# Patient Record
Sex: FEMALE | Race: WHITE | Employment: OTHER | URBAN - NONMETROPOLITAN AREA
[De-identification: names, ages, dates, MRNs, and addresses within clinical notes are randomized per-mention and may not be internally consistent; named-entity substitution may affect disease eponyms.]

---

## 2017-02-02 ENCOUNTER — OFFICE VISIT (OUTPATIENT)
Dept: CARDIOLOGY | Age: 82
End: 2017-02-02

## 2017-02-02 VITALS
HEIGHT: 62 IN | SYSTOLIC BLOOD PRESSURE: 122 MMHG | BODY MASS INDEX: 24.66 KG/M2 | WEIGHT: 134 LBS | HEART RATE: 73 BPM | DIASTOLIC BLOOD PRESSURE: 78 MMHG

## 2017-02-02 DIAGNOSIS — R06.02 SOB (SHORTNESS OF BREATH) ON EXERTION: Primary | ICD-10-CM

## 2017-02-02 DIAGNOSIS — I10 ESSENTIAL HYPERTENSION: ICD-10-CM

## 2017-02-02 PROCEDURE — 99213 OFFICE O/P EST LOW 20 MIN: CPT | Performed by: NUCLEAR MEDICINE

## 2017-02-02 PROCEDURE — 4040F PNEUMOC VAC/ADMIN/RCVD: CPT | Performed by: NUCLEAR MEDICINE

## 2017-02-02 PROCEDURE — 93000 ELECTROCARDIOGRAM COMPLETE: CPT | Performed by: NUCLEAR MEDICINE

## 2017-02-02 PROCEDURE — G8427 DOCREV CUR MEDS BY ELIG CLIN: HCPCS | Performed by: NUCLEAR MEDICINE

## 2017-02-02 PROCEDURE — 1123F ACP DISCUSS/DSCN MKR DOCD: CPT | Performed by: NUCLEAR MEDICINE

## 2017-02-02 PROCEDURE — G8400 PT W/DXA NO RESULTS DOC: HCPCS | Performed by: NUCLEAR MEDICINE

## 2017-02-02 PROCEDURE — 1090F PRES/ABSN URINE INCON ASSESS: CPT | Performed by: NUCLEAR MEDICINE

## 2017-02-02 PROCEDURE — 4004F PT TOBACCO SCREEN RCVD TLK: CPT | Performed by: NUCLEAR MEDICINE

## 2017-02-02 PROCEDURE — G8420 CALC BMI NORM PARAMETERS: HCPCS | Performed by: NUCLEAR MEDICINE

## 2017-02-02 PROCEDURE — G8484 FLU IMMUNIZE NO ADMIN: HCPCS | Performed by: NUCLEAR MEDICINE

## 2017-02-02 RX ORDER — BUSPIRONE HYDROCHLORIDE 5 MG/1
5 TABLET ORAL 2 TIMES DAILY PRN
COMMUNITY

## 2017-02-13 ENCOUNTER — TELEPHONE (OUTPATIENT)
Dept: CARDIOLOGY | Age: 82
End: 2017-02-13

## 2017-06-05 ENCOUNTER — OFFICE VISIT (OUTPATIENT)
Dept: AUDIOLOGY | Age: 82
End: 2017-06-05

## 2017-06-05 DIAGNOSIS — H90.3 SENSORINEURAL HEARING LOSS, BILATERAL: Primary | ICD-10-CM

## 2018-02-08 ENCOUNTER — OFFICE VISIT (OUTPATIENT)
Dept: CARDIOLOGY CLINIC | Age: 83
End: 2018-02-08
Payer: MEDICARE

## 2018-02-08 VITALS
DIASTOLIC BLOOD PRESSURE: 80 MMHG | BODY MASS INDEX: 25.6 KG/M2 | SYSTOLIC BLOOD PRESSURE: 150 MMHG | WEIGHT: 144.5 LBS | HEIGHT: 63 IN | HEART RATE: 72 BPM

## 2018-02-08 DIAGNOSIS — R06.02 SOB (SHORTNESS OF BREATH): Primary | ICD-10-CM

## 2018-02-08 DIAGNOSIS — I10 ESSENTIAL HYPERTENSION: ICD-10-CM

## 2018-02-08 PROCEDURE — 4040F PNEUMOC VAC/ADMIN/RCVD: CPT | Performed by: NUCLEAR MEDICINE

## 2018-02-08 PROCEDURE — G8419 CALC BMI OUT NRM PARAM NOF/U: HCPCS | Performed by: NUCLEAR MEDICINE

## 2018-02-08 PROCEDURE — 4004F PT TOBACCO SCREEN RCVD TLK: CPT | Performed by: NUCLEAR MEDICINE

## 2018-02-08 PROCEDURE — G8427 DOCREV CUR MEDS BY ELIG CLIN: HCPCS | Performed by: NUCLEAR MEDICINE

## 2018-02-08 PROCEDURE — 93000 ELECTROCARDIOGRAM COMPLETE: CPT | Performed by: NUCLEAR MEDICINE

## 2018-02-08 PROCEDURE — 1123F ACP DISCUSS/DSCN MKR DOCD: CPT | Performed by: NUCLEAR MEDICINE

## 2018-02-08 PROCEDURE — 1090F PRES/ABSN URINE INCON ASSESS: CPT | Performed by: NUCLEAR MEDICINE

## 2018-02-08 PROCEDURE — G8484 FLU IMMUNIZE NO ADMIN: HCPCS | Performed by: NUCLEAR MEDICINE

## 2018-02-08 PROCEDURE — 99213 OFFICE O/P EST LOW 20 MIN: CPT | Performed by: NUCLEAR MEDICINE

## 2018-02-08 RX ORDER — TERBINAFINE HYDROCHLORIDE 250 MG/1
250 TABLET ORAL DAILY
COMMUNITY
End: 2018-05-31 | Stop reason: ALTCHOICE

## 2018-02-08 NOTE — PROGRESS NOTES
[facility] PROFESSIONAL SERVS  [facility] [place]  [address]  [address]  [place] [address]  Dept: [phone]  Dept Fax: [phone]  Loc: [phone]    Visit Date: 2/8/2018    Linda Angeles is a 80 y.o. female who presents today for:  Chief Complaint   Patient presents with    1 Year Follow Up    Shortness of Breath    Hypertension   BP is better at home  No chest pain  No changes in breathing  Some dyspnea on exertion   Active  No new issues  No dizziness  No syncope  No ER visits       HPI:  HPI  Past Medical History:   Diagnosis Date    Arthritis     GERD (gastroesophageal reflux disease)     Hyperlipidemia     Hypertension     Keratosis     benign lichemoid      Past Surgical History:   Procedure Laterality Date    APPENDECTOMY  1934    CHOLECYSTECTOMY  1999    COLON SURGERY  2000    resection    HYSTERECTOMY  1979    KNEE SURGERY  2010    ROTATOR CUFF REPAIR  2006     No family history on file. Social History   Substance Use Topics    Smoking status: Never Smoker    Smokeless tobacco: Never Used    Alcohol use Yes      Comment: 2 ounce of wine once in a while. Current Outpatient Prescriptions   Medication Sig Dispense Refill    terbinafine (LAMISIL) 250 MG tablet Take 250 mg by mouth daily      busPIRone (BUSPAR) 5 MG tablet Take 5 mg by mouth 2 times daily as needed       ALENDRONATE SODIUM PO Take 70 mg by mouth      CALCIUM PO Take by mouth      losartan (COZAAR) 50 MG tablet Take 1 tablet by mouth 2 times daily 180 tablet 1    amLODIPine (NORVASC) 5 MG tablet Take 1 tablet by mouth daily 90 tablet 1    metoprolol (LOPRESSOR) 100 MG tablet Take 100 mg by mouth 2 times daily Pt takes 50 in AM and 100 in PM      Omega-3 Fatty Acids 500 MG CAPS Take  by mouth daily.  therapeutic multivitamin-minerals (THERAGRAN-M) tablet Take 1 tablet by mouth daily.       NONFORMULARY Reliv, powder supplement      omeprazole (PRILOSEC) 20 MG capsule Take 20 mg by mouth daily. No current facility-administered medications for this visit. Allergies   Allergen Reactions    Tramadol     Vicodin [Hydrocodone-Acetaminophen] Nausea And Vomiting and Anxiety     Health Maintenance   Topic Date Due    DTaP/Tdap/Td vaccine (1 - Tdap) 03/04/1950    Zostavax vaccine  03/04/1991    Pneumococcal low/med risk (1 of 2 - PCV13) 03/04/1996    Flu vaccine (1) 09/01/2017       Subjective:  Review of Systems  General:   No fever, no chills, No fatigue or weight loss  Pulmonary:    some dyspnea, no wheezing  Cardiac:    Denies recent chest pain,   GI:     No nausea or vomiting, no abdominal pain  Neuro:    No dizziness or light headedness,   Musculoskeletal:  No recent active issues  Extremities:   No edema, good peripheral pulses      Objective:  Physical Exam  BP (!) 162/86   Pulse 72   Ht 5' 3\" (1.6 m)   Wt 144 lb 8 oz (65.5 kg)   BMI 25.60 kg/m²   General:   Well developed, well nourished  Lungs:   Clear to auscultation  Heart:    Normal S1 S2, Slight murmur. no rubs, no gallops  Abdomen:   Soft, non tender, no organomegalies, positive bowel sounds  Extremities:   No edema, no cyanosis, good peripheral pulses  Neurological:   Awake, alert, oriented. No obvious focal deficits  Musculoskelatal:  No obvious deformities    Assessment:     1. SOB (shortness of breath)  EKG 12 Lead   2. Essential hypertension     cardiac fair for now   ECG in office was done today. I reviewed the ECG. No acute findings      Plan:  No Follow-up on file. As above  Continue risk factor modification and medical management  Thank you for allowing me to participate in the care of your patient. Please don't hesitate to contact me regarding any further issues related to the patient care    Orders Placed:  Orders Placed This Encounter   Procedures    EKG 12 Lead     Order Specific Question:   Reason for Exam?     Answer:    Other       Medications Prescribed:  No orders of the defined types were placed in this encounter. Discussed use, benefit, and side effects of prescribed medications. All patient questions answered. Pt voiced understanding. Instructed to continue current medications, diet and exercise. Continue risk factor modification and medical management. Patient agreed with treatment plan. Follow up as directed.     Electronically signed by Chelsie Hodges MD on 2/8/2018 at 9:58 AM

## 2018-04-18 ENCOUNTER — HOSPITAL ENCOUNTER (OUTPATIENT)
Dept: CT IMAGING | Age: 83
Discharge: HOME | End: 2018-04-18
Payer: MEDICARE

## 2018-04-18 DIAGNOSIS — J32.9 CHRONIC SINUSITIS, UNSPECIFIED LOCATION: ICD-10-CM

## 2018-04-18 PROCEDURE — 70486 CT MAXILLOFACIAL W/O DYE: CPT

## 2018-05-31 ENCOUNTER — OFFICE VISIT (OUTPATIENT)
Dept: ENT CLINIC | Age: 83
End: 2018-05-31
Payer: MEDICARE

## 2018-05-31 VITALS
BODY MASS INDEX: 26.46 KG/M2 | DIASTOLIC BLOOD PRESSURE: 70 MMHG | HEART RATE: 78 BPM | WEIGHT: 143.8 LBS | HEIGHT: 62 IN | TEMPERATURE: 97.9 F | SYSTOLIC BLOOD PRESSURE: 128 MMHG | RESPIRATION RATE: 18 BRPM

## 2018-05-31 DIAGNOSIS — J34.2 DEVIATED NASAL SEPTUM: Primary | ICD-10-CM

## 2018-05-31 DIAGNOSIS — J32.3 CHRONIC SPHENOIDAL SINUSITIS: ICD-10-CM

## 2018-05-31 DIAGNOSIS — H66.90 CHRONIC OTITIS MEDIA, UNSPECIFIED OTITIS MEDIA TYPE: ICD-10-CM

## 2018-05-31 PROCEDURE — 99203 OFFICE O/P NEW LOW 30 MIN: CPT | Performed by: OTOLARYNGOLOGY

## 2018-05-31 PROCEDURE — 1036F TOBACCO NON-USER: CPT | Performed by: OTOLARYNGOLOGY

## 2018-05-31 PROCEDURE — G8419 CALC BMI OUT NRM PARAM NOF/U: HCPCS | Performed by: OTOLARYNGOLOGY

## 2018-05-31 PROCEDURE — G8427 DOCREV CUR MEDS BY ELIG CLIN: HCPCS | Performed by: OTOLARYNGOLOGY

## 2018-05-31 PROCEDURE — 4040F PNEUMOC VAC/ADMIN/RCVD: CPT | Performed by: OTOLARYNGOLOGY

## 2018-05-31 PROCEDURE — 92511 NASOPHARYNGOSCOPY: CPT | Performed by: OTOLARYNGOLOGY

## 2018-05-31 PROCEDURE — 1090F PRES/ABSN URINE INCON ASSESS: CPT | Performed by: OTOLARYNGOLOGY

## 2018-05-31 PROCEDURE — 1123F ACP DISCUSS/DSCN MKR DOCD: CPT | Performed by: OTOLARYNGOLOGY

## 2018-05-31 RX ORDER — LORATADINE 10 MG/1
10 TABLET ORAL DAILY
COMMUNITY
End: 2020-02-13 | Stop reason: ALTCHOICE

## 2018-05-31 ASSESSMENT — ENCOUNTER SYMPTOMS
WHEEZING: 0
CHEST TIGHTNESS: 0
RHINORRHEA: 0
SHORTNESS OF BREATH: 0
FACIAL SWELLING: 0
COUGH: 0
ABDOMINAL PAIN: 0
TROUBLE SWALLOWING: 0
NAUSEA: 0
VOICE CHANGE: 1
SORE THROAT: 0
COLOR CHANGE: 0
DIARRHEA: 0
STRIDOR: 0
VOMITING: 0
APNEA: 0
SINUS PRESSURE: 1
CHOKING: 0

## 2018-06-04 ENCOUNTER — HOSPITAL ENCOUNTER (OUTPATIENT)
Dept: AUDIOLOGY | Age: 83
Discharge: HOME | End: 2018-06-04

## 2018-06-04 ENCOUNTER — TELEPHONE (OUTPATIENT)
Dept: ENT CLINIC | Age: 83
End: 2018-06-04

## 2018-06-04 ENCOUNTER — HOSPITAL ENCOUNTER (OUTPATIENT)
Age: 83
Discharge: HOME | End: 2018-06-04
Payer: MEDICARE

## 2018-06-04 DIAGNOSIS — Z79.2 ON TRIMETHOPRIM/SULFAMETHAXAZOLE THERAPY: ICD-10-CM

## 2018-06-04 DIAGNOSIS — Z79.2 ON TRIMETHOPRIM/SULFAMETHAXAZOLE THERAPY: Primary | ICD-10-CM

## 2018-06-04 LAB
CREAT SERPL-MCNC: 0.5 MG/DL (ref 0.4–1.2)
GFR SERPL CREATININE-BSD FRML MDRD: > 90 ML/MIN/1.73M2
ORGANISM: ABNORMAL
ORGANISM: ABNORMAL
THROAT/NOSE CULTURE: ABNORMAL

## 2018-06-04 PROCEDURE — 82565 ASSAY OF CREATININE: CPT

## 2018-06-04 PROCEDURE — 36415 COLL VENOUS BLD VENIPUNCTURE: CPT

## 2018-06-04 PROCEDURE — 92593 HC HEARING AID CHECK, BOTH EARS: CPT | Performed by: AUDIOLOGIST

## 2018-06-04 RX ORDER — SULFAMETHOXAZOLE AND TRIMETHOPRIM 400; 80 MG/1; MG/1
1 TABLET ORAL 3 TIMES DAILY
Qty: 84 TABLET | Refills: 0 | Status: SHIPPED | OUTPATIENT
Start: 2018-06-04 | End: 2018-07-02

## 2018-07-10 ENCOUNTER — OFFICE VISIT (OUTPATIENT)
Dept: ENT CLINIC | Age: 83
End: 2018-07-10
Payer: MEDICARE

## 2018-07-10 VITALS
RESPIRATION RATE: 12 BRPM | HEART RATE: 76 BPM | TEMPERATURE: 98 F | SYSTOLIC BLOOD PRESSURE: 110 MMHG | BODY MASS INDEX: 26.39 KG/M2 | WEIGHT: 143.4 LBS | DIASTOLIC BLOOD PRESSURE: 60 MMHG | HEIGHT: 62 IN

## 2018-07-10 DIAGNOSIS — H66.90 CHRONIC OTITIS MEDIA, UNSPECIFIED OTITIS MEDIA TYPE: Primary | ICD-10-CM

## 2018-07-10 DIAGNOSIS — K21.9 GERD WITHOUT ESOPHAGITIS: ICD-10-CM

## 2018-07-10 DIAGNOSIS — R09.89 FOREIGN BODY SENSATION IN THROAT: ICD-10-CM

## 2018-07-10 DIAGNOSIS — H90.6 MIXED CONDUCTIVE AND SENSORINEURAL HEARING LOSS OF BOTH EARS: ICD-10-CM

## 2018-07-10 DIAGNOSIS — T46.5X5D ADVERSE EFFECT OF ANGIOTENSIN 2 RECEPTOR ANTAGONIST, SUBSEQUENT ENCOUNTER: ICD-10-CM

## 2018-07-10 DIAGNOSIS — J32.3 CHRONIC SPHENOIDAL SINUSITIS: ICD-10-CM

## 2018-07-10 PROCEDURE — G8419 CALC BMI OUT NRM PARAM NOF/U: HCPCS | Performed by: OTOLARYNGOLOGY

## 2018-07-10 PROCEDURE — 4040F PNEUMOC VAC/ADMIN/RCVD: CPT | Performed by: OTOLARYNGOLOGY

## 2018-07-10 PROCEDURE — 99213 OFFICE O/P EST LOW 20 MIN: CPT | Performed by: OTOLARYNGOLOGY

## 2018-07-10 PROCEDURE — 1123F ACP DISCUSS/DSCN MKR DOCD: CPT | Performed by: OTOLARYNGOLOGY

## 2018-07-10 PROCEDURE — 1090F PRES/ABSN URINE INCON ASSESS: CPT | Performed by: OTOLARYNGOLOGY

## 2018-07-10 PROCEDURE — 31575 DIAGNOSTIC LARYNGOSCOPY: CPT | Performed by: OTOLARYNGOLOGY

## 2018-07-10 PROCEDURE — 1036F TOBACCO NON-USER: CPT | Performed by: OTOLARYNGOLOGY

## 2018-07-10 PROCEDURE — G8427 DOCREV CUR MEDS BY ELIG CLIN: HCPCS | Performed by: OTOLARYNGOLOGY

## 2018-07-10 PROCEDURE — 1101F PT FALLS ASSESS-DOCD LE1/YR: CPT | Performed by: OTOLARYNGOLOGY

## 2018-07-10 ASSESSMENT — ENCOUNTER SYMPTOMS
FACIAL SWELLING: 0
RHINORRHEA: 0
CHOKING: 0
APNEA: 0
DIARRHEA: 0
STRIDOR: 0
NAUSEA: 0
SINUS PRESSURE: 0
VOICE CHANGE: 0
CHEST TIGHTNESS: 0
SHORTNESS OF BREATH: 0
SORE THROAT: 0
COLOR CHANGE: 0
COUGH: 1
TROUBLE SWALLOWING: 0
WHEEZING: 0
ABDOMINAL PAIN: 0
VOMITING: 0

## 2018-07-10 NOTE — PROGRESS NOTES
capsule Take 20 mg by mouth daily. No current facility-administered medications for this visit. Past Medical History:   Diagnosis Date    Arthritis     GERD (gastroesophageal reflux disease)     Hyperlipidemia     Hypertension     Keratosis     benign lichemoid      Past Surgical History:   Procedure Laterality Date    APPENDECTOMY  1934    CHOLECYSTECTOMY  1999    COLON SURGERY  2000    resection    HYSTERECTOMY  1979    KNEE SURGERY  2010    ROTATOR CUFF REPAIR  2006     History reviewed. No pertinent family history. Social History   Substance Use Topics    Smoking status: Never Smoker    Smokeless tobacco: Never Used    Alcohol use Yes      Comment: 2 ounce of wine once in a while. Subjective:      Review of Systems   Constitutional: Negative for activity change, appetite change, chills, diaphoresis, fatigue, fever and unexpected weight change. HENT: Positive for congestion, ear discharge, hearing loss and postnasal drip. Negative for dental problem, ear pain, facial swelling, mouth sores, nosebleeds, rhinorrhea, sinus pressure, sneezing, sore throat, tinnitus, trouble swallowing and voice change. Eyes: Negative for visual disturbance. Respiratory: Positive for cough. Negative for apnea, choking, chest tightness, shortness of breath, wheezing and stridor. Cardiovascular: Negative for chest pain, palpitations and leg swelling. Gastrointestinal: Negative for abdominal pain, diarrhea, nausea and vomiting. Endocrine: Negative for cold intolerance, heat intolerance, polydipsia and polyuria. Genitourinary: Negative for dysuria, enuresis and hematuria. Musculoskeletal: Negative for arthralgias, gait problem, neck pain and neck stiffness. Skin: Negative for color change and rash. Allergic/Immunologic: Negative for environmental allergies, food allergies and immunocompromised state.    Neurological: Negative for dizziness, syncope, facial asymmetry, speech difficulty, none

## 2018-07-13 ENCOUNTER — TELEPHONE (OUTPATIENT)
Dept: CARDIOLOGY CLINIC | Age: 83
End: 2018-07-13

## 2018-09-13 ENCOUNTER — HOSPITAL ENCOUNTER (OUTPATIENT)
Dept: AUDIOLOGY | Age: 83
Discharge: HOME | End: 2018-09-13
Payer: MEDICARE

## 2018-09-13 ENCOUNTER — OFFICE VISIT (OUTPATIENT)
Dept: ENT CLINIC | Age: 83
End: 2018-09-13
Payer: MEDICARE

## 2018-09-13 VITALS
RESPIRATION RATE: 12 BRPM | HEIGHT: 61 IN | SYSTOLIC BLOOD PRESSURE: 138 MMHG | WEIGHT: 142 LBS | DIASTOLIC BLOOD PRESSURE: 72 MMHG | HEART RATE: 72 BPM | BODY MASS INDEX: 26.81 KG/M2 | TEMPERATURE: 97.6 F

## 2018-09-13 DIAGNOSIS — J34.2 DEVIATED NASAL SEPTUM: ICD-10-CM

## 2018-09-13 DIAGNOSIS — T46.5X5D ADVERSE EFFECT OF ANGIOTENSIN 2 RECEPTOR ANTAGONIST, SUBSEQUENT ENCOUNTER: ICD-10-CM

## 2018-09-13 DIAGNOSIS — J32.3 CHRONIC SPHENOIDAL SINUSITIS: ICD-10-CM

## 2018-09-13 DIAGNOSIS — H90.3 SENSORINEURAL HEARING LOSS (SNHL) OF BOTH EARS: Primary | ICD-10-CM

## 2018-09-13 PROCEDURE — G8419 CALC BMI OUT NRM PARAM NOF/U: HCPCS | Performed by: OTOLARYNGOLOGY

## 2018-09-13 PROCEDURE — 92567 TYMPANOMETRY: CPT | Performed by: AUDIOLOGIST

## 2018-09-13 PROCEDURE — 4040F PNEUMOC VAC/ADMIN/RCVD: CPT | Performed by: OTOLARYNGOLOGY

## 2018-09-13 PROCEDURE — 1101F PT FALLS ASSESS-DOCD LE1/YR: CPT | Performed by: OTOLARYNGOLOGY

## 2018-09-13 PROCEDURE — 92557 COMPREHENSIVE HEARING TEST: CPT | Performed by: AUDIOLOGIST

## 2018-09-13 PROCEDURE — 1090F PRES/ABSN URINE INCON ASSESS: CPT | Performed by: OTOLARYNGOLOGY

## 2018-09-13 PROCEDURE — 99213 OFFICE O/P EST LOW 20 MIN: CPT | Performed by: OTOLARYNGOLOGY

## 2018-09-13 PROCEDURE — 1036F TOBACCO NON-USER: CPT | Performed by: OTOLARYNGOLOGY

## 2018-09-13 PROCEDURE — 1123F ACP DISCUSS/DSCN MKR DOCD: CPT | Performed by: OTOLARYNGOLOGY

## 2018-09-13 PROCEDURE — G8427 DOCREV CUR MEDS BY ELIG CLIN: HCPCS | Performed by: OTOLARYNGOLOGY

## 2018-09-13 ASSESSMENT — ENCOUNTER SYMPTOMS
VOICE CHANGE: 0
CHOKING: 0
DIARRHEA: 0
ABDOMINAL PAIN: 0
WHEEZING: 0
SINUS PRESSURE: 0
NAUSEA: 0
VOMITING: 0
FACIAL SWELLING: 0
SORE THROAT: 0
CHEST TIGHTNESS: 0
SHORTNESS OF BREATH: 0
COUGH: 0
COLOR CHANGE: 0
RHINORRHEA: 0
TROUBLE SWALLOWING: 0
APNEA: 0
STRIDOR: 0

## 2018-09-13 NOTE — PROGRESS NOTES
ACCOUNT #: [de-identified]      AUDIOLOGICAL EVALUATION      REASON FOR TESTING:  Patient wears binaural [employer] LEIA hearing aids. She stated that her right ear feels like there is fluid in it. OTOSCOPY: clear EAC's. AUDIOGRAM        Reliability: good  Audiometer Used:  GSI-61    PURE TONES     RE    LE     []   [] WNL        []   [x] Mild    [x]   [x] Moderate       []   [] Mod-Severe   []   [] Severe    []   [] Profound    TYPE     RE    LE    [x]   [x] SNHL    []   []  Conductive HL    []   [] Mixed HL      CONFIGURATION    RE    LE    []   [] Essentially Flat     [x]   [x]  Sloping  []   [] Steeply Sloping  []   []  Rising  []   [] Cookie Bite    SPEECH AUDIOMETRY   Right Left Sound Field Aided   PTA 33 27     SRT 35 30     SAT       MASKING       % WRS   QUIET 92 88      92 88     %WRS   NOISE              MCL       [facility]            Live Voice  [x]     Recorded  []     List   []     WORD RECOGNITION   RE    LE  [x]   [x]  Excellent    []   []  Good  []   [] Fair  []   [] Poor  []   [] Very Poor    TYMPANOGRAMS  RE    LE  []   [x]  WNL    []   []  WNL w/reduced mobility  []   [] WNL w/hyper mobility  []   [] Negative pressure  [x]   [] Flat w/normal ECV  []   [] Flat w/large ECV  []   [] Patent PE tube  []   [] Non-Patent PE tube  []   [] Could Not Test    COMMENTS:  Audiometric results have remained stable compared to previous results obtained on 1/29/2015. Tympanometry is WNL in the left ear. A flat tympanogram was obtained for the right ear. RECOMMENDATION(S):   1. Patient has a follow up appointment scheduled with Dr. Herminia Dandy today. 2.  Continued annual hearing aid checks recommended.

## 2018-09-13 NOTE — LETTER
[address] [address] [address] [place] [address] [address]  [address]  Phone: [phone]  Fax: [phone]    Irina Grossman MD        September 23, 2018    Kenneth Millard, [address]    Patient: Dara Irizarry   MR Number: [id number]   YOB: 1931   Date of Visit: 9/13/2018     Dear Kenneth Millard,    I recently saw your patient, Dara Irizarry, regarding Her hearing test in her throat symptoms. She is doing much better off losartan. She has mild to moderate high-frequency hearing loss bilaterally, slightly worse on the right but not significantly so. Tuning fork testing shows air greater than bone bilaterally. She has hearing aids. I do not believe she needs any further intervention from my end. Below are the relevant portions of my assessment and plan of care. Assessment & Plan   Diagnoses and all orders for this visit:     Diagnosis Orders   1. Sensorineural hearing loss (SNHL) of both ears      Mild to moderate   2. Adverse effect of angiotensin 2 receptor antagonist, subsequent encounter      Improved off losartan   3. Deviated nasal septum, Right     4. Chronic sphenoidal sinusitis      Improved       The findings were explained and her questions were answered. The patient has hearing aids and is being followed by audiology. I do not think that any attempt of the ventilation tube is warranted. She should contact us if any of her problems for back up again. Return as needed      If you have questions, please do not hesitate to call me. I look forward to following Kale Bradford along with you.     Sincerely,          Irina Grossman MD

## 2018-09-13 NOTE — PROGRESS NOTES
[facility] [address], NOSE AND THROAT  [facility] [address]  [address]  [address] [address]  Dept: [phone]  Dept Fax: [phone]  Loc: [phone]    Sade Ruiz is a 80 y.o. female who was referred by No ref. provider found for:  Chief Complaint   Patient presents with    Follow-up     Patient is here for a follow up after Audiogram    .    HPI:     Sade Ruiz is a 80 y.o. female who presents today for Follow-up on her hearing testing and her adverse effect of losartan. Her throat feels much better off the losartan. Audiometry shows a virtually symmetrical gently sloping sensorineural hearing loss. Tympanometry was showed a possible flat tympanogram on the right, where she has a thickened tympanic membrane. Review of her CT scan from April shows no middle ear fluid on that side     Endoscopy of her sinuses on the prior visit showed that the sphenoid ostium was  Patent. History: Allergies   Allergen Reactions    Penicillins     Tramadol     Vicodin [Hydrocodone-Acetaminophen] Nausea And Vomiting and Anxiety     Current Outpatient Prescriptions   Medication Sig Dispense Refill    loratadine (CLARITIN) 10 MG tablet Take 10 mg by mouth daily      busPIRone (BUSPAR) 5 MG tablet Take 5 mg by mouth 2 times daily as needed       ALENDRONATE SODIUM PO Take 70 mg by mouth      CALCIUM PO Take by mouth      amLODIPine (NORVASC) 5 MG tablet Take 1 tablet by mouth daily 90 tablet 1    metoprolol (LOPRESSOR) 100 MG tablet Take 100 mg by mouth 2 times daily Pt takes 50 in AM and 100 in PM      Omega-3 Fatty Acids 500 MG CAPS Take  by mouth daily.  therapeutic multivitamin-minerals (THERAGRAN-M) tablet Take 1 tablet by mouth daily.  NONFORMULARY Reliv, powder supplement      omeprazole (PRILOSEC) 20 MG capsule Take 20 mg by mouth daily. No current facility-administered medications for this visit.       Past Medical History:   Diagnosis Date  Arthritis     GERD (gastroesophageal reflux disease)     Hyperlipidemia     Hypertension     Keratosis     benign lichemoid      Past Surgical History:   Procedure Laterality Date    APPENDECTOMY  1934    CHOLECYSTECTOMY  1999    COLON SURGERY  2000    resection    HYSTERECTOMY  1979    KNEE SURGERY  2010    ROTATOR CUFF REPAIR  2006     History reviewed. No pertinent family history. Social History   Substance Use Topics    Smoking status: Never Smoker    Smokeless tobacco: Never Used    Alcohol use Yes      Comment: 2 ounce of wine once in a while. Subjective:      Review of Systems   Constitutional: Negative for activity change, appetite change, chills, diaphoresis, fatigue, fever and unexpected weight change. HENT: Negative for congestion, dental problem, ear discharge, ear pain, facial swelling, hearing loss, mouth sores, nosebleeds, postnasal drip, rhinorrhea, sinus pressure, sneezing, sore throat, tinnitus, trouble swallowing and voice change. Eyes: Negative for visual disturbance. Respiratory: Negative for apnea, cough, choking, chest tightness, shortness of breath, wheezing and stridor. Cardiovascular: Negative for chest pain, palpitations and leg swelling. Gastrointestinal: Negative for abdominal pain, diarrhea, nausea and vomiting. Endocrine: Negative for cold intolerance, heat intolerance, polydipsia and polyuria. Genitourinary: Negative for dysuria, enuresis and hematuria. Musculoskeletal: Negative for arthralgias, gait problem, neck pain and neck stiffness. Skin: Negative for color change and rash. Allergic/Immunologic: Negative for environmental allergies, food allergies and immunocompromised state. Neurological: Negative for dizziness, syncope, facial asymmetry, speech difficulty, light-headedness and headaches. Hematological: Negative for adenopathy. Does not bruise/bleed easily. Psychiatric/Behavioral: Negative for confusion and sleep disturbance.

## 2019-02-07 ENCOUNTER — OFFICE VISIT (OUTPATIENT)
Dept: CARDIOLOGY CLINIC | Age: 84
End: 2019-02-07
Payer: MEDICARE

## 2019-02-07 VITALS
HEART RATE: 87 BPM | HEIGHT: 62 IN | BODY MASS INDEX: 25.76 KG/M2 | DIASTOLIC BLOOD PRESSURE: 72 MMHG | SYSTOLIC BLOOD PRESSURE: 126 MMHG | WEIGHT: 140 LBS

## 2019-02-07 DIAGNOSIS — I10 ESSENTIAL HYPERTENSION: ICD-10-CM

## 2019-02-07 DIAGNOSIS — R06.02 SOB (SHORTNESS OF BREATH): Primary | ICD-10-CM

## 2019-02-07 PROCEDURE — G8419 CALC BMI OUT NRM PARAM NOF/U: HCPCS | Performed by: NUCLEAR MEDICINE

## 2019-02-07 PROCEDURE — G8484 FLU IMMUNIZE NO ADMIN: HCPCS | Performed by: NUCLEAR MEDICINE

## 2019-02-07 PROCEDURE — 1101F PT FALLS ASSESS-DOCD LE1/YR: CPT | Performed by: NUCLEAR MEDICINE

## 2019-02-07 PROCEDURE — G8427 DOCREV CUR MEDS BY ELIG CLIN: HCPCS | Performed by: NUCLEAR MEDICINE

## 2019-02-07 PROCEDURE — 99213 OFFICE O/P EST LOW 20 MIN: CPT | Performed by: NUCLEAR MEDICINE

## 2019-02-07 PROCEDURE — 1090F PRES/ABSN URINE INCON ASSESS: CPT | Performed by: NUCLEAR MEDICINE

## 2019-02-07 PROCEDURE — 4040F PNEUMOC VAC/ADMIN/RCVD: CPT | Performed by: NUCLEAR MEDICINE

## 2019-02-07 PROCEDURE — 1123F ACP DISCUSS/DSCN MKR DOCD: CPT | Performed by: NUCLEAR MEDICINE

## 2019-02-07 PROCEDURE — 1036F TOBACCO NON-USER: CPT | Performed by: NUCLEAR MEDICINE

## 2019-02-07 PROCEDURE — 93000 ELECTROCARDIOGRAM COMPLETE: CPT | Performed by: NUCLEAR MEDICINE

## 2019-11-27 ENCOUNTER — HOSPITAL ENCOUNTER (OUTPATIENT)
Age: 84
Discharge: HOME | End: 2019-11-27
Payer: MEDICARE

## 2019-11-27 ENCOUNTER — HOSPITAL ENCOUNTER (OUTPATIENT)
Dept: GENERAL RADIOLOGY | Age: 84
Discharge: HOME | End: 2019-11-27
Payer: MEDICARE

## 2019-11-27 DIAGNOSIS — M25.562 LEFT KNEE PAIN, UNSPECIFIED CHRONICITY: ICD-10-CM

## 2019-11-27 PROCEDURE — 73562 X-RAY EXAM OF KNEE 3: CPT

## 2020-02-13 ENCOUNTER — OFFICE VISIT (OUTPATIENT)
Dept: CARDIOLOGY CLINIC | Age: 85
End: 2020-02-13
Payer: MEDICARE

## 2020-02-13 VITALS
BODY MASS INDEX: 24.69 KG/M2 | HEIGHT: 62 IN | WEIGHT: 134.2 LBS | DIASTOLIC BLOOD PRESSURE: 76 MMHG | SYSTOLIC BLOOD PRESSURE: 138 MMHG | HEART RATE: 76 BPM

## 2020-02-13 PROCEDURE — 1090F PRES/ABSN URINE INCON ASSESS: CPT | Performed by: NUCLEAR MEDICINE

## 2020-02-13 PROCEDURE — 93000 ELECTROCARDIOGRAM COMPLETE: CPT | Performed by: NUCLEAR MEDICINE

## 2020-02-13 PROCEDURE — 1123F ACP DISCUSS/DSCN MKR DOCD: CPT | Performed by: NUCLEAR MEDICINE

## 2020-02-13 PROCEDURE — 1036F TOBACCO NON-USER: CPT | Performed by: NUCLEAR MEDICINE

## 2020-02-13 PROCEDURE — G8420 CALC BMI NORM PARAMETERS: HCPCS | Performed by: NUCLEAR MEDICINE

## 2020-02-13 PROCEDURE — G8427 DOCREV CUR MEDS BY ELIG CLIN: HCPCS | Performed by: NUCLEAR MEDICINE

## 2020-02-13 PROCEDURE — 99213 OFFICE O/P EST LOW 20 MIN: CPT | Performed by: NUCLEAR MEDICINE

## 2020-02-13 PROCEDURE — G8484 FLU IMMUNIZE NO ADMIN: HCPCS | Performed by: NUCLEAR MEDICINE

## 2020-02-13 PROCEDURE — 4040F PNEUMOC VAC/ADMIN/RCVD: CPT | Performed by: NUCLEAR MEDICINE

## 2020-02-13 RX ORDER — FOLIC ACID/MULTIVIT,IRON,MINER 0.4MG-18MG
1 TABLET ORAL DAILY
COMMUNITY

## 2020-02-13 RX ORDER — METOPROLOL TARTRATE 100 MG/1
100 TABLET ORAL 2 TIMES DAILY
Qty: 135 TABLET | Refills: 3 | Status: SHIPPED | OUTPATIENT
Start: 2020-02-13 | End: 2022-05-24 | Stop reason: SDUPTHER

## 2020-02-13 NOTE — PROGRESS NOTES
[address] [address]  Dept: [phone]  Dept Fax: [phone]  Loc: [phone]    Visit Date: 2/13/2020    Frances Espinal is a 80 y.o. female who presents todayfor:  Chief Complaint   Patient presents with    1 Year Follow Up    Hypertension    Shortness of Breath     BP is stable  No chest pain   No changes in breathing  No new symptoms  Active  No ER visits      HPI:  HPI  Past Medical History:   Diagnosis Date    Arthritis     GERD (gastroesophageal reflux disease)     Hyperlipidemia     Hypertension     Keratosis     benign lichemoid      Past Surgical History:   Procedure Laterality Date    APPENDECTOMY  1934    CHOLECYSTECTOMY  1999    COLON SURGERY  2000    resection    HYSTERECTOMY  1979    KNEE SURGERY  2010    ROTATOR CUFF REPAIR  2006     History reviewed. No pertinent family history. Social History     Tobacco Use    Smoking status: Never Smoker    Smokeless tobacco: Never Used   Substance Use Topics    Alcohol use: Yes     Comment: 2 ounce of wine once in a while. Current Outpatient Medications   Medication Sig Dispense Refill    Krill Oil 350 MG CAPS Take 1 tablet by mouth daily      Acetaminophen (TYLENOL 8 HOUR ARTHRITIS PAIN PO) Take 1 tablet by mouth as needed      busPIRone (BUSPAR) 5 MG tablet Take 5 mg by mouth 2 times daily as needed       CALCIUM PO Take by mouth      amLODIPine (NORVASC) 5 MG tablet Take 1 tablet by mouth daily 90 tablet 1    metoprolol (LOPRESSOR) 100 MG tablet Take 100 mg by mouth 2 times daily Pt takes 50 in AM and 100 in PM      NONFORMULARY Reliv, powder supplement      omeprazole (PRILOSEC) 20 MG capsule Take 20 mg by mouth daily. No current facility-administered medications for this visit.       Allergies   Allergen Reactions    Penicillins     Tramadol     Vicodin [Hydrocodone-Acetaminophen] Nausea And Vomiting and Anxiety placed in this encounter. Discussed use, benefit, and side effects of prescribed medications. All patient questions answered. Pt voicedunderstanding. Instructed to continue current medications, diet and exercise. Continue risk factor modification and medical management. Patient agreed with treatment plan. Follow up as directed.     Electronically signedby Eleni Mendez MD on 2/13/2020 at 9:28 AM

## 2020-03-05 ENCOUNTER — HOSPITAL ENCOUNTER (OUTPATIENT)
Dept: GENERAL RADIOLOGY | Age: 85
Discharge: HOME | End: 2020-03-05
Payer: MEDICARE

## 2020-03-05 ENCOUNTER — HOSPITAL ENCOUNTER (OUTPATIENT)
Age: 85
Discharge: HOME | End: 2020-03-05
Payer: MEDICARE

## 2020-03-05 PROCEDURE — 73030 X-RAY EXAM OF SHOULDER: CPT

## 2020-05-06 ENCOUNTER — HOSPITAL ENCOUNTER (OUTPATIENT)
Dept: CT IMAGING | Age: 85
Discharge: HOME | End: 2020-05-06
Payer: MEDICARE

## 2020-05-06 LAB — POC CREATININE WHOLE BLOOD: 0.6 MG/DL (ref 0.5–1.2)

## 2020-05-06 PROCEDURE — 82565 ASSAY OF CREATININE: CPT

## 2020-05-06 PROCEDURE — 74177 CT ABD & PELVIS W/CONTRAST: CPT

## 2020-05-06 PROCEDURE — 6360000004 HC RX CONTRAST MEDICATION: Performed by: FAMILY MEDICINE

## 2020-05-06 RX ADMIN — IOPAMIDOL 80 ML: 755 INJECTION, SOLUTION INTRAVENOUS at 15:45

## 2020-05-06 RX ADMIN — IOHEXOL 50 ML: 240 INJECTION, SOLUTION INTRATHECAL; INTRAVASCULAR; INTRAVENOUS; ORAL at 15:45

## 2021-02-26 ENCOUNTER — ANESTHESIA EVENT (OUTPATIENT)
Dept: OPERATING ROOM | Age: 86
DRG: 467 | End: 2021-02-26
Payer: MEDICARE

## 2021-02-26 ENCOUNTER — APPOINTMENT (OUTPATIENT)
Dept: GENERAL RADIOLOGY | Age: 86
DRG: 467 | End: 2021-02-26
Payer: MEDICARE

## 2021-02-26 ENCOUNTER — HOSPITAL ENCOUNTER (INPATIENT)
Age: 86
LOS: 5 days | Discharge: NURSING, REHAB OR LONG-TERM CARE | DRG: 467 | End: 2021-03-03
Attending: EMERGENCY MEDICINE | Admitting: ORTHOPAEDIC SURGERY
Payer: MEDICARE

## 2021-02-26 DIAGNOSIS — D50.0 BLOOD LOSS ANEMIA: ICD-10-CM

## 2021-02-26 DIAGNOSIS — S72.411A CLOSED DISPLACED FRACTURE OF CONDYLE OF RIGHT FEMUR, INITIAL ENCOUNTER (HCC): Primary | ICD-10-CM

## 2021-02-26 PROBLEM — S72.431A: Status: ACTIVE | Noted: 2021-02-26

## 2021-02-26 LAB
ANION GAP SERPL CALCULATED.3IONS-SCNC: 11 MEQ/L (ref 8–16)
BASOPHILS # BLD: 0.1 %
BASOPHILS ABSOLUTE: 0 THOU/MM3 (ref 0–0.1)
BUN BLDV-MCNC: 14 MG/DL (ref 7–22)
CALCIUM SERPL-MCNC: 9.3 MG/DL (ref 8.5–10.5)
CHLORIDE BLD-SCNC: 98 MEQ/L (ref 98–111)
CO2: 25 MEQ/L (ref 23–33)
CREAT SERPL-MCNC: 0.4 MG/DL (ref 0.4–1.2)
EKG ATRIAL RATE: 92 BPM
EKG P AXIS: 54 DEGREES
EKG P-R INTERVAL: 208 MS
EKG Q-T INTERVAL: 380 MS
EKG QRS DURATION: 86 MS
EKG QTC CALCULATION (BAZETT): 469 MS
EKG R AXIS: 3 DEGREES
EKG T AXIS: 31 DEGREES
EKG VENTRICULAR RATE: 92 BPM
EOSINOPHIL # BLD: 0 %
EOSINOPHILS ABSOLUTE: 0 THOU/MM3 (ref 0–0.4)
ERYTHROCYTE [DISTWIDTH] IN BLOOD BY AUTOMATED COUNT: 11.9 % (ref 11.5–14.5)
ERYTHROCYTE [DISTWIDTH] IN BLOOD BY AUTOMATED COUNT: 41.1 FL (ref 35–45)
GFR SERPL CREATININE-BSD FRML MDRD: > 90 ML/MIN/1.73M2
GLUCOSE BLD-MCNC: 136 MG/DL (ref 70–108)
HCT VFR BLD CALC: 40.6 % (ref 37–47)
HEMOGLOBIN: 13.3 GM/DL (ref 12–16)
IMMATURE GRANS (ABS): 0.16 THOU/MM3 (ref 0–0.07)
IMMATURE GRANULOCYTES: 0.8 %
LYMPHOCYTES # BLD: 3.5 %
LYMPHOCYTES ABSOLUTE: 0.7 THOU/MM3 (ref 1–4.8)
MCH RBC QN AUTO: 31.3 PG (ref 26–33)
MCHC RBC AUTO-ENTMCNC: 32.8 GM/DL (ref 32.2–35.5)
MCV RBC AUTO: 95.5 FL (ref 81–99)
MONOCYTES # BLD: 5.9 %
MONOCYTES ABSOLUTE: 1.2 THOU/MM3 (ref 0.4–1.3)
NUCLEATED RED BLOOD CELLS: 0 /100 WBC
OSMOLALITY CALCULATION: 270.8 MOSMOL/KG (ref 275–300)
PLATELET # BLD: 274 THOU/MM3 (ref 130–400)
PMV BLD AUTO: 10.3 FL (ref 9.4–12.4)
POTASSIUM REFLEX MAGNESIUM: 4 MEQ/L (ref 3.5–5.2)
RBC # BLD: 4.25 MILL/MM3 (ref 4.2–5.4)
SARS-COV-2, NAAT: NOT DETECTED
SEG NEUTROPHILS: 89.7 %
SEGMENTED NEUTROPHILS ABSOLUTE COUNT: 18.7 THOU/MM3 (ref 1.8–7.7)
SODIUM BLD-SCNC: 134 MEQ/L (ref 135–145)
TROPONIN T: < 0.01 NG/ML
WBC # BLD: 20.9 THOU/MM3 (ref 4.8–10.8)

## 2021-02-26 PROCEDURE — 6360000002 HC RX W HCPCS: Performed by: INTERNAL MEDICINE

## 2021-02-26 PROCEDURE — 93005 ELECTROCARDIOGRAM TRACING: CPT | Performed by: STUDENT IN AN ORGANIZED HEALTH CARE EDUCATION/TRAINING PROGRAM

## 2021-02-26 PROCEDURE — 73564 X-RAY EXAM KNEE 4 OR MORE: CPT

## 2021-02-26 PROCEDURE — 71045 X-RAY EXAM CHEST 1 VIEW: CPT

## 2021-02-26 PROCEDURE — 87635 SARS-COV-2 COVID-19 AMP PRB: CPT

## 2021-02-26 PROCEDURE — 36415 COLL VENOUS BLD VENIPUNCTURE: CPT

## 2021-02-26 PROCEDURE — 6820000001 HC L2 TRAUMA SURGERY EVALUATION: Performed by: ORTHOPAEDIC SURGERY

## 2021-02-26 PROCEDURE — 73552 X-RAY EXAM OF FEMUR 2/>: CPT

## 2021-02-26 PROCEDURE — 73590 X-RAY EXAM OF LOWER LEG: CPT

## 2021-02-26 PROCEDURE — 6360000002 HC RX W HCPCS: Performed by: NURSE PRACTITIONER

## 2021-02-26 PROCEDURE — 80048 BASIC METABOLIC PNL TOTAL CA: CPT

## 2021-02-26 PROCEDURE — 99284 EMERGENCY DEPT VISIT MOD MDM: CPT

## 2021-02-26 PROCEDURE — 6370000000 HC RX 637 (ALT 250 FOR IP): Performed by: NURSE PRACTITIONER

## 2021-02-26 PROCEDURE — 84484 ASSAY OF TROPONIN QUANT: CPT

## 2021-02-26 PROCEDURE — 2580000003 HC RX 258: Performed by: NURSE PRACTITIONER

## 2021-02-26 PROCEDURE — 6370000000 HC RX 637 (ALT 250 FOR IP): Performed by: INTERNAL MEDICINE

## 2021-02-26 PROCEDURE — 99221 1ST HOSP IP/OBS SF/LOW 40: CPT | Performed by: INTERNAL MEDICINE

## 2021-02-26 PROCEDURE — 12011 RPR F/E/E/N/L/M 2.5 CM/<: CPT

## 2021-02-26 PROCEDURE — 85025 COMPLETE CBC W/AUTO DIFF WBC: CPT

## 2021-02-26 PROCEDURE — 1200000003 HC TELEMETRY R&B

## 2021-02-26 PROCEDURE — 6360000002 HC RX W HCPCS: Performed by: STUDENT IN AN ORGANIZED HEALTH CARE EDUCATION/TRAINING PROGRAM

## 2021-02-26 RX ORDER — OXYCODONE HYDROCHLORIDE AND ACETAMINOPHEN 5; 325 MG/1; MG/1
1 TABLET ORAL EVERY 4 HOURS PRN
Status: DISCONTINUED | OUTPATIENT
Start: 2021-02-26 | End: 2021-03-03 | Stop reason: HOSPADM

## 2021-02-26 RX ORDER — FENTANYL CITRATE 50 UG/ML
25 INJECTION, SOLUTION INTRAMUSCULAR; INTRAVENOUS ONCE
Status: COMPLETED | OUTPATIENT
Start: 2021-02-26 | End: 2021-02-26

## 2021-02-26 RX ORDER — SODIUM CHLORIDE 0.9 % (FLUSH) 0.9 %
10 SYRINGE (ML) INJECTION EVERY 12 HOURS SCHEDULED
Status: DISCONTINUED | OUTPATIENT
Start: 2021-02-26 | End: 2021-03-03 | Stop reason: HOSPADM

## 2021-02-26 RX ORDER — BUSPIRONE HYDROCHLORIDE 5 MG/1
5 TABLET ORAL 2 TIMES DAILY PRN
Status: DISCONTINUED | OUTPATIENT
Start: 2021-02-26 | End: 2021-03-03 | Stop reason: HOSPADM

## 2021-02-26 RX ORDER — MORPHINE SULFATE 2 MG/ML
1 INJECTION, SOLUTION INTRAMUSCULAR; INTRAVENOUS
Status: DISCONTINUED | OUTPATIENT
Start: 2021-02-26 | End: 2021-03-03 | Stop reason: HOSPADM

## 2021-02-26 RX ORDER — ONDANSETRON 2 MG/ML
4 INJECTION INTRAMUSCULAR; INTRAVENOUS EVERY 6 HOURS PRN
Status: DISCONTINUED | OUTPATIENT
Start: 2021-02-26 | End: 2021-03-03 | Stop reason: HOSPADM

## 2021-02-26 RX ORDER — PANTOPRAZOLE SODIUM 40 MG/1
40 TABLET, DELAYED RELEASE ORAL
Status: DISCONTINUED | OUTPATIENT
Start: 2021-02-27 | End: 2021-03-03 | Stop reason: HOSPADM

## 2021-02-26 RX ORDER — ONDANSETRON 4 MG/1
4 TABLET, ORALLY DISINTEGRATING ORAL EVERY 8 HOURS PRN
Status: DISCONTINUED | OUTPATIENT
Start: 2021-02-26 | End: 2021-03-03 | Stop reason: HOSPADM

## 2021-02-26 RX ORDER — AMLODIPINE BESYLATE 5 MG/1
5 TABLET ORAL DAILY
Status: DISCONTINUED | OUTPATIENT
Start: 2021-02-27 | End: 2021-02-27

## 2021-02-26 RX ORDER — SODIUM CHLORIDE 0.9 % (FLUSH) 0.9 %
10 SYRINGE (ML) INJECTION PRN
Status: DISCONTINUED | OUTPATIENT
Start: 2021-02-26 | End: 2021-03-03 | Stop reason: HOSPADM

## 2021-02-26 RX ORDER — OXYCODONE HYDROCHLORIDE AND ACETAMINOPHEN 5; 325 MG/1; MG/1
2 TABLET ORAL EVERY 4 HOURS PRN
Status: DISCONTINUED | OUTPATIENT
Start: 2021-02-26 | End: 2021-02-26

## 2021-02-26 RX ORDER — SODIUM CHLORIDE 9 MG/ML
INJECTION, SOLUTION INTRAVENOUS CONTINUOUS
Status: DISCONTINUED | OUTPATIENT
Start: 2021-02-26 | End: 2021-02-28

## 2021-02-26 RX ORDER — METOPROLOL TARTRATE 50 MG/1
100 TABLET, FILM COATED ORAL 2 TIMES DAILY
Status: DISCONTINUED | OUTPATIENT
Start: 2021-02-26 | End: 2021-02-27

## 2021-02-26 RX ORDER — SODIUM CHLORIDE, SODIUM LACTATE, POTASSIUM CHLORIDE, CALCIUM CHLORIDE 600; 310; 30; 20 MG/100ML; MG/100ML; MG/100ML; MG/100ML
INJECTION, SOLUTION INTRAVENOUS CONTINUOUS
Status: DISCONTINUED | OUTPATIENT
Start: 2021-02-26 | End: 2021-02-26

## 2021-02-26 RX ORDER — MORPHINE SULFATE 2 MG/ML
2 INJECTION, SOLUTION INTRAMUSCULAR; INTRAVENOUS
Status: DISCONTINUED | OUTPATIENT
Start: 2021-02-26 | End: 2021-02-26

## 2021-02-26 RX ADMIN — METOPROLOL TARTRATE 100 MG: 50 TABLET, FILM COATED ORAL at 20:50

## 2021-02-26 RX ADMIN — OXYCODONE HYDROCHLORIDE AND ACETAMINOPHEN 1 TABLET: 5; 325 TABLET ORAL at 17:15

## 2021-02-26 RX ADMIN — MORPHINE SULFATE 2 MG: 2 INJECTION, SOLUTION INTRAMUSCULAR; INTRAVENOUS at 14:45

## 2021-02-26 RX ADMIN — FENTANYL CITRATE 25 MCG: 50 INJECTION, SOLUTION INTRAMUSCULAR; INTRAVENOUS at 13:11

## 2021-02-26 RX ADMIN — OXYCODONE HYDROCHLORIDE AND ACETAMINOPHEN 1 TABLET: 5; 325 TABLET ORAL at 23:31

## 2021-02-26 RX ADMIN — MORPHINE SULFATE 1 MG: 2 INJECTION, SOLUTION INTRAMUSCULAR; INTRAVENOUS at 18:51

## 2021-02-26 RX ADMIN — SODIUM CHLORIDE: 9 INJECTION, SOLUTION INTRAVENOUS at 14:05

## 2021-02-26 RX ADMIN — SODIUM CHLORIDE, PRESERVATIVE FREE 10 ML: 5 INJECTION INTRAVENOUS at 20:50

## 2021-02-26 ASSESSMENT — ENCOUNTER SYMPTOMS
CONSTIPATION: 0
EYE REDNESS: 0
EYE PAIN: 0
COUGH: 0
RHINORRHEA: 0
SINUS PAIN: 0
SORE THROAT: 0
SHORTNESS OF BREATH: 0
NAUSEA: 0
DIARRHEA: 0
WHEEZING: 0
EYE DISCHARGE: 0
ABDOMINAL PAIN: 0
COLOR CHANGE: 0
CHEST TIGHTNESS: 0
VOMITING: 0
BACK PAIN: 0
SINUS PRESSURE: 0

## 2021-02-26 ASSESSMENT — PAIN - FUNCTIONAL ASSESSMENT: PAIN_FUNCTIONAL_ASSESSMENT: PREVENTS OR INTERFERES WITH MANY ACTIVE NOT PASSIVE ACTIVITIES

## 2021-02-26 ASSESSMENT — PAIN DESCRIPTION - LOCATION
LOCATION: KNEE
LOCATION: KNEE

## 2021-02-26 ASSESSMENT — PAIN DESCRIPTION - FREQUENCY
FREQUENCY: CONTINUOUS
FREQUENCY: INTERMITTENT

## 2021-02-26 ASSESSMENT — PAIN DESCRIPTION - PAIN TYPE: TYPE: ACUTE PAIN

## 2021-02-26 ASSESSMENT — PAIN DESCRIPTION - DESCRIPTORS: DESCRIPTORS: SORE

## 2021-02-26 ASSESSMENT — PAIN SCALES - GENERAL
PAINLEVEL_OUTOF10: 8
PAINLEVEL_OUTOF10: 8

## 2021-02-26 ASSESSMENT — PAIN DESCRIPTION - ONSET: ONSET: ON-GOING

## 2021-02-26 NOTE — ED NOTES
Per ortho at bedside to apply immobilizer if patient can tolerate. Patient is unable to extend leg straight and is in a comfortably position with it slightly bent.       Geo Suarez RN  02/26/21 6622

## 2021-02-26 NOTE — ED TRIAGE NOTES
Patient presents to the ED via [address] [place] [address] EMS after a fall while walking into [facility]. Patient states she missed the curb and fell directly onto her right knee. Patient states she had a whole knee replacement in the right knee about 2010. Patient denies being on blood thinners. Patient did not hit her head and denies LOC. [address] [place] [address] EMS established a 20G IV in the left forearm and gave the patient a total of 100mcg of fentanyl during route. Patient still rates pain at a 9/10. Pedal pulses equal and strong. Patient is unable to straighten right leg due to the pain.

## 2021-02-26 NOTE — CONSULTS
Consult History & Physical      Patient:  Elle Cruz  YOB: 1931    MRN: [id number]     Acct: [de-identified]      Chief Complaint:  Fall, leg pain    Date of Service: Pt seen/examined in consultation on 2/26/2021      History Of Present Illness:      80 y.o. female who we are asked to see/evaluate by Gely Scott DO for medical management of hbp    The pt has been in good health other than for mild hbp. She tripped on a curb today, falling and suffering a femur fx. She is being prepared for surgery and medical evaluation was requested. She has no overt hx of cardiac or pulmonary dz. She has never smoked. She has had a  Normal echo and stress in the past.    She has tolerated a number of surgeries in the past.    Past Medical History:        Diagnosis Date    Arthritis     GERD (gastroesophageal reflux disease)     Hyperlipidemia     Hypertension     Keratosis     benign lichemoid       Past Surgical History:        Procedure Laterality Date    APPENDECTOMY  1934    CHOLECYSTECTOMY  36    COLON SURGERY  2000    resection    HYSTERECTOMY  1979    KNEE SURGERY  2010    ROTATOR CUFF REPAIR  2006       Medications Prior to Admission:    Prior to Admission medications    Medication Sig Start Date End Date Taking?  Authorizing Provider   metoprolol (LOPRESSOR) 100 MG tablet Take 1 tablet by mouth 2 times daily Pt takes 50 in AM and 100 in PM 2/13/20  Yes Rohan Do MD   amLODIPine (NORVASC) 5 MG tablet Take 1 tablet by mouth daily 10/1/15  Yes Rohan Do MD   Krill Oil 350 MG CAPS Take 1 tablet by mouth daily    Historical Provider, MD   Acetaminophen (TYLENOL 8 HOUR ARTHRITIS PAIN PO) Take 1 tablet by mouth as needed    Historical Provider, MD   busPIRone (BUSPAR) 5 MG tablet Take 5 mg by mouth 2 times daily as needed     Historical Provider, MD   CALCIUM PO Take by mouth    Historical Provider, MD   NONFORMGABRIEL Reliv, powder supplement    Historical Provider, MD omeprazole (PRILOSEC) 20 MG capsule Take 20 mg by mouth daily. Historical Provider, MD       Allergies:  Penicillins, Tramadol, and Vicodin [hydrocodone-acetaminophen]    Social History:      The patient currently lives alone    TOBACCO:   reports that she has never smoked. She has never used smokeless tobacco.  ETOH:   reports current alcohol use. Family History:     . Positive as follows:    History reviewed. No pertinent family history. Diet:  DIET GENERAL;  Diet NPO, After Midnight    REVIEW OF SYSTEMS:   Pertinent positives as noted in the HPI. All other systems reviewed and negative. PHYSICAL EXAM:  /75   Pulse 94   Temp 98.1 °F (36.7 °C) (Oral)   Resp 20   Wt 132 lb (59.9 kg)   SpO2 98%   BMI 24.14 kg/m²   General appearance: No apparent distress, appears stated age and cooperative. HEENT: Normal cephalic, atraumatic without obvious deformity. Pupils equal, round, and reactive to light. Extra ocular muscles intact. Conjunctivae/corneas clear. Neck: Supple, with full range of motion. No jugular venous distention. Trachea midline. Respiratory:  Normal respiratory effort. Clear to auscultation, bilaterally without Rales/Wheezes/Rhonchi. Cardiovascular: Regular rate and rhythm with normal S1/S2 without murmurs, rubs or gallops. Abdomen: Soft, non-tender, non-distended with normal bowel sounds. Musculoskeletal: knee in brace  Skin: Skin color, texture, turgor normal.  No rashes or lesions. Neurologic:  Neurovascularly intact without any focal sensory/motor deficits.  Cranial nerves: II-XII intact, grossly non-focal.  Psychiatric: Alert and oriented, thought content appropriate, normal insight  Capillary Refill: Brisk,< 3 seconds   Peripheral Pulses: +2 palpable, equal bilaterally     Labs:     Recent Labs     02/26/21  1321   WBC 20.9*   HGB 13.3   HCT 40.6        Recent Labs     02/26/21  1321   *   K 4.0   CL 98   CO2 25   BUN 14   CREATININE 0.4   CALCIUM 9.3 No results for input(s): AST, ALT, BILIDIR, BILITOT, ALKPHOS in the last 72 hours. No results for input(s): INR in the last 72 hours. No results for input(s): Amato Marshall in the last 72 hours. Urinalysis:    Lab Results   Component Value Date    BLOODU large 07/03/2013       Radiology: I have reviewed the radiology reports with the following interpretation:     XR CHEST PORTABLE   Final Result   No acute cardiopulmonary process. **This report has been created using voice recognition software. It may contain minor errors which are inherent in voice recognition technology. **      Final report electronically signed by Dr. Sheridan Hayes on 2/26/2021 2:43 PM      XR TIBIA FIBULA RIGHT (2 VIEWS)   Final Result      Status post right knee total arthroplasty with comminuted foreshortened and dorsally angulated fracture of the distal right femur. **This report has been created using voice recognition software. It may contain minor errors which are inherent in voice recognition technology. **      Final report electronically signed by Dr. Sheridan Hayes on 2/26/2021 12:09 PM      XR KNEE RIGHT (MIN 4 VIEWS)   Final Result      Status post right knee total arthroplasty with comminuted foreshortened and dorsally angulated fracture of the distal right femur. **This report has been created using voice recognition software. It may contain minor errors which are inherent in voice recognition technology. **      Final report electronically signed by Dr. Sheridan Hayes on 2/26/2021 12:09 PM      XR FEMUR RIGHT (MIN 2 VIEWS)   Final Result      Status post right knee total arthroplasty with comminuted foreshortened and dorsally angulated fracture of the distal right femur. **This report has been created using voice recognition software. It may contain minor errors which are inherent in voice recognition technology. **      Final report electronically signed by Dr. Ilene Jules Guylouelaina Eren on 2/26/2021 12:09 PM             EKG:  I have reviewed the EKG with the following interpretation:    nsr no acute change    DVT prophylaxis:per surgery  Code Status: Full Code          ASSESSMENT:    Active Hospital Problems    Diagnosis Date Noted    Closed displaced fracture of medial condyle of right femur ([address] Lyn Reid 02/26/2021    Essential hypertension [I10]        PLAN:    1. fx femur- no overt contraindication to surgery  2. hbp-continue home med  3. Will follow  4. Leukocytosis- suspect stress related; to be repeated. No overt sign of infection  5. Mild hyponatremia- repeat labs in am         Thank you for the consultation.     Electronically signed by Zara Guajardo MD on 2/26/2021 at 2:59 PM

## 2021-02-26 NOTE — ED NOTES
In to update patient son at bedside. Patient states she rubbed her eye and noticed some blood on her thumb. After evaluation, it appears patient's left eye has a small laceration on her eyelid, possibly caused by the eyeglasses she is wearing. Wound cleansed. Bleeding controlled.       Nessa Suarez RN  02/26/21 5217

## 2021-02-26 NOTE — ED PROVIDER NOTES
[place] ENCOUNTER          Pt Name: Tanvi Michelle  MRN: [id number]  [place] 3/4/1931  Date of evaluation: 2/26/2021  Treating Resident Physician: Kiersten Gibbs DO  Supervising Physician: [address]       Chief Complaint   Patient presents with   Acquanetta Ruddle    Knee Pain     right     History obtained from the patient. HISTORY OF PRESENT ILLNESS    HPI  Tanvi Michelle is a 80 y.o. female who presents to the emergency department for evaluation of fall at [facility] this morning. Mechanical trip and fall while walking to the parking lot. Patient denies loss of consciousness, head strike. States that her glasses hit her eye and bruised her upper eyelid. States she is feeling fine other than severe right knee pain. She admits to associated swelling. Pain level evaluation was 9-10 out of 10. The patient has no other acute complaints at this time. REVIEW OF SYSTEMS   Review of Systems   Constitutional: Negative for chills and fever. HENT: Negative for ear discharge, ear pain, postnasal drip, rhinorrhea, sinus pressure, sinus pain and sore throat. Eyes: Negative for pain, discharge, redness and visual disturbance. Respiratory: Negative for cough, chest tightness, shortness of breath and wheezing. Cardiovascular: Negative for chest pain, palpitations and leg swelling. Gastrointestinal: Negative for abdominal pain, constipation, diarrhea, nausea and vomiting. Endocrine: Negative for cold intolerance and heat intolerance. Genitourinary: Negative for difficulty urinating, dysuria, flank pain and urgency. Musculoskeletal: Positive for arthralgias and joint swelling. Negative for back pain, neck pain and neck stiffness. Skin: Negative for color change and rash. Neurological: Negative for dizziness, syncope, weakness, light-headedness, numbness and headaches.          PAST MEDICAL AND SURGICAL HISTORY     Past Medical ALLERGIES     Allergies   Allergen Reactions    Penicillins     Tramadol     Vicodin [Hydrocodone-Acetaminophen] Nausea And Vomiting and Anxiety         FAMILY HISTORY   History reviewed. No pertinent family history. PREVIOUS RECORDS   Previous records reviewed: This is this patient's first visit to [facility] ED, no previous records available on EMR. .        PHYSICAL EXAM     ED Triage Vitals   BP Temp Temp Source Pulse Resp SpO2 Height Weight   02/26/21 1105 02/26/21 1101 02/26/21 1101 02/26/21 1101 02/26/21 1101 02/26/21 1101 -- 02/26/21 1101   99/79 98.1 °F (36.7 °C) Oral 94 15 100 %  132 lb (59.9 kg)     Initial vital signs and nursing assessment reviewed and normal. Body mass index is 24.14 kg/m². Pulsoximetry is normal per my interpretation. Additional Vital Signs:  Vitals:    02/26/21 1611   BP: (!) 140/68   Pulse: 94   Resp: 18   Temp: 98.2 °F (36.8 °C)   SpO2: 97%       Physical Exam  Constitutional:       General: She is not in acute distress. Appearance: She is not toxic-appearing or diaphoretic. HENT:      Head: Normocephalic. Comments: Small laceration of the left eyelid. Hemostasis achieved, no pressure needed. Dermabond applied. Nose: Nose normal. No congestion or rhinorrhea. Mouth/Throat:      Mouth: Mucous membranes are moist.      Pharynx: Oropharynx is clear. No oropharyngeal exudate or posterior oropharyngeal erythema. Eyes:      General: No scleral icterus. Right eye: No discharge. Left eye: No discharge. Extraocular Movements: Extraocular movements intact. Conjunctiva/sclera: Conjunctivae normal.      Pupils: Pupils are equal, round, and reactive to light. Neck:      Musculoskeletal: Normal range of motion. No neck rigidity or muscular tenderness. Cardiovascular:      Rate and Rhythm: Normal rate and regular rhythm. Pulses: Normal pulses. Heart sounds: No murmur. No friction rub. No gallop.     Pulmonary:      Effort: Pulmonary effort is normal.      Breath sounds: No wheezing, rhonchi or rales. Abdominal:      Palpations: Abdomen is soft. Tenderness: There is no abdominal tenderness. There is no guarding or rebound. Musculoskeletal:         General: Swelling and tenderness present. No deformity. Comments: Swelling and tenderness overlying the right knee. No ecchymosis or open wound present. Skin:     General: Skin is warm and dry. Capillary Refill: Capillary refill takes less than 2 seconds. Findings: No bruising, lesion or rash. Neurological:      General: No focal deficit present. Mental Status: She is alert and oriented to person, place, and time. Cranial Nerves: No cranial nerve deficit. Sensory: No sensory deficit. Motor: No weakness. Coordination: Coordination normal.      Gait: Gait abnormal (Patient unable to ambulate secondary to pain. ). Comments: Right lower extremity neurovascularly intact. Normal capillary refill and pulses. MEDICAL DECISION MAKING   Initial Assessment:   1. Pleasant 26-year-old female. Mechanical trip and fall in grocery store parking lot. Right knee edema without ecchymosis. Neurovascularly intact, no concern for deficit or lack of perfusion. No LOC or head strike. Patient is not on anticoagulants. Severe right knee pain, history of replacement. White count elevation of 20.9, right distal femoral fracture. Plan:    Pain control with fentanyl and morphine. Orthopedic consult. Admission under Dr. Patricia Minor.   Patient agreeable to this plan and further evaluation by the orthopedic surgery team.    ED RESULTS   Laboratory results:  Labs Reviewed   CBC WITH AUTO DIFFERENTIAL - Abnormal; Notable for the following components:       Result Value    WBC 20.9 (*)     Segs Absolute 18.7 (*)     Lymphocytes Absolute 0.7 (*)     Immature Grans (Abs) 0.16 (*)     All other components within normal limits   BASIC METABOLIC PANEL W/ REFLEX TO MG FOR LOW K - Abnormal; Notable for the following components:    Sodium 134 (*)     Glucose 136 (*)     All other components within normal limits   OSMOLALITY - Abnormal; Notable for the following components:    Osmolality Calc 270.8 (*)     All other components within normal limits   COVID-19, RAPID   TROPONIN   ANION GAP   GLOMERULAR FILTRATION RATE, ESTIMATED   URINE RT REFLEX TO CULTURE       Radiologic studies results:  XR CHEST PORTABLE   Final Result   No acute cardiopulmonary process. **This report has been created using voice recognition software. It may contain minor errors which are inherent in voice recognition technology. **      Final report electronically signed by Dr. Shawn Sagastume on 2/26/2021 2:43 PM      XR TIBIA FIBULA RIGHT (2 VIEWS)   Final Result      Status post right knee total arthroplasty with comminuted foreshortened and dorsally angulated fracture of the distal right femur. **This report has been created using voice recognition software. It may contain minor errors which are inherent in voice recognition technology. **      Final report electronically signed by Dr. Shawn Sagastume on 2/26/2021 12:09 PM      XR KNEE RIGHT (MIN 4 VIEWS)   Final Result      Status post right knee total arthroplasty with comminuted foreshortened and dorsally angulated fracture of the distal right femur. **This report has been created using voice recognition software. It may contain minor errors which are inherent in voice recognition technology. **      Final report electronically signed by Dr. Shawn Sagastume on 2/26/2021 12:09 PM      XR FEMUR RIGHT (MIN 2 VIEWS)   Final Result      Status post right knee total arthroplasty with comminuted foreshortened and dorsally angulated fracture of the distal right femur. **This report has been created using voice recognition software. It may contain minor errors which are inherent in voice recognition technology. **

## 2021-02-26 NOTE — FLOWSHEET NOTE
Pt was with a large family. She was dealing with closed displaced comminuted fracture of shaft of right femur. She was strong in spirit and wanted prayer to cope and heal. She was anointed. 02/26/21 1818   Encounter Summary   Services provided to: Patient   Referral/Consult From: [address] [place] [address] [place] [address] Visiting Yes  (2/26 )   Complexity of Encounter Moderate   Length of Encounter 15 minutes   Spiritual/Adventist   Type Spiritual support   Assessment Approachable;Calm   Intervention Prayer;Nurtured hope; Active listening; Anointing;Empowerment;Sustaining presence/ Ministry of presence   Outcome Connection/belonging;Expressed gratitude;Encouraged; Hopeful   Sacraments   Sacrament of Sick-Anointing Anointed

## 2021-02-26 NOTE — H&P
History and Physical        CHIEF COMPLAINT:  Right leg pain    HISTORY OF PRESENT ILLNESS:      The patient is a 80 y.o. female with multiple medical comorbidities who ortho admitted for definitive treatment of a right periprosthetic distal femur fracture sustained in a fall. She states she lost her balance while grocery shopping and fell forward and onto the right knee. She had a TKR 2 years ago. She denies hitting her head, although she does have a contusion to the left eye caused by her glasses. X-rays confirm the fracture    Past Medical History:    Past Medical History:   Diagnosis Date    Arthritis     GERD (gastroesophageal reflux disease)     Hyperlipidemia     Hypertension     Keratosis     benign lichemoid       Past Surgical History:    Past Surgical History:   Procedure Laterality Date    APPENDECTOMY  1934    CHOLECYSTECTOMY  36    COLON SURGERY  2000    resection    HYSTERECTOMY  1979    KNEE SURGERY  2010    ROTATOR CUFF REPAIR  2006       Medications Prior to Admission:   Prior to Admission medications    Medication Sig Start Date End Date Taking? Authorizing Provider   metoprolol (LOPRESSOR) 100 MG tablet Take 1 tablet by mouth 2 times daily Pt takes 50 in AM and 100 in PM 2/13/20  Yes Giovanni Stuart MD   amLODIPine (NORVASC) 5 MG tablet Take 1 tablet by mouth daily 10/1/15  Yes Giovanni Stuart MD   Krill Oil 350 MG CAPS Take 1 tablet by mouth daily    Historical Provider, MD   Acetaminophen (TYLENOL 8 HOUR ARTHRITIS PAIN PO) Take 1 tablet by mouth as needed    Historical Provider, MD   busPIRone (BUSPAR) 5 MG tablet Take 5 mg by mouth 2 times daily as needed     Historical Provider, MD   CALCIUM PO Take by mouth    Historical Provider, MD   NONFORMULARY Reliv, powder supplement    Historical Provider, MD   omeprazole (PRILOSEC) 20 MG capsule Take 20 mg by mouth daily.       Historical Provider, MD    Scheduled Meds:  Continuous Infusions:  PRN Meds:.oxyCODONE-acetaminophen **OR** oxyCODONE-acetaminophen    Allergies:  Penicillins, Tramadol, and Vicodin [hydrocodone-acetaminophen]    Social History:   Social History     Socioeconomic History    Marital status:      Spouse name: None    Number of children: None    Years of education: None    Highest education level: None   Occupational History    None   Social Needs    Financial resource strain: None    Food insecurity     Worry: None     Inability: None    Transportation needs     Medical: None     Non-medical: None   Tobacco Use    Smoking status: Never Smoker    Smokeless tobacco: Never Used   Substance and Sexual Activity    Alcohol use: Yes     Comment: 2 ounce of wine once in a while.  Drug use: No    Sexual activity: None   Lifestyle    Physical activity     Days per week: None     Minutes per session: None    Stress: None   Relationships    Social connections     Talks on phone: None     Gets together: None     Attends Religion service: None     Active member of club or organization: None     Attends meetings of clubs or organizations: None     Relationship status: None    Intimate partner violence     Fear of current or ex partner: None     Emotionally abused: None     Physically abused: None     Forced sexual activity: None   Other Topics Concern    None   Social History Narrative    None     Social History     Tobacco Use   Smoking Status Never Smoker   Smokeless Tobacco Never Used     Social History     Substance and Sexual Activity   Alcohol Use Yes    Comment: 2 ounce of wine once in a while. Social History     Substance and Sexual Activity   Drug Use No       Family History:  History reviewed. No pertinent family history. REVIEW OF SYSTEMS:  Constitutional: Denies any fever, chills. Derm: Denies any rash or skin color change. Eyes: Denies blurred or decreased in vision. Ent: Denies any tinnitus or vertigo. Resp: Denies any cough or shortness of breath.   CV: Denies any syncope, palpitations or chest pain. GI:  Denies any abdominal pain, nausea, vomiting, constipation or diarrhea. : Denies any hematuria, hesitancy, or dysuria. Heme/Lymph: Denies any bleeding. Musculoskeletal: Positive for right knee pain  Neuro: Denies any dizziness, paresthesia or weakness. PHYSICAL EXAM:  Patient Vitals for the past 24 hrs:   BP Temp Temp src Pulse Resp SpO2 Weight   02/26/21 1310 129/72   86 18 98 %    02/26/21 1201 (!) 156/79   90 16 100 %    02/26/21 1106 132/72         02/26/21 1105 99/79         02/26/21 1101  98.1 °F (36.7 °C) Oral 94 15 100 % 132 lb (59.9 kg)     General appearance:  Alert and oriented x 3. No apparent distress, appears stated age and cooperative. HEENT:  Normal cephalic, atraumatic without obvious deformity. Pupils equal, round, and reactive to light. Conjunctivae/corneas clear. Neck: Supple, with full range of motion. Trachea midline. Respiratory:  Normal respiratory effort. No audible Wheezes or Rhonchi. Cardiovascular:  Regular rate and rhythm. Abdomen: Soft, non-tender, non-distended. Musculoskeletal:   RLE skin intact. Moderate knee swelling. Flex and extends toes and ankle. ROM limited 2/2 pain. Calf and compartments soft. DP/PT 2+. SILT  Skin: Skin color, texture, turgor normal.  No rashes or lesions. Neurologic:  Neurovascularly intact without any focal sensory/motor deficits. Sensation intact. DATA:  CBC:   Lab Results   Component Value Date    WBC 20.9 02/26/2021    HGB 13.3 02/26/2021     02/26/2021     BMP:    Lab Results   Component Value Date     12/09/2020    K 4.0 12/09/2020     12/09/2020    CO2 30 12/09/2020    BUN 9 12/09/2020    CREATININE 0.59 12/09/2020    CALCIUM 9.3 12/09/2020    GLUCOSE 88 12/09/2020     PT/INR:  No results found for: PROTIME, INR  Troponin:    Lab Results   Component Value Date    TROPONINI <0.006 03/16/2013     No results for input(s): LIPASE, AMYLASE in the last 72 hours.   No results for input(s): AST, ALT, BILIDIR, BILITOT, ALKPHOS in the last 72 hours. Radiology:  Xr Femur Right (min 2 Views)    Result Date: 2/26/2021  PROCEDURE: XR KNEE RIGHT (MIN 4 VIEWS), XR TIBIA FIBULA RIGHT (2 VIEWS), XR FEMUR RIGHT (MIN 2 VIEWS) CLINICAL INFORMATION: fall. COMPARISON: Right knee radiographs 10/8/2007. TECHNIQUE: 4 views of the right knee include an AP view, a lateral view and bilateral oblique views. 3 views right tib-fib and 4 views right femur. FINDINGS: Status post right knee total arthroplasty. There is a comminuted fracture of the distal femur with foreshortening of the fracture fragments with apex dorsal angulation. Incidental note of vascular calcification. Status post right knee total arthroplasty with comminuted foreshortened and dorsally angulated fracture of the distal right femur. **This report has been created using voice recognition software. It may contain minor errors which are inherent in voice recognition technology. ** Final report electronically signed by Dr. Lesli Zamarripa on 2/26/2021 12:09 PM    Xr Knee Right (min 4 Views)    Result Date: 2/26/2021  PROCEDURE: XR KNEE RIGHT (MIN 4 VIEWS), XR TIBIA FIBULA RIGHT (2 VIEWS), XR FEMUR RIGHT (MIN 2 VIEWS) CLINICAL INFORMATION: fall. COMPARISON: Right knee radiographs 10/8/2007. TECHNIQUE: 4 views of the right knee include an AP view, a lateral view and bilateral oblique views. 3 views right tib-fib and 4 views right femur. FINDINGS: Status post right knee total arthroplasty. There is a comminuted fracture of the distal femur with foreshortening of the fracture fragments with apex dorsal angulation. Incidental note of vascular calcification. Status post right knee total arthroplasty with comminuted foreshortened and dorsally angulated fracture of the distal right femur. **This report has been created using voice recognition software. It may contain minor errors which are inherent in voice recognition technology. ** Final report electronically signed by Dr. Sherry Mills on 2/26/2021 12:09 PM    Xr Tibia Fibula Right (2 Views)    Result Date: 2/26/2021  PROCEDURE: XR KNEE RIGHT (MIN 4 VIEWS), XR TIBIA FIBULA RIGHT (2 VIEWS), XR FEMUR RIGHT (MIN 2 VIEWS) CLINICAL INFORMATION: fall. COMPARISON: Right knee radiographs 10/8/2007. TECHNIQUE: 4 views of the right knee include an AP view, a lateral view and bilateral oblique views. 3 views right tib-fib and 4 views right femur. FINDINGS: Status post right knee total arthroplasty. There is a comminuted fracture of the distal femur with foreshortening of the fracture fragments with apex dorsal angulation. Incidental note of vascular calcification. Status post right knee total arthroplasty with comminuted foreshortened and dorsally angulated fracture of the distal right femur. **This report has been created using voice recognition software. It may contain minor errors which are inherent in voice recognition technology. ** Final report electronically signed by Dr. Sherry Mills on 2/26/2021 12:09 PM      ASSESSMENT:Active Problems:    Closed displaced fracture of medial condyle of right femur ([address]  Resolved Problems:    * No resolved hospital problems. *       PLAN as discussed with Dr. Cira Sims:  Plan for surgery tomorrow for revision right TKA OSS  NPO after midnight. Medicine to risk stratify  Knee immobilizer  Consent for surgery    The patient was counseled at length about the risks of halina Covid-19 during their perioperative period and any recovery window from their procedure. The patient was made aware that halina Covid-19  may worsen their prognosis for recovering from their procedure  and lend to a higher morbidity and/or mortality risk. All material risks, benefits, and reasonable alternatives including postponing the procedure were discussed. The patient does wish to proceed with the procedure at this time.          Electronically signed by Saroj Camacho APRN - CNP on 2/26/2021 at 1:42 PM

## 2021-02-26 NOTE — ED NOTES
Ortho to bedside to update patient and family on POC and results.       Candace Suarez RN  02/26/21 1257

## 2021-02-26 NOTE — ED NOTES
Bed: 010A  Expected date:   Expected time:   Means of arrival: [facility]  Comments:     Yue Schaffer RN  02/26/21 1057

## 2021-02-27 ENCOUNTER — APPOINTMENT (OUTPATIENT)
Dept: GENERAL RADIOLOGY | Age: 86
DRG: 467 | End: 2021-02-27
Payer: MEDICARE

## 2021-02-27 ENCOUNTER — ANESTHESIA (OUTPATIENT)
Dept: OPERATING ROOM | Age: 86
DRG: 467 | End: 2021-02-27
Payer: MEDICARE

## 2021-02-27 VITALS
OXYGEN SATURATION: 100 % | SYSTOLIC BLOOD PRESSURE: 162 MMHG | DIASTOLIC BLOOD PRESSURE: 82 MMHG | TEMPERATURE: 97.2 F | RESPIRATION RATE: 11 BRPM

## 2021-02-27 LAB
ANION GAP SERPL CALCULATED.3IONS-SCNC: 8 MEQ/L (ref 8–16)
BACTERIA: ABNORMAL /HPF
BASOPHILS # BLD: 0.1 %
BASOPHILS ABSOLUTE: 0 THOU/MM3 (ref 0–0.1)
BILIRUBIN URINE: NEGATIVE
BLOOD, URINE: NEGATIVE
BUN BLDV-MCNC: 10 MG/DL (ref 7–22)
CALCIUM SERPL-MCNC: 8.8 MG/DL (ref 8.5–10.5)
CASTS 2: ABNORMAL /LPF
CASTS UA: ABNORMAL /LPF
CHARACTER, URINE: CLEAR
CHLORIDE BLD-SCNC: 101 MEQ/L (ref 98–111)
CO2: 24 MEQ/L (ref 23–33)
COLOR: YELLOW
CREAT SERPL-MCNC: 0.4 MG/DL (ref 0.4–1.2)
CRYSTALS, UA: ABNORMAL
EOSINOPHIL # BLD: 0.1 %
EOSINOPHILS ABSOLUTE: 0 THOU/MM3 (ref 0–0.4)
EPITHELIAL CELLS, UA: ABNORMAL /HPF
ERYTHROCYTE [DISTWIDTH] IN BLOOD BY AUTOMATED COUNT: 12 % (ref 11.5–14.5)
ERYTHROCYTE [DISTWIDTH] IN BLOOD BY AUTOMATED COUNT: 42.5 FL (ref 35–45)
GFR SERPL CREATININE-BSD FRML MDRD: > 90 ML/MIN/1.73M2
GLUCOSE BLD-MCNC: 105 MG/DL (ref 70–108)
GLUCOSE URINE: NEGATIVE MG/DL
HCT VFR BLD CALC: 36.7 % (ref 37–47)
HEMOGLOBIN: 12 GM/DL (ref 12–16)
IMMATURE GRANS (ABS): 0.11 THOU/MM3 (ref 0–0.07)
IMMATURE GRANULOCYTES: 0.6 %
KETONES, URINE: NEGATIVE
LEUKOCYTE ESTERASE, URINE: ABNORMAL
LYMPHOCYTES # BLD: 6.1 %
LYMPHOCYTES ABSOLUTE: 1.1 THOU/MM3 (ref 1–4.8)
MCH RBC QN AUTO: 31.6 PG (ref 26–33)
MCHC RBC AUTO-ENTMCNC: 32.7 GM/DL (ref 32.2–35.5)
MCV RBC AUTO: 96.6 FL (ref 81–99)
MISCELLANEOUS 2: ABNORMAL
MONOCYTES # BLD: 9.3 %
MONOCYTES ABSOLUTE: 1.7 THOU/MM3 (ref 0.4–1.3)
NITRITE, URINE: NEGATIVE
NUCLEATED RED BLOOD CELLS: 0 /100 WBC
PH UA: 7.5 (ref 5–9)
PLATELET # BLD: 251 THOU/MM3 (ref 130–400)
PMV BLD AUTO: 10.7 FL (ref 9.4–12.4)
POTASSIUM SERPL-SCNC: 4.3 MEQ/L (ref 3.5–5.2)
PROTEIN UA: NEGATIVE
RBC # BLD: 3.8 MILL/MM3 (ref 4.2–5.4)
RBC URINE: ABNORMAL /HPF
RENAL EPITHELIAL, UA: ABNORMAL
SEG NEUTROPHILS: 83.8 %
SEGMENTED NEUTROPHILS ABSOLUTE COUNT: 15.3 THOU/MM3 (ref 1.8–7.7)
SODIUM BLD-SCNC: 133 MEQ/L (ref 135–145)
SPECIFIC GRAVITY, URINE: 1.01 (ref 1–1.03)
UROBILINOGEN, URINE: 0.2 EU/DL (ref 0–1)
WBC # BLD: 18.2 THOU/MM3 (ref 4.8–10.8)
WBC UA: ABNORMAL /HPF
YEAST: ABNORMAL

## 2021-02-27 PROCEDURE — 6360000002 HC RX W HCPCS: Performed by: ORTHOPAEDIC SURGERY

## 2021-02-27 PROCEDURE — 0SPC0JZ REMOVAL OF SYNTHETIC SUBSTITUTE FROM RIGHT KNEE JOINT, OPEN APPROACH: ICD-10-PCS | Performed by: ORTHOPAEDIC SURGERY

## 2021-02-27 PROCEDURE — 93010 ELECTROCARDIOGRAM REPORT: CPT | Performed by: NUCLEAR MEDICINE

## 2021-02-27 PROCEDURE — C1713 ANCHOR/SCREW BN/BN,TIS/BN: HCPCS | Performed by: ORTHOPAEDIC SURGERY

## 2021-02-27 PROCEDURE — 3700000000 HC ANESTHESIA ATTENDED CARE: Performed by: ORTHOPAEDIC SURGERY

## 2021-02-27 PROCEDURE — 36415 COLL VENOUS BLD VENIPUNCTURE: CPT

## 2021-02-27 PROCEDURE — 6360000002 HC RX W HCPCS: Performed by: NURSE ANESTHETIST, CERTIFIED REGISTERED

## 2021-02-27 PROCEDURE — 2580000003 HC RX 258: Performed by: ORTHOPAEDIC SURGERY

## 2021-02-27 PROCEDURE — 94760 N-INVAS EAR/PLS OXIMETRY 1: CPT

## 2021-02-27 PROCEDURE — 3600000015 HC SURGERY LEVEL 5 ADDTL 15MIN: Performed by: ORTHOPAEDIC SURGERY

## 2021-02-27 PROCEDURE — 85025 COMPLETE CBC W/AUTO DIFF WBC: CPT

## 2021-02-27 PROCEDURE — 6370000000 HC RX 637 (ALT 250 FOR IP): Performed by: ORTHOPAEDIC SURGERY

## 2021-02-27 PROCEDURE — 51798 US URINE CAPACITY MEASURE: CPT

## 2021-02-27 PROCEDURE — 2500000003 HC RX 250 WO HCPCS: Performed by: ORTHOPAEDIC SURGERY

## 2021-02-27 PROCEDURE — 2720000010 HC SURG SUPPLY STERILE: Performed by: ORTHOPAEDIC SURGERY

## 2021-02-27 PROCEDURE — 6360000002 HC RX W HCPCS: Performed by: ANESTHESIOLOGY

## 2021-02-27 PROCEDURE — 2500000003 HC RX 250 WO HCPCS: Performed by: NURSE ANESTHETIST, CERTIFIED REGISTERED

## 2021-02-27 PROCEDURE — 08QPXZZ REPAIR LEFT UPPER EYELID, EXTERNAL APPROACH: ICD-10-PCS | Performed by: EMERGENCY MEDICINE

## 2021-02-27 PROCEDURE — 7100000001 HC PACU RECOVERY - ADDTL 15 MIN: Performed by: ORTHOPAEDIC SURGERY

## 2021-02-27 PROCEDURE — 0SRC069 REPLACEMENT OF RIGHT KNEE JOINT WITH OXIDIZED ZIRCONIUM ON POLYETHYLENE SYNTHETIC SUBSTITUTE, CEMENTED, OPEN APPROACH: ICD-10-PCS | Performed by: ORTHOPAEDIC SURGERY

## 2021-02-27 PROCEDURE — 80048 BASIC METABOLIC PNL TOTAL CA: CPT

## 2021-02-27 PROCEDURE — 99233 SBSQ HOSP IP/OBS HIGH 50: CPT | Performed by: INTERNAL MEDICINE

## 2021-02-27 PROCEDURE — 1200000003 HC TELEMETRY R&B

## 2021-02-27 PROCEDURE — 6370000000 HC RX 637 (ALT 250 FOR IP): Performed by: NURSE PRACTITIONER

## 2021-02-27 PROCEDURE — 73560 X-RAY EXAM OF KNEE 1 OR 2: CPT

## 2021-02-27 PROCEDURE — 2580000003 HC RX 258: Performed by: INTERNAL MEDICINE

## 2021-02-27 PROCEDURE — 7100000000 HC PACU RECOVERY - FIRST 15 MIN: Performed by: ORTHOPAEDIC SURGERY

## 2021-02-27 PROCEDURE — C1776 JOINT DEVICE (IMPLANTABLE): HCPCS | Performed by: ORTHOPAEDIC SURGERY

## 2021-02-27 PROCEDURE — 3700000001 HC ADD 15 MINUTES (ANESTHESIA): Performed by: ORTHOPAEDIC SURGERY

## 2021-02-27 PROCEDURE — 2709999900 HC NON-CHARGEABLE SUPPLY: Performed by: ORTHOPAEDIC SURGERY

## 2021-02-27 PROCEDURE — 81001 URINALYSIS AUTO W/SCOPE: CPT

## 2021-02-27 PROCEDURE — 3600000005 HC SURGERY LEVEL 5 BASE: Performed by: ORTHOPAEDIC SURGERY

## 2021-02-27 PROCEDURE — 6360000002 HC RX W HCPCS: Performed by: INTERNAL MEDICINE

## 2021-02-27 PROCEDURE — 6370000000 HC RX 637 (ALT 250 FOR IP): Performed by: INTERNAL MEDICINE

## 2021-02-27 DEVICE — BUSHING TIB POLY LO FRIC INTFACE OSS: Type: IMPLANTABLE DEVICE | Status: FUNCTIONAL

## 2021-02-27 DEVICE — COMPONENT FEM KNEE YOKE REINF ORTH SALV SYS: Type: IMPLANTABLE DEVICE | Status: FUNCTIONAL

## 2021-02-27 DEVICE — PROVIDENCE ANTERIOR CERVICAL PLATE 4-LEVEL, 78MM
Type: IMPLANTABLE DEVICE | Status: FUNCTIONAL
Brand: PROVIDENCE

## 2021-02-27 DEVICE — CEMENT BNE 40GM FULL DOSE PMMA W/O ANTIBIO M VISC RADPQ: Type: IMPLANTABLE DEVICE | Status: FUNCTIONAL

## 2021-02-27 DEVICE — IMPLANTABLE DEVICE: Type: IMPLANTABLE DEVICE | Status: FUNCTIONAL

## 2021-02-27 DEVICE — BUSHING FEM KNEE ARCM POLY LO FRIC INTFACE AXLE AND REINF: Type: IMPLANTABLE DEVICE | Status: FUNCTIONAL

## 2021-02-27 DEVICE — AXLE FEM STD KNEE OSS: Type: IMPLANTABLE DEVICE | Status: FUNCTIONAL

## 2021-02-27 RX ORDER — CEFAZOLIN SODIUM 1 G/3ML
INJECTION, POWDER, FOR SOLUTION INTRAMUSCULAR; INTRAVENOUS PRN
Status: DISCONTINUED | OUTPATIENT
Start: 2021-02-27 | End: 2021-02-27 | Stop reason: SDUPTHER

## 2021-02-27 RX ORDER — ROCURONIUM BROMIDE 10 MG/ML
INJECTION, SOLUTION INTRAVENOUS PRN
Status: DISCONTINUED | OUTPATIENT
Start: 2021-02-27 | End: 2021-02-27 | Stop reason: SDUPTHER

## 2021-02-27 RX ORDER — METOPROLOL TARTRATE 50 MG/1
50 TABLET, FILM COATED ORAL DAILY
Status: DISCONTINUED | OUTPATIENT
Start: 2021-02-28 | End: 2021-03-03 | Stop reason: HOSPADM

## 2021-02-27 RX ORDER — OXYCODONE HYDROCHLORIDE AND ACETAMINOPHEN 5; 325 MG/1; MG/1
1 TABLET ORAL EVERY 4 HOURS PRN
Qty: 50 TABLET | Refills: 0 | Status: ON HOLD | OUTPATIENT
Start: 2021-02-27 | End: 2021-03-12 | Stop reason: HOSPADM

## 2021-02-27 RX ORDER — FENTANYL CITRATE 50 UG/ML
INJECTION, SOLUTION INTRAMUSCULAR; INTRAVENOUS PRN
Status: DISCONTINUED | OUTPATIENT
Start: 2021-02-27 | End: 2021-02-27 | Stop reason: SDUPTHER

## 2021-02-27 RX ORDER — TRANEXAMIC ACID 100 MG/ML
INJECTION, SOLUTION INTRAVENOUS PRN
Status: DISCONTINUED | OUTPATIENT
Start: 2021-02-27 | End: 2021-02-27 | Stop reason: SDUPTHER

## 2021-02-27 RX ORDER — ONDANSETRON 2 MG/ML
INJECTION INTRAMUSCULAR; INTRAVENOUS PRN
Status: DISCONTINUED | OUTPATIENT
Start: 2021-02-27 | End: 2021-02-27 | Stop reason: SDUPTHER

## 2021-02-27 RX ORDER — KETOROLAC TROMETHAMINE 30 MG/ML
15 INJECTION, SOLUTION INTRAMUSCULAR; INTRAVENOUS EVERY 6 HOURS
Status: COMPLETED | OUTPATIENT
Start: 2021-02-27 | End: 2021-03-01

## 2021-02-27 RX ORDER — NEOSTIGMINE METHYLSULFATE 5 MG/5 ML
SYRINGE (ML) INTRAVENOUS PRN
Status: DISCONTINUED | OUTPATIENT
Start: 2021-02-27 | End: 2021-02-27 | Stop reason: SDUPTHER

## 2021-02-27 RX ORDER — PROPOFOL 10 MG/ML
INJECTION, EMULSION INTRAVENOUS PRN
Status: DISCONTINUED | OUTPATIENT
Start: 2021-02-27 | End: 2021-02-27 | Stop reason: SDUPTHER

## 2021-02-27 RX ORDER — DEXAMETHASONE SODIUM PHOSPHATE 10 MG/ML
INJECTION, EMULSION INTRAMUSCULAR; INTRAVENOUS PRN
Status: DISCONTINUED | OUTPATIENT
Start: 2021-02-27 | End: 2021-02-27 | Stop reason: SDUPTHER

## 2021-02-27 RX ORDER — LABETALOL 20 MG/4 ML (5 MG/ML) INTRAVENOUS SYRINGE
PRN
Status: DISCONTINUED | OUTPATIENT
Start: 2021-02-27 | End: 2021-02-27 | Stop reason: SDUPTHER

## 2021-02-27 RX ORDER — FENTANYL CITRATE 50 UG/ML
50 INJECTION, SOLUTION INTRAMUSCULAR; INTRAVENOUS EVERY 5 MIN PRN
Status: DISCONTINUED | OUTPATIENT
Start: 2021-02-27 | End: 2021-02-27 | Stop reason: HOSPADM

## 2021-02-27 RX ORDER — AMLODIPINE BESYLATE 5 MG/1
5 TABLET ORAL DAILY
Status: DISCONTINUED | OUTPATIENT
Start: 2021-02-27 | End: 2021-03-03 | Stop reason: HOSPADM

## 2021-02-27 RX ORDER — LABETALOL 20 MG/4 ML (5 MG/ML) INTRAVENOUS SYRINGE
10 EVERY 10 MIN PRN
Status: DISCONTINUED | OUTPATIENT
Start: 2021-02-27 | End: 2021-02-27 | Stop reason: HOSPADM

## 2021-02-27 RX ORDER — GLYCOPYRROLATE 1 MG/5 ML
SYRINGE (ML) INTRAVENOUS PRN
Status: DISCONTINUED | OUTPATIENT
Start: 2021-02-27 | End: 2021-02-27 | Stop reason: SDUPTHER

## 2021-02-27 RX ORDER — METOPROLOL TARTRATE 100 MG/1
100 TABLET ORAL NIGHTLY
Status: DISCONTINUED | OUTPATIENT
Start: 2021-02-27 | End: 2021-03-03 | Stop reason: HOSPADM

## 2021-02-27 RX ORDER — FENTANYL CITRATE 50 UG/ML
25 INJECTION, SOLUTION INTRAMUSCULAR; INTRAVENOUS EVERY 5 MIN PRN
Status: DISCONTINUED | OUTPATIENT
Start: 2021-02-27 | End: 2021-02-27 | Stop reason: HOSPADM

## 2021-02-27 RX ORDER — PROMETHAZINE HYDROCHLORIDE 25 MG/ML
12.5 INJECTION, SOLUTION INTRAMUSCULAR; INTRAVENOUS
Status: DISCONTINUED | OUTPATIENT
Start: 2021-02-27 | End: 2021-02-27 | Stop reason: HOSPADM

## 2021-02-27 RX ORDER — EPINEPHRINE 1 MG/ML
INJECTION, SOLUTION, CONCENTRATE INTRAVENOUS PRN
Status: DISCONTINUED | OUTPATIENT
Start: 2021-02-27 | End: 2021-02-27 | Stop reason: SDUPTHER

## 2021-02-27 RX ADMIN — Medication 3 MG: at 12:06

## 2021-02-27 RX ADMIN — SODIUM CHLORIDE: 9 INJECTION, SOLUTION INTRAVENOUS at 12:00

## 2021-02-27 RX ADMIN — KETOROLAC TROMETHAMINE 15 MG: 30 INJECTION, SOLUTION INTRAMUSCULAR; INTRAVENOUS at 16:22

## 2021-02-27 RX ADMIN — ONDANSETRON HYDROCHLORIDE 4 MG: 4 INJECTION, SOLUTION INTRAMUSCULAR; INTRAVENOUS at 12:25

## 2021-02-27 RX ADMIN — OXYCODONE HYDROCHLORIDE AND ACETAMINOPHEN 1 TABLET: 5; 325 TABLET ORAL at 04:59

## 2021-02-27 RX ADMIN — MORPHINE SULFATE 1 MG: 2 INJECTION, SOLUTION INTRAMUSCULAR; INTRAVENOUS at 13:56

## 2021-02-27 RX ADMIN — ROCURONIUM BROMIDE 50 MG: 10 INJECTION INTRAVENOUS at 11:03

## 2021-02-27 RX ADMIN — LABETALOL 20 MG/4 ML (5 MG/ML) INTRAVENOUS SYRINGE 2.5 MG: at 11:27

## 2021-02-27 RX ADMIN — KETOROLAC TROMETHAMINE 15 MG: 30 INJECTION, SOLUTION INTRAMUSCULAR; INTRAVENOUS at 22:01

## 2021-02-27 RX ADMIN — EPINEPHRINE 5 MCG: 1 INJECTION, SOLUTION, CONCENTRATE INTRAVENOUS at 11:59

## 2021-02-27 RX ADMIN — MORPHINE SULFATE 1 MG: 2 INJECTION, SOLUTION INTRAMUSCULAR; INTRAVENOUS at 10:27

## 2021-02-27 RX ADMIN — METOPROLOL TARTRATE 100 MG: 100 TABLET, FILM COATED ORAL at 22:01

## 2021-02-27 RX ADMIN — PROPOFOL 60 MG: 10 INJECTION, EMULSION INTRAVENOUS at 11:22

## 2021-02-27 RX ADMIN — CEFAZOLIN 2000 MG: 10 INJECTION, POWDER, FOR SOLUTION INTRAVENOUS at 18:07

## 2021-02-27 RX ADMIN — SODIUM CHLORIDE: 9 INJECTION, SOLUTION INTRAVENOUS at 18:07

## 2021-02-27 RX ADMIN — CEFAZOLIN 2000 MG: 1 INJECTION, POWDER, FOR SOLUTION INTRAMUSCULAR; INTRAVENOUS; PARENTERAL at 11:09

## 2021-02-27 RX ADMIN — Medication 0.4 MG: at 12:06

## 2021-02-27 RX ADMIN — DEXAMETHASONE SODIUM PHOSPHATE 5 MG: 10 INJECTION, EMULSION INTRAMUSCULAR; INTRAVENOUS at 11:11

## 2021-02-27 RX ADMIN — BUSPIRONE HYDROCHLORIDE 5 MG: 5 TABLET ORAL at 14:10

## 2021-02-27 RX ADMIN — SODIUM CHLORIDE, PRESERVATIVE FREE 10 ML: 5 INJECTION INTRAVENOUS at 22:02

## 2021-02-27 RX ADMIN — AMLODIPINE BESYLATE 5 MG: 5 TABLET ORAL at 22:02

## 2021-02-27 RX ADMIN — MORPHINE SULFATE 1 MG: 2 INJECTION, SOLUTION INTRAMUSCULAR; INTRAVENOUS at 08:22

## 2021-02-27 RX ADMIN — FENTANYL CITRATE 50 MCG: 50 INJECTION, SOLUTION INTRAMUSCULAR; INTRAVENOUS at 11:20

## 2021-02-27 RX ADMIN — SODIUM CHLORIDE: 9 INJECTION, SOLUTION INTRAVENOUS at 10:58

## 2021-02-27 RX ADMIN — OXYCODONE HYDROCHLORIDE AND ACETAMINOPHEN 1 TABLET: 5; 325 TABLET ORAL at 22:01

## 2021-02-27 RX ADMIN — METOPROLOL TARTRATE 50 MG: 50 TABLET, FILM COATED ORAL at 08:25

## 2021-02-27 RX ADMIN — EPINEPHRINE 10 MCG: 1 INJECTION, SOLUTION, CONCENTRATE INTRAVENOUS at 11:52

## 2021-02-27 RX ADMIN — FENTANYL CITRATE 25 MCG: 50 INJECTION INTRAMUSCULAR; INTRAVENOUS at 12:53

## 2021-02-27 RX ADMIN — TRANEXAMIC ACID 1000 MG: 1 INJECTION, SOLUTION INTRAVENOUS at 11:10

## 2021-02-27 RX ADMIN — FENTANYL CITRATE 25 MCG: 50 INJECTION INTRAMUSCULAR; INTRAVENOUS at 12:42

## 2021-02-27 RX ADMIN — FENTANYL CITRATE 50 MCG: 50 INJECTION, SOLUTION INTRAMUSCULAR; INTRAVENOUS at 11:03

## 2021-02-27 RX ADMIN — PROPOFOL 140 MG: 10 INJECTION, EMULSION INTRAVENOUS at 11:03

## 2021-02-27 ASSESSMENT — PULMONARY FUNCTION TESTS
PIF_VALUE: 21
PIF_VALUE: 20
PIF_VALUE: 3
PIF_VALUE: 22
PIF_VALUE: 0
PIF_VALUE: 19
PIF_VALUE: 19
PIF_VALUE: 21
PIF_VALUE: 1
PIF_VALUE: 20
PIF_VALUE: 7
PIF_VALUE: 20
PIF_VALUE: 18
PIF_VALUE: 21
PIF_VALUE: 19
PIF_VALUE: 20
PIF_VALUE: 19
PIF_VALUE: 19
PIF_VALUE: 20
PIF_VALUE: 18
PIF_VALUE: 19
PIF_VALUE: 20
PIF_VALUE: 20
PIF_VALUE: 21
PIF_VALUE: 1
PIF_VALUE: 19
PIF_VALUE: 20
PIF_VALUE: 18
PIF_VALUE: 19
PIF_VALUE: 18
PIF_VALUE: 21
PIF_VALUE: 20
PIF_VALUE: 19
PIF_VALUE: 20
PIF_VALUE: 1
PIF_VALUE: 21
PIF_VALUE: 0
PIF_VALUE: 20
PIF_VALUE: 19
PIF_VALUE: 18
PIF_VALUE: 3
PIF_VALUE: 21
PIF_VALUE: 21
PIF_VALUE: 18
PIF_VALUE: 19
PIF_VALUE: 20
PIF_VALUE: 4
PIF_VALUE: 19
PIF_VALUE: 23
PIF_VALUE: 19
PIF_VALUE: 20

## 2021-02-27 ASSESSMENT — PAIN SCALES - GENERAL
PAINLEVEL_OUTOF10: 0
PAINLEVEL_OUTOF10: 8
PAINLEVEL_OUTOF10: 5
PAINLEVEL_OUTOF10: 6
PAINLEVEL_OUTOF10: 5
PAINLEVEL_OUTOF10: 7

## 2021-02-27 ASSESSMENT — PAIN DESCRIPTION - PROGRESSION
CLINICAL_PROGRESSION: NOT CHANGED

## 2021-02-27 ASSESSMENT — PAIN DESCRIPTION - DESCRIPTORS
DESCRIPTORS: ACHING;SORE
DESCRIPTORS: ACHING

## 2021-02-27 ASSESSMENT — PAIN DESCRIPTION - ONSET: ONSET: ON-GOING

## 2021-02-27 ASSESSMENT — PAIN DESCRIPTION - FREQUENCY: FREQUENCY: CONTINUOUS

## 2021-02-27 ASSESSMENT — PAIN DESCRIPTION - LOCATION
LOCATION: HIP;LEG
LOCATION: KNEE

## 2021-02-27 ASSESSMENT — PAIN DESCRIPTION - PAIN TYPE: TYPE: SURGICAL PAIN

## 2021-02-27 NOTE — PROGRESS NOTES
1229-  Patient arrived to pacu via cart. Spontaneous respirations even and unlabored. Placed on monitor--BP elevated. All other VSS. 1230-  Assessment completed. Patient is drowsy, but responds to name and follows commands. IV infusing 0.9 in left arm-- no complications. IV capped off in right hand. Patient denies pain--will monitor. ACE wrap to right knee--clean, dry, and intact. Hemovac in place and compressed-- bloody drainage in drain. Ashraf in place draining clear, yellow urine. Right toes pink and warm  Cap refill less than three seconds in right toes. Patient able to wiggle toes without difficulty. 1235-  X-ray here. 1240-  Respirations even and unlabored. BP trending down. All other VSS. 1242-  Patient states pain 6/10.  25mcgs of Fentanyl given. See MAR.   1245-  Ice chips given to patient. 1250-  Respirations even and unlabored. VSS.   1253-  25mcgs of Fentanyl given. See MAR.    1257-  Patient's O2 saturation 90%. 2L NC applied. Patient encouraged to take deep breaths. O2 saturation 96% with encouragement. 1300-  Respirations even and unlabored. VSS.   1305-  Patient states pain is more tolerable at this time, but \"still sore\". This RN explained to patient her oxygen drops when she received pain medication. Patient verbalizes understanding. 1310-  Respirations even and unlabored. VSS. Report given to [address] [place] [address] RN. All questions answered. 1313-  Reassessment completed. Patient meets criteria to be transferred to [address] [place] [address].     1318-  Waiting for patient to be transported. 1330-  Patient transferred back to [facility] in stable condition. Medical record transferred with patient.

## 2021-02-27 NOTE — ANESTHESIA PRE PROCEDURE
Department of Anesthesiology  Preprocedure Note       Name:  Kirstin Titus   Age:  80 y.o.  :  3/4/1931                                          MRN:  [id number]         Date:  2021      Surgeon: Parag Mohan):  Coco Ponce MD    Procedure: Procedure(s):  RIGHT TOTAL KNEE REVISION WITH OSS    Medications prior to admission:   Prior to Admission medications    Medication Sig Start Date End Date Taking? Authorizing Provider   Krill Oil 350 MG CAPS Take 1 tablet by mouth daily   Yes Historical Provider, MD   Acetaminophen (TYLENOL 8 HOUR ARTHRITIS PAIN PO) Take 1 tablet by mouth as needed   Yes Historical Provider, MD   metoprolol (LOPRESSOR) 100 MG tablet Take 1 tablet by mouth 2 times daily Pt takes 50 in AM and 100 in PM 20  Yes Ijeoma Herron MD   busPIRone (BUSPAR) 5 MG tablet Take 5 mg by mouth 2 times daily as needed    Yes Historical Provider, MD   CALCIUM PO Take by mouth   Yes Historical Provider, MD   amLODIPine (NORVASC) 5 MG tablet Take 1 tablet by mouth daily 10/1/15  Yes Ijeoma Herron MD   NONFORMULARY Reliv, powder supplement   Yes Historical Provider, MD   omeprazole (PRILOSEC) 20 MG capsule Take 20 mg by mouth daily.      Yes Historical Provider, MD       Current medications:    Current Facility-Administered Medications   Medication Dose Route Frequency Provider Last Rate Last Admin    amLODIPine (NORVASC) tablet 5 mg  5 mg Oral Daily Keith Avalos MD        metoprolol (LOPRESSOR) tablet 100 mg  100 mg Oral Nightly MD Kayce Cotter ON 2021] metoprolol tartrate (LOPRESSOR) tablet 50 mg  50 mg Oral Daily Keith Avalos MD        0.9 % sodium chloride infusion   Intravenous Continuous Keith Avalos MD 50 mL/hr at 21 1659 Rate Change at 21 165    sodium chloride flush 0.9 % injection 10 mL  10 mL Intravenous 2 times per day MOISÉS Solo - CNP   10 mL at 21    sodium chloride flush 0.9 % injection 10 mL  10 mL Intravenous PRN MOISÉS Sparks CNP        ondansetron (ZOFRAN-ODT) disintegrating tablet 4 mg  4 mg Oral Q8H PRN MOISÉS Sparks CNP        Or    [facility]ron [facility] injection 4 mg  4 mg Intravenous Q6H PRN MOISÉS Sparks CNP        oxyCODONE-acetaminophen (PERCOCET) 5-325 MG per tablet 1 tablet  1 tablet Oral Q4H PRN MOISÉS Sparks CNP   1 tablet at 02/27/21 0459    busPIRone (BUSPAR) tablet 5 mg  5 mg Oral BID PRN Zafar Perry MD        pantoprazole (PROTONIX) tablet 40 mg  40 mg Oral QAM AC Zafar Perry MD        morphine (PF) injection 1 mg  1 mg Intravenous Q2H PRN Zafar Perry MD   1 mg at 02/27/21 1027       Allergies: Allergies   Allergen Reactions    Penicillins     Tramadol     Vicodin [Hydrocodone-Acetaminophen] Nausea And Vomiting and Anxiety       Problem List:    Patient Active Problem List   Diagnosis Code    Hypertension I10    [facility] right [facility] S72.431A       Past Medical History:        Diagnosis Date    Arthritis     GERD (gastroesophageal reflux disease)     Hyperlipidemia     Hypertension     Keratosis     benign lichemoid    Psychiatric problem        Past Surgical History:        Procedure Laterality Date    ABDOMEN SURGERY      APPENDECTOMY  1934    CHOLECYSTECTOMY  1999    COLON SURGERY  2000    resection    COLONOSCOPY      DILATATION, ESOPHAGUS      ENDOSCOPY, COLON, DIAGNOSTIC      HYSTERECTOMY  1979    JOINT REPLACEMENT      KNEE SURGERY  2010    ROTATOR CUFF REPAIR  2006       Social History:    Social History     Tobacco Use    Smoking status: Never Smoker    Smokeless tobacco: Never Used   Substance Use Topics    Alcohol use: Yes     Comment: 2 ounce of wine once in a while.                                 Counseling given: Not Answered      Vital Signs (Current):   Vitals:    02/27/21 0030 02/27/21 0445 02/27/21 0606 02/27/21 0730   BP: (!) 145/69 136/66  132/68   Pulse: 66 72  71 Resp: 20 18 16 16   Temp: 98.2 °F (36.8 °C) 97.9 °F (36.6 °C)  98.1 °F (36.7 °C)   TempSrc: Oral Oral  Oral   SpO2: 98% 97% 98%    Weight:       Height:                                                  BP Readings from Last 3 Encounters:   02/27/21 132/68   02/13/20 138/76   02/07/19 126/72       NPO Status:                                                                                 BMI:   Wt Readings from Last 3 Encounters:   02/26/21 132 lb (59.9 kg)   02/13/20 134 lb 3.2 oz (60.9 kg)   02/07/19 140 lb (63.5 kg)     Body mass index is 24.14 kg/m². CBC:   Lab Results   Component Value Date    WBC 18.2 02/27/2021    RBC 3.80 02/27/2021    RBC 4.43 12/09/2020    HGB 12.0 02/27/2021    HCT 36.7 02/27/2021    MCV 96.6 02/27/2021    RDW 12.9 12/09/2020     02/27/2021       CMP:   Lab Results   Component Value Date     02/27/2021    K 4.3 02/27/2021    K 4.0 02/26/2021     02/27/2021    CO2 24 02/27/2021    BUN 10 02/27/2021    CREATININE 0.4 02/27/2021    LABGLOM >90 02/27/2021    GLUCOSE 105 02/27/2021    GLUCOSE 88 12/09/2020    PROT 7.2 12/09/2020    CALCIUM 8.8 02/27/2021    BILITOT 0.5 12/09/2020    ALKPHOS 107 12/09/2020    AST 20 12/09/2020    ALT 13 12/09/2020       POC Tests: No results for input(s): POCGLU, POCNA, POCK, POCCL, POCBUN, POCHEMO, POCHCT in the last 72 hours.     Coags: No results found for: PROTIME, INR, APTT    HCG (If Applicable): No results found for: PREGTESTUR, PREGSERUM, HCG, HCGQUANT     ABGs: No results found for: PHART, PO2ART, YEA3CTS, AOH2MCA, BEART, H4QXFYQD     Type & Screen (If Applicable):  No results found for: LABABO, LABRH    Drug/Infectious Status (If Applicable):  No results found for: HIV, HEPCAB    COVID-19 Screening (If Applicable):   Lab Results   Component Value Date    COVID19 NOT DETECTED 02/26/2021         Anesthesia Evaluation  Patient summary reviewed  Airway: Mallampati: III  TM distance: >3 FB   Neck ROM: full  Mouth opening: > = 3 FB Dental:    (+) upper dentures and lower dentures      Pulmonary:                              Cardiovascular:    (+) hypertension:,       ECG reviewed      Echocardiogram reviewed  Stress test reviewed                Neuro/Psych:               GI/Hepatic/Renal:   (+) GERD:,           Endo/Other:                     Abdominal:           Vascular:                                        Anesthesia Plan      general     ASA 2       Induction: intravenous. MIPS: Postoperative opioids intended and Prophylactic antiemetics administered. Anesthetic plan and risks discussed with patient and child/children. Plan discussed with CRNA. Yola Diaz.  [address] [facility]   2/27/2021

## 2021-02-27 NOTE — OP NOTE
[employer] [id number] Right 1 Implanted   STEM FEM L150MM [id number] HIP TI POR IM BOW SABRA ORTH SALV SYS  STEM FEM L150MM [id number] HIP TI POR IM BOW SABRA ORTH SALV SYS  [employer] [id number] Right 1 Implanted         Drains:   Closed/Suction Drain Right Knee 10 English (Active)       External Urinary Catheter (Active)   Output (mL) 0 mL 02/27/21 0330       Findings: Confirmed    Detailed Description of Procedure: Indications  This is an 40-year-old female history of a fall. Was seen in the emergency department diagnosed with a right supracondylar femur fracture without intercondylar extension. Significantly displaced. Very distal implant appears to be unstable. Felt to benefit from a hinged knee construct. Family agreed. Narrative  Patient was taken the operating room underwent a general anesthetic. Placed in supine position. The right lower extremities prepped draped in a sterile fashion with a nonsterile tourniquet. Timeout was taken consent was confirmed. Did receive 2 g of Ancef as well as 1 g of trans-Taras acid. Tourniquet was inflated to 300 mmHg start with a midline incision. Skin knife electrocautery down the extensor mechanism. We then performed a medial parapatellar arthrotomy. Large hematoma was expressed. Distal femur was then removed. Several bony fragments were removed as well. We then placed a 12 x 150 mm stem up the canal we planed off some of the distal femur to get a good spot. We felt like a 3 cm replacement would be adequate at this position. We then addressed the tibia. Using oscillating saw around the tibial component it was then removed. Remove the loose cement. Elected to go with a 1 piece 67 mm prosthesis. This actually impacted in good position. Elected to go with a 14 mm bearing surface and is appropriate as well. The patella button was in good condition we left this there.   We prepped the canals and cemented in a tibial femoral components with a 14 mm bearing surface. Once cemented cured look for loose cement tracked appropriately. Tourniquet was let down no significant bleeders were noted. Repaired the extensor mechanism with #2 Quill suture. It should be noted we did place a Hemovac drain as well. The skin was repaired with 2-0 Vicryl suture. The skin was closed with nylon suture. Dermabond was applied. Dry dressings were applied. Patient was then awakened turned to cover room good condition. Postoperative plan  Weightbearing as tolerated. Change dressings as necessary. I will see her in the office in 2 to 3 weeks clinical exam suture removal no x-rays will be necessary.     Electronically signed by Bar Dominguez MD on 2/27/2021 at 12:03 PM

## 2021-02-27 NOTE — ANESTHESIA POSTPROCEDURE EVALUATION
Department of Anesthesiology  Postprocedure Note    Patient: Cruz Sanchez  MRN: [id number]  YOB: 1931  Date of evaluation: 2/27/2021  Time:  1:15 PM     Procedure Summary     Date: 02/27/21 Room / Location: [facility] [place] OR 03 / [place]    Anesthesia Start: 8564 Anesthesia Stop: 4313    Procedure: RIGHT TOTAL KNEE REVISION WITH OSS (Right Knee) Diagnosis: (FALL, FRACTURE OF KNEE)    Surgeons: Chryl Lombard, MD Responsible Provider: Paulette Mejia DO    Anesthesia Type: general ASA Status: 2          Anesthesia Type: general    Cee Phase I: Cee Score: 9    Cee Phase II:      Last vitals: Reviewed and per EMR flowsheets. Anesthesia Post Evaluation    Comments: Neri Dumont 60  POST-ANESTHESIA NOTE       Name:  Cruz Sanchez                                         Age:  80 y.o.   MRN:  [id number]      Last Vitals:  BP (!) 140/63   Pulse 69   Temp 98.2 °F (36.8 °C) (Temporal)   Resp 19   Ht 5' 2\" (1.575 m)   Wt 132 lb (59.9 kg)   SpO2 99%   Breastfeeding No   BMI 24.14 kg/m²   Patient Vitals in the past 4 hrs:  02/27/21 1310, BP:(!) 140/63, Pulse:69, Resp:19, SpO2:99 %  02/27/21 1305, BP:135/62, Pulse:61, Resp:15, SpO2:99 %  02/27/21 1300, BP:136/67, Pulse:63, Resp:22, SpO2:100 %  02/27/21 1255, BP:(!) 140/67, Pulse:69, Resp:14, SpO2:99 %  02/27/21 1250, BP:(!) 144/72, Pulse:67, Resp:15, SpO2:94 %  02/27/21 1245, BP:(!) 147/70, Pulse:69, Resp:12, SpO2:97 %  02/27/21 1240, BP:(!) 175/80, Pulse:77, Resp:13, SpO2:96 %  02/27/21 1235, BP:(!) 166/76, Pulse:78, Resp:14, SpO2:96 %  02/27/21 1230, BP:(!) 177/80, Pulse:83, Resp:14, SpO2:98 %  02/27/21 1229, BP:(!) 178/81, Temp:98.2 °F (36.8 °C), Temp src:Temporal, Pulse:85, Resp:16, SpO2:98 %    Level of Consciousness:  Awake    Respiratory:  Stable    Oxygen Saturation:  Stable    Cardiovascular:  Stable    Hydration:  Adequate    PONV:  Stable    Post-op Pain:  Adequate analgesia    Post-op Assessment:  No apparent anesthetic complications    Additional Follow-Up / Treatment / Comment:  None    Mila Núñez DO  February 27, 2021   1:15 PM

## 2021-02-27 NOTE — PLAN OF CARE
Problem: Pain:  Goal: Control of acute pain  Description: Control of acute pain  Outcome: Met This Shift  Note: Pt report pain at 5 on scale. Pt states oral/iv medication helping to achieve pain goal of a 5 on scale. Problem: Falls - Risk of:  Goal: Will remain free from falls  Description: Will remain free from falls  Outcome: Met This Shift  Note: No falls noted this shift. Patient has yet to ambulate, surgery this afternoon. Family member at bedside. Bed kept in low position. Safe environment maintained. Bedside table & call light in reach. Uses call light appropriately when needing assistance. Problem: Skin Integrity:  Goal: Will show no infection signs and symptoms  Description: Will show no infection signs and symptoms  Outcome: Met This Shift  Note: Pt's right knee surgical incision healing. Dressing is dry and intact and not due to be changed today. No other skin impairments noted. Pt understands the importance of frequent repositioning in order to prevent any skin breakdown. Problem: Cardiovascular  Goal: No DVT, peripheral vascular complications  Outcome: Met This Shift  Note: Pt without s/s of DVT. Pt able to take prescribed anticoagulants to help prevent development of DVT. Problem: Respiratory  Goal: O2 Sat > 90%  Outcome: Met This Shift  Note: Pt sats 94-97% on RA. No shortness of breath noted. Lungs clear, uses IS, and able to C&DB  as ordered. Problem: GI  Goal: No bowel complications  Outcome: Met This Shift  Note: Pt with bowel sounds, passing flatus, and without nausea. Taking prescribed medications to assist with BM      Problem:   Goal: Adequate urinary output  Outcome: Met This Shift  Note: Ashraf catheter remains in at this time due to post op. Problem: Musculor/Skeletal Functional Status  Goal: Highest potential functional level  Outcome: Met This Shift  Note: Activity as tolerated, FWB to right leg.

## 2021-02-27 NOTE — PLAN OF CARE
Problem: Pain:  Goal: Pain level will decrease  Description: Pain level will decrease  Outcome: Ongoing  Note: Pt report pain at 6 on scale. Pt states oral/iv/im medication helping to achieve pain goal of no pain on scale. Problem: Pain:  Goal: Control of acute pain  Description: Control of acute pain  Outcome: Ongoing     Problem: Pain:  Goal: Control of chronic pain  Description: Control of chronic pain  Outcome: Ongoing     Problem: Falls - Risk of:  Goal: Will remain free from falls  Description: Will remain free from falls  Outcome: Ongoing  Note: Pt using call light appropriately to call for assistance with ambulation to the bathroom and to chair. Pt is also compliant with use of non-skid slippers. Pt reports understanding of fall prevention when discussed. Problem: Falls - Risk of:  Goal: Absence of physical injury  Description: Absence of physical injury  Outcome: Ongoing  Note: No physical injury noted. Problem: Skin Integrity:  Goal: Will show no infection signs and symptoms  Description: Will show no infection signs and symptoms  Outcome: Ongoing  Note: No other skin impairments noted. Pt understands the importance of frequent repositioning in order to prevent any skin breakdown. Problem: Skin Integrity:  Goal: Absence of new skin breakdown  Description: Absence of new skin breakdown  Outcome: Ongoing  Note: Pt free from new skin breakdown this shift. Care plan reviewed with patient. Patient  verbalizes understanding of the plan of care and contribute to goal setting.

## 2021-02-27 NOTE — PROGRESS NOTES
Pt admitted to  [facility] via cart/stretcher. Complaints: right femur fracture due to fall. IV normal saline infusing into the forearm left, condition patent and no redness at a rate of 50 mls/ hour with about 500 mls in the bag still. IV site free of s/s of infection or infiltration. Vital signs obtained. Assessment and data collection initiated. Two nurse skin assessment performed by Vickie BENSON and Mark Lemus RN. Oriented to room. Heels soft bilaterally. Right knee edematous. Bruise to left eye with abrasion. Explained patients right to have family, representative or physician notified of their admission. Patient has Declined for physician to be notified. Patient has Declined for family/representative to be notified. The patient is interested in [facility]. [facility] meds to beds program?:  No    Policies and procedures for  explained. All questions answered with no further questions at this time. Fall prevention and safety brochure discussed with patient. Bed alarm on. Call light in reach.

## 2021-02-27 NOTE — PROGRESS NOTES
Assessment and Plan:        1. FX FEMUR- stable overnight. OR today  2. hbp- stable  3. Leukocytosis- improving; likely stress reaction      CC:  Fall fx femur  HPI: pt in good health, lost balance and fell. No hx heart problems  ROS (12 point review of systems completed. Pertinent positives noted. Otherwise ROS is negative) :   PMH:  Per HPI  SHX:  Nonsmoker, lives alone  FHX: Noncontributory    Allergies: See above    Medications:     sodium chloride 50 mL/hr at 02/26/21 1659      sodium chloride flush  10 mL Intravenous 2 times per day    amLODIPine  5 mg Oral Daily    metoprolol  100 mg Oral BID    pantoprazole  40 mg Oral QAM AC       Vital Signs:   /68   Pulse 71   Temp 98.1 °F (36.7 °C) (Oral)   Resp 16   Ht 5' 2\" (1.575 m)   Wt 132 lb (59.9 kg)   SpO2 98%   Breastfeeding No   BMI 24.14 kg/m²      Intake/Output Summary (Last 24 hours) at 2/27/2021 0820  Last data filed at 2/27/2021 0445  Gross per 24 hour   Intake 570 ml   Output 750 ml   Net -180 ml        Physical Examination: General appearance - alert, well appearing, and in no distress  Mental status - alert, oriented to person, place, and time  Neck - supple, no significant adenopathy, no JVD, or carotid bruits  Chest - clear to auscultation, no wheezes, rales or rhonchi, symmetric air entry  Heart - normal rate, regular rhythm, normal S1, S2, no murmurs, rubs, clicks or gallops  Abdomen - soft, nontender, nondistended, no masses or organomegaly  Neurological - alert, oriented, normal speech, no focal findings or movement disorder noted  Musculoskeletal - not examined  Extremities - no pedal edema noted  Skin - normal coloration and turgor, no rashes, no suspicious skin lesions noted    Data: (All radiographs, tracings, PFTs, and imaging are personally viewed and interpreted unless otherwise noted). ?  Reviewed labs      Electronically signed by Suzi Cowan MD on 2/27/2021 at 8:20 AM

## 2021-02-27 NOTE — PROCEDURES
A Bladder scan was performed at 0435 . The residual amount was measured to be 606 ML. Report of results was given to [facility].

## 2021-02-28 LAB
ANION GAP SERPL CALCULATED.3IONS-SCNC: 8 MEQ/L (ref 8–16)
BASOPHILS # BLD: 0.1 %
BASOPHILS ABSOLUTE: 0 THOU/MM3 (ref 0–0.1)
BUN BLDV-MCNC: 13 MG/DL (ref 7–22)
CALCIUM SERPL-MCNC: 7.9 MG/DL (ref 8.5–10.5)
CHLORIDE BLD-SCNC: 101 MEQ/L (ref 98–111)
CO2: 24 MEQ/L (ref 23–33)
CREAT SERPL-MCNC: 0.7 MG/DL (ref 0.4–1.2)
EOSINOPHIL # BLD: 0 %
EOSINOPHILS ABSOLUTE: 0 THOU/MM3 (ref 0–0.4)
ERYTHROCYTE [DISTWIDTH] IN BLOOD BY AUTOMATED COUNT: 12.1 % (ref 11.5–14.5)
ERYTHROCYTE [DISTWIDTH] IN BLOOD BY AUTOMATED COUNT: 42.9 FL (ref 35–45)
GFR SERPL CREATININE-BSD FRML MDRD: 79 ML/MIN/1.73M2
GLUCOSE BLD-MCNC: 113 MG/DL (ref 70–108)
HCT VFR BLD CALC: 26.1 % (ref 37–47)
HEMOGLOBIN: 8.3 GM/DL (ref 12–16)
IMMATURE GRANS (ABS): 0.07 THOU/MM3 (ref 0–0.07)
IMMATURE GRANULOCYTES: 0.6 %
LYMPHOCYTES # BLD: 10.6 %
LYMPHOCYTES ABSOLUTE: 1.3 THOU/MM3 (ref 1–4.8)
MCH RBC QN AUTO: 31 PG (ref 26–33)
MCHC RBC AUTO-ENTMCNC: 31.8 GM/DL (ref 32.2–35.5)
MCV RBC AUTO: 97.4 FL (ref 81–99)
MONOCYTES # BLD: 15.9 %
MONOCYTES ABSOLUTE: 2 THOU/MM3 (ref 0.4–1.3)
NUCLEATED RED BLOOD CELLS: 0 /100 WBC
PLATELET # BLD: 178 THOU/MM3 (ref 130–400)
PMV BLD AUTO: 10.8 FL (ref 9.4–12.4)
POTASSIUM SERPL-SCNC: 4.4 MEQ/L (ref 3.5–5.2)
RBC # BLD: 2.68 MILL/MM3 (ref 4.2–5.4)
SEG NEUTROPHILS: 72.8 %
SEGMENTED NEUTROPHILS ABSOLUTE COUNT: 9.1 THOU/MM3 (ref 1.8–7.7)
SODIUM BLD-SCNC: 133 MEQ/L (ref 135–145)
WBC # BLD: 12.5 THOU/MM3 (ref 4.8–10.8)

## 2021-02-28 PROCEDURE — 85025 COMPLETE CBC W/AUTO DIFF WBC: CPT

## 2021-02-28 PROCEDURE — 6370000000 HC RX 637 (ALT 250 FOR IP): Performed by: ORTHOPAEDIC SURGERY

## 2021-02-28 PROCEDURE — 36415 COLL VENOUS BLD VENIPUNCTURE: CPT

## 2021-02-28 PROCEDURE — 6370000000 HC RX 637 (ALT 250 FOR IP): Performed by: INTERNAL MEDICINE

## 2021-02-28 PROCEDURE — 97530 THERAPEUTIC ACTIVITIES: CPT

## 2021-02-28 PROCEDURE — 97535 SELF CARE MNGMENT TRAINING: CPT

## 2021-02-28 PROCEDURE — 97166 OT EVAL MOD COMPLEX 45 MIN: CPT

## 2021-02-28 PROCEDURE — 94760 N-INVAS EAR/PLS OXIMETRY 1: CPT

## 2021-02-28 PROCEDURE — 6360000002 HC RX W HCPCS: Performed by: ORTHOPAEDIC SURGERY

## 2021-02-28 PROCEDURE — 2580000003 HC RX 258: Performed by: ORTHOPAEDIC SURGERY

## 2021-02-28 PROCEDURE — 99232 SBSQ HOSP IP/OBS MODERATE 35: CPT | Performed by: INTERNAL MEDICINE

## 2021-02-28 PROCEDURE — 1200000003 HC TELEMETRY R&B

## 2021-02-28 PROCEDURE — 97161 PT EVAL LOW COMPLEX 20 MIN: CPT

## 2021-02-28 PROCEDURE — 80048 BASIC METABOLIC PNL TOTAL CA: CPT

## 2021-02-28 PROCEDURE — 97116 GAIT TRAINING THERAPY: CPT

## 2021-02-28 RX ORDER — ASCORBIC ACID 500 MG
500 TABLET ORAL
Status: DISCONTINUED | OUTPATIENT
Start: 2021-02-28 | End: 2021-03-03 | Stop reason: HOSPADM

## 2021-02-28 RX ORDER — POLYETHYLENE GLYCOL 3350 17 G/17G
17 POWDER, FOR SOLUTION ORAL DAILY
Status: DISCONTINUED | OUTPATIENT
Start: 2021-02-28 | End: 2021-03-03 | Stop reason: HOSPADM

## 2021-02-28 RX ORDER — FERROUS SULFATE 325(65) MG
325 TABLET ORAL
Status: DISCONTINUED | OUTPATIENT
Start: 2021-02-28 | End: 2021-03-03 | Stop reason: HOSPADM

## 2021-02-28 RX ADMIN — KETOROLAC TROMETHAMINE 15 MG: 30 INJECTION, SOLUTION INTRAMUSCULAR; INTRAVENOUS at 03:17

## 2021-02-28 RX ADMIN — OXYCODONE HYDROCHLORIDE AND ACETAMINOPHEN 1 TABLET: 5; 325 TABLET ORAL at 03:17

## 2021-02-28 RX ADMIN — OXYCODONE HYDROCHLORIDE AND ACETAMINOPHEN 1 TABLET: 5; 325 TABLET ORAL at 09:41

## 2021-02-28 RX ADMIN — METOPROLOL TARTRATE 50 MG: 100 TABLET, FILM COATED ORAL at 09:38

## 2021-02-28 RX ADMIN — ENOXAPARIN SODIUM 40 MG: 40 INJECTION, SOLUTION INTRAVENOUS; SUBCUTANEOUS at 06:28

## 2021-02-28 RX ADMIN — OXYCODONE HYDROCHLORIDE AND ACETAMINOPHEN 500 MG: 500 TABLET ORAL at 09:38

## 2021-02-28 RX ADMIN — SODIUM CHLORIDE, PRESERVATIVE FREE 10 ML: 5 INJECTION INTRAVENOUS at 09:42

## 2021-02-28 RX ADMIN — KETOROLAC TROMETHAMINE 15 MG: 30 INJECTION, SOLUTION INTRAMUSCULAR; INTRAVENOUS at 15:04

## 2021-02-28 RX ADMIN — OXYCODONE HYDROCHLORIDE AND ACETAMINOPHEN 1 TABLET: 5; 325 TABLET ORAL at 20:15

## 2021-02-28 RX ADMIN — KETOROLAC TROMETHAMINE 15 MG: 30 INJECTION, SOLUTION INTRAMUSCULAR; INTRAVENOUS at 20:15

## 2021-02-28 RX ADMIN — METOPROLOL TARTRATE 100 MG: 100 TABLET, FILM COATED ORAL at 20:16

## 2021-02-28 RX ADMIN — SODIUM CHLORIDE, PRESERVATIVE FREE 10 ML: 5 INJECTION INTRAVENOUS at 20:16

## 2021-02-28 RX ADMIN — PANTOPRAZOLE SODIUM 40 MG: 40 TABLET, DELAYED RELEASE ORAL at 06:28

## 2021-02-28 RX ADMIN — AMLODIPINE BESYLATE 5 MG: 5 TABLET ORAL at 20:16

## 2021-02-28 RX ADMIN — CEFAZOLIN 2000 MG: 10 INJECTION, POWDER, FOR SOLUTION INTRAVENOUS at 03:17

## 2021-02-28 RX ADMIN — FERROUS SULFATE TAB 325 MG (65 MG ELEMENTAL FE) 325 MG: 325 (65 FE) TAB at 09:38

## 2021-02-28 RX ADMIN — BUSPIRONE HYDROCHLORIDE 5 MG: 5 TABLET ORAL at 09:38

## 2021-02-28 RX ADMIN — BUSPIRONE HYDROCHLORIDE 5 MG: 5 TABLET ORAL at 20:16

## 2021-02-28 RX ADMIN — KETOROLAC TROMETHAMINE 15 MG: 30 INJECTION, SOLUTION INTRAMUSCULAR; INTRAVENOUS at 09:42

## 2021-02-28 RX ADMIN — POLYETHYLENE GLYCOL 3350 17 G: 17 POWDER, FOR SOLUTION ORAL at 09:41

## 2021-02-28 ASSESSMENT — PAIN DESCRIPTION - PROGRESSION
CLINICAL_PROGRESSION: NOT CHANGED

## 2021-02-28 ASSESSMENT — PAIN DESCRIPTION - PAIN TYPE
TYPE: ACUTE PAIN;SURGICAL PAIN
TYPE: ACUTE PAIN;SURGICAL PAIN

## 2021-02-28 ASSESSMENT — PAIN DESCRIPTION - FREQUENCY
FREQUENCY: CONTINUOUS
FREQUENCY: CONTINUOUS

## 2021-02-28 ASSESSMENT — PAIN SCALES - GENERAL
PAINLEVEL_OUTOF10: 7
PAINLEVEL_OUTOF10: 7
PAINLEVEL_OUTOF10: 2
PAINLEVEL_OUTOF10: 10

## 2021-02-28 ASSESSMENT — PAIN DESCRIPTION - DESCRIPTORS
DESCRIPTORS: SORE
DESCRIPTORS: ACHING;SORE

## 2021-02-28 ASSESSMENT — PAIN DESCRIPTION - ONSET
ONSET: ON-GOING
ONSET: ON-GOING

## 2021-02-28 ASSESSMENT — PAIN DESCRIPTION - ORIENTATION: ORIENTATION: RIGHT

## 2021-02-28 ASSESSMENT — PAIN - FUNCTIONAL ASSESSMENT: PAIN_FUNCTIONAL_ASSESSMENT: ACTIVITIES ARE NOT PREVENTED

## 2021-02-28 NOTE — PROGRESS NOTES
1130-this nurse was called into room due to blood noted to dressing. Pulled dressing back to see that active bleeding noted to drain site. Dressing applied, attempted to compress drain without success. Therapy walking patient in room and drain came all the way out. Hunter GAYTAN notified.

## 2021-02-28 NOTE — FLOWSHEET NOTE
[facility]. [facility] PROGRESS NOTE      Patient: Lashanda Castillo  Room #: 7K-01/001-A            YOB: 1931  Age: 80 y.o. Gender: female            Admit Date & Time: 2/26/2021 10:58 AM    Assessment:   was notified by the Charge nurse that pt requested the Eucharist.  Pt was laying in bed. Two of pt's sons were present, pt has 7 children. Pt shared that she is a member of [employer] [address]. Pt's chart has been updated to reflect this. Pt fell and damaged a knee replacement, so she had to have another one put in. Pt stated the knee replacement is a full one, and the old one was a partial. Pt hopes to return to normal within 3-6 weeks and to be discharged soon. Interventions:   provided a listening and supportive presence.  provided the Host for the pt. Outcomes:  Pt expressed gratitude for the Eucharist.    Plan:  1. Provide spiritual care and support. 2.  Reevaluate pt for spiritual needs if she is not released soon. Electronically signed by Rosita Holman, on 2/28/2021 at 3:32 PM.  [address] [place] [address]  [phone]       02/28/21 1500   Encounter Summary   Services provided to: Patient and family together   Referral/Consult From: Nurse   [id number]   ([employer] [facility] [facility])   Continue Visiting Yes  (2/28)   Complexity of Encounter Moderate   Length of Encounter 30 minutes   Spiritual/Synagogue   Type Spiritual support   Assessment Calm; Approachable   Intervention Active listening;Explored feelings, thoughts, concerns;Communion;Sustaining presence/ Ministry of presence   Outcome Connection/belonging;Comfort;Engaged in conversation;Expressed feelings/needs/concerns   Sacraments   Communion Patient received communion

## 2021-02-28 NOTE — PROGRESS NOTES
Baileylydia Julia 60  INPATIENT OCCUPATIONAL THERAPY  [facility] ORTHOPEDICS 7K  EVALUATION    Time:   Time In: 2637  Time Out: 1050  Timed Code Treatment Minutes: 26 Minutes  Minutes: 36          Date: 2021  Patient Name: Onelia Young,   Gender: female      MRN: [id number]  : 3/4/1931  (80 y.o.)  Referring Practitioner: Heidy Burns MD  Diagnosis: Closed displaced fractureof medial condyle of R femur, R TKA revision. Additional Pertinent Hx: per 21 ED note; Onelia Young is a 80 y.o. female who presents to the emergency department for evaluation of fall at [facility] this morning. Mechanical trip and fall while walking to the parking lot. Patient denies loss of consciousness, head strike. States that her glasses hit her eye and bruised her upper eyelid. States she is feeling fine other than severe right knee pain. She admits to associated swelling. Pain level evaluation was 9-10 out of 10. Restrictions/Precautions:  Restrictions/Precautions: (WBAT R LE)    Vitals: Vitals were stable through-out session. Subjective  Chart Reviewed: Yes, Orders, Progress Notes, History and Physical, Imaging  Patient assessed for rehabilitation services?: Yes  Family / Caregiver Present: No    Subjective: RN approved session. reports L hand is sore following her fall and has told Dr. Fortino Yanez when he visited her this a.m. A & O x 4. states she feels like her R LE is shorter than her L LE when functionally ambulating.     Pain:  Pain Assessment  Patient Currently in Pain: Yes  Pain Assessment: 0-10  Pain Level: 10    Social/Functional History:  Lives With: Alone  Type of Home: House  Home Layout: One level  Home Access: Stairs to enter with rails  Entrance Stairs - Number of Steps: 2  Entrance Stairs - Rails: Left  Home Equipment: Cane, Rolling walker   Bathroom Shower/Tub: Tub/Shower unit  Bathroom Toilet: Handicap height  Bathroom Equipment: Grab bars in shower, Grab bars around toilet Bathroom Accessibility: Accessible    Receives Help From: Family(helped with \"whatever I need\". )  ADL Assistance: Independent  Homemaking Assistance: Independent  Homemaking Responsibilities: Yes  Ambulation Assistance: Independent  Transfer Assistance: Independent    Active : Yes  Mode of Transportation: Car     Additional Comments: did not use an AD prior to admission    VISION:Corrected    HEARING:  WFL    COGNITION: WFL    RANGE OF MOTION:  Right Upper Extremity: WFL  Left Upper Extremity:  WFL    STRENGTH:  Right Upper Extremity: WFL  Left Upper Extremity:  Impaired - decreased  in L hand due to pain from fall; (F), L UE was WFL    SENSATION:   WFL    ADL:   Lower Extremity Dressing: Stand By Assistance. for L sock, MAX A for R sock due to pain and limited ROM from recent procedure  Toilet Transfer: Minimal Assistance. for sit to stand, CGA for stand to sit; cues for hand placement and tech with R LE to decrease pain. BALANCE:  Sitting Balance:  Stand By Assistance. due to pain in R knee with dynamic sitting task  Standing Balance: Contact Guard Assistance. with use of 2 w/w. puts most of wt thru UEs. BED MOBILITY:  Not Tested    TRANSFERS:  Sit to Stand:  Minimal Assistance. cues for hand placement to increase ease with sit to stand    FUNCTIONAL MOBILITY:  Assistive Device: Walker  Assist Level:  Contact Guard Assistance. Distance: To and from bathroom  Increased time due to pain and frequently c/o R LE feeling shorter than L LE. Exercise:  none completed this date    Activity Tolerance:  Patient tolerance of  treatment: good. Patient was in pain; however able to complete tasks. Assessment:  Assessment: patient is motivated to participate in treatment and challenges self to gain max benefit. She would benefit from continued OT to increase ovearll endurance, increase ease and (I) with ADLs and functional transfers and AE training for increased (I) with LB dressing. Performance deficits / Impairments: Decreased functional mobility , Decreased ADL status, Decreased endurance  Prognosis: Good  REQUIRES OT FOLLOW UP: Yes  Decision Making: Medium Complexity    Treatment Initiated: Treatment and education initiated within context of evaluation. Evaluation time included review of current medical information, gathering information related to past medical, social and functional history, completion of standardized testing, formal and informal observation of tasks, assessment of data and development of plan of care and goals. Treatment time included skilled education and facilitation of tasks to increase safety and independence with ADL's for improved functional independence and quality of life. Discharge Recommendations:  IP Rehab, Continue to assess pending progress, Patient would benefit from continued therapy after discharge    Patient Education:  OT Education: OT Role, Plan of Care, Home Exercise Program, ADL Adaptive Strategies, Transfer Training  Patient Education: edu in adaptive tech for LB dressing and tech for decreasing pain when completing stand to sit. Equipment Recommendations:  Equipment Needed: Yes  Mobility Devices: ADL Assistive Devices  ADL Assistive Devices: Reacher, Sock-Aid Hard    Plan:  Times per week: 6 x  Times per day: Daily  Current Treatment Recommendations: Endurance Training, Neuromuscular Re-education, Patient/Caregiver Education & Training, Equipment Evaluation, Education, & procurement, Self-Care / ADL. See long-term goal time frame for expected duration of plan of care. If no long-term goals established, a short length of stay is anticipated. Goals:  Patient goals : return home [facility]  Short term goals  Time Frame for Short term goals: by discharge  Short term goal 1: patient will tolerate 6 min functional standing with sit to stand and participate with SBA to increase overall endurance to tolerate ADL routine. Short term goal 2: patient will complete toileting routine with SBA. Short term goal 3: patient will complete dressing tasks with SBA and use of AE PRN. Short term goal 4: patient will tolerate grooming at sink in standing with SBA. Following session, patient left in safe position with all fall risk precautions in place.

## 2021-02-28 NOTE — PROGRESS NOTES
[address] [address]  INPATIENT PHYSICAL THERAPY  EVALUATION  [facility] ORTHOPEDICS 7K - 7K-01/001-A    Time In: 0980  Time Out: 1142  Timed Code Treatment Minutes: 8 Minutes  Minutes: 30          Date: 2021  Patient Name: Kamaljit Choi,  Gender:  female        MRN: [id number]  : 3/4/1931  (80 y.o.)      Referring Practitioner: Dr. Mariella Thorne  Diagnosis: Closed displaced fracture of medial condyle of right femur  Additional Pertinent Hx: per 21 ED note; Kamaljit Choi is a 80 y.o. female who presents to the emergency department for evaluation of fall at [facility] this morning. Mechanical trip and fall while walking to the parking lot. Patient denies loss of consciousness, head strike. States that her glasses hit her eye and bruised her upper eyelid. States she is feeling fine other than severe right knee pain. She admits to associated swelling. [id number] TOTAL KNEE REVISION     Restrictions/Precautions:  Restrictions/Precautions: Weight Bearing  Right Lower Extremity Weight Bearing: Weight Bearing As Tolerated    Vitals:See RN flow sheet   Subjective:  Chart Reviewed: Yes  Patient assessed for rehabilitation services?: Yes  Subjective: Pt up in chair at start of session. Pt agreeable to therapy. Upon initiating eval, PT noted pt's drain was leaking. RN in to assist.  RN rebandaged around drain then RN noted drain not compressing. As PT worked with pt drain ultimately came out. RN aware and to notify MD.    General:       Vision: Impaired  Vision Exceptions: Wears glasses at all times    Hearing: Within functional limits         Pain: 0/10: at rest but with mobility increased pain. Pt did not rate.      Social/Functional History:    Lives With: Alone  Type of Home: House  Home Layout: One level  Home Access: Stairs to enter with rails  Entrance Stairs - Number of Steps: 2  Entrance Stairs - Rails: Left  Home Equipment: Cane, Rolling walker     Bathroom Shower/Tub: Tub/Shower unit  [place] Toilet: Handicap height  Bathroom Equipment: Grab bars in shower, Grab bars around toilet  Bathroom Accessibility: Accessible    Receives Help From: Family  ADL Assistance: Independent  Homemaking Assistance: Independent  Homemaking Responsibilities: Yes  Ambulation Assistance: Independent  Transfer Assistance: Independent    Active : Yes  Mode of Transportation: Car     Additional Comments: did not use an AD prior to admission    OBJECTIVE:  Range of Motion:  Right Lower Extremity: Impaired - Heavy dressing applied to R knee. Pt lacking full knee extension and limited knee flex to approx 40 degrees, ankle df WFL   Left Lower Extremity: [facility]    Strength:  Right Lower Extremity: Impaired - good df strength, good quad set, mod A for S. L.R  Left Lower Extremity: WFL    Balance:  Static Sitting Balance:  Stand By Assistance  Static Standing Balance: Contact Guard Assistance, X 1, with RW     Bed Mobility:  Not Tested Anticipate min A x 1 at R LE     Transfers:  Sit to Stand: [employer], X 1  Stand to  Corporation, Dependent, cues for hand placement    Ambulation:  Contact Guard Assistance  Distance: 15' x 1  Surface: Level Tile  Device:Rolling Walker  Gait Deviations: Forward Flexed Posture, Slow Yessenia, Decreased Gait Speed and decreased step length on R LE, decreased stance phase R LE due to pain. Exercise:  Patient was guided in 1 set(s) 10 reps of exercise to both lower extremities. Ankle pumps, Glut sets, Quad sets, Heelslides and Hip abduction/adduction. Exercises were completed for increased independence with functional mobility. Functional Outcome Measures: Completed  AM-PAC Inpatient Mobility without Stair Climbing Raw Score : 15  AM-PAC Inpatient without Stair Climbing T-Scale Score : 43.03    ASSESSMENT:  Activity Tolerance:  Patient tolerance of  treatment: good. Pt motivated.        Treatment Initiated: Treatment and education initiated within context of evaluation. Evaluation time included review of current medical information, gathering information related to past medical, social and functional history, completion of standardized testing, formal and informal observation of tasks, assessment of data and development of plan of care and goals. Treatment time included skilled education and facilitation of tasks to increase safety and independence with functional mobility for improved independence and quality of life. Assessment: Body structures, Functions, Activity limitations: Decreased functional mobility , Decreased ROM, Decreased strength, Decreased endurance  Assessment: Pt presents with above dx. Pt pleasant and cooperative. Pt with decreased ROM to R LE and decreased strength. Pt with decreased endurance and balance. Pt to benefit from continued therapy for gait training s/p TKR prior to discharge home. Prognosis: Good    REQUIRES PT FOLLOW UP: Yes    Discharge Recommendations:  Discharge Recommendations: Continue to assess pending progress    Patient Education:  PT Education: PT Role    Equipment Recommendations:       Plan:  Times per week: 6x BID/1X QD  Times per day: Daily  Current Treatment Recommendations: Strengthening, ROM, Balance Training, Functional Mobility Training, Transfer Training, Gait Training, Endurance Training    Goals:  Patient goals : To return home  Short term goals  Time Frame for Short term goals: At time of discharge  Short term goal 1: Mod I with bed mobility so pt can get in and out of bed  Short term goal 2: Mod I with t/fs so pt can get up to go to the bathroom  Short term goal 3: SBA x 1 to amb with ' x1 for household amb  Short term goal 4: Pt to negotiate 3 steps with 1 HR with SBA x 1 to enter home  Long term goals  Time Frame for Long term goals : No LTGs secondary to ELOS    Following session, patient left in safe position with all fall risk precautions in place.

## 2021-02-28 NOTE — PROGRESS NOTES
Assessment and Plan:        1. FX FEMUR- stable overnight. OR 2.27  2. hbp- stable  3. Leukocytosis- improving; likely stress reaction. HGB8.4  4. PT/OT; she is considering an inpt rehab; does not want to go to [facility]          CC:  Fall fx femur  HPI: pt in good health, lost balance and fell. No hx heart problems  ROS (12 point review of systems completed. Pertinent positives noted.  Otherwise ROS is negative) :   PMH:  Per HPI  SHX:  Nonsmoker, lives alone  FHX: Noncontributory    Allergies: See above    Medications:       ferrous sulfate  325 mg Oral Q48H    ascorbic acid  500 mg Oral Q48H    polyethylene glycol  17 g Oral Daily    amLODIPine  5 mg Oral Daily    metoprolol  100 mg Oral Nightly    metoprolol tartrate  50 mg Oral Daily    ketorolac  15 mg Intravenous Q6H    enoxaparin  40 mg Subcutaneous Daily    sodium chloride flush  10 mL Intravenous 2 times per day    pantoprazole  40 mg Oral QAM AC       Vital Signs:   /68   Pulse 66   Temp 98.1 °F (36.7 °C) (Oral)   Resp 17   Ht 5' 2\" (1.575 m)   Wt 132 lb (59.9 kg)   SpO2 98%   Breastfeeding No   BMI 24.14 kg/m²      Intake/Output Summary (Last 24 hours) at 2/28/2021 6878  Last data filed at 2/28/2021 0300  Gross per 24 hour   Intake 2735.3 ml   Output 2275 ml   Net 460.3 ml        Physical Examination: General appearance - alert, well appearing, and in no distress  Mental status - alert, oriented to person, place, and time  Neck - supple, no significant adenopathy, no JVD, or carotid bruits  Chest - clear to auscultation, no wheezes, rales or rhonchi, symmetric air entry  Heart - normal rate, regular rhythm, normal S1, S2, no murmurs, rubs, clicks or gallops  Abdomen - soft, nontender, nondistended, no masses or organomegaly  Neurological - alert, oriented, normal speech, no focal findings or movement disorder noted  Musculoskeletal - not examined  Extremities - no pedal edema noted Skin - normal coloration and turgor, no rashes, no suspicious skin lesions noted    Data: (All radiographs, tracings, PFTs, and imaging are personally viewed and interpreted unless otherwise noted). ?  Reviewed labs      Electronically signed by Marty Jenkins MD on 2/28/2021 at 7:38 AM

## 2021-02-28 NOTE — PROGRESS NOTES
Orthopaedic Progress Note      SUBJECTIVE   Ms. Harshil Quintanilla is post op day # 1     Patient notes status post right revision total knee arthroplasty with [employer]/Jose Elias. OBJECTIVE      Physical    VITALS:  /60   Pulse 81   Temp 98.9 °F (37.2 °C) (Oral)   Resp 18   Ht 5' 2\" (1.575 m)   Wt 132 lb (59.9 kg)   SpO2 94%   Breastfeeding No   BMI 24.14 kg/m²   I/O last 3 completed shifts: In: 2735.3 [P.O.:300; I.V.:2435.3]  Out: 6500 [Urine:1875; Drains:200; Blood:200]      Patient is neurovascular intact sensation intact. Pain is well controlled at this time. Patient is able to flex and extend toes denies of pain to palpation of calf.   Hemovac is in place proximally 110 out      Data  CBC:   Lab Results   Component Value Date    WBC 12.5 02/28/2021    HGB 8.3 02/28/2021     02/28/2021     BMP:    Lab Results   Component Value Date     02/28/2021    K 4.4 02/28/2021    K 4.0 02/26/2021     02/28/2021    CO2 24 02/28/2021    BUN 13 02/28/2021    CREATININE 0.7 02/28/2021    CALCIUM 7.9 02/28/2021    GLUCOSE 113 02/28/2021    GLUCOSE 88 12/09/2020     Uric Acid:  No components found for: URIC  PT/INR:  No results found for: PROTIME, INR  Troponin:    Lab Results   Component Value Date    TROPONINI <0.006 03/16/2013     Urine Culture:  No components found for: DEYSI      Current Inpatient Medications    Current Facility-Administered Medications: ferrous sulfate (IRON 325) tablet 325 mg, 325 mg, Oral, Q48H  ascorbic acid (VITAMIN C) tablet 500 mg, 500 mg, Oral, Q48H  polyethylene glycol (GLYCOLAX) packet 17 g, 17 g, Oral, Daily  amLODIPine (NORVASC) tablet 5 mg, 5 mg, Oral, Daily  metoprolol (LOPRESSOR) tablet 100 mg, 100 mg, Oral, Nightly  metoprolol tartrate (LOPRESSOR) tablet 50 mg, 50 mg, Oral, Daily  ketorolac (TORADOL) injection 15 mg, 15 mg, Intravenous, Q6H  enoxaparin (LOVENOX) injection 40 mg, 40 mg, Subcutaneous, Daily  phenol 1.4 % mouth spray 1 spray, 1 spray, Mouth/Throat, Q2H PRN  sodium chloride flush 0.9 % injection 10 mL, 10 mL, Intravenous, 2 times per day  sodium chloride flush 0.9 % injection 10 mL, 10 mL, Intravenous, PRN  ondansetron (ZOFRAN-ODT) disintegrating tablet 4 mg, 4 mg, Oral, Q8H PRN **OR** ondansetron (ZOFRAN) injection 4 mg, 4 mg, Intravenous, Q6H PRN  oxyCODONE-acetaminophen (PERCOCET) 5-325 MG per tablet 1 tablet, 1 tablet, Oral, Q4H PRN **OR** [DISCONTINUED] oxyCODONE-acetaminophen (PERCOCET) 5-325 MG per tablet 2 tablet, 2 tablet, Oral, Q4H PRN  busPIRone (BUSPAR) tablet 5 mg, 5 mg, Oral, BID PRN  pantoprazole (PROTONIX) tablet 40 mg, 40 mg, Oral, QAM AC  morphine (PF) injection 1 mg, 1 mg, Intravenous, Q2H PRN        PLAN    Continue with Hemovac  PT and OT  Discussed discharge planning

## 2021-02-28 NOTE — PLAN OF CARE
Note: Up with 1 assist, walker, and gait belt, and with steady gait. Tolerates working with PT and OT well. Care plan reviewed with patient. Patient verbalizes understanding of the plan of care and contribute to goal setting.

## 2021-03-01 ENCOUNTER — APPOINTMENT (OUTPATIENT)
Dept: GENERAL RADIOLOGY | Age: 86
DRG: 467 | End: 2021-03-01
Payer: MEDICARE

## 2021-03-01 LAB
ANION GAP SERPL CALCULATED.3IONS-SCNC: 9 MEQ/L (ref 8–16)
BASOPHILS # BLD: 0.3 %
BASOPHILS ABSOLUTE: 0 THOU/MM3 (ref 0–0.1)
BUN BLDV-MCNC: 15 MG/DL (ref 7–22)
CALCIUM SERPL-MCNC: 8.1 MG/DL (ref 8.5–10.5)
CHLORIDE BLD-SCNC: 100 MEQ/L (ref 98–111)
CO2: 25 MEQ/L (ref 23–33)
CREAT SERPL-MCNC: 0.5 MG/DL (ref 0.4–1.2)
EOSINOPHIL # BLD: 1.6 %
EOSINOPHILS ABSOLUTE: 0.2 THOU/MM3 (ref 0–0.4)
ERYTHROCYTE [DISTWIDTH] IN BLOOD BY AUTOMATED COUNT: 12.1 % (ref 11.5–14.5)
ERYTHROCYTE [DISTWIDTH] IN BLOOD BY AUTOMATED COUNT: 44.2 FL (ref 35–45)
GFR SERPL CREATININE-BSD FRML MDRD: > 90 ML/MIN/1.73M2
GLUCOSE BLD-MCNC: 89 MG/DL (ref 70–108)
HCT VFR BLD CALC: 24.6 % (ref 37–47)
HCT VFR BLD CALC: 24.7 % (ref 37–47)
HEMOGLOBIN: 7.6 GM/DL (ref 12–16)
HEMOGLOBIN: 7.8 GM/DL (ref 12–16)
IMMATURE GRANS (ABS): 0.06 THOU/MM3 (ref 0–0.07)
IMMATURE GRANULOCYTES: 0.6 %
LV EF: 60 %
LVEF MODALITY: NORMAL
LYMPHOCYTES # BLD: 26.3 %
LYMPHOCYTES ABSOLUTE: 2.5 THOU/MM3 (ref 1–4.8)
MCH RBC QN AUTO: 30.5 PG (ref 26–33)
MCHC RBC AUTO-ENTMCNC: 30.8 GM/DL (ref 32.2–35.5)
MCV RBC AUTO: 99.2 FL (ref 81–99)
MONOCYTES # BLD: 15.7 %
MONOCYTES ABSOLUTE: 1.5 THOU/MM3 (ref 0.4–1.3)
NUCLEATED RED BLOOD CELLS: 0 /100 WBC
PLATELET # BLD: 153 THOU/MM3 (ref 130–400)
PMV BLD AUTO: 10.8 FL (ref 9.4–12.4)
POTASSIUM SERPL-SCNC: 4.2 MEQ/L (ref 3.5–5.2)
RBC # BLD: 2.49 MILL/MM3 (ref 4.2–5.4)
SEG NEUTROPHILS: 55.5 %
SEGMENTED NEUTROPHILS ABSOLUTE COUNT: 5.3 THOU/MM3 (ref 1.8–7.7)
SODIUM BLD-SCNC: 134 MEQ/L (ref 135–145)
WBC # BLD: 9.6 THOU/MM3 (ref 4.8–10.8)

## 2021-03-01 PROCEDURE — 97116 GAIT TRAINING THERAPY: CPT

## 2021-03-01 PROCEDURE — 2580000003 HC RX 258: Performed by: ORTHOPAEDIC SURGERY

## 2021-03-01 PROCEDURE — 6370000000 HC RX 637 (ALT 250 FOR IP): Performed by: ORTHOPAEDIC SURGERY

## 2021-03-01 PROCEDURE — 80048 BASIC METABOLIC PNL TOTAL CA: CPT

## 2021-03-01 PROCEDURE — 97535 SELF CARE MNGMENT TRAINING: CPT

## 2021-03-01 PROCEDURE — 99223 1ST HOSP IP/OBS HIGH 75: CPT | Performed by: INTERNAL MEDICINE

## 2021-03-01 PROCEDURE — 6370000000 HC RX 637 (ALT 250 FOR IP): Performed by: INTERNAL MEDICINE

## 2021-03-01 PROCEDURE — 1200000003 HC TELEMETRY R&B

## 2021-03-01 PROCEDURE — 85025 COMPLETE CBC W/AUTO DIFF WBC: CPT

## 2021-03-01 PROCEDURE — 93306 TTE W/DOPPLER COMPLETE: CPT

## 2021-03-01 PROCEDURE — 6360000002 HC RX W HCPCS: Performed by: ORTHOPAEDIC SURGERY

## 2021-03-01 PROCEDURE — 97110 THERAPEUTIC EXERCISES: CPT

## 2021-03-01 PROCEDURE — 85014 HEMATOCRIT: CPT

## 2021-03-01 PROCEDURE — 36415 COLL VENOUS BLD VENIPUNCTURE: CPT

## 2021-03-01 PROCEDURE — 99232 SBSQ HOSP IP/OBS MODERATE 35: CPT | Performed by: INTERNAL MEDICINE

## 2021-03-01 PROCEDURE — 85018 HEMOGLOBIN: CPT

## 2021-03-01 PROCEDURE — 73130 X-RAY EXAM OF HAND: CPT

## 2021-03-01 PROCEDURE — 99223 1ST HOSP IP/OBS HIGH 75: CPT | Performed by: PHYSICAL MEDICINE & REHABILITATION

## 2021-03-01 RX ORDER — SENNA AND DOCUSATE SODIUM 50; 8.6 MG/1; MG/1
1 TABLET, FILM COATED ORAL 2 TIMES DAILY
Status: DISCONTINUED | OUTPATIENT
Start: 2021-03-01 | End: 2021-03-03 | Stop reason: HOSPADM

## 2021-03-01 RX ADMIN — KETOROLAC TROMETHAMINE 15 MG: 30 INJECTION, SOLUTION INTRAMUSCULAR; INTRAVENOUS at 02:19

## 2021-03-01 RX ADMIN — PANTOPRAZOLE SODIUM 40 MG: 40 TABLET, DELAYED RELEASE ORAL at 06:02

## 2021-03-01 RX ADMIN — METOPROLOL TARTRATE 100 MG: 100 TABLET, FILM COATED ORAL at 21:01

## 2021-03-01 RX ADMIN — AMLODIPINE BESYLATE 5 MG: 5 TABLET ORAL at 21:01

## 2021-03-01 RX ADMIN — POLYETHYLENE GLYCOL 3350 17 G: 17 POWDER, FOR SOLUTION ORAL at 08:28

## 2021-03-01 RX ADMIN — SODIUM CHLORIDE, PRESERVATIVE FREE 10 ML: 5 INJECTION INTRAVENOUS at 08:28

## 2021-03-01 RX ADMIN — DOCUSATE SODIUM 50 MG AND SENNOSIDES 8.6 MG 1 TABLET: 8.6; 5 TABLET, FILM COATED ORAL at 16:51

## 2021-03-01 RX ADMIN — METOPROLOL TARTRATE 50 MG: 100 TABLET, FILM COATED ORAL at 08:28

## 2021-03-01 RX ADMIN — KETOROLAC TROMETHAMINE 15 MG: 30 INJECTION, SOLUTION INTRAMUSCULAR; INTRAVENOUS at 08:28

## 2021-03-01 RX ADMIN — SODIUM CHLORIDE, PRESERVATIVE FREE 10 ML: 5 INJECTION INTRAVENOUS at 21:02

## 2021-03-01 RX ADMIN — OXYCODONE HYDROCHLORIDE AND ACETAMINOPHEN 1 TABLET: 5; 325 TABLET ORAL at 16:51

## 2021-03-01 RX ADMIN — ENOXAPARIN SODIUM 40 MG: 40 INJECTION, SOLUTION INTRAVENOUS; SUBCUTANEOUS at 08:28

## 2021-03-01 ASSESSMENT — PAIN SCALES - GENERAL
PAINLEVEL_OUTOF10: 3
PAINLEVEL_OUTOF10: 0
PAINLEVEL_OUTOF10: 8

## 2021-03-01 NOTE — CONSULTS
[facility]'s  Cardiology Consult      Patient:  Maged Doan  YOB: 1931    MRN: [id number]   Acct: [de-identified]     Primary Care Physician: Dick Lesches, MD    REASON FOR CONSULT:   HTN  Fall, femur fracture   Pateitn requested      CHIEF COMPLAINT:    FALL     HISTORY OF PRESENT ILLNESS:    Maged Doan is a pleasant 80year old female patient with past medical history that includes:   Past Medical History:   Diagnosis Date    Arthritis     GERD (gastroesophageal reflux disease)     Hyperlipidemia     Hypertension     Keratosis     benign lichemoid    Psychiatric problem    The patient was admitted to the hospital on 2/26/2021 after she lost balance and fell down. The patient presented with LE pain. Fall was mechanical per description, no LOC. Troponin was <0.01. EKG revealed SR, some PVCs noted. She was found to have femur fracture. Patient was seen by Orthopedics, she underwent surgery on 2/27/2021. She was noticed to have anemia, Hgb today is down to 7.6 (was 12 on 2/27/2021). She is followed as outpatient by Dr Vinh Alvarez. Stress test 2017 was negative for ischemia. Echo 2017 revealed an EF of 55%, trace mitral regurgitation. Patient denies chest pain, orthopnea, paroxysmal nocturnal dyspnea, palpitations, dizziness, syncope, recent weight gain or leg swelling. She occasionally has SOB and JESUS that occurs when she is walking, shopping. She thinks that this might related to \"allergies\" and to her \"wearing the mask\".      All labs, EKG's, diagnostic testing and images as well as cardiac cath, stress testing   were reviewed during this encounter    CARDIAC TESTING  Echo:   ECHO:   Results for orders placed during the hospital encounter of 02/09/17   ECHO Complete 2D W Doppler W Color    Narrative Transthoracic Echocardiography Report (TTE)     Demographics      Patient Name   Kalina Lamas  Gender              Female                  A      MR #           [id number] Race                                                    Ethnicity      Account #      [de-identified]        Room Number      Accession      [id number]      Date of Study       02/09/2017   Number      Date of Birth  03/04/1931     Referring Physician Alisa Burden MD      Age            80 year(s)     Yury Sanders [facility]                                    Interpreting        Alisa Puga MD                                 Physician     Procedure    Type of Study      TTE procedure:ECHOCARDIOGRAM COMPLETE 2D W DOPPLER W COLOR. Procedure Date  Date: 02/09/2017 Start: 10:40 AM    Study Location: Echo Lab  Technical Quality: Adequate visualization    Indications:Dyspnea / Shortness of breath. Additional Medical History:Hypertension    Patient Status: Routine    Height: 62 inches Weight: 134 pounds BSA: 1.61 m^2 BMI: 24.51 kg/m^2    BP: 122/78 mmHg     Conclusions      Summary   Ejection fraction is visually estimated at 55%. Overall left ventricular function is normal.   Trace mitral regurgitation is present. Signature      ----------------------------------------------------------------   Electronically signed by Alisa Puga MD (Interpreting   physician) on 02/09/2017 at 04:55 PM   ----------------------------------------------------------------      Findings      Mitral Valve   Trace mitral regurgitation is present. Aortic Valve   The aortic valve leaflets were not well visualized. Aortic valve appears tricuspid. Aortic valve leaflets are somewhat thickened. Aortic valve leaflets are Mildly calcified. Trivial aortic regurgitation is noted. Tricuspid Valve   Mild tricuspid regurgitation. Tricuspid valve was not well visualized. Pulmonic Valve   The pulmonic valve leaflets exhibited normal thickness, no calcification,   and normal cuspal separation.  DOPPLER: The transpulmonic velocity was   within the normal range with no evidence for regurgitation. Left Atrium   Left atrial size was normal.      Left Ventricle   Ejection fraction is visually estimated at 55%. Overall left ventricular function is normal.      Right Atrium   Right atrial size was normal.      Right Ventricle   The right ventricular size was normal with normal systolic function and   wall thickness. Pericardial Effusion   The pericardium was normal in appearance with no evidence of a pericardial   effusion. Pleural Effusion   No evidence of pleural effusion. Aorta / Great Vessels   -Aortic root dimension within normal limits.   -The Pulmonary artery is within normal limits. -IVC size is within normal limits with normal respiratory phasic changes.      M-Mode/2D Measurements & Calculations      LV Diastolic    LV Systolic Dimension: 2.7  AV Cusp Separation: 1.8 cmLA   Dimension: 4 cm cm                          Dimension: 3.4 cmAO Root   LV FS:32.5 %    LV Volume Diastolic: 70 ml  Dimension: 2.8 cm   LV PW           LV Volume Systolic: 27 ml   Diastolic: 1 cm LV EDV/LV EDV Index: 70   Septum          ml/43 m^2LV ESV/LV ESV   Diastolic: 1 cm Index: 27 YR/08 m^2         RV Diastolic Dimension: 2.8 cm                   EF Calculated: 61.4 %                                               LA/Aorta: 1.21                      LVOT: 2 cm     Doppler Measurements & Calculations      MV Peak E-Wave: 87.4 cm/s  AV Peak Velocity: 132 LVOT Peak Velocity: 85.4   MV Peak A-Wave: 111 cm/s   cm/s                  cm/s   MV E/A Ratio: 0.79         AV Peak Gradient:     LVOT Peak Gradient: 3   MV Peak Gradient: 3.06     6.97 mmHg             mmHg   mmHg                                                    TV Peak E-Wave: 36.5 cm/s   MV Deceleration Time: 232                        TV Peak A-Wave: 41.5 cm/s   msec                              AV P1/2t: 1070 msec   TV Peak Gradient: 0.53 IVRT: 99 msec         mmHg   MV E' Septal Velocity:                           TR Velocity:209 cm/s   6.63 cm/s                                        TR Gradient:17.47 mmHg   MV A' Septal Velocity:     AV DVI (Vmax):0.65    PV Peak Velocity: 67.6   13.3 cm/s                                        cm/s   MV E' Lateral Velocity:                          PV Peak Gradient: 1.83   6.92 cm/s                                        mmHg   MV A' Lateral Velocity:   11.7 cm/s   E/E' septal: 13.18   E/E' lateral: 12.63   MR Velocity: 456 cm/s     [id number]        Stress Test:2017        Imaging Results:Calculated gated LVEF 79 %. This study was negative for ischemia. increased gut uptake      Conclusions      Summary   This Nuclear Medicine study was negative for ischemia. Recommendation   Medical management.       Signatures      ----------------------------------------------------------------   Electronically signed by Mani Dunbar MD (Interpreting   Cardiologist) on 02/09/2017 at 17:16   ----------------------------------------------------------------       Past Medical History:    Past Medical History:   Diagnosis Date    Arthritis     GERD (gastroesophageal reflux disease)     Hyperlipidemia     Hypertension     Keratosis     benign lichemoid    Psychiatric problem        Past Surgical History:    Past Surgical History:   Procedure Laterality Date    ABDOMEN SURGERY      APPENDECTOMY  1934    CHOLECYSTECTOMY  1999    COLON SURGERY  2000    resection    COLONOSCOPY      DILATATION, ESOPHAGUS      ENDOSCOPY, COLON, DIAGNOSTIC      HYSTERECTOMY  1979    JOINT REPLACEMENT      KNEE SURGERY  2010    ROTATOR CUFF REPAIR  2006       Medications Prior to Admission:    Medications Prior to Admission: Krill Oil 350 MG CAPS, Take 1 tablet by mouth daily  Acetaminophen (TYLENOL 8 HOUR ARTHRITIS PAIN PO), Take 1 tablet by mouth as needed  metoprolol (LOPRESSOR) 100 MG tablet, Take 1 tablet by mouth 2 times daily Pt takes 50 in AM and 100 in PM  busPIRone (BUSPAR) 5 MG tablet, Take 5 mg by mouth 2 times daily as needed   CALCIUM PO, Take by mouth  amLODIPine (NORVASC) 5 MG tablet, Take 1 tablet by mouth daily  NONFORMULARY, Reliv, powder supplement  omeprazole (PRILOSEC) 20 MG capsule, Take 20 mg by mouth daily. Allergies:    Penicillins, Tramadol, and Vicodin [hydrocodone-acetaminophen]    Social History:    reports that she has never smoked. She has never used smokeless tobacco. She reports current alcohol use. She reports that she does not use drugs. Family History:   family history is not on file. No premature CADC    REVIEW OF SYSTEMS:  Constitutional: negative for anorexia, chills and fevers,weight change  Skin: negative for new skin rash per patient  HEENT: negative for head trauma or new visual changes  Respiratory: negative for cough, hemoptysis, wheezing  Cardiovascular: negative for  orthopnea, palpitations and syncope. Gastrointestinal: negative for abdominal pain,nausea , vomiting, constipation, diarrhea. Hematologic/lymphatic: negative for bruising,prolonged bleeding,blood clots  Musculoskeletal:negative for muscle weakness, myalgias,wasting  Neurological: negative for coordination problems, dizziness, gait problems and vertigo  Behavioral/Psych:negative for mood/sleep disturbance      PHYSICAL EXAM:   Vitals:  Patient Vitals for the past 24 hrs:   BP Temp Temp src Pulse Resp SpO2   03/01/21 0430 116/85 98.5 °F (36.9 °C) Oral 73 16 95 %   03/01/21 0000 106/70 98.2 °F (36.8 °C) Oral 76 16 98 %   02/28/21 1945 108/70 97.9 °F (36.6 °C) Oral 78 20 99 %   02/28/21 1702      98 %   02/28/21 1630 128/63 98.8 °F (37.1 °C) Oral 80 18 97 %   02/28/21 1230 (!) 158/63 98.8 °F (37.1 °C) Oral 81 18 91 %       Last 3 weights:    Wt Readings from Last 3 Encounters: 02/26/21 132 lb (59.9 kg)   02/13/20 134 lb 3.2 oz (60.9 kg)   02/07/19 140 lb (63.5 kg)     24 hour intake/output:    Intake/Output Summary (Last 24 hours) at 3/1/2021 0816  Last data filed at 3/1/2021 0427  Gross per 24 hour   Intake 200 ml   Output 815 ml   Net -615 ml     BMI:Body mass index is 24.14 kg/m². General Appearance: alert and oriented to person, place and time, well developed and well- nourished, in no acute distress  Skin: warm and dry, no rash or erythema  Eyes: Left josefina-orbital ecchymosis. pupils equal, round, and reactive to light, extraocular eye movements intact, conjunctivae normal  Neck: supple and non-tender without mass, no thyromegaly or thyroid nodules, no cervical lymphadenopathy  Pulmonary/Chest: clear to auscultation bilaterally- no wheezes, rales or rhonchi, normal air movement, no respiratory distress  Cardiovascular: normal rate, regular rhythm, normal S1 and S2, III/VI systolic murmur. No rubs, clicks, or gallops, distal pulses intact, no carotid bruits, Negative JVD  Radial Pulses: intact 2+  Abdomen: soft, non-tender, non-distended, normal bowel sounds, no masses or organomegaly  Extremities: no cyanosis, clubbing . No Edema. Dressing over right knee  Musculoskeletal: normal range of motion, no joint swelling, deformity or tenderness      RADIOLOGY   Xr Knee Right (1-2 Views)    Result Date: 2/27/2021  PROCEDURE: XR KNEE RIGHT (1-2 VIEWS) CLINICAL INFORMATION: arthroplasty COMPARISON: 2/26/2021 TECHNIQUE: A mobile AP and crossfire lateral views of the right knee were obtained following surgery. Arvin Jackson FINDINGS: The previously noted knee prosthesis has been removed. The distal femoral condylar fracture fragments of also been removed. The prosthetic components have been inserted. The prosthetic components are normally related. Moderate soft tissue swelling and mild subcutaneous air are seen at the operative site. Postop appearance right knee.  **This report has been created using voice recognition software. It may contain minor errors which are inherent in voice recognition technology. ** Final report electronically signed by Dr. Ramon Rodríguez on 2/27/2021 12:54 PM      LABS:  Recent Labs     02/26/21  1321   TROPONINT < 0.010     CBC:   Lab Results   Component Value Date    WBC 9.6 03/01/2021    RBC 2.49 03/01/2021    RBC 4.43 12/09/2020    HGB 7.6 03/01/2021    HCT 24.7 03/01/2021    MCV 99.2 03/01/2021    MCH 30.5 03/01/2021    MCHC 30.8 03/01/2021    RDW 12.9 12/09/2020     03/01/2021    MPV 10.8 03/01/2021     BMP:    Lab Results   Component Value Date     03/01/2021    K 4.2 03/01/2021    K 4.0 02/26/2021     03/01/2021    CO2 25 03/01/2021    BUN 15 03/01/2021    LABALBU 4.5 12/09/2020    CREATININE 0.5 03/01/2021    CALCIUM 8.1 03/01/2021    LABGLOM >90 03/01/2021    GLUCOSE 89 03/01/2021    GLUCOSE 88 12/09/2020     Hepatic Function Panel:    Lab Results   Component Value Date    ALKPHOS 107 12/09/2020    ALT 13 12/09/2020    AST 20 12/09/2020    PROT 7.2 12/09/2020    BILITOT 0.5 12/09/2020    LABALBU 4.5 12/09/2020     Magnesium:  No results found for: MG  Warfarin PT/INR:  No components found for: PTPATWAR, PTINRWAR  HgBA1c:  No results found for: LABA1C  FLP:    Lab Results   Component Value Date    TRIG 154 03/16/2013    HDL 41 03/16/2013    LDLCALC 136 03/16/2013     TSH:  No results found for: TSH  BNP: No results found for: BNP      ASSESSMENT:  Maged Doan is a pleasant 80year old female patient with past medical history that includes:   Past Medical History:   Diagnosis Date    Arthritis     GERD (gastroesophageal reflux disease)     Hyperlipidemia     Hypertension     Keratosis     benign lichemoid    Psychiatric problem    The patient was admitted to the hospital on 2/26/2021 after she lost balance and fell down. The patient presented with LE pain. Fall was mechanical per description, no LOC. Troponin was <0.01.  EKG revealed SR, some PVCs noted. She was found to have femur fracture. Patient was seen by Orthopedics, she underwent surgery on 2/27/2021. She was noticed to have anemia, Hgb today is down to 7.6 (was 12 on 2/27/2021). She is followed as outpatient by Dr Hector Villavicencio. Stress test 2017 was negative for ischemia. Echo 2017 revealed an EF of 55%, trace mitral regurgitation. Patient denies chest pain, orthopnea, paroxysmal nocturnal dyspnea, palpitations, dizziness, syncope, recent weight gain or leg swelling. She occasionally has SOB and JESUS that occurs when she is walking, shopping. She thinks that this might related to \"allergies\" and to her \"wearing the mask\". 1. Dyspnea on exertion   2. PVCs  3. Mitral regurgitation  4. Heart murmur  5. S/p fall   6. Femur fracture   7. HTN  8. Dyslipidemia     RECOMMENDATIONS:   Patient denies LOC   Fall seems to be mechanical in etiology   She denies dizziness, lightheadedness or palpiations   Only occasional PVCs on EKG   Telemetry    May consider event monitor in the future, only if she has symptoms to suggest symtpomatic arrhythmia    Stress test was negative in 2017   Denies chest pains   Echo 2017 revealed preserved EF, MR   Reports JESUS, SOB    She thinks that this might related to \"allergies\" and to her \"wearing the mask\"   Will need to investigate for underlying structural heart disease, will proceed with a transthoracic echocardiogram    No further cardiac studies with plan for outpatient follow up with Dr Hector Villavicencio, if no major new findings on Echo      Above findings and plan of care were discussed with patient, questions were answered, agreeable to plan. Thank you for allowing me to participate in the care of this patient. Please let me know if I can be of any further assistance.       Noe Lloyd MD, Kei Velarde   8:16 AM  3/1/2021

## 2021-03-01 NOTE — PLAN OF CARE
Problem: Respiratory  Goal: O2 Sat > 90%  2/28/2021 1933 by Robert Weinberg RN  Outcome: Met This Shift  Note: Pt O2 sat has been above 90% on room air      Problem: GI  Goal: No bowel complications  3/15/8071 1933 by Robert Weinberg RN  Outcome: Met This Shift  Note: No bowel complications present     Problem: Pain:  Goal: Control of acute pain  Description: Control of acute pain  2/28/2021 1933 by Robert Weinberg RN  Outcome: Ongoing  Note: Pt report pain at 4 on scale. Pt states oral/iv/im medication helping to achieve pain goal of a no pain on scale. Problem: Falls - Risk of:  Goal: Will remain free from falls  Description: Will remain free from falls  2/28/2021 1933 by Robert Weinberg RN  Outcome: Ongoing  Note: Pt using call light appropriately to call for assistance with ambulation to the bathroom and to chair. Pt is also compliant with use of non-skid slippers. Pt reports understanding of fall prevention when discussed. Problem: Skin Integrity:  Goal: Will show no infection signs and symptoms  Description: Will show no infection signs and symptoms  2/28/2021 1933 by Robert Weinberg RN  Outcome: Ongoing  Note: Pt's  surgical incision healing. Dressing is dry and intact and not due to be changed this shift. No other skin impairments noted. Pt understands the importance of frequent repositioning in order to prevent any skin breakdown. Problem: Cardiovascular  Goal: No DVT, peripheral vascular complications  4/74/7684 1933 by Robert Weinberg RN  Outcome: Ongoing     Problem:   Goal: Adequate urinary output  2/28/2021 1933 by Robert Weinberg RN  Outcome: Ongoing     Problem: Musculor/Skeletal Functional Status  Goal: Highest potential functional level  2/28/2021 1933 by Robert Weinberg RN  Outcome: Ongoing  Care plan reviewed with patient. Patient verbalizes understanding of the plan of care and contribute to goal setting.

## 2021-03-01 NOTE — CARE COORDINATION
DISCHARGE/PLANNING EVALUATION  3/1/21, 11:43 AM EST    Reason for Referral: requests inpt rehab   Mental Status:  Alert, oriented   Decision Making:  Makes own decisions   Family/Social/Home Environment:  Carol Ann Sagastume lives at home alone. She has been independent with her care at home, including meals and housekeeping. She is an active . Current Services including food security, transportation and housekeeping:  No current services, no concerns noted by patient. She has been independent at home, has supportive family, plans inpt rehab before returning home   Current Equipment: has cane and walker, had not been using at home   Payment Source: medicare   Concerns or Barriers to Discharge: requests inpt rehab, she does not want to go to an ecf    Post acute provider list with quality measures, geographic area and applicable managed care information provided. Questions regarding selection process answered: declined list, does not want ecf, requests inpt rehab before returning home     Teach Back Method used with patient  regarding care plan and discharge plan  Patient  verbalizes understanding of the plan of care and contributes to goal setting. Patient goals, treatment preferences and discharge plan:  Spoke with patient about discharge plan. She is requesting inpt rehab before returning home alone. She will not consider ecf. She has been independent with her care at home prior to this admission, and has supportive family.       Electronically signed by CARIDAD Almonte on 3/1/2021 at 11:43 AM

## 2021-03-01 NOTE — PROGRESS NOTES
Neri Dumont 60  PHYSICAL THERAPY MISSED TREATMENT NOTE  [facility] 7K    Date: 3/1/2021  Patient Name: Sujatha Arthur        MRN: [id number]   : 3/4/1931  (80 y.o.)  Gender: female   Referring Practitioner: Dr. Colleen Barajas  Diagnosis: Closed displaced fracture of medial condyle of right femur         REASON FOR MISSED TREATMENT:  Pt missed pm session as she was getting a test in her room will check back tomorrow .

## 2021-03-01 NOTE — CARE COORDINATION
3/1/21, 1:11 PM EST  DISCHARGE PLANNING EVALUATION:    Barb Amor       Admitted: 2/26/2021/ [address] day: 3   Location: -01/001-A Reason for admit: Closed displaced fracture of medial condyle of right femur[facility] [S72.431A]  Closed displaced fracture of medial condyle of right femur, [facility] Indra Johnston   PMH:  has a past medical history of Arthritis, GERD (gastroesophageal reflux disease), Hyperlipidemia, Hypertension, Keratosis, and Psychiatric problem. Procedure: 2/27/21 surgery by Dr Patricia Minor: Kylee Raddle OSS both femoral and tibial components  Left hand xray today:     1. Fracture at the base of the fifth metacarpal with minimal displacement. 2. Diffuse osteopenia. 3. Degenerative change especially at the first carpometacarpal joint. Barriers to Discharge:  Dr Patricia Minor attending. Hospitalist for medical management. Cardiology consult per pat request PT/OT. FWB RLE. N/V checks. Telemetry monitoring. Physiatry consult for [facility]. PCP: Carina Begum MD  Readmission Risk Score: 12%    Patient Goals/Plan/Treatment Preferences: Requesting [facility]. She does not want to consider ECF. No safe for discharge to home. I discussed with Elyse Silvestre CNP. Await physiatry consult. Transportation/Food Security/Housekeeping Addressed:  No issues identified.

## 2021-03-01 NOTE — PROGRESS NOTES
[facility]  INPATIENT PHYSICAL THERAPY  DAILY NOTE  [facility] ORTHOPEDICS 7K - 7K-01/001-A      Time In: 1003  Time Out: 1041  Timed Code Treatment Minutes: 45 Minutes  Minutes: 38          Date: 3/1/2021  Patient Name: Walter Kruse,  Gender:  female        MRN: [id number]  : 3/4/1931  (80 y.o.)     Referring Practitioner: Dr. Kehinde Dawson  Diagnosis: Closed displaced fracture of medial condyle of right femur  Additional Pertinent Hx: per 21 ED note; Walter Kruse is a 80 y.o. female who presents to the emergency department for evaluation of fall at [facility] this morning. Mechanical trip and fall while walking to the parking lot. Patient denies loss of consciousness, head strike. States that her glasses hit her eye and bruised her upper eyelid. States she is feeling fine other than severe right knee pain. She admits to associated swelling. [id number] TOTAL KNEE REVISION     Prior Level of Function:  Lives With: Alone  Type of Home: House  Home Layout: One level  Home Access: Stairs to enter with rails  Entrance Stairs - Number of Steps: 2  Entrance Stairs - Rails: Left  Home Equipment: Cane, Rolling walker   Bathroom Shower/Tub: Tub/Shower unit  Bathroom Toilet: Handicap height  Bathroom Equipment: Grab bars in shower, Grab bars around toilet  Bathroom Accessibility: Accessible    Receives Help From: Family  ADL Assistance: [address] [place] [address] Avenue: Independent  Homemaking Responsibilities: Yes  Ambulation Assistance: Independent  Transfer Assistance: Independent  Active :  Yes  Additional Comments: did not use an AD prior to admission    Restrictions/Precautions:  Restrictions/Precautions: Weight Bearing  Right Lower Extremity Weight Bearing: Weight Bearing As Tolerated       VITALS: see nursing flow sheet for vitals     SUBJECTIVE: pt in bed cooperative and agreeable for therapy     PAIN: 4-10/10: at right knee, she pain meds right before session     OBJECTIVE:  Bed Mobility: Supine to Sit: Minimal Assistance    Transfers:  Sit to Stand: Minimal Assistance  Stand to Sit:Contact Guard Assistance  ** cues for hand placement  Ambulation:  Contact Guard Assistance  Distance: ~35 feet   Surface: Level Tile  Device:Rolling Walker  Gait Deviations:  Slow ally, with decreased stance at right LE with step to pattern       Exercise:  Patient was guided in 1 set(s) 10 reps of exercise to both lower extremities. Ankle pumps, Glut sets, Quad sets, Heelslides, Short arc quads, Hip abduction/adduction, Straight leg raises and with assist at right LE  AAROM with heelslides ~ 75 degrees. Exercises were completed for increased independence with functional mobility. Functional Outcome Measures: Completed  AM-PAC Inpatient Mobility Raw Score : 18  AM-PAC Inpatient T-Scale Score : 43.63    ASSESSMENT:  Assessment: Patient progressing toward established goals. and pt tolerated session fair, she did require assist with mobility and only tolerated walking short distance, she would benefit from cont skilled therapy prior to return home   Activity Tolerance:  Patient tolerance of  treatment: fair. She did require rest      Equipment Recommendations:   Discharge Recommendations:    Continue to assess pending progress(anticipate pt may need a therapy stay prior to return home)    Plan: Times per week: 6x BID/1X QD  Times per day: Daily  Current Treatment Recommendations: Strengthening, ROM, Balance Training, Functional Mobility Training, Transfer Training, Gait Training, Endurance Training    Patient Education  Patient Education: Plan of Care    Goals:  Patient goals : To return home  Short term goals  Time Frame for Short term goals:  At time of discharge  Short term goal 1: Mod I with bed mobility so pt can get in and out of bed  Short term goal 2: Mod I with t/fs so pt can get up to go to the bathroom  Short term goal 3: SBA x 1 to amb with ' x1 for household amb  Short term goal 4: Pt to negotiate 3 steps with 1 HR with SBA x 1 to enter home  Long term goals  Time Frame for Long term goals : No LTGs secondary to ELOS    Following session, patient left in safe position with all fall risk precautions in place.

## 2021-03-01 NOTE — PROGRESS NOTES
(LOVENOX) injection 40 mg, 40 mg, Subcutaneous, Daily  phenol 1.4 % mouth spray 1 spray, 1 spray, Mouth/Throat, Q2H PRN  sodium chloride flush 0.9 % injection 10 mL, 10 mL, Intravenous, 2 times per day  sodium chloride flush 0.9 % injection 10 mL, 10 mL, Intravenous, PRN  ondansetron (ZOFRAN-ODT) disintegrating tablet 4 mg, 4 mg, Oral, Q8H PRN **OR** ondansetron (ZOFRAN) injection 4 mg, 4 mg, Intravenous, Q6H PRN  oxyCODONE-acetaminophen (PERCOCET) 5-325 MG per tablet 1 tablet, 1 tablet, Oral, Q4H PRN **OR** [DISCONTINUED] oxyCODONE-acetaminophen (PERCOCET) 5-325 MG per tablet 2 tablet, 2 tablet, Oral, Q4H PRN  busPIRone (BUSPAR) tablet 5 mg, 5 mg, Oral, BID PRN  pantoprazole (PROTONIX) tablet 40 mg, 40 mg, Oral, QAM AC  morphine (PF) injection 1 mg, 1 mg, Intravenous, Q2H PRN    .     ASSESSMENT AND PLAN  Continue PT/OT  WBAT RLE  Diclofenac cream for left knee and right shoulder  Left hand x-ray, complains of pain [facility]  Rehab for DC

## 2021-03-01 NOTE — PROGRESS NOTES
[facility]  Occupational Therapy  Daily Note  Time:   Time In: 8141  Time Out: 1130  Timed Code Treatment Minutes: 25 Minutes  Minutes: 25          Date: 3/1/2021  Patient Name: Elle Cruz,   Gender: female      Room: -001-A  MRN: [id number]  : 3/4/1931  (80 y.o.)  Referring Practitioner: Gaye Burns MD  Diagnosis: Closed displaced fractureof medial condyle of R femur, R TKA revision. Additional Pertinent Hx: per 21 ED note; Elle Cruz is a 80 y.o. female who presents to the emergency department for evaluation of fall at [facility] this morning. Mechanical trip and fall while walking to the parking lot. Patient denies loss of consciousness, head strike. States that her glasses hit her eye and bruised her upper eyelid. States she is feeling fine other than severe right knee pain. She admits to associated swelling. Pain level evaluation was 9-10 out of 10. Restrictions/Precautions:  Restrictions/Precautions: Weight Bearing  Right Lower Extremity Weight Bearing: Weight Bearing As Tolerated       SUBJECTIVE: Nurse ok'd session. Patient seated in bedside chair upon arrival. Agreeable to OT session     PAIN: Complains of pain in R knee    COGNITION: WFL    ADL:   Education provided on use of LHAE to assist with LB dressing. Educated on reacher and sock aid. Patient able to utilize reacher to doff socks using reacher with increased time and verbal cues for technique. Able to don B socks with use of sock aid with increased time and verbal cues for technique. Would benefit from continued education on use of LHAE     BALANCE:  Sitting Balance:  Supervision.       BED MOBILITY:  Not Tested    TRANSFERS:  Patient declined transfers and mobility this AM due to just recently taking a walk with physical therapy     ADDITIONAL ACTIVITIES: Patient identified a personal goal to increase UB strength and improve overall endurance so they can complete their toilet & shower transfers; skilled edu on UE strengthening and patient completed BUE strengthening exercises x10 reps x1 set this date with a moderate resistance band in all joints and all planes. Patient tolerated nia, requiring rest breaks. Pt required cues for technique. ASSESSMENT:     Activity Tolerance:  Patient tolerance of  treatment: fair. Discharge Recommendations: IP Rehab, Continue to assess pending progress, Patient would benefit from continued therapy after discharge   Equipment Recommendations: Equipment Needed: Yes  Mobility Devices: ADL Assistive Devices  ADL Assistive Devices: Reacher, Sock-Aid Hard  Plan: Times per week: 6 x  Times per day: Daily  Current Treatment Recommendations: Endurance Training, Neuromuscular Re-education, Patient/Caregiver Education & Training, Equipment Evaluation, Education, & procurement, Self-Care / ADL    Patient Education  Patient Education: Home Exercise Program and Equipment Education    Goals  Short term goals  Time Frame for Short term goals: by discharge  Short term goal 1: patient will tolerate 6 min functional standing with sit to stand and participate with SBA to increase overall endurance to tolerate ADL routine. Short term goal 2: patient will complete toileting routine with SBA. Short term goal 3: patient will complete dressing tasks with SBA and use of AE PRN. Short term goal 4: patient will tolerate grooming at sink in standing with SBA. Following session, patient left in safe position with all fall risk precautions in place.

## 2021-03-01 NOTE — PROGRESS NOTES
Hospitalist Consult Progress Note    Patient:  Erika Araujo      Unit/Bed:7K-01/001-A    YOB: 1931    MRN: [id number]       Acct: [de-identified]     PCP: Acacia Underwood MD    Date of Admission: 2/26/2021    Attending Physician: Thea Lowe MD    Reason for Consult: medical management      Assessment/Plan:  Active Hospital Problems    Diagnosis Date Noted    Closed displaced fracture of medial condyle of right femur [facility]) [S72.431A] 02/26/2021    Hypertension [I10]        Acute Postop Blood Loss anemia: 13 is baseline, 13.3 on arrival, 200ml blood loss. -reported drain fell out and bled postop.   -drop to 8.3 now 7.6 postop  -repeat H*H this afternoon stable at 7.8  -upper limit normal orthostatic BP. At this time hold off any transfusion  -CBC in am.     Distal Rt Femur Fx s/p fall: hx of rt TKA -> s/p OR on 2/27/21 with rt total knee revision with OSS (femoral and tibial components). -Fall appears to be mechanical, but details fuzzy. Consider Holter. Echo ordered.    -drain removed 2/28/21  -pain control and bowel regimen.   -check Vit D, consider bisphosphonate as OP  -PT/OT, ? IPR    Left 5th Metacarpal fracture: d/w ortho, splint to be applied. Conservative management. Left Eye Bruising: stable, no impingement on vision. Constipation: schedule pericolace and continue miralax. OOB as able. Soft Murmur, Hx of Mild MR (2017 echo): repeat echo per cardiology    Mild Hyponatremia: noted, will monitor with BMP in am.     HTN: continue norvasc, lopressor. Leukocytosis: likely stress response. mod leuk esterase on UA but no bacteruria, fever. Resolved. Hxx of partial colectomy for diverticulitis      Thank you, Thea Lowe MD, for the opportunity to participate in this patient's care.      Expected discharge date:   Per Primary    Disposition:  Per Primary - looking into IPR.       ------------------------------------------------------------- Chief Complaint: [facility] Course: Patient with a history of right TKA admitted by orthopedic surgery status post fall with right femur fracture, status post OR on 2/27 with total knee revision. Did have significant drop in hemoglobin postoperatively however this has stabilized in the mid to high sevens. Patient is working with therapy and may go to inpatient rehab. Subjective (past 24 hours):  Patient seen at bedside. No current issues. Pain in right knee with movement but not at rest. No BM since arrival, but passing flatus. Eating ok, but appetite less so. No nausea. No chest pain or SOB. Medications:  Reviewed    Infusion Medications   Scheduled Medications    ferrous sulfate  325 mg Oral Q48H    ascorbic acid  500 mg Oral Q48H    polyethylene glycol  17 g Oral Daily    amLODIPine  5 mg Oral Daily    metoprolol  100 mg Oral Nightly    metoprolol tartrate  50 mg Oral Daily    ketorolac  15 mg Intravenous Q6H    enoxaparin  40 mg Subcutaneous Daily    sodium chloride flush  10 mL Intravenous 2 times per day    pantoprazole  40 mg Oral QAM AC     PRN Meds: phenol, sodium chloride flush, ondansetron **OR** ondansetron, oxyCODONE-acetaminophen **OR** [DISCONTINUED] oxyCODONE-acetaminophen, busPIRone, morphine      Intake/Output Summary (Last 24 hours) at 3/1/2021 0716  Last data filed at 3/1/2021 0427  Gross per 24 hour   Intake 560 ml   Output 815 ml   Net -255 ml       Diet:  DIET GENERAL;    Exam:  /85   Pulse 73   Temp 98.5 °F (36.9 °C) (Oral)   Resp 16   Ht 5' 2\" (1.575 m)   Wt 132 lb (59.9 kg)   SpO2 95%   Breastfeeding No   BMI 24.14 kg/m²     General appearance: No apparent distress, well developed, appears stated age. Elderly pleasant. Eyes:  Pupils equal, round, and reactive to light. Conjunctivae/corneas clear. HENT: Head normal in appearance. External nares normal.  Oral mucosa moist without lesions. Hearing grossly intact. Neck: Supple, with full range of motion. No jugular venous distention. Trachea midline. Respiratory:  Normal respiratory effort. bilaterally without wheezes or rhonchi. Trace rales in bases. Cardiovascular: Normal rate, regular rhythm with normal Q5/C2 soft systolic murmur in LUSB. No lower extremity edema. Abdomen: Soft, non-tender, non-distended with normal bowel sounds. Musculoskeletal: Swelling right knee, tenderness to palpation  Right knee ace wrap, wound without erythema, some dried blood on pad. No drainage. Skin: Skin color, texture, turgor normal.  No rashes or lesions. Ecchymosis around left eye  Neurologic:  Neurovascularly intact without any focal sensory/motor deficits in the upper and lower extremities. Cranial nerves:  grossly non-focal.  Psychiatric: Alert and oriented, thought content appropriate, normal insight. Capillary Refill: Brisk,< 3 seconds. Peripheral Pulses: +2 palpable, equal bilaterally. Labs:   Recent Labs     02/27/21  0607 02/28/21  0618 03/01/21  0528   WBC 18.2* 12.5* 9.6   HGB 12.0 8.3* 7.6*   HCT 36.7* 26.1* 24.7*    178 153     Recent Labs     02/27/21  0607 02/28/21  0618 03/01/21  0528   * 133* 134*   K 4.3 4.4 4.2    101 100   CO2 24 24 25   BUN 10 13 15   CREATININE 0.4 0.7 0.5   CALCIUM 8.8 7.9* 8.1*     No results for input(s): AST, ALT, BILIDIR, BILITOT, ALKPHOS in the last 72 hours. No results for input(s): INR in the last 72 hours. No results for input(s): Gwenette Martyr in the last 72 hours. Microbiology:      Urinalysis:      Lab Results   Component Value Date    NITRU NEGATIVE 02/27/2021    WBCUA 5-9 02/27/2021    BACTERIA NONE SEEN 02/27/2021    RBCUA 0-2 02/27/2021    BLOODU NEGATIVE 02/27/2021    GLUCOSEU NEGATIVE 02/27/2021       Radiology:  XR KNEE RIGHT (1-2 VIEWS)   Final Result   Postop appearance right knee. **This report has been created using voice recognition software. It may contain minor errors which are inherent in voice recognition technology. **      Final report electronically signed by Dr. Ly Buckner on 2/27/2021 12:54 PM      XR CHEST PORTABLE   Final Result   No acute cardiopulmonary process. **This report has been created using voice recognition software. It may contain minor errors which are inherent in voice recognition technology. **      Final report electronically signed by Dr. Bob Fountain on 2/26/2021 2:43 PM      XR TIBIA FIBULA RIGHT (2 VIEWS)   Final Result      Status post right knee total arthroplasty with comminuted foreshortened and dorsally angulated fracture of the distal right femur. **This report has been created using voice recognition software. It may contain minor errors which are inherent in voice recognition technology. **      Final report electronically signed by Dr. Bob Fountain on 2/26/2021 12:09 PM      XR KNEE RIGHT (MIN 4 VIEWS)   Final Result      Status post right knee total arthroplasty with comminuted foreshortened and dorsally angulated fracture of the distal right femur. **This report has been created using voice recognition software. It may contain minor errors which are inherent in voice recognition technology. **      Final report electronically signed by Dr. Bob Fountain on 2/26/2021 12:09 PM      XR FEMUR RIGHT (MIN 2 VIEWS)   Final Result      Status post right knee total arthroplasty with comminuted foreshortened and dorsally angulated fracture of the distal right femur. **This report has been created using voice recognition software. It may contain minor errors which are inherent in voice recognition technology. **      Final report electronically signed by Dr. Bob Fountain on 2/26/2021 12:09 PM            DVT prophylaxis: [x] Lovenox                                  [] SCDs [] SQ Heparin                                 [] Encourage ambulation           [] Already on Anticoagulation     Code Status: Full Code    PT/OT Eval Status: yes      Tele:   [] yes             [] no      Thank you, Ana Hanson MD, for the opportunity to participate in this patient's care.        Electronically signed by Rachell Luz DO on 3/1/2021 at 7:16 AM

## 2021-03-02 LAB
ABO CHECK: NORMAL
ANION GAP SERPL CALCULATED.3IONS-SCNC: 8 MEQ/L (ref 8–16)
BUN BLDV-MCNC: 11 MG/DL (ref 7–22)
CALCIUM SERPL-MCNC: 8.3 MG/DL (ref 8.5–10.5)
CHLORIDE BLD-SCNC: 102 MEQ/L (ref 98–111)
CO2: 27 MEQ/L (ref 23–33)
CREAT SERPL-MCNC: 0.5 MG/DL (ref 0.4–1.2)
ERYTHROCYTE [DISTWIDTH] IN BLOOD BY AUTOMATED COUNT: 12.3 % (ref 11.5–14.5)
ERYTHROCYTE [DISTWIDTH] IN BLOOD BY AUTOMATED COUNT: 42.8 FL (ref 35–45)
GEL INDIRECT COOMBS: NORMAL
GFR SERPL CREATININE-BSD FRML MDRD: > 90 ML/MIN/1.73M2
GLUCOSE BLD-MCNC: 104 MG/DL (ref 70–108)
HCT VFR BLD CALC: 22.8 % (ref 37–47)
HCT VFR BLD CALC: 30.1 % (ref 37–47)
HEMOGLOBIN: 7.1 GM/DL (ref 12–16)
HEMOGLOBIN: 9.2 GM/DL (ref 12–16)
MAGNESIUM: 2.1 MG/DL (ref 1.6–2.4)
MCH RBC QN AUTO: 30.1 PG (ref 26–33)
MCHC RBC AUTO-ENTMCNC: 31.1 GM/DL (ref 32.2–35.5)
MCV RBC AUTO: 96.6 FL (ref 81–99)
PLATELET # BLD: 183 THOU/MM3 (ref 130–400)
PMV BLD AUTO: 10.6 FL (ref 9.4–12.4)
POTASSIUM REFLEX MAGNESIUM: 4.5 MEQ/L (ref 3.5–5.2)
RBC # BLD: 2.36 MILL/MM3 (ref 4.2–5.4)
RH FACTOR: NORMAL
SODIUM BLD-SCNC: 137 MEQ/L (ref 135–145)
VITAMIN D 25-HYDROXY: 20 NG/ML (ref 30–100)
WBC # BLD: 9 THOU/MM3 (ref 4.8–10.8)

## 2021-03-02 PROCEDURE — 6370000000 HC RX 637 (ALT 250 FOR IP): Performed by: INTERNAL MEDICINE

## 2021-03-02 PROCEDURE — 82306 VITAMIN D 25 HYDROXY: CPT

## 2021-03-02 PROCEDURE — 85014 HEMATOCRIT: CPT

## 2021-03-02 PROCEDURE — 97530 THERAPEUTIC ACTIVITIES: CPT

## 2021-03-02 PROCEDURE — 1200000003 HC TELEMETRY R&B

## 2021-03-02 PROCEDURE — 86901 BLOOD TYPING SEROLOGIC RH(D): CPT

## 2021-03-02 PROCEDURE — 2580000003 HC RX 258: Performed by: ORTHOPAEDIC SURGERY

## 2021-03-02 PROCEDURE — 86900 BLOOD TYPING SEROLOGIC ABO: CPT

## 2021-03-02 PROCEDURE — 86923 COMPATIBILITY TEST ELECTRIC: CPT

## 2021-03-02 PROCEDURE — 99232 SBSQ HOSP IP/OBS MODERATE 35: CPT | Performed by: NURSE PRACTITIONER

## 2021-03-02 PROCEDURE — 99232 SBSQ HOSP IP/OBS MODERATE 35: CPT | Performed by: INTERNAL MEDICINE

## 2021-03-02 PROCEDURE — 6370000000 HC RX 637 (ALT 250 FOR IP): Performed by: ORTHOPAEDIC SURGERY

## 2021-03-02 PROCEDURE — 36430 TRANSFUSION BLD/BLD COMPNT: CPT

## 2021-03-02 PROCEDURE — 97116 GAIT TRAINING THERAPY: CPT

## 2021-03-02 PROCEDURE — 86885 COOMBS TEST INDIRECT QUAL: CPT

## 2021-03-02 PROCEDURE — 85027 COMPLETE CBC AUTOMATED: CPT

## 2021-03-02 PROCEDURE — 83735 ASSAY OF MAGNESIUM: CPT

## 2021-03-02 PROCEDURE — 6360000002 HC RX W HCPCS: Performed by: ORTHOPAEDIC SURGERY

## 2021-03-02 PROCEDURE — 85018 HEMOGLOBIN: CPT

## 2021-03-02 PROCEDURE — 36415 COLL VENOUS BLD VENIPUNCTURE: CPT

## 2021-03-02 PROCEDURE — P9016 RBC LEUKOCYTES REDUCED: HCPCS

## 2021-03-02 PROCEDURE — 80048 BASIC METABOLIC PNL TOTAL CA: CPT

## 2021-03-02 PROCEDURE — 97535 SELF CARE MNGMENT TRAINING: CPT

## 2021-03-02 PROCEDURE — 99232 SBSQ HOSP IP/OBS MODERATE 35: CPT | Performed by: PHYSICIAN ASSISTANT

## 2021-03-02 PROCEDURE — 97110 THERAPEUTIC EXERCISES: CPT

## 2021-03-02 RX ORDER — METOPROLOL TARTRATE 100 MG/1
100 TABLET ORAL NIGHTLY
Status: CANCELLED | OUTPATIENT
Start: 2021-03-02

## 2021-03-02 RX ORDER — PANTOPRAZOLE SODIUM 40 MG/1
40 TABLET, DELAYED RELEASE ORAL
Status: CANCELLED | OUTPATIENT
Start: 2021-03-03

## 2021-03-02 RX ORDER — METOPROLOL TARTRATE 50 MG/1
50 TABLET, FILM COATED ORAL DAILY
Status: CANCELLED | OUTPATIENT
Start: 2021-03-02

## 2021-03-02 RX ORDER — CALCIUM CARBONATE/VITAMIN D3 250-3.125
1 TABLET ORAL 2 TIMES DAILY
Status: CANCELLED | OUTPATIENT
Start: 2021-03-02

## 2021-03-02 RX ORDER — AMLODIPINE BESYLATE 5 MG/1
5 TABLET ORAL DAILY
Status: CANCELLED | OUTPATIENT
Start: 2021-03-02

## 2021-03-02 RX ORDER — BUSPIRONE HYDROCHLORIDE 5 MG/1
5 TABLET ORAL 2 TIMES DAILY PRN
Status: CANCELLED | OUTPATIENT
Start: 2021-03-02

## 2021-03-02 RX ORDER — POLYETHYLENE GLYCOL 3350 17 G/17G
17 POWDER, FOR SOLUTION ORAL DAILY
Status: CANCELLED | OUTPATIENT
Start: 2021-03-02

## 2021-03-02 RX ORDER — CALCIUM CARBONATE/VITAMIN D3 250-3.125
1 TABLET ORAL 2 TIMES DAILY
Status: DISCONTINUED | OUTPATIENT
Start: 2021-03-02 | End: 2021-03-03 | Stop reason: HOSPADM

## 2021-03-02 RX ORDER — SENNA AND DOCUSATE SODIUM 50; 8.6 MG/1; MG/1
1 TABLET, FILM COATED ORAL 2 TIMES DAILY
Status: CANCELLED | OUTPATIENT
Start: 2021-03-02

## 2021-03-02 RX ORDER — SODIUM CHLORIDE 9 MG/ML
INJECTION, SOLUTION INTRAVENOUS PRN
Status: DISCONTINUED | OUTPATIENT
Start: 2021-03-02 | End: 2021-03-03 | Stop reason: HOSPADM

## 2021-03-02 RX ORDER — FERROUS SULFATE 325(65) MG
325 TABLET ORAL
Status: CANCELLED | OUTPATIENT
Start: 2021-03-02

## 2021-03-02 RX ORDER — OXYCODONE HYDROCHLORIDE AND ACETAMINOPHEN 5; 325 MG/1; MG/1
1 TABLET ORAL EVERY 4 HOURS PRN
Status: CANCELLED | OUTPATIENT
Start: 2021-03-02

## 2021-03-02 RX ORDER — ASCORBIC ACID 500 MG
500 TABLET ORAL
Status: CANCELLED | OUTPATIENT
Start: 2021-03-02

## 2021-03-02 RX ADMIN — AMLODIPINE BESYLATE 5 MG: 5 TABLET ORAL at 20:36

## 2021-03-02 RX ADMIN — PANTOPRAZOLE SODIUM 40 MG: 40 TABLET, DELAYED RELEASE ORAL at 05:31

## 2021-03-02 RX ADMIN — CALCIUM CARBONATE-CHOLECALCIFEROL TAB 250 MG-125 UNIT 250 MG: 250-125 TAB at 20:35

## 2021-03-02 RX ADMIN — POLYETHYLENE GLYCOL 3350 17 G: 17 POWDER, FOR SOLUTION ORAL at 10:58

## 2021-03-02 RX ADMIN — OXYCODONE HYDROCHLORIDE AND ACETAMINOPHEN 1 TABLET: 5; 325 TABLET ORAL at 09:40

## 2021-03-02 RX ADMIN — DOCUSATE SODIUM 50 MG AND SENNOSIDES 8.6 MG 1 TABLET: 8.6; 5 TABLET, FILM COATED ORAL at 20:36

## 2021-03-02 RX ADMIN — METOPROLOL TARTRATE 50 MG: 100 TABLET, FILM COATED ORAL at 10:56

## 2021-03-02 RX ADMIN — OXYCODONE HYDROCHLORIDE AND ACETAMINOPHEN 1 TABLET: 5; 325 TABLET ORAL at 14:33

## 2021-03-02 RX ADMIN — METOPROLOL TARTRATE 100 MG: 100 TABLET, FILM COATED ORAL at 20:36

## 2021-03-02 RX ADMIN — OXYCODONE HYDROCHLORIDE AND ACETAMINOPHEN 1 TABLET: 5; 325 TABLET ORAL at 20:36

## 2021-03-02 RX ADMIN — ENOXAPARIN SODIUM 40 MG: 40 INJECTION, SOLUTION INTRAVENOUS; SUBCUTANEOUS at 10:58

## 2021-03-02 RX ADMIN — OXYCODONE HYDROCHLORIDE AND ACETAMINOPHEN 500 MG: 500 TABLET ORAL at 10:57

## 2021-03-02 RX ADMIN — SODIUM CHLORIDE, PRESERVATIVE FREE 10 ML: 5 INJECTION INTRAVENOUS at 20:37

## 2021-03-02 RX ADMIN — FERROUS SULFATE TAB 325 MG (65 MG ELEMENTAL FE) 325 MG: 325 (65 FE) TAB at 10:55

## 2021-03-02 RX ADMIN — DOCUSATE SODIUM 50 MG AND SENNOSIDES 8.6 MG 1 TABLET: 8.6; 5 TABLET, FILM COATED ORAL at 10:54

## 2021-03-02 RX ADMIN — OXYCODONE HYDROCHLORIDE AND ACETAMINOPHEN 1 TABLET: 5; 325 TABLET ORAL at 02:48

## 2021-03-02 ASSESSMENT — PAIN DESCRIPTION - LOCATION
LOCATION: KNEE

## 2021-03-02 ASSESSMENT — PAIN DESCRIPTION - DESCRIPTORS
DESCRIPTORS: ACHING

## 2021-03-02 ASSESSMENT — PAIN DESCRIPTION - PAIN TYPE
TYPE: SURGICAL PAIN

## 2021-03-02 ASSESSMENT — PAIN DESCRIPTION - FREQUENCY
FREQUENCY: INTERMITTENT

## 2021-03-02 ASSESSMENT — PAIN - FUNCTIONAL ASSESSMENT
PAIN_FUNCTIONAL_ASSESSMENT: PREVENTS OR INTERFERES SOME ACTIVE ACTIVITIES AND ADLS

## 2021-03-02 ASSESSMENT — PAIN SCALES - GENERAL
PAINLEVEL_OUTOF10: 8
PAINLEVEL_OUTOF10: 5
PAINLEVEL_OUTOF10: 6

## 2021-03-02 NOTE — PROGRESS NOTES
Physical Medicine & Rehabilitation   Progress Note    Chief Complaint:  Fall. Subjective:  Patient seen today, sitting up in chair. Family at bedside. PT approached to work with patient, patient agreeable. Planning IPR admission tomorrow. Family with questions about visitation and IPR needs. Deny any further questions. Rehabilitation:  PT:   Bed Mobility:  Rolling to Right: Modified Independent   Supine to Sit: Minimal Assistance  Scooting: Minimal Assistance   Obdulio from therapist to aid in lifting R lower extremity. Transfers:  Sit to Stand: Contact Guard Assistance  Stand to [address] [place] [address] for hand placement for safety   Ambulation:  Contact Guard Assistance  Distance: ~50ft  Surface: Level Tile  Device:Rolling Walker  Gait Deviations:  Slow Yessenia, Decreased Step Length Bilaterally, Decreased Gait Speed, Decreased Heel Strike on Left, Narrow Base of Support, Decreased Terminal Knee Extension, and step to pattern. Pt improving heel strike on R foot. OT:  ADL:   Grooming: Contact Guard Assistance. standing at sink with RW in order to wash hands and brush hair x 2:38 min prior to continuing mobility task  Upper Body Dressing: Min A for L wrist splint  Toileting: Stand By Assistance. standing for front periarea with RW  Toilet Transfer: Minimal Assistance. RLE management to elevated toilet seat as well as ascending. BALANCE:  Sitting Balance:  Stand By Assistance. seated edge of bedside chair  Standing Balance: Contact Guard Assistance. with RW; no LOB  TRANSFERS:  Sit to Stand:  Minimal Assistance. bedside chair to RW with verbal cues for hand placement demonstrating understanding  Stand to Sit: Minimal Assistance. RLE management as well as verbal cues for body positioning; demonstrating understanding  FUNCTIONAL MOBILITY:  Assistive Device: Rolling Walker  Assist Level:  Contact Guard Assistance. Distance:  To and from bathroom  Slow pace; no LOB; assistance with IV pole management     Review of Systems:  CONSTITUTIONAL:  negative  EYES:  negative  HEENT:  negative  RESPIRATORY:  negative  CARDIOVASCULAR:  negative  GASTROINTESTINAL:  constipation  GENITOURINARY:  negative  SKIN:  negative  HEMATOLOGIC/LYMPHATIC:  negative  MUSCULOSKELETAL:  positive for  pain  NEUROLOGICAL:  positive for gait problems and pain  BEHAVIOR/PSYCH:  negative  System review otherwise negative      Objective:  /60   Pulse 110   Temp 98.4 °F (36.9 °C) (Oral)   Resp 18   Ht 5' 2\" (1.575 m)   Wt 132 lb (59.9 kg)   SpO2 99%   Breastfeeding No   BMI 24.14 kg/m²   awake  Orientation:   person, place, time  Mood: within normal limits  Affect: calm  General appearance: Patient is well nourished, well developed, well groomed and in no acute distress, appearing stated age    Attention/Concentration: normal  Language:  normal    Cranial Nerves:  cranial nerves II-XII are grossly intact  ROM:  abnormal - RLE      Diagnostics:   Recent Results (from the past 24 hour(s))   Hemoglobin and hematocrit, blood    Collection Time: 03/01/21  2:45 PM   Result Value Ref Range    Hemoglobin 7.8 (L) 12.0 - 16.0 gm/dl    Hematocrit 24.6 (L) 37.0 - 47.0 %   Vitamin D 25 hydroxy    Collection Time: 03/02/21  5:20 AM   Result Value Ref Range    Vit D, 25-Hydroxy 20 (L) 30 - 100 ng/ml   CBC    Collection Time: 03/02/21  5:20 AM   Result Value Ref Range    WBC 9.0 4.8 - 10.8 thou/mm3    RBC 2.36 (L) 4.20 - 5.40 mill/mm3    Hemoglobin 7.1 (L) 12.0 - 16.0 gm/dl    Hematocrit 22.8 (L) 37.0 - 47.0 %    MCV 96.6 81.0 - 99.0 fL    MCH 30.1 26.0 - 33.0 pg    MCHC 31.1 (L) 32.2 - 35.5 gm/dl    RDW-CV 12.3 11.5 - 14.5 %    RDW-SD 42.8 35.0 - 45.0 fL    Platelets 332 634 - 096 thou/mm3    MPV 10.6 9.4 - 12.4 fL   Basic Metabolic Panel w/ Reflex to MG    Collection Time: 03/02/21  5:20 AM   Result Value Ref Range    Sodium 137 135 - 145 meq/L    Potassium reflex Magnesium 4.5 3.5 - 5.2 meq/L    Chloride 102 98 - 111 meq/L    CO2 27 23 - 33 meq/L    Glucose 104 70 - 108 mg/dL    BUN 11 7 - 22 mg/dL    CREATININE 0.5 0.4 - 1.2 mg/dL    Calcium 8.3 (L) 8.5 - 10.5 mg/dL   Anion Gap    Collection Time: 03/02/21  5:20 AM   Result Value Ref Range    Anion Gap 8.0 8.0 - 16.0 meq/L   Glomerular Filtration Rate, Estimated    Collection Time: 03/02/21  5:20 AM   Result Value Ref Range    Est, Glom Filt Rate >90 ml/min/1.73m2   TYPE AND SCREEN    Collection Time: 03/02/21  8:07 AM   Result Value Ref Range    ABO Check O     Rh Factor POS     GEL INDIRECT ANDRE NEG        Impression:  · Fall   · Right femur, closed displace medial condyle fracture  · S/p right TKR with Dr Fortino Yanez  · Right leg pain  · Left fifth metacarpal fracture  · Acute post op blood loss anemia  · Left eye ecchymosis  · Constipation  · HTN  · Hx partial colectomy for diverticulitis  · Mitral regurgitation with murmur  · Dyslipidemia     Plan:  · Continue current therapies  · PRBC today per ortho due to anemia  · If medically stable, H&H stable, may admit to IPR 3/3/21      MOISÉS Cormier - CNP

## 2021-03-02 NOTE — PROGRESS NOTES
Hospitalist Consult Progress Note    Patient:  Светлана Arguelles      Unit/Bed:7K-01/001-A    YOB: 1931    MRN: [id number]       Acct: [de-identified]     PCP: Kady Stuart MD    Date of Admission: 2/26/2021    Attending Physician: Van Pino MD    Reason for Consult: medical management      Assessment/Plan:  Active Hospital Problems    Diagnosis Date Noted    Closed displaced fracture of medial condyle of right femur [facility]) [S72.431A] 02/26/2021    Hypertension [I10]        From hospitalist perspective, once blood transfusion completed patient will be stable for discharge, but will need ongoing monitoring of CBC. Recommend in 1 day and 1 week. Acute Postop Blood Loss anemia: 13 is baseline, 13.3 on arrival, 200ml blood loss. -reported drain fell out and bled postop.   -drop to 8.3 -> 7.6 -> 7.1 (despite being off fluids)   -Transfuse 1 u PRBC   -upper limit normal orthostatic BP.  -post transfusion H*H and CBC in am.     Distal Rt Femur Fx s/p fall: hx of rt TKA -> s/p OR on 2/27/21 with rt total knee revision with OSS (femoral and tibial components). -Fall appears to be mechanical, but details fuzzy. Consider Holter. Echo grossly normal.    -drain removed 2/28/21  -pain control and bowel regimen.   -low Vit D, consider bisphosphonate as OP  -PT/OT, plan is IPR    Vitamin D Deficiency: start on oscal daily, f/up with PCP. Left 5th Metacarpal fracture: d/w ortho, splint to be applied. Conservative management. OT. Left Eye Bruising: stable, no impingement on vision. Constipation: schedule pericolace and continue miralax. OOB as able. Soft Murmur, Hx of Mild MR (2017 echo): repeat echo per cardiology shows G1DD. Mild Hyponatremia: noted, will monitor with BMP in am. Resolved, no need to trend anymore. HTN: continue norvasc, lopressor. Leukocytosis: likely stress response. mod leuk esterase on UA but no bacteruria, fever. Resolved.      Hxx of partial colectomy for diverticulitis: reports bowel habits are inconsistent. Occasional incontinence/urgency of stool. Thank you, Cielo Recinos MD, for the opportunity to participate in this patient's care. Expected discharge date:   Per Primary    Disposition:  Per Primary - looking into IPR.       -------------------------------------------------------------    Chief Complaint: [facility] Course: Patient with a history of right TKA admitted by orthopedic surgery status post fall with right femur fracture, status post OR on 2/27 with total knee revision. Did have significant drop in hemoglobin postoperatively however this has stabilized in the mid to high sevens. Patient is working with therapy and may go to inpatient rehab. Subjective (past 24 hours):  Patient seen at bedside. No current issues. Denies any bowel movement, but does not have constipation. Is passing flatus. No nausea or vomiting.   Is getting a unit of blood today since Hgb dropped to 7.1 this am.        Medications:  Reviewed    Infusion Medications    sodium chloride       Scheduled Medications    oyster shell calcium/vitamin D  1 tablet Oral BID    sennosides-docusate sodium  1 tablet Oral BID    ferrous sulfate  325 mg Oral Q48H    ascorbic acid  500 mg Oral Q48H    polyethylene glycol  17 g Oral Daily    amLODIPine  5 mg Oral Daily    metoprolol  100 mg Oral Nightly    metoprolol tartrate  50 mg Oral Daily    enoxaparin  40 mg Subcutaneous Daily    sodium chloride flush  10 mL Intravenous 2 times per day    pantoprazole  40 mg Oral QAM AC     PRN Meds: sodium chloride, diclofenac sodium, phenol, sodium chloride flush, ondansetron **OR** ondansetron, oxyCODONE-acetaminophen **OR** [DISCONTINUED] oxyCODONE-acetaminophen, busPIRone, morphine      Intake/Output Summary (Last 24 hours) at 3/2/2021 1313  Last data filed at 3/2/2021 0748  Gross per 24 hour   Intake 940 ml   Output 0 ml   Net 940 ml       Diet: DIET GENERAL;    Exam:  /60   Pulse 110   Temp 98.4 °F (36.9 °C) (Oral)   Resp 18   Ht 5' 2\" (1.575 m)   Wt 132 lb (59.9 kg)   SpO2 99%   Breastfeeding No   BMI 24.14 kg/m²     General appearance: No apparent distress. .  Elderly pleasant. Pale. Eyes:  Pupils equal, round, and reactive to light. Conjunctivae/corneas clear. HENT: Head normal in appearance. External nares normal.  Oral mucosa moist without lesions. Hearing grossly intact. Neck: Supple, with full range of motion. No jugular venous distention. Trachea midline. Respiratory:  Normal respiratory effort. bilaterally without wheezes or rhonchi. Trace rales in bases. Cardiovascular: Normal rate, regular rhythm with normal I5/I7 soft systolic murmur in LUSB. No lower extremity edema. Abdomen: Soft, non-tender, non-distended with normal bowel sounds. Musculoskeletal: Swelling right knee, tenderness to palpation  Right knee ace wrap, wound without erythema. No drainage. Skin: Skin color, texture, turgor normal.  No rashes or lesions. Ecchymosis around left eye. Pallorous appearing. Neurologic:  Neurovascularly intact without any focal sensory/motor deficits in the upper and lower extremities. Cranial nerves:  grossly non-focal.  Psychiatric: Alert and oriented, thought content appropriate, normal insight. Capillary Refill: Brisk,< 3 seconds. Peripheral Pulses: +2 palpable, equal bilaterally. Labs:   Recent Labs     02/28/21 0618 03/01/21  0528 03/01/21  1445 03/02/21  0520   WBC 12.5* 9.6  --  9.0   HGB 8.3* 7.6* 7.8* 7.1*   HCT 26.1* 24.7* 24.6* 22.8*    153  --  183     Recent Labs     02/28/21  0618 03/01/21  0528 03/02/21  0520   * 134* 137   K 4.4 4.2 4.5    100 102   CO2 24 25 27   BUN 13 15 11   CREATININE 0.7 0.5 0.5   CALCIUM 7.9* 8.1* 8.3*     No results for input(s): AST, ALT, BILIDIR, BILITOT, ALKPHOS in the last 72 hours. No results for input(s): INR in the last 72 hours.   No results for input(s): Akilah Tracey in the last 72 hours. Microbiology:      Urinalysis:      Lab Results   Component Value Date    NITRU NEGATIVE 02/27/2021    WBCUA 5-9 02/27/2021    BACTERIA NONE SEEN 02/27/2021    RBCUA 0-2 02/27/2021    BLOODU NEGATIVE 02/27/2021    GLUCOSEU NEGATIVE 02/27/2021       Radiology:  XR HAND LEFT (MIN 3 VIEWS)   Final Result         1. Fracture at the base of the fifth metacarpal with minimal displacement. 2. Diffuse osteopenia. 3. Degenerative change especially at the first carpometacarpal joint. **This report has been created using voice recognition software. It may contain minor errors which are inherent in voice recognition technology. **      Final report electronically signed by DR Rose Bruno on 3/1/2021 11:44 AM      XR KNEE RIGHT (1-2 VIEWS)   Final Result   Postop appearance right knee. **This report has been created using voice recognition software. It may contain minor errors which are inherent in voice recognition technology. **      Final report electronically signed by Dr. Sujatha Bhardwaj on 2/27/2021 12:54 PM      XR CHEST PORTABLE   Final Result   No acute cardiopulmonary process. **This report has been created using voice recognition software. It may contain minor errors which are inherent in voice recognition technology. **      Final report electronically signed by Dr. Jozef Lisa on 2/26/2021 2:43 PM      XR TIBIA FIBULA RIGHT (2 VIEWS)   Final Result      Status post right knee total arthroplasty with comminuted foreshortened and dorsally angulated fracture of the distal right femur. **This report has been created using voice recognition software. It may contain minor errors which are inherent in voice recognition technology. **      Final report electronically signed by Dr. Jozef Lisa on 2/26/2021 12:09 PM      XR KNEE RIGHT (MIN 4 VIEWS)   Final Result      Status post right knee total arthroplasty with comminuted foreshortened and dorsally angulated fracture of the distal right femur. **This report has been created using voice recognition software. It may contain minor errors which are inherent in voice recognition technology. **      Final report electronically signed by Dr. Vladislav Stephens on 2/26/2021 12:09 PM      XR FEMUR RIGHT (MIN 2 VIEWS)   Final Result      Status post right knee total arthroplasty with comminuted foreshortened and dorsally angulated fracture of the distal right femur. **This report has been created using voice recognition software. It may contain minor errors which are inherent in voice recognition technology. **      Final report electronically signed by Dr. Vladislav Stephens on 2/26/2021 12:09 PM            DVT prophylaxis: [x] Lovenox                                  [] SCDs                                 [] SQ Heparin                                 [] Encourage ambulation           [] Already on Anticoagulation     Code Status: Full Code    PT/OT Eval Status: yes      Tele:   [] yes             [x] no      Thank you, Joi Benitez MD, for the opportunity to participate in this patient's care.        Electronically signed by Jodi Anderson DO on 3/2/2021 at 1:13 PM

## 2021-03-02 NOTE — PLAN OF CARE
Problem: Pain:  Goal: Control of acute pain  Description: Control of acute pain  Outcome: Ongoing  Note: Pt report pain at 3 on scale. Pt states oral/iv/im medication helping to achieve pain goal of no pain on scale. Problem: Falls - Risk of:  Goal: Will remain free from falls  Description: Will remain free from falls  Outcome: Ongoing  Note: Pt using call light appropriately to call for assistance with ambulation to the bathroom and to chair. Pt is also compliant with use of non-skid slippers. Pt reports understanding of fall prevention when discussed. Problem: Skin Integrity:  Goal: Will show no infection signs and symptoms  Description: Will show no infection signs and symptoms  Outcome: Ongoing  Note: Pt's surgical incision healing. Dressing is dry and intact and not due to be changed today. No other skin impairments noted. Pt understands the importance of frequent repositioning in order to prevent any skin breakdown. Problem: Cardiovascular  Goal: No DVT, peripheral vascular complications  Outcome: Ongoing     Problem: Respiratory  Goal: O2 Sat > 90%  Outcome: Ongoing  Note: Patient's O2 Sat above 90% this shift on room air     Problem: GI  Goal: No bowel complications  Outcome: Ongoing     Problem:   Goal: Adequate urinary output  Outcome: Ongoing     Problem: Musculor/Skeletal Functional Status  Goal: Highest potential functional level  Outcome: Ongoing     Problem: DISCHARGE BARRIERS  Goal: Patient's continuum of care needs are met  Outcome: Ongoing  Care plan reviewed with patient. Patient verbalizes understanding of the plan of care and contribute to goal setting.

## 2021-03-02 NOTE — CARE COORDINATION
3/2/21, 10:04 AM EST    DISCHARGE ON GOING EVALUATION    [address] [facility] day: 4  Location: -01/001-A Reason for admit: Closed displaced fracture of medial condyle of right femur, initial encounter ([facility] [S72.431A]  Closed displaced fracture of medial condyle of right femur, initial encounter [facility]) [S72.431A]   Procedure: 2/27/21 surgery by Dr Rama Aleman: Holli Starkey WITH OSS both femoral and tibial components  Left hand xray today:      1. Fracture at the base of the fifth metacarpal with minimal displacement. 2. Diffuse osteopenia. 3. Degenerative change especially at the first carpometacarpal joint.      Barriers to Discharge:  Dr Rama Aleman attending. Hospitalist for medical management. Cardiology consult per pat request PT/OT. FWB RLE. N/V checks. Telemetry monitoring. Physiatry consult for [facility] IP Rehab - accepted to Rehab. PCP: Hilda Scott MD  Readmission Risk Score: 13%  Patient Goals/Plan/Treatment Preferences: Planning discharge readmit to [address] [place] [address] later today if bed available. Soniya said that bed will be available tomorrow.

## 2021-03-02 NOTE — PROGRESS NOTES
Alert and oriented to person, place, time and situation. Family present. Speech clear and appropriate. Heart sounds regular and unlabored respirations. Lung sounds are clear throughout bilaterally. Abdomen is soft, round and non tender. Bowel sounds are active x 4. Lower extremities are pink, warm and dry. No numbness or tingling voiced. Pedal, push and pull are strong and equal bilaterally. Pulses are 2+ bilaterally. 14 stitches to right knee, no redness or drainage noted. Patient resting in chair visiting with her children. Chair wheels are locked, call light and bedside table are within reach. No concerns voiced at this time.   Pablito Bonner, RSN

## 2021-03-02 NOTE — PROGRESS NOTES
[address] [address]  INPATIENT PHYSICAL THERAPY  DAILY NOTE  [facility] ORTHOPEDICS 7K - 7K-01/001-A    Time In: 0511  Time Out: 1454  Timed Code Treatment Minutes: 44 Minutes  Minutes: 39          Date: 3/2/2021  Patient Name: Kimberly Sheets,  Gender:  female        MRN: [id number]  : 3/4/1931  (80 y.o.)     Referring Practitioner: Dr. Andres Valles  Diagnosis: Closed displaced fracture of medial condyle of right femur  Additional Pertinent Hx: per 21 ED note; Kimberly Sheets is a 80 y.o. female who presents to the emergency department for evaluation of fall at [facility] this morning. Mechanical trip and fall while walking to the parking lot. Patient denies loss of consciousness, head strike. States that her glasses hit her eye and bruised her upper eyelid. States she is feeling fine other than severe right knee pain. She admits to associated swelling. [id number] TOTAL KNEE REVISION     Prior Level of Function:  Lives With: Alone  Type of Home: House  Home Layout: One level  Home Access: Stairs to enter with rails  Entrance Stairs - Number of Steps: 2  Entrance Stairs - Rails: Left  Home Equipment: Cane, Rolling walker   Bathroom Shower/Tub: Tub/Shower unit  Bathroom Toilet: Handicap height  Bathroom Equipment: Grab bars in shower, Grab bars around toilet  Bathroom Accessibility: Accessible    Receives Help From: Family  ADL Assistance: [address] [place] [address] Avenue: Independent  Homemaking Responsibilities: Yes  Ambulation Assistance: Independent  Transfer Assistance: Independent  Active : Yes  Additional Comments: did not use an AD prior to admission    Restrictions/Precautions:  Restrictions/Precautions: Weight Bearing  Right Lower Extremity Weight Bearing: Weight Bearing As Tolerated  Position Activity Restriction  Other position/activity restrictions: 3-1: Fracture at the base of the fifth metacarpal with minimal displacement. 3-2: per ortho, splint to left hand PRN. Pt can be WBAT. VITALS: Vitals not assessed per clinical judgement, see nursing flowsheet    SUBJECTIVE: pt in chair upon arrival, 3 children in room. Pt states left wrist brace is too big and uncomfortable, agreeable to do therapy. Pt requested to use bathroom. PAIN: 7/10: in right knee while ambulating and after exercises. Notified nursing of pt wanting pain meds. Given ice for right knee and left hand. OBJECTIVE:  Bed Mobility:  Sit to Supine: Minimal Assistance: assistance from therapist to lift right lower extremity. Scooting: Minimal Assistance: assistance from therapist to lift right lower extremity. Transfers:  Sit to Stand: Contact Guard Assistance  Stand to [employer] Assistance: cues for hand placement for safety. Ambulation:  Contact Guard Assistance  Distance: ~50 feet  Surface: Level Tile  Device:Rolling Walker  Gait Deviations:  Slow Yessenia, Decreased Step Length Bilaterally, Decreased Gait Speed, Decreased Heel Strike on Left, Narrow Base of Support, Decreased Terminal Knee Extension on Right and decreased toe off Bilaterally. Pt focused on right heel strike and right terminal knee extension. Pt began with splint at start of gait training, but preferred not to wear it. Pt did not demonstrate any increased pain without the splint. Balance:  Static Sitting Balance:  Stand By Assistance: while prepping for ambulation. Dynamic Standing Balance: Contact Guard Assistance: no support or assistance with upper extremities while performing toiletry tasks. Exercise:  Patient was guided in 1 set(s) 15 reps of exercise to right lower extremity. Ankle pumps, Glut sets, Quad sets, Heelslides, Straight leg raises and Hooklying hip abduction/adduction. Exercises were completed for increased independence with functional mobility. Required Josué with heelslides, straight leg raises, and abduction.     Functional Outcome Measures: Completed  AM-PAC Inpatient Mobility Raw Score : 17 AM-PAC Inpatient T-Scale Score : 42.13    ASSESSMENT:  Assessment: Patient progressing toward established goals. Activity Tolerance:  Patient tolerance of  treatment: good. Pt required Obdulio with most lower extremity exercises due to weakness and pain in Right knee. Equipment Recommendations:   Discharge Recommendations:  Continue to assess pending progress(anticipate pt may need a therapy stay prior to return home)    Plan: Times per week: 6x BID/1X QD  Times per day: Daily  Current Treatment Recommendations: Strengthening, ROM, Balance Training, Functional Mobility Training, Transfer Training, Gait Training, Endurance Training    Patient Education  Patient Education: Family Education, Transfers,  - Patient Verbalized Understanding, - Patient Requires Continued Education: pt and family educated on importance of keeping Right knee in extension without pillow underneath to improve terminal knee extension for gait. Pt also educated in hand placement for safety during transfers. Goals:  Patient goals : To return home  Short term goals  Time Frame for Short term goals: At time of discharge  Short term goal 1: Mod I with bed mobility so pt can get in and out of bed  Short term goal 2: Mod I with t/fs so pt can get up to go to the bathroom  Short term goal 3: SBA x 1 to amb with ' x1 for household amb  Short term goal 4: Pt to negotiate 3 steps with 1 HR with SBA x 1 to enter home  Long term goals  Time Frame for Long term goals : No LTGs secondary to ELOS    Following session, patient left in safe position with all fall risk precautions in place.

## 2021-03-02 NOTE — PROGRESS NOTES
[address] [address]  INPATIENT PHYSICAL THERAPY  DAILY NOTE  [facility] ORTHOPEDICS 7K - 7K-01/001-A    Time In: 2358  Time Out: 2271  Timed Code Treatment Minutes: 35 Minutes  Minutes: 33          Date: 3/2/2021  Patient Name: Cruz Sanchez,  Gender:  female        MRN: [id number]  : 3/4/1931  (80 y.o.)     Referring Practitioner: Dr. Anju Riojas  Diagnosis: Closed displaced fracture of medial condyle of right femur  Additional Pertinent Hx: per 21 ED note; Cruz Sanchez is a 80 y.o. female who presents to the emergency department for evaluation of fall at [facility] this morning. Mechanical trip and fall while walking to the parking lot. Patient denies loss of consciousness, head strike. States that her glasses hit her eye and bruised her upper eyelid. States she is feeling fine other than severe right knee pain. She admits to associated swelling. [id number] TOTAL KNEE REVISION     Prior Level of Function:  Lives With: Alone  Type of Home: House  Home Layout: One level  Home Access: Stairs to enter with rails  Entrance Stairs - Number of Steps: 2  Entrance Stairs - Rails: Left  Home Equipment: Cane, Rolling walker   Bathroom Shower/Tub: Tub/Shower unit  Bathroom Toilet: Handicap height  Bathroom Equipment: Grab bars in shower, Grab bars around toilet  Bathroom Accessibility: Accessible    Receives Help From: Family  ADL Assistance: [facility]: Independent  Homemaking Responsibilities: Yes  Ambulation Assistance: Independent  Transfer Assistance: Independent  Active : Yes  Additional Comments: did not use an AD prior to admission    Restrictions/Precautions:  Restrictions/Precautions: Weight Bearing  Right Lower Extremity Weight Bearing: Weight Bearing As Tolerated     VITALS: Vitals not assessed per clinical judgement, see nursing flowsheet    SUBJECTIVE: pt in bed upon arrival, agreeable to do therapy.      PAIN: 8/10: during ambulation, 5/10: while sitting in chair post Patient Education  Patient Education: Plan of Care, Transfers,  - Patient Verbalized Understanding: pt educated on hand placement during transfers for safety and on the importance of exercises. Goals:  Patient goals : To return home  Short term goals  Time Frame for Short term goals: At time of discharge  Short term goal 1: Mod I with bed mobility so pt can get in and out of bed  Short term goal 2: Mod I with t/fs so pt can get up to go to the bathroom  Short term goal 3: SBA x 1 to amb with ' x1 for household amb  Short term goal 4: Pt to negotiate 3 steps with 1 HR with SBA x 1 to enter home  Long term goals  Time Frame for Long term goals : No LTGs secondary to ELOS    Following session, patient left in safe position with all fall risk precautions in place.

## 2021-03-02 NOTE — PLAN OF CARE
Problem: Pain:  Goal: Control of acute pain  Description: Control of acute pain  Outcome: Ongoing  Note: Patient complains of pain in the right knee with a rating of 5-6 on 0-10 scale. PRN pain medication given as ordered along with ice packs and elevation. Patient's stated pain goal is 4. Problem: Falls - Risk of:  Goal: Will remain free from falls  Description: Will remain free from falls  Outcome: Ongoing  Note: Patient has remained free of falls during this shift. Appropriate fall prevention measures in place. Patient is compliant with using call light for assistance when needed. Problem: Skin Integrity:  Goal: Will show no infection signs and symptoms  Description: Will show no infection signs and symptoms  Outcome: Ongoing  Note: No s/sx of infection noted during this shift. Patient remains afebrile and VS stable. Problem: Cardiovascular  Goal: No DVT, peripheral vascular complications  Outcome: Ongoing  Note: Patient without S/SX of DVT. Patient has teds and SCD in place to help prevent development of DVT. Problem: Respiratory  Goal: O2 Sat > 90%  Outcome: Ongoing  Note: O2 sats remain greater than 90% on room air. Problem: GI  Goal: No bowel complications  Outcome: Ongoing  Note: Bowel sounds active x 4. Patient is passing gas. No BM during this shift. Bowel medications given. Problem:   Goal: Adequate urinary output  Outcome: Ongoing  Note: Patient has voided adequate amounts of clear, yellow urine during this shift. Problem: Musculor/Skeletal Functional Status  Goal: Highest potential functional level  Outcome: Ongoing  Note: Patient is up with 1 assist using a walker and gait belt. Gait is steady. Problem: DISCHARGE BARRIERS  Goal: Patient's continuum of care needs are met  Outcome: Ongoing  Note: Discharge planning in progress. Patient is planning to go to rehab at discharge possibly tomorrow. Case management assisting with discharge needs. Care plan reviewed with patient. Patient verbalize understanding of the plan of care and contribute to goal setting.

## 2021-03-02 NOTE — PROGRESS NOTES
[address] [address]  Acute Inpatient Rehab Preadmission Assessment    Patient Name: Kimberly Sheets        MRN: [id number]    : 3/4/1931  (80 y.o.)  Gender: female     Admitted from:[address]  Initial Assessment    Date of admission to the hospital: 2021 10:58 AM  Date patient eligible for admission:3/2/2021    Primary Diagnosis: Closed displaced fracture of medial condyle of right femur, initial encounter ([facility] [S72.431A]  Closed displaced fracture of medial condyle of right femur, initial encounter ([facility] [I17.895Z]       Did patient have surgery? yes - RIGHT TOTAL KNEE REVISION WITH OSS both femoral and tibial components    Physicians: Eran Ellis MD, Dr. Oumar Monroy, Dr. Radha Armijo for clinical complications/co-morbidities:   Past Medical History:   Diagnosis Date    Arthritis     GERD (gastroesophageal reflux disease)     Hyperlipidemia     Hypertension     Keratosis     benign lichemoid    Psychiatric problem        Financial Information  Primary insurance: Medicare    Secondary Insurance:  . Has the patient had two or more falls in the past year or any fall with injury in the past year? yes    Did the patient have major surgery during the 100 days prior to admission?   yes    Precautions:   falls, infections and skin  Restrictions/Precautions: Weight Bearing  Right Lower Extremity Weight Bearing: Weight Bearing As Tolerated    Isolation Precautions: None       Physiatrist: Dr. Karl Keith    Patients Occupation: Retired  Reviewed Lab and Diagnostic reports from Current Admission: Yes    Patients Prior Functional  Level: Prior Function  Receives Help From: Family  ADL Assistance: Independent  Homemaking Assistance: Independent  Ambulation Assistance: Independent  Transfer Assistance: Independent  Additional Comments: did not use an AD prior to admission    Current functional status for upper extremity ADLs: contact guard assistance    Current functional status for lower extremity ADLs:  Minimal assistance  Current functional status for bed, chair, wheelchair transfers: minimal assistance    Current functional status for toilet transfers: Minimal assistance    Current functional status for locomotion: Assistive Device: Rolling Walker  Assist Level:  [employer]. Distance: To and from bathroom  Slow pace; no LOB; assistance with IV pole management     Current functional status for bladder management: Modified independence    Current functional status for bowel management:Modified independence    Current functional status for comprehension: Complete independence    Current functional status for expression: Complete independence    Current functional status for social interaction: Complete independence    Current functional status for problem solving: Complete independence    Current functional status for memory: Complete independence    Expected level of Improvement in Self-Care:  Complete independence    Expected level of Improvement in Sphincter Control:  Complete independence    Expected level of Improvement in Transfers: Complete independence    Expected level of Improvement in Locomotion:  Modified independence    Expected level of Improvement in Communication and Social Cognition: Modified independence    Expected length of time to achieve that level of improvement: 2 weeks    Current rehab issues: ADL dysfunction,bladder management,bowel management,carry over of therapy techniques, discharge planning, disease and co-morbidity management, gait/mobility dysfunction, medication management, nutrition and hydration management,Ongoing assessment of safety, Pain management, Patient and family education, Prevention of secondary complications, Skin Integrity    Required therapy: Physical Therapy, Occupational Therapy3 hours per day, 5-6 days per week. Recreational Therapy 1 hour per week.     Expected Discharge Destination: Home    Expected Post Discharge Treatments: Out Patient    Other information relevant to the care needs:     Acute Inpatient Rehabilitation Disclosure Statement provided to patient. Patient verbalized understanding. I have reviewed and concur with the findings and results of the pre-admission screening assessment completed by the Inpatient Rehabilitation Admissions Coordinator.     Donta Pacheco MD

## 2021-03-02 NOTE — PROGRESS NOTES
[facility]  Occupational Therapy  Daily Note  Time:   Time In: 06  Time Out: 1136  Timed Code Treatment Minutes: 26 Minutes  Minutes: 38      -12 minutes for student nursing to address incision as well as distribute medication    Date: 3/2/2021  Patient Name: Carlos Dwyer,   Gender: female      Room: [facility]/001-A  MRN: [id number]  : 3/4/1931  (80 y.o.)  Referring Practitioner: Duy Burns MD  Diagnosis: Closed displaced fractureof medial condyle of R femur, R TKA revision. Additional Pertinent Hx: per 21 ED note; Carlos Dwyer is a 80 y.o. female who presents to the emergency department for evaluation of fall at [facility] this morning. Mechanical trip and fall while walking to the parking lot. Patient denies loss of consciousness, head strike. States that her glasses hit her eye and bruised her upper eyelid. States she is feeling fine other than severe right knee pain. She admits to associated swelling. Pain level evaluation was 9-10 out of 10. Restrictions/Precautions:  Restrictions/Precautions: Weight Bearing  Right Lower Extremity Weight Bearing: Weight Bearing As Tolerated  Position Activity Restriction  Other position/activity restrictions: 3-1: Fracture at the base of the fifth metacarpal with minimal displacement. 3-2: per ortho, splint to left hand PRN. Pt can be WBAT. VITALS: Nurse checked vitals prior to session as well as throughout session; 128/60  O2 room air at end of session    SUBJECTIVE: Pt seated in bedside chair upon arrival with student nursing present; agreeable to therapy this date. Pt pleasant and cooperative throughout session. Pt states she has not used the splint with walking however has tried it on. Patient states she is not a fan of it as it feels awkward to do tasks with it on.     PAIN: 5/10: seated however with mobility patient demonstrates 10/10 of R LE    COGNITION: Decreased Problem Solving    ADL:   Grooming: Contact Guard Assistance. standing at sink with RW in order to wash hands and brush hair x 2:38 min prior to continuing mobility task  Upper Body Dressing: Min A for L wrist splint  Toileting: Stand By Assistance. standing for front periarea with RW  Toilet Transfer: Minimal Assistance. RLE management to elevated toilet seat as well as ascending. BALANCE:  Sitting Balance:  Stand By Assistance. seated edge of bedside chair  Standing Balance: Contact Guard Assistance. with RW; no LOB    BED MOBILITY:  Not Tested    TRANSFERS:  Sit to Stand:  Minimal Assistance. bedside chair to RW with verbal cues for hand placement demonstrating understanding  Stand to Sit: Minimal Assistance. RLE management as well as verbal cues for body positioning; demonstrating understanding    FUNCTIONAL MOBILITY:  Assistive Device: Rolling Walker  Assist Level:  Contact Guard Assistance. Distance: To and from bathroom  Slow pace; no LOB; assistance with IV pole management     ASSESSMENT:     Activity Tolerance:  Patient tolerance of  treatment: good. Discharge Recommendations: IP Rehab, Continue to assess pending progress, Patient would benefit from continued therapy after discharge   Equipment Recommendations: Equipment Needed: Yes  Mobility Devices: ADL Assistive Devices  ADL Assistive Devices: Reacher, Sock-Aid Hard  Plan: Times per week: 6 x  Times per day: Daily  Current Treatment Recommendations: Endurance Training, Neuromuscular Re-education, Patient/Caregiver Education & Training, Equipment Evaluation, Education, & procurement, Self-Care / ADL    Patient Education  Patient Education: Role of OT, Plan of Care, ADL's and Importance of Increasing Activity    Goals  Short term goals  Time Frame for Short term goals: by discharge  Short term goal 1: patient will tolerate 6 min functional standing with sit to stand and participate with SBA to increase overall endurance to tolerate ADL routine.   Short term goal 2: patient will complete toileting routine with SBA. Short term goal 3: patient will complete dressing tasks with SBA and use of AE PRN. Short term goal 4: patient will tolerate grooming at sink in standing with SBA. Following session, patient left in safe position with all fall risk precautions in place.

## 2021-03-02 NOTE — PROGRESS NOTES
SBARR received from primary nurse Delia. Patient resting in bed with no concerns at this time.   TUSHAR MooreN

## 2021-03-02 NOTE — PROGRESS NOTES
Cardiology Progress Note      Patient:  Chiqui Bowman  YOB: 1931  MRN: [id number]   Acct: [de-identified]  [facility] Date:  2/26/2021  Primary Cardiologist: Cash Durand MD    Note per dr Melva De Luna:   HTN  Fall, femur fracture   Pateitn requested        CHIEF COMPLAINT:    FALL      HISTORY OF PRESENT ILLNESS:    Chiqui Bowman is a pleasant 80year old female patient with past medical history that includes:   Past Medical History        Past Medical History:   Diagnosis Date    Arthritis      GERD (gastroesophageal reflux disease)      Hyperlipidemia      Hypertension      Keratosis       benign lichemoid    Psychiatric problem        The patient was admitted to the hospital on 2/26/2021 after she lost balance and fell down. The patient presented with LE pain. Fall was mechanical per description, no LOC. Troponin was <0.01. EKG revealed SR, some PVCs noted. She was found to have femur fracture. Patient was seen by Orthopedics, she underwent surgery on 2/27/2021. She was noticed to have anemia, Hgb today is down to 7.6 (was 12 on 2/27/2021). She is followed as outpatient by Dr Maryanne Dumont. Stress test 2017 was negative for ischemia. Echo 2017 revealed an EF of 55%, trace mitral regurgitation. Patient denies chest pain, orthopnea, paroxysmal nocturnal dyspnea, palpitations, dizziness, syncope, recent weight gain or leg swelling. She occasionally has SOB and JESUS that occurs when she is walking, shopping. She thinks that this might related to \"allergies\" and to her \"wearing the mask\". \"    Subjective (Events in last 24 hours): pt awake and alert. NAD.   No cp or sob  On RA      Objective:   /62   Pulse 78   Temp 98.8 °F (37.1 °C) (Oral)   Resp 18   Ht 5' 2\" (1.575 m)   Wt 132 lb (59.9 kg)   SpO2 97%   Breastfeeding No   BMI 24.14 kg/m²        TELEMETRY: off tele    Physical Exam:  General Appearance: alert and oriented to person, place and time, in no acute distress

## 2021-03-02 NOTE — PROGRESS NOTES
Alert and oriented to person, place, time and situation. Speech clear and appropriate. PERRL 3mm to 2mm. Mucous membranes are pink and dry. Upper extremities are pink, warm and dry. No numbness or tingling voiced. Hand grasp is strong and equal bilaterally. Arm drift is negative. Capillary refill is < 3 seconds bilaterally. Skin turgor is < 3 seconds bilaterally. Heart sounds are regular with unlabored respirations. Chest expansion is symmetrical.  Lung sounds are clear throughout bilaterally. Abdomen is soft, round and non tender. No masses noted. Lower extremities are pink, warm and dry. No numbness or tingling voiced. Pedal, push and pull are strong and equal bilaterally. 14 stitches to right knee, dry with dressing intact with no drainage noted. Dorsalis Pedalis is strong and equal bilaterally. Posterior Tibialis is strong and equal bilaterally. Pulses are 2+ bilaterally. Skin is free from breakdown. No concerns voiced at this time. Bed in lowest position, side rails up x2. Call light and bedside table within reach.   LEO Newton

## 2021-03-03 ENCOUNTER — HOSPITAL ENCOUNTER (INPATIENT)
Age: 86
LOS: 10 days | Discharge: HOME | DRG: 092 | End: 2021-03-13
Attending: FAMILY MEDICINE | Admitting: PHYSICAL MEDICINE & REHABILITATION
Payer: MEDICARE

## 2021-03-03 VITALS
WEIGHT: 132 LBS | TEMPERATURE: 98.5 F | OXYGEN SATURATION: 96 % | BODY MASS INDEX: 24.29 KG/M2 | DIASTOLIC BLOOD PRESSURE: 60 MMHG | HEIGHT: 62 IN | HEART RATE: 93 BPM | SYSTOLIC BLOOD PRESSURE: 120 MMHG | RESPIRATION RATE: 18 BRPM

## 2021-03-03 DIAGNOSIS — S72.431S: ICD-10-CM

## 2021-03-03 DIAGNOSIS — S72.451D: ICD-10-CM

## 2021-03-03 DIAGNOSIS — S72.431D CLOSED DISPLACED FRACTURE OF MEDIAL CONDYLE OF RIGHT FEMUR WITH ROUTINE HEALING, SUBSEQUENT ENCOUNTER: ICD-10-CM

## 2021-03-03 DIAGNOSIS — S72.431A CLOSED DISPLACED FRACTURE OF MEDIAL CONDYLE OF RIGHT FEMUR, INITIAL ENCOUNTER (HCC): Primary | ICD-10-CM

## 2021-03-03 DIAGNOSIS — I10 ESSENTIAL HYPERTENSION: ICD-10-CM

## 2021-03-03 DIAGNOSIS — S72.451A: ICD-10-CM

## 2021-03-03 DIAGNOSIS — S62.347D CLOSED NONDISPLACED FRACTURE OF BASE OF FIFTH METACARPAL BONE OF LEFT HAND WITH ROUTINE HEALING, SUBSEQUENT ENCOUNTER: ICD-10-CM

## 2021-03-03 DIAGNOSIS — S62.347A CLOSED NONDISPLACED FRACTURE OF BASE OF FIFTH METACARPAL BONE OF LEFT HAND, INITIAL ENCOUNTER: ICD-10-CM

## 2021-03-03 LAB
HCT VFR BLD CALC: 27.2 % (ref 37–47)
HEMOGLOBIN: 8.5 GM/DL (ref 12–16)

## 2021-03-03 PROCEDURE — 97110 THERAPEUTIC EXERCISES: CPT

## 2021-03-03 PROCEDURE — 85018 HEMOGLOBIN: CPT

## 2021-03-03 PROCEDURE — 97530 THERAPEUTIC ACTIVITIES: CPT

## 2021-03-03 PROCEDURE — 6370000000 HC RX 637 (ALT 250 FOR IP): Performed by: ORTHOPAEDIC SURGERY

## 2021-03-03 PROCEDURE — 36415 COLL VENOUS BLD VENIPUNCTURE: CPT

## 2021-03-03 PROCEDURE — 85014 HEMATOCRIT: CPT

## 2021-03-03 PROCEDURE — 97535 SELF CARE MNGMENT TRAINING: CPT

## 2021-03-03 PROCEDURE — 1180000000 HC REHAB R&B

## 2021-03-03 PROCEDURE — 6370000000 HC RX 637 (ALT 250 FOR IP): Performed by: INTERNAL MEDICINE

## 2021-03-03 PROCEDURE — 99222 1ST HOSP IP/OBS MODERATE 55: CPT | Performed by: PHYSICAL MEDICINE & REHABILITATION

## 2021-03-03 PROCEDURE — 6360000002 HC RX W HCPCS: Performed by: ORTHOPAEDIC SURGERY

## 2021-03-03 PROCEDURE — 2580000003 HC RX 258: Performed by: ORTHOPAEDIC SURGERY

## 2021-03-03 PROCEDURE — 97116 GAIT TRAINING THERAPY: CPT

## 2021-03-03 RX ORDER — POLYETHYLENE GLYCOL 3350 17 G/17G
17 POWDER, FOR SOLUTION ORAL DAILY
Qty: 30 EACH | Refills: 1 | Status: SHIPPED | OUTPATIENT
Start: 2021-03-03 | End: 2021-04-02

## 2021-03-03 RX ORDER — CALCIUM CARBONATE/VITAMIN D3 250-3.125
1 TABLET ORAL 2 TIMES DAILY
Status: DISCONTINUED | OUTPATIENT
Start: 2021-03-03 | End: 2021-03-13 | Stop reason: HOSPADM

## 2021-03-03 RX ORDER — AMLODIPINE BESYLATE 5 MG/1
5 TABLET ORAL DAILY
Status: DISCONTINUED | OUTPATIENT
Start: 2021-03-03 | End: 2021-03-13 | Stop reason: HOSPADM

## 2021-03-03 RX ORDER — ASCORBIC ACID 500 MG
500 TABLET ORAL
Status: DISCONTINUED | OUTPATIENT
Start: 2021-03-04 | End: 2021-03-13 | Stop reason: HOSPADM

## 2021-03-03 RX ORDER — PANTOPRAZOLE SODIUM 40 MG/1
40 TABLET, DELAYED RELEASE ORAL
Status: DISCONTINUED | OUTPATIENT
Start: 2021-03-04 | End: 2021-03-13 | Stop reason: HOSPADM

## 2021-03-03 RX ORDER — BISACODYL 10 MG
10 SUPPOSITORY, RECTAL RECTAL DAILY PRN
Status: DISCONTINUED | OUTPATIENT
Start: 2021-03-03 | End: 2021-03-13 | Stop reason: HOSPADM

## 2021-03-03 RX ORDER — ACETAMINOPHEN 325 MG/1
650 TABLET ORAL EVERY 4 HOURS PRN
Status: CANCELLED | OUTPATIENT
Start: 2021-03-03

## 2021-03-03 RX ORDER — POLYETHYLENE GLYCOL 3350 17 G/17G
17 POWDER, FOR SOLUTION ORAL DAILY PRN
Status: DISCONTINUED | OUTPATIENT
Start: 2021-03-03 | End: 2021-03-03

## 2021-03-03 RX ORDER — POLYETHYLENE GLYCOL 3350 17 G/17G
17 POWDER, FOR SOLUTION ORAL DAILY
Status: DISCONTINUED | OUTPATIENT
Start: 2021-03-04 | End: 2021-03-13 | Stop reason: HOSPADM

## 2021-03-03 RX ORDER — FERROUS SULFATE 325(65) MG
325 TABLET ORAL
Qty: 30 TABLET | Refills: 0 | Status: SHIPPED | OUTPATIENT
Start: 2021-03-04 | End: 2022-05-24

## 2021-03-03 RX ORDER — OXYCODONE HYDROCHLORIDE AND ACETAMINOPHEN 5; 325 MG/1; MG/1
1 TABLET ORAL EVERY 4 HOURS PRN
Status: DISCONTINUED | OUTPATIENT
Start: 2021-03-03 | End: 2021-03-05

## 2021-03-03 RX ORDER — SENNA AND DOCUSATE SODIUM 50; 8.6 MG/1; MG/1
1 TABLET, FILM COATED ORAL 2 TIMES DAILY
Status: DISCONTINUED | OUTPATIENT
Start: 2021-03-03 | End: 2021-03-13 | Stop reason: HOSPADM

## 2021-03-03 RX ORDER — POLYETHYLENE GLYCOL 3350 17 G/17G
17 POWDER, FOR SOLUTION ORAL DAILY PRN
Status: CANCELLED | OUTPATIENT
Start: 2021-03-03

## 2021-03-03 RX ORDER — BUSPIRONE HYDROCHLORIDE 5 MG/1
5 TABLET ORAL 2 TIMES DAILY PRN
Status: DISCONTINUED | OUTPATIENT
Start: 2021-03-03 | End: 2021-03-13 | Stop reason: HOSPADM

## 2021-03-03 RX ORDER — ACETAMINOPHEN 325 MG/1
650 TABLET ORAL EVERY 4 HOURS PRN
Status: DISCONTINUED | OUTPATIENT
Start: 2021-03-03 | End: 2021-03-05

## 2021-03-03 RX ORDER — ASCORBIC ACID 500 MG
500 TABLET ORAL
Qty: 30 TABLET | Refills: 0 | Status: SHIPPED | OUTPATIENT
Start: 2021-03-04

## 2021-03-03 RX ORDER — METOPROLOL TARTRATE 100 MG/1
100 TABLET ORAL NIGHTLY
Status: DISCONTINUED | OUTPATIENT
Start: 2021-03-03 | End: 2021-03-13 | Stop reason: HOSPADM

## 2021-03-03 RX ORDER — FERROUS SULFATE 325(65) MG
325 TABLET ORAL
Status: DISCONTINUED | OUTPATIENT
Start: 2021-03-04 | End: 2021-03-13 | Stop reason: HOSPADM

## 2021-03-03 RX ORDER — METOPROLOL TARTRATE 50 MG/1
50 TABLET, FILM COATED ORAL DAILY
Status: DISCONTINUED | OUTPATIENT
Start: 2021-03-04 | End: 2021-03-13 | Stop reason: HOSPADM

## 2021-03-03 RX ORDER — SENNA AND DOCUSATE SODIUM 50; 8.6 MG/1; MG/1
1 TABLET, FILM COATED ORAL 2 TIMES DAILY
Qty: 30 TABLET | Refills: 0 | Status: SHIPPED | OUTPATIENT
Start: 2021-03-03 | End: 2022-05-24

## 2021-03-03 RX ADMIN — OXYCODONE HYDROCHLORIDE AND ACETAMINOPHEN 1 TABLET: 5; 325 TABLET ORAL at 01:49

## 2021-03-03 RX ADMIN — OXYCODONE HYDROCHLORIDE AND ACETAMINOPHEN 1 TABLET: 5; 325 TABLET ORAL at 09:30

## 2021-03-03 RX ADMIN — AMLODIPINE BESYLATE 5 MG: 5 TABLET ORAL at 22:05

## 2021-03-03 RX ADMIN — METOPROLOL TARTRATE 50 MG: 100 TABLET, FILM COATED ORAL at 08:13

## 2021-03-03 RX ADMIN — PANTOPRAZOLE SODIUM 40 MG: 40 TABLET, DELAYED RELEASE ORAL at 05:09

## 2021-03-03 RX ADMIN — OXYCODONE HYDROCHLORIDE AND ACETAMINOPHEN 1 TABLET: 5; 325 TABLET ORAL at 17:34

## 2021-03-03 RX ADMIN — ENOXAPARIN SODIUM 40 MG: 40 INJECTION, SOLUTION INTRAVENOUS; SUBCUTANEOUS at 08:15

## 2021-03-03 RX ADMIN — SODIUM CHLORIDE, PRESERVATIVE FREE 10 ML: 5 INJECTION INTRAVENOUS at 08:13

## 2021-03-03 RX ADMIN — CALCIUM CARBONATE-CHOLECALCIFEROL TAB 250 MG-125 UNIT 250 MG: 250-125 TAB at 08:12

## 2021-03-03 RX ADMIN — OXYCODONE HYDROCHLORIDE AND ACETAMINOPHEN 1 TABLET: 5; 325 TABLET ORAL at 22:08

## 2021-03-03 RX ADMIN — METOPROLOL TARTRATE 100 MG: 100 TABLET, FILM COATED ORAL at 22:05

## 2021-03-03 RX ADMIN — Medication 250 MG: at 22:05

## 2021-03-03 SDOH — HEALTH STABILITY: MENTAL HEALTH: HOW OFTEN DO YOU HAVE A DRINK CONTAINING ALCOHOL?: MONTHLY OR LESS

## 2021-03-03 SDOH — HEALTH STABILITY: MENTAL HEALTH: HOW MANY STANDARD DRINKS CONTAINING ALCOHOL DO YOU HAVE ON A TYPICAL DAY?: 1 OR 2

## 2021-03-03 ASSESSMENT — ENCOUNTER SYMPTOMS
BACK PAIN: 1
EYE PAIN: 0
CONSTIPATION: 1
ROS SKIN COMMENTS: ITCHY
VOMITING: 0
RHINORRHEA: 0
WHEEZING: 0
SINUS PRESSURE: 1
ABDOMINAL PAIN: 0
DIARRHEA: 0
TROUBLE SWALLOWING: 0
BLOOD IN STOOL: 0
SORE THROAT: 0
SHORTNESS OF BREATH: 1
COUGH: 0
NAUSEA: 0
EYE DISCHARGE: 0

## 2021-03-03 ASSESSMENT — PAIN - FUNCTIONAL ASSESSMENT
PAIN_FUNCTIONAL_ASSESSMENT: PREVENTS OR INTERFERES SOME ACTIVE ACTIVITIES AND ADLS

## 2021-03-03 ASSESSMENT — PAIN DESCRIPTION - PAIN TYPE
TYPE: SURGICAL PAIN
TYPE: SURGICAL PAIN

## 2021-03-03 ASSESSMENT — PAIN DESCRIPTION - ONSET
ONSET: ON-GOING

## 2021-03-03 ASSESSMENT — PAIN DESCRIPTION - PROGRESSION
CLINICAL_PROGRESSION: NOT CHANGED

## 2021-03-03 ASSESSMENT — PAIN DESCRIPTION - LOCATION
LOCATION: KNEE
LOCATION: KNEE

## 2021-03-03 ASSESSMENT — PAIN DESCRIPTION - DESCRIPTORS
DESCRIPTORS: ACHING
DESCRIPTORS: ACHING

## 2021-03-03 ASSESSMENT — PAIN DESCRIPTION - FREQUENCY
FREQUENCY: INTERMITTENT

## 2021-03-03 ASSESSMENT — PAIN SCALES - GENERAL
PAINLEVEL_OUTOF10: 3
PAINLEVEL_OUTOF10: 5
PAINLEVEL_OUTOF10: 9
PAINLEVEL_OUTOF10: 9
PAINLEVEL_OUTOF10: 10

## 2021-03-03 ASSESSMENT — PAIN DESCRIPTION - ORIENTATION
ORIENTATION: RIGHT
ORIENTATION: RIGHT

## 2021-03-03 NOTE — PROGRESS NOTES
[address] [address]  INPATIENT PHYSICAL THERAPY  DAILY NOTE  [facility] ORTHOPEDICS 7K - 7K-01/001-A    Time In: 4083  Time Out: 1038  Timed Code Treatment Minutes: 31 Minutes  Minutes: 31          Date: 3/3/2021  Patient Name: Sky Santos,  Gender:  female        MRN: [id number]  : 3/4/1931  (80 y.o.)     Referring Practitioner: Dr. Allison Ordaz  Diagnosis: Closed displaced fracture of medial condyle of right femur  Additional Pertinent Hx: per 21 ED note; kSy Santos is a 80 y.o. female who presents to the emergency department for evaluation of fall at [facility] this morning. Mechanical trip and fall while walking to the parking lot. Patient denies loss of consciousness, head strike. States that her glasses hit her eye and bruised her upper eyelid. States she is feeling fine other than severe right knee pain. She admits to associated swelling. [id number] TOTAL KNEE REVISION     Prior Level of Function:  Lives With: Alone  Type of Home: House  Home Layout: One level  Home Access: Stairs to enter with rails  Entrance Stairs - Number of Steps: 2  Entrance Stairs - Rails: Left  Home Equipment: Cane, Rolling walker   Bathroom Shower/Tub: Tub/Shower unit  Bathroom Toilet: Handicap height  Bathroom Equipment: Grab bars in shower, Grab bars around toilet  Bathroom Accessibility: Accessible    Receives Help From: Family  ADL Assistance: [facility]: Independent  Homemaking Responsibilities: Yes  Ambulation Assistance: Independent  Transfer Assistance: Independent  Active : Yes  Additional Comments: did not use an AD prior to admission    Restrictions/Precautions:  Restrictions/Precautions: Weight Bearing  Right Lower Extremity Weight Bearing: Weight Bearing As Tolerated  Position Activity Restriction  Other position/activity restrictions: 3-1: Fracture at the base of the fifth metacarpal with minimal displacement. 3-2: per ortho, splint to left hand PRN. Pt can be WBAT. VITALS: Vitals not assessed per clinical judgement, see nursing flowsheet    SUBJECTIVE: pt up and in chair upon arrival. Daughter present and very supportive. Pt requests to use bathroom. PAIN: 8/10: during ambulation and after exercises. OBJECTIVE:  Bed Mobility:  Not Tested    Transfers:  Sit to Stand: Contact Guard Assistance  [facility]  Verbal cues for hand placement for safety. Needs assistance to keep R lower extremity up upon sitting due to pain. Ambulation:  Contact Guard Assistance  Distance: ~50 feet   Surface: Level Tile  Device:Rolling Walker  Gait Deviations:  Slow Yessenia, Decreased Step Length Bilaterally, Decreased Heel Strike on Left and Narrow Base of Support: pt improved to swing through gait pattern with L foot for ~20 feet, improving heel strike and demonstrates increased gait speed. Balance:  Static Sitting Balance:  Contact Guard Assistance: in prep for ambulation. Dynamic Standing Balance: Contact Guard Assistance: no hand assistance or support of upper extremities while pulling pants up/down for toiletry tasks. Exercise:  Patient was guided in 1 set(s) 15 reps of exercise to both lower extremities. Ankle pumps, Glut sets, Quad sets, Heelslides, Straight leg raises and Hooklying hip abduction/adduction. Pt requires Josué from therapist for R straight leg raises due to pain. Exercises were completed for increased independence with functional mobility. Functional Outcome Measures: Completed  -PAC Inpatient Mobility Raw Score : 17  AM-PAC Inpatient T-Scale Score : 42.13    ASSESSMENT:  Assessment: Patient progressing toward established goals. Activity Tolerance:  Patient tolerance of  treatment: good. Pt improving gait speed and progressed to step through gait pattern with L foot, improving heel strike and step length.       Equipment Recommendations: RW   Discharge Recommendations:  IPR  Plan: Times per week: 6x BID/1X QD  Times per day: Daily  Current Treatment Recommendations: Strengthening, ROM, Balance Training, Functional Mobility Training, Transfer Training, Gait Training, Endurance Training    Patient Education  Patient Education: Transfers,  - Patient Verbalized Understanding: pt educated in hand placement for safety during transfers. Goals:  Patient goals : To return home  Short term goals  Time Frame for Short term goals: At time of discharge  Short term goal 1: Mod I with bed mobility so pt can get in and out of bed  Short term goal 2: Mod I with t/fs so pt can get up to go to the bathroom  Short term goal 3: SBA x 1 to amb with ' x1 for household amb  Short term goal 4: Pt to negotiate 3 steps with 1 HR with SBA x 1 to enter home  Long term goals  Time Frame for Long term goals : No LTGs secondary to ELOS    Following session, patient left in safe position with all fall risk precautions in place.

## 2021-03-03 NOTE — CARE COORDINATION
3/3/21, 8:56 AM EST   rehab today; room 3A48  Patient goals/plan/ treatment preferences discussed by  and . Patient goals/plan/ treatment preferences reviewed with patient/ family. Patient/ family verbalize understanding of discharge plan and are in agreement with goal/plan/treatment preferences. Understanding was demonstrated using the teach back method. AVS provided by RN at time of discharge, which includes all necessary medical information pertaining to the patients current course of illness, treatment, post-discharge goals of care, and treatment preferences.     Services After Discharge  Services At/After Discharge: Nursing Services, OT, PT, Skilled Therapy   IMM Letter  IMM Letter given to Patient/Family/Significant other/Guardian/POA/by[de-identified] cm  IMM Letter date given[de-identified] 03/01/21  IMM Letter time given[de-identified] 6022

## 2021-03-03 NOTE — PLAN OF CARE
Hospitalist Update    Patient stable for discharge from medical perspective s/p transfusion yesterday. Hgb above expected level for 1 unit. No evidence of ongoing bleeding.   -ordered for CBC 5 days.   -scripts for senna, miralax, iron/vitamin c printed off.   -Primary team managing DVT ppx and analgesia.      Ro Yan  3/3/2021  7:14 AM

## 2021-03-03 NOTE — PROGRESS NOTES
[facility]  Occupational Therapy  Daily Note  Time:   Time In: 0810  Time Out: 0848  Timed Code Treatment Minutes: 38 Minutes  Minutes: 38          Date: 3/3/2021  Patient Name: Airam Soto,   Gender: female      Room: -001-A  MRN: [id number]  : 3/4/1931  (80 y.o.)  Referring Practitioner: Román Burns MD  Diagnosis: Closed displaced fractureof medial condyle of R femur, R TKA revision. Additional Pertinent Hx: per 21 ED note; Airam Soto is a 80 y.o. female who presents to the emergency department for evaluation of fall at [facility] this morning. Mechanical trip and fall while walking to the parking lot. Patient denies loss of consciousness, head strike. States that her glasses hit her eye and bruised her upper eyelid. States she is feeling fine other than severe right knee pain. She admits to associated swelling. Pain level evaluation was 9-10 out of 10. Restrictions/Precautions:  Restrictions/Precautions: Weight Bearing  Right Lower Extremity Weight Bearing: Weight Bearing As Tolerated  Position Activity Restriction  Other position/activity restrictions: 3-1: Fracture at the base of the fifth metacarpal with minimal displacement. 3-2: per ortho, splint to left hand PRN. Pt can be WBAT. VITALS: Vitals not assessed per clinical judgement, see nursing flowsheet    SUBJECTIVE: Pt supine in bed with HOB elevated upon arrival; agreeable to therapy this date. Pt states she has already been cleaned up and used the restroom today however is agreeable to get up into chair. Patient becomes tearful once in chair as pain in R knee becomes too much for her with nursing notified for medication. Pt states she is going to IP rehab this date with nursing confirming. Pt questions schedule and therapy requirements at IP rehab; this therapist answers all questions; patient verbalized understanding.   Pt states she does not like wearing the L wrist splint as it is too big and makes it difficult for her to hold on to the walker. PAIN: 7/10 R knee    COGNITION: WFL and Decreased Problem Solving    ADL:   Feeding: Minimal Assistance. in order to open milk carton, crackers, coffee lid as patient experiences difficulty d/t pain of L wrist.    BALANCE:  Sitting Balance:  Supervision. EOB, bedside chair  Standing Balance: Contact Guard Assistance. wih RW; no LOB    BED MOBILITY:  Supine to Sit: Minimal Assistance RLE management  Scooting: Contact Guard Assistance of RLE d/t pain until patient is able to put food on floor    TRANSFERS:  Sit to Stand:  [employer]. verbal cues for hand placement  Stand to Sit: Minimal Assistance. RLE management with verbal cues for hand placement and body positioning for safety; demonstrating understanding    FUNCTIONAL MOBILITY:  Assistive Device: Rolling Walker  Assist Level:  Contact Guard Assistance. Distance: EOB to bedside chair  Slow pace; forward flex posture; small gait step; no LOB     ADDITIONAL ACTIVITIES:  Patient identified a personal goal to increase UB strength and improve overall endurance so they can complete their toilet & shower transfers; skilled edu on UE strengthening and patient completed BUE strengthening exercises x10 reps x1 set this date with a minimal resistance band in all joints and all planes. Patient tolerated good, requiring no rest breaks. Pt required min verbal/visual cues for technique. ASSESSMENT:     Activity Tolerance:  Patient tolerance of  treatment: good.        Discharge Recommendations: IP Rehab, Continue to assess pending progress, Patient would benefit from continued therapy after discharge   Equipment Recommendations: Equipment Needed: Yes  Mobility Devices: ADL Assistive Devices  ADL Assistive Devices: Reacher, Sock-Aid Hard  Plan: Times per week: 6 x  Times per day: Daily  Current Treatment Recommendations: Endurance Training, Neuromuscular Re-education, Patient/Caregiver Education & Training, Equipment Evaluation, Education, & procurement, Self-Care / ADL    Patient Education  Patient Education: Plan of Care, ADL's, IADL's, Home Exercise Program, Home Safety, Importance of Increasing Activity and Assistive Device Safety    Goals  Short term goals  Time Frame for Short term goals: by discharge  Short term goal 1: patient will tolerate 6 min functional standing with sit to stand and participate with SBA to increase overall endurance to tolerate ADL routine. Short term goal 2: patient will complete toileting routine with SBA. Short term goal 3: patient will complete dressing tasks with SBA and use of AE PRN. Short term goal 4: patient will tolerate grooming at sink in standing with SBA. Following session, patient left in safe position with all fall risk precautions in place.

## 2021-03-03 NOTE — DISCHARGE INSTR - DIET
? Good nutrition is important when healing from an illness, injury, or surgery. Follow any nutrition recommendations given to you during your hospital stay. ? If you were given an oral nutrition supplement while in the hospital, continue to take this supplement at home. You can take it with meals, in-between meals, and/or before bedtime. These supplements can be purchased at most local grocery stores, pharmacies, and chain [employer]-stores. ? If you have any questions about your diet or nutrition, call the hospital and ask for the dietitian.     General as tolerated

## 2021-03-03 NOTE — PROGRESS NOTES
Plan acute inpatient rehab today. Patient will go to [facility]. [facility] RN made aware.  [facility] made aware that Dr. Sade Wilson will do discharge readmit orders

## 2021-03-03 NOTE — CARE COORDINATION
3/3/21, 10:40 AM EST    DISCHARGE PLANNING EVALUATION      Planning inpt rehab today. 3/3/21, 10:40 AM EST    Patient goals/plan/ treatment preferences discussed by  and . Patient goals/plan/ treatment preferences reviewed with patient/ family. Patient/ family verbalize understanding of discharge plan and are in agreement with goal/plan/treatment preferences. Understanding was demonstrated using the teach back method. AVS provided by RN at time of discharge, which includes all necessary medical information pertaining to the patients current course of illness, treatment, post-discharge goals of care, and treatment preferences.     Services After Discharge  Services At/After Discharge: ([facility] in patient rehab)   IMM Letter  IMM Letter given to Patient/Family/Significant other/Guardian/POA/by[de-identified] blane  IMM Letter date given[de-identified] 03/01/21  IMM Letter time given[de-identified] [id number]

## 2021-03-03 NOTE — PLAN OF CARE
[address] [address]  Physical Medicine Case Management Assessment    [x] Inpatient Rehabilitation Unit  [] Transitional Care Unit    Patient Name: Maged Doan        MRN: [id number]    : 3/4/1931  (80 y.o.)  Gender: female   Date of Admission: 3/3/2021 11:19 AM    Family/Social/Home Environment:    Prior to this stay patient lived alone. She did all of her own cleaning, cooking, shopping and ADL's. Patient has seven children Benoit Oneill and Rocael Lopez all live in [place], Dilcia Archer lives in [place], Lei lives in [place], Nathanael Patel lives in [place] and [facility] lives in [place]. Patient sees one of her kids most days but they decreased their visits since Stacy had started. If none of the children visit at least one will call. Patients children will be her transportation after discharge if needed. The patients family doctor is Doctor Emmanuelle Coello from [place] and uses [employer] for her medication. Patient said she did not use any devices at home before this stay but she has a two wheeled walker and a rollator at home. The patients biggest concern is being able to use her leg again and being able to keep living alone. Contact/Guardian Information: Emergency Contacts  Healthcare Agent Appointed: Adult Children    Community Resources Utilized: No community resources used prior to admission. Sexuality/Intimacy: None at time of Social work meeting. Complementary Health Approaches: Patient has no current interest in complementary health approaches. Anticipated Needs/Discharge Plans: Anticipated patient would benefit from continued therapy upon discharge.           Discharge Planning  Living Arrangements: Alone  Support Systems: Family Members  Potential Assistance Needed: Outpatient PT/OT  Potential Assistance Purchasing Medications: No  Meds-to-Beds: Does the patient want to have any new prescriptions delivered to bedside prior to discharge?: No  Type of Home Care Services: OT, PT, Nursing Services  Patient expects to be discharged to[de-identified] home  Expected Discharge Date: 03/17/21  Follow Up Appointment: Best Day/Time : Wednesday PM      Electronically signed by CARIDAD De Oliveira on 3/3/2021 at 4:08 PM

## 2021-03-03 NOTE — H&P
Physical Medicine & Rehabilitation Admission History and Physical    Impression:  · Right distal femur supracondylar comminuted fracture with severe right knee pain causing impaired ADLs & ambulation due to fall accident requiring right total knee revision with OSS both femoral and tibial components  · Hypertension  · GERD  · Hyperlipidemia  · Bilateral eyes cataract    Plan:   · Admit to the inpatient rehabilitation unit. The patient demonstrates good potential to participate in an inpatient rehabilitation program involving at least 3 hours per day, 5 days per week of intensive rehabilitation. Rehabilitation services will include PT and OT/RT in order to improve functional status prior to discharge. Family education and training will be completed. Equipment evaluations and recommendations will be completed as appropriate. · Rehabilitation nursing will be involved for bowel, bladder, skin, and pain management. Nursing will also provide education and training to patient and family. · Prophylaxis:  DVT: Lovenox, RON stocking and intermittent pneumatic compression. GI: Glycolax, Senokot-S and prn Dulcolax suppository. · Pain: Tylenol prn, Voltaren gel prn, Percocet prn  · Continue Norvasc and Lopressor for HTN  · Continue iron supplement for anemia  · Nutrition:  Consultation to dietician for nutritional counseling and recommendations. Prealbumin will be checked on admission. · Bladder: normal function ; will monitor  · Bowel: functionally normal ; will monitor for constipation  ·  and case management consultations for coordination of care and discharge planning    The main medical problem(s) and comorbidities being actively managed by the physicians and requiring 24 hour rehabilitation nursing care during this stay include right distal femur fracture requiring surgery, HTN, GERD and hyperlipidemia.       The domains of functional impairment present in this patient which will require an intensive and interdisciplinary rehabilitation environment include self care, mobility, pain management and safety. Estimated length of stay for this admission : to be determined (possibly 10~14 days)    Anticipated disposition: Home. The potential to achieve that is good.    ==========================================================================================================================      Chief Complaint and Reason for Rehabilitation Admission:   Right knee pain causing impaired ADLs and ambulation due to right distal femur supracondylar fracture requiring revision of TKA     History of Present Illness:  Mariely De Santiago  is a 80 y.o.  female with history of hypertension arthritis, GERD, hyperlipidemia, bilateral eyes cataract, status post right knee total knee arthroplasty, status post left shoulder rotator cuff repair, is admitted to the inpatient rehabilitation unit on 3/3/2021 for impaired ADLs & ambulation due to right distal femur supracondylar fracture with right knee pain requiring right total knee revision with new femur and tibia components. The patient says she lost balance and fell while she was grocery shopping on 2/26/2021. She landed on her right knee with sudden severe right knee pain and could not get up. She denies loss of consciousness. She was send to [address] [place] [address] ER for evaluation. X-ray of right femur and right knee revealed status post right knee total arthroplasty with comminuted foreshortened and dorsally angulated fracture of distal right femur. Therefore she underwent right total knee revision with OSS both femoral and tibial components by Dr. Jagruti Rodney on 2/27/2021. At the present time, the patient still complains of right knee sharp knife pain when she moves her right knee or when she is walking with intensity rated up to 10/10 level. She says her right lower extremity feels heavy and weak.   She denies having numbness or tingling. She denies having abdominal pain but has constipation. She says her left hand also has intermittent pain. She also has ecchymosis around her left eye but it is not painful. Current Rehabilitation Assessments:  PT:    Bed Mobility:  Sit to Supine: Minimal Assistance: assistance from therapist to lift right lower extremity. Scooting: Minimal Assistance: assistance from therapist to lift right lower extremity. Transfers:  Sit to Stand: Contact Guard Assistance  Stand to [facility] [address]  Verbal cues for hand placement for safety. Needs assistance to keep R lower extremity up upon sitting due to pain.      Ambulation:  Contact Guard Assistance  Distance: ~50 feet      Surface: Level Tile  Device:Rolling Walker  Gait Deviations:  Slow Yessenia, Decreased Step Length Bilaterally, Decreased Heel Strike on Left and Narrow Base of Support: pt improved to swing through gait pattern with L foot for ~20 feet, improving heel strike and demonstrates increased gait speed.     Balance:  Static Sitting Balance:  Contact Guard Assistance: in prep for ambulation. Dynamic Standing Balance: Contact Guard Assistance: no hand assistance or support of upper extremities while pulling pants up/down for toiletry tasks. OT:    ADL:   Grooming: Contact Guard Assistance. standing at sink with RW in order to wash hands and brush hair x 2:38 min prior to continuing mobility task  Upper Body Dressing: Min A for L wrist splint  Toileting: Stand By Assistance. standing for front periarea with RW  Toilet Transfer: Minimal Assistance. RLE management to elevated toilet seat as well as ascending.     BALANCE:  Sitting Balance:  Stand By Assistance. seated edge of bedside chair  Standing Balance: Contact Guard Assistance. with RW; no LOB     TRANSFERS:  Sit to Stand:  Minimal Assistance. bedside chair to RW with verbal cues for hand placement demonstrating understanding  Stand to Sit: Minimal Assistance.  RLE management as well as verbal cues for body positioning; demonstrating understanding     FUNCTIONAL MOBILITY:  Assistive Device: Rolling Walker  Assist Level:  Contact Guard Assistance. Distance: To and from bathroom  Slow pace; no LOB; assistance with IV pole management         ST:  N/A      Past Medical History:      Diagnosis Date    Arthritis     Cataract of both eyes     GERD (gastroesophageal reflux disease)     Hyperlipidemia     Hypertension     Keratosis     benign lichemoid    Psychiatric problem        Primary care provider: Geo Comer MD     Past Surgical History:      Procedure Laterality Date    ABDOMEN SURGERY      [address]  [address]   [address]    COLON SURGERY  2000    resection for diverticulitis    COLONOSCOPY      DILATATION, ESOPHAGUS      ENDOSCOPY, COLON, DIAGNOSTIC      HYSTERECTOMY  1979    JOINT REPLACEMENT Right     right knee    KNEE ARTHROPLASTY Right 02/27/2021    RIGHT TOTAL KNEE REVISION WITH OSS performed by [facility]  2010    ROTATOR CUFF REPAIR Left 2006       Allergies:     Allergies   Allergen Reactions    Penicillins     Tramadol     Vicodin [Hydrocodone-Acetaminophen] Nausea And Vomiting and Anxiety       Current Medications:    Current Facility-Administered Medications   Medication Dose Route Frequency Provider Last Rate Last Admin    acetaminophen (TYLENOL) tablet 650 mg  650 mg Oral Q4H PRN Carrie Pierce, APRN - CNP        amLODIPine (NORVASC) tablet 5 mg  5 mg Oral Daily Joce Weldon MD        [START ON 3/4/2021] ascorbic acid (VITAMIN C) tablet 500 mg  500 mg Oral Q48H Joce Weldon MD        busPIRone (BUSPAR) tablet 5 mg  5 mg Oral BID PRN Joce Weldon MD        diclofenac sodium (VOLTAREN) 1 % gel 4 g  4 g Topical 4x Daily PRN Joce Weldon MD        [START ON 3/4/2021] enoxaparin (LOVENOX) injection 40 mg  40 mg Subcutaneous Daily MD Kayce Morales ON 3/4/2021] ferrous sulfate (IRON 325) tablet 325 mg  325 mg Oral Q48H Queen Osler, MD        metoprolol (LOPRESSOR) tablet 100 mg  100 mg Oral Nightly Queen Osler, MD        [START ON 3/4/2021] metoprolol tartrate (LOPRESSOR) tablet 50 mg  50 mg Oral Daily Queen Osler, MD        oxyCODONE-acetaminophen (PERCOCET) 5-325 MG per tablet 1 tablet  1 tablet Oral Q4H PRN Queen Osler, MD        oyster shell calcium/vitamin D 250-125 MG-UNIT per tablet 250 mg  1 tablet Oral BID Queen Osler, MD        [START ON 3/4/2021] pantoprazole (PROTONIX) tablet 40 mg  40 mg Oral QAM AC Queen Osler, MD        phenol 1.4 % mouth spray 1 spray  1 spray Mouth/Throat Q2H PRN Queen Osler, MD        [START ON 3/4/2021] polyethylene glycol (GLYCOLAX) packet 17 g  17 g Oral Daily Queen Osler, MD        sennosides-docusate sodium (SENOKOT-S) 8.6-50 MG tablet 1 tablet  1 tablet Oral BID Queen Osler, MD        bisacodyl (DULCOLAX) suppository 10 mg  10 mg Rectal Daily PRN Naga Moore MD            Social History:  Social History     Socioeconomic History    Marital status:      Spouse name: Not on file    Number of children: Not on file    Years of education: Not on file    Highest education level: Not on file   Occupational History    Not on file   Social Needs    Financial resource strain: Not on file    Food insecurity     Worry: Not on file     Inability: Not on file    Transportation needs     Medical: Not on file     Non-medical: Not on file   Tobacco Use    Smoking status: Never Smoker    Smokeless tobacco: Never Used   Substance and Sexual Activity    Alcohol use: Yes     Alcohol/week: 1.0 standard drinks     Types: 1 Glasses of wine per week     Frequency: Monthly or less     Drinks per session: 1 or 2     Binge frequency: Never     Comment: 2 ounce of wine once in a while.     Drug use: No    Sexual activity: Not Currently     Partners: Male   Lifestyle    Physical activity Days per week: Not on file     Minutes per session: Not on file    Stress: Not on file   Relationships    Social connections     Talks on phone: Not on file     Gets together: Not on file     Attends [employer]ism service: Not on file     Active member of club or organization: Not on file     Attends meetings of clubs or organizations: Not on file     Relationship status: Not on file    Intimate partner violence     Fear of current or ex partner: Not on file     Emotionally abused: Not on file     Physically abused: Not on file     Forced sexual activity: Not on file   Other Topics Concern    Not on file   Social History Narrative    Not on file       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 Using a straight cane occasionally when going outside since 2010 ; independent in driving      Family History:       Problem Relation Age of Onset    Heart Attack Mother        Review of Systems:  Review of Systems   Constitutional: Positive for chills. Negative for diaphoresis, fatigue and fever. HENT: Positive for hearing loss, postnasal drip and sinus pressure. Negative for ear discharge, ear pain, mouth sores, nosebleeds, rhinorrhea, sore throat, tinnitus and trouble swallowing. Eyes: Negative for pain, discharge and visual disturbance. Respiratory: Positive for shortness of breath (occasionally ). Negative for cough and wheezing. Cardiovascular: Positive for chest pain (occasionally). Negative for palpitations and leg swelling. Gastrointestinal: Positive for constipation. Negative for abdominal pain, blood in stool, diarrhea, nausea and vomiting. Endocrine: Positive for cold intolerance. Negative for heat intolerance. Genitourinary: Positive for difficulty urinating.  Negative for dysuria, frequency and urgency. Musculoskeletal: Positive for arthralgias (right knee, left shoulder), back pain and gait problem. Negative for myalgias and neck pain. Skin:        itchy   Allergic/Immunologic: Negative for food allergies. Neurological: Positive for weakness. Negative for dizziness, tremors, seizures, speech difficulty, light-headedness, numbness and headaches. Hematological: Bruises/bleeds easily. Psychiatric/Behavioral: Positive for dysphoric mood and sleep disturbance. Negative for hallucinations. The patient is nervous/anxious.          Physical Exam:  BP (!) 143/64   Pulse 86   Temp 98.6 °F (37 °C) (Oral)   Resp 19   Ht 5' 2\" (1.575 m)   Wt 135 lb (61.2 kg)   SpO2 97%   BMI 24.69 kg/m²   General:  well-developed, well nourished  female ; in no acute distress ; appropriate affect & mood ; lying on bed comfortably  Eyes: pupil equally round ; extra-ocular motion intact bilaterally  Head, Ear, Nose, Mouth & Throat : normocephalic ; presence of ecchymosis at left orbit area ; no tenderness at head or face ; no discharge from ears or nose ; no deformity ; no facial swelling ; oral mucosa pink   Neck :  supple ; no tenderness ; no muscle spasm  Cardiovascular : regular rate & rhythm ; normal S1 & S2 heart sound ; presence of S4 sound ; no murmur ; normal peripheral pulse at bilateral upper & lower extremities   Pulmonary : lung clear to auscultation ; no wheezing ; no rale  Gastrointestinal : soft, flat abdomen without tenderness ; normal bowel sound present   Back : no tenderness; no muscle spasm  Skin: presence of new surgical scar with suture in place at right anterior knee from distal thigh to upper leg without open wound or discharge ; healed surgical scar at left lateral shoulder ; no skin lesion or rash ; no pitting edema at all 4 extremities ; mild nonpitting swelling at right distal thigh and proximal right leg  Musculoskeletal : no limb asymmetry; no limb deformity; tenderness at anterior, lower medial and posterior right knee ; no tenderness at bilateral upper extremities & the rest of lower extremities; no palpable mass at limbs ; no joints laxity or crepitation other than right knee which is not assessed; knee passive ROM limited to 0-20 degrees limited by increased right knee pain ; otherwise normal functional joints ROM at bilateral upper extremities & the rest of bilateral lower extremities  Cerebral :  alert ; awake ; oriented to place, person and time   Cerebellum : no dysmetria with bilateral finger-to-nose test   Cranial Nerves :  grossly intact CN II to XII function  Sensory : intact light touch and pin prick sensation at bilateral upper & lower extremities  Motor : normal tone at bilateral upper & left lower extremities ; right lower extremity muscle tone not assessed ; 2-/5 muscle strength at right knee and 2+/5 muscle strength at right hip due to increased right knee pain ; otherwise  normal 5/5 muscle strength at bilateral upper extremities & the rest of bilateral lower extremities  Reflex :  2+ bilateral biceps and triceps reflexes ; 1+ bilateral brachioradialis reflexes ; 1+ left knee reflex ; zero bilateral ankle reflexes ; right knee reflex not tested  Pathological Reflex :  No Richard's sign ; no Babinski sign ; no ankle clonus  Gait :  Not assessed      Diagnostics:  Recent Results (from the past 24 hour(s))   Magnesium    Collection Time: 03/02/21  6:34 PM   Result Value Ref Range    Magnesium 2.1 1.6 - 2.4 mg/dL   Hemoglobin and hematocrit, blood    Collection Time: 03/02/21  6:34 PM   Result Value Ref Range    Hemoglobin 9.2 (L) 12.0 - 16.0 gm/dl    Hematocrit 30.1 (L) 37.0 - 47.0 %   Hemoglobin and hematocrit, blood    Collection Time: 03/03/21  6:22 AM   Result Value Ref Range    Hemoglobin 8.5 (L) 12.0 - 16.0 gm/dl    Hematocrit 27.2 (L) 37.0 - 47.0 %     X-ray of right femur (2/26/2021) :  Status post right knee total arthroplasty with comminuted foreshortened and dorsally angulated fracture of the distal right femur. X-ray of right knee (2/26/2021) :  Status post right knee total arthroplasty with comminuted foreshortened and dorsally angulated fracture of the distal right femur. X-ray of right tibia fibula (2/26/2021) :  Status post right knee total arthroplasty with comminuted foreshortened and dorsally angulated fracture of the distal right femur. X-ray of left hand (3/1/2021) :  1. Fracture at the base of the fifth metacarpal with minimal displacement. 2. Diffuse osteopenia. 3. Degenerative change especially at the first carpometacarpal joint. The post admission physician evaluation (CRIS) is consistent with the pre-admission assessment. See above findings to reflect the elements required in the CRIS. Patient's admitting condition is consistent with the findings of the preadmission assessment by the rehabilitation admissions coordinator.     Stefan Vail MD

## 2021-03-03 NOTE — PROGRESS NOTES
[facility]  Individualized Disclosure Statement      Patient: Sky Santos      [facility] of [facility] provides 24 hour individualized service to patients with functional limitations due to, but not limited to: stroke, brain injury, spinal cord injury, major multiple trauma, fractures, amputation, and neurological disorders. The [address] [place] [address] provides rehabilitative nursing and medical services as well as physical, occupational, speech, and recreation therapies. [address] [facility] is fully accredited by the Commission on Accreditation of Rehabilitation Facilities (CARF) as a comprehensive provider of rehabilitation services. Patients admitted to the [address] [place] [address] receive a minimum of three hours of therapy per day, at least six days per week, with a revised therapy schedule on weekends and holidays. Physical therapy, occupational therapy, and speech therapy are provided seven days per week including holidays. Other therapeutic services are available on weekends and evenings as needed or scheduled. Intensity of Treatment  Your treatment program will consist of Nursing Care and:  1.5 hours of Physical Therapy, per day  1.5 hours of Occupational Therapy, per day  1 hour of Recreational Therapy, per week    [facility] maintains contracts with most insurance plans. Depending on the type of coverage, the insurance may impose limits on the coverage for rehabilitation care. Coverage is based on the premise that you are able to fully participate in the rehabilitation program and show continued progress. Please verify your own insurance information A copy of this was given to the patient/ family on this date.   Insurance Coverage  Your insurance company has made the following determination relative to the length of your stay:   Your estimated length of stay is  14 days

## 2021-03-03 NOTE — PLAN OF CARE
Care plan reviewed with patient. Patient  verbalizes understanding of the plan of care and contribute to goal setting.

## 2021-03-04 LAB
ALBUMIN SERPL-MCNC: 2.8 G/DL (ref 3.5–5.1)
ALP BLD-CCNC: 70 U/L (ref 38–126)
ALT SERPL-CCNC: 10 U/L (ref 11–66)
ANION GAP SERPL CALCULATED.3IONS-SCNC: 8 MEQ/L (ref 8–16)
AST SERPL-CCNC: 18 U/L (ref 5–40)
BASOPHILS # BLD: 0.7 %
BASOPHILS ABSOLUTE: 0.1 THOU/MM3 (ref 0–0.1)
BILIRUB SERPL-MCNC: 1.1 MG/DL (ref 0.3–1.2)
BILIRUBIN DIRECT: 0.4 MG/DL (ref 0–0.3)
BUN BLDV-MCNC: 11 MG/DL (ref 7–22)
CALCIUM SERPL-MCNC: 8.5 MG/DL (ref 8.5–10.5)
CHLORIDE BLD-SCNC: 100 MEQ/L (ref 98–111)
CO2: 25 MEQ/L (ref 23–33)
CREAT SERPL-MCNC: 0.4 MG/DL (ref 0.4–1.2)
EOSINOPHIL # BLD: 4 %
EOSINOPHILS ABSOLUTE: 0.4 THOU/MM3 (ref 0–0.4)
ERYTHROCYTE [DISTWIDTH] IN BLOOD BY AUTOMATED COUNT: 13.8 % (ref 11.5–14.5)
ERYTHROCYTE [DISTWIDTH] IN BLOOD BY AUTOMATED COUNT: 47.9 FL (ref 35–45)
GFR SERPL CREATININE-BSD FRML MDRD: > 90 ML/MIN/1.73M2
GLUCOSE BLD-MCNC: 98 MG/DL (ref 70–108)
HCT VFR BLD CALC: 26.2 % (ref 37–47)
HEMOGLOBIN: 8.3 GM/DL (ref 12–16)
IMMATURE GRANS (ABS): 0.17 THOU/MM3 (ref 0–0.07)
IMMATURE GRANULOCYTES: 1.8 %
LYMPHOCYTES # BLD: 18.7 %
LYMPHOCYTES ABSOLUTE: 1.8 THOU/MM3 (ref 1–4.8)
MCH RBC QN AUTO: 30.4 PG (ref 26–33)
MCHC RBC AUTO-ENTMCNC: 31.7 GM/DL (ref 32.2–35.5)
MCV RBC AUTO: 96 FL (ref 81–99)
MONOCYTES # BLD: 15.7 %
MONOCYTES ABSOLUTE: 1.5 THOU/MM3 (ref 0.4–1.3)
NUCLEATED RED BLOOD CELLS: 0 /100 WBC
PLATELET # BLD: 234 THOU/MM3 (ref 130–400)
PMV BLD AUTO: 10.2 FL (ref 9.4–12.4)
POTASSIUM REFLEX MAGNESIUM: 4.3 MEQ/L (ref 3.5–5.2)
PREALBUMIN: 9.7 MG/DL (ref 20–40)
RBC # BLD: 2.73 MILL/MM3 (ref 4.2–5.4)
SEG NEUTROPHILS: 59.1 %
SEGMENTED NEUTROPHILS ABSOLUTE COUNT: 5.6 THOU/MM3 (ref 1.8–7.7)
SODIUM BLD-SCNC: 133 MEQ/L (ref 135–145)
TOTAL PROTEIN: 5.4 G/DL (ref 6.1–8)
WBC # BLD: 9.5 THOU/MM3 (ref 4.8–10.8)

## 2021-03-04 PROCEDURE — 97162 PT EVAL MOD COMPLEX 30 MIN: CPT

## 2021-03-04 PROCEDURE — 97110 THERAPEUTIC EXERCISES: CPT

## 2021-03-04 PROCEDURE — 6370000000 HC RX 637 (ALT 250 FOR IP): Performed by: ORTHOPAEDIC SURGERY

## 2021-03-04 PROCEDURE — 84134 ASSAY OF PREALBUMIN: CPT

## 2021-03-04 PROCEDURE — 97116 GAIT TRAINING THERAPY: CPT

## 2021-03-04 PROCEDURE — 6370000000 HC RX 637 (ALT 250 FOR IP): Performed by: FAMILY MEDICINE

## 2021-03-04 PROCEDURE — 80053 COMPREHEN METABOLIC PANEL: CPT

## 2021-03-04 PROCEDURE — 36415 COLL VENOUS BLD VENIPUNCTURE: CPT

## 2021-03-04 PROCEDURE — 6360000002 HC RX W HCPCS: Performed by: ORTHOPAEDIC SURGERY

## 2021-03-04 PROCEDURE — 6370000000 HC RX 637 (ALT 250 FOR IP): Performed by: NURSE PRACTITIONER

## 2021-03-04 PROCEDURE — 1180000000 HC REHAB R&B

## 2021-03-04 PROCEDURE — 99232 SBSQ HOSP IP/OBS MODERATE 35: CPT | Performed by: PHYSICAL MEDICINE & REHABILITATION

## 2021-03-04 PROCEDURE — 97535 SELF CARE MNGMENT TRAINING: CPT

## 2021-03-04 PROCEDURE — 97530 THERAPEUTIC ACTIVITIES: CPT

## 2021-03-04 PROCEDURE — 85025 COMPLETE CBC W/AUTO DIFF WBC: CPT

## 2021-03-04 PROCEDURE — 97166 OT EVAL MOD COMPLEX 45 MIN: CPT

## 2021-03-04 RX ADMIN — BENZOCAINE: 100 GEL TOPICAL at 18:40

## 2021-03-04 RX ADMIN — METOPROLOL TARTRATE 100 MG: 100 TABLET, FILM COATED ORAL at 21:35

## 2021-03-04 RX ADMIN — ACETAMINOPHEN 650 MG: 325 TABLET ORAL at 08:43

## 2021-03-04 RX ADMIN — ENOXAPARIN SODIUM 40 MG: 40 INJECTION SUBCUTANEOUS at 08:43

## 2021-03-04 RX ADMIN — FERROUS SULFATE TAB 325 MG (65 MG ELEMENTAL FE) 325 MG: 325 (65 FE) TAB at 11:04

## 2021-03-04 RX ADMIN — OXYCODONE HYDROCHLORIDE AND ACETAMINOPHEN 500 MG: 500 TABLET ORAL at 11:04

## 2021-03-04 RX ADMIN — OXYCODONE HYDROCHLORIDE AND ACETAMINOPHEN 1 TABLET: 5; 325 TABLET ORAL at 20:11

## 2021-03-04 RX ADMIN — AMLODIPINE BESYLATE 5 MG: 5 TABLET ORAL at 20:12

## 2021-03-04 RX ADMIN — DICLOFENAC SODIUM 4 G: 10 GEL TOPICAL at 08:48

## 2021-03-04 RX ADMIN — BUSPIRONE HYDROCHLORIDE 5 MG: 5 TABLET ORAL at 13:34

## 2021-03-04 RX ADMIN — ACETAMINOPHEN 650 MG: 325 TABLET ORAL at 18:40

## 2021-03-04 RX ADMIN — OXYCODONE HYDROCHLORIDE AND ACETAMINOPHEN 1 TABLET: 5; 325 TABLET ORAL at 05:52

## 2021-03-04 RX ADMIN — Medication 250 MG: at 21:35

## 2021-03-04 RX ADMIN — DOCUSATE SODIUM AND SENNOSIDES 1 TABLET: 8.6; 5 TABLET, FILM COATED ORAL at 08:44

## 2021-03-04 RX ADMIN — PANTOPRAZOLE SODIUM 40 MG: 40 TABLET, DELAYED RELEASE ORAL at 08:47

## 2021-03-04 RX ADMIN — ACETAMINOPHEN 650 MG: 325 TABLET ORAL at 03:50

## 2021-03-04 RX ADMIN — Medication 250 MG: at 08:43

## 2021-03-04 RX ADMIN — OXYCODONE HYDROCHLORIDE AND ACETAMINOPHEN 1 TABLET: 5; 325 TABLET ORAL at 11:08

## 2021-03-04 RX ADMIN — POLYETHYLENE GLYCOL 3350 17 G: 17 POWDER, FOR SOLUTION ORAL at 08:44

## 2021-03-04 RX ADMIN — METOPROLOL TARTRATE 50 MG: 100 TABLET, FILM COATED ORAL at 08:47

## 2021-03-04 ASSESSMENT — ENCOUNTER SYMPTOMS
RHINORRHEA: 0
ABDOMINAL PAIN: 0
DIARRHEA: 0
NAUSEA: 0
SORE THROAT: 0
EYE DISCHARGE: 0
EYE PAIN: 0
WHEEZING: 0
VOMITING: 0
CONSTIPATION: 0
TROUBLE SWALLOWING: 0
SHORTNESS OF BREATH: 1
BACK PAIN: 1

## 2021-03-04 ASSESSMENT — PAIN SCALES - GENERAL
PAINLEVEL_OUTOF10: 2
PAINLEVEL_OUTOF10: 3
PAINLEVEL_OUTOF10: 5
PAINLEVEL_OUTOF10: 7
PAINLEVEL_OUTOF10: 7
PAINLEVEL_OUTOF10: 4
PAINLEVEL_OUTOF10: 2

## 2021-03-04 ASSESSMENT — PAIN DESCRIPTION - ORIENTATION
ORIENTATION: RIGHT

## 2021-03-04 ASSESSMENT — PAIN DESCRIPTION - PAIN TYPE: TYPE: SURGICAL PAIN

## 2021-03-04 ASSESSMENT — PAIN DESCRIPTION - PROGRESSION: CLINICAL_PROGRESSION: NOT CHANGED

## 2021-03-04 ASSESSMENT — PAIN DESCRIPTION - FREQUENCY: FREQUENCY: INTERMITTENT

## 2021-03-04 ASSESSMENT — PAIN DESCRIPTION - DESCRIPTORS
DESCRIPTORS: ACHING
DESCRIPTORS: ACHING

## 2021-03-04 NOTE — PROGRESS NOTES
[address] [address]  INPATIENT PHYSICAL THERAPY  EVALUATION  [facility] - [id number]    Time In: 0700  Time Out: 0730  Timed Code Treatment Minutes: 15 Minutes  Minutes: 30          Date: 3/4/2021  Patient Name: Светлана Arguelles,  Gender:  female        MRN: [id number]  : 3/4/1931  (80 y.o.)      Referring Practitioner: MOISÉS Lundberg CNP and Dm Green MD  Diagnosis: closed comminuted supracondylar fracture of right femur, initial encounter  Additional Pertinent Hx: per 21 ED note; Светлана Arguelles is a 80 y.o. female who presents to the emergency department for evaluation of fall at [facility] this morning. Mechanical trip and fall while walking to the parking lot. Patient denies loss of consciousness, head strike. States that her glasses hit her eye and bruised her upper eyelid. States she is feeling fine other than severe right knee pain. She admits to associated swelling. [id number] TOTAL KNEE REVISION. to IPR 3/3/2021. Restrictions/Precautions:  Restrictions/Precautions: Weight Bearing, General Precautions  Right Lower Extremity Weight Bearing: Weight Bearing As Tolerated  Left Upper Extremity Weight Bearing: Weight Bearing As Tolerated  Position Activity Restriction  Other position/activity restrictions: s/p R TKA  and minimally displaced L 5th met fx, wrist splint PRN    Vitals: Vitals not assessed per clinical judgement, see nursing flowsheet    Subjective:  Chart Reviewed: Yes  Patient assessed for rehabilitation services?: Yes  Family / Caregiver Present: No  Subjective: Pt presented supine in bed, pleasant and agreeable to participate in therapy. Very talkative throughout session and requesting to use restroom. Post session, pt reclined in bedside chair with chair alarm on and all needs within reach.     General:  Overall Orientation Status: Within Functional Limits  Follows Commands: Within Functional Limits    Vision: Impaired  Vision Exceptions: Wears glasses at all times    Hearing: Within functional limits         Pain: 0/10: at rest, 10/10 in [address]     Social/Functional History:    Lives With: Alone  Type of Home: [address]: One level  Home Access: Stairs to enter with rails  Entrance Stairs - Number of Steps: 2  Entrance Stairs - Rails: Left  Home Equipment: Cane, Rolling walker     Bathroom Shower/Tub: Tub/Shower unit  Bathroom Toilet: Handicap height  Bathroom Equipment: Grab bars in shower, Grab bars around toilet  Bathroom Accessibility: Walker accessible    [address] Help From: Family  ADL Assistance: Independent  Homemaking Assistance: Independent  Ambulation Assistance: Independent  Transfer Assistance: Independent    Active : Yes  Mode of Transportation: Car  Occupation: Retired  Type of occupation: [employer],  then worked part time at [employer] until Dec 2020. Additional Comments: very indep PTA, did not use AD for mobility. Pt reports retired son lives close by and can help prn.     OBJECTIVE:  Range of Motion:  Right Lower Extremity: hip WFL, knee grossly limited due to s/p R TKA (formal ROM to be assessed in PM session), ankle WFL  Left Lower Extremity: [facility]    Strength:  Right Lower Extremity: not assessed due to s/p R TKA  Left Lower Extremity: grossly 4/5    Balance:  Static Standing Balance: Contact Guard Assistance  Dynamic Standing Balance: Contact Guard Assistance   *dependent for pericare, able to assist with donning and doffing pants    Bed Mobility:  Supine to Sit: Minimal Assistance, with head of bed raised, with verbal cues , with increased time for completion, assst at R LE   *will assess bed mobility with HOB flat during PM session    Transfers:  Sit to Stand: [employer], with increased time for completion, to/from chair with arms, increased difficulty pushing to stand with L UE due to fx  Stand to Sit:Contact Guard Assistance, Minimal Assistance, with increased time for completion, to/from chair with arms, requesting min A at R LE    Ambulation:  [employer], with increased time for completion  Distance: 15ft and 75ft  Surface: Level Tile  Device:Rolling Walker  Gait Deviations: Forward Flexed Posture, Slow Yessenia, Decreased Step Length on Left, Decreased Weight Shift Right, Decreased Gait Speed, Decreased Heel Strike Bilaterally, Mild Path Deviations, Unsteady Gait and step-to pattern      Functional Outcome Measures: Not completed   *to be assessed in PM       ASSESSMENT:  Activity Tolerance:  Patient tolerance of  treatment: good. Treatment Initiated: Treatment and education initiated within context of evaluation. Evaluation time included review of current medical information, gathering information related to past medical, social and functional history, completion of standardized testing, formal and informal observation of tasks, assessment of data and development of plan of care and goals. Treatment time included skilled education and facilitation of tasks to increase safety and independence with functional mobility for improved independence and quality of life. Assessment: Body structures, Functions, Activity limitations: Decreased functional mobility , Decreased ROM, Decreased strength, Decreased endurance  Assessment: Pt presents with closed comminuted supracondylar fx of R femur and is s/p R TKA. Also presents with L 5th met fx, requiring wrist splint PRN. Pt demonstrates decreased strength B with R>L, decreased R LE ROM, increased R knee pain with all mobility and WBing, and decreased balance, limiting her ability to perform bed mobility, transfers, and ambulate with a normalized gait pattern. She will benefit from skilled PT services to promote increased indep with functional mobility activities for return to PLOF.   Prognosis: Good         Discharge Recommendations:  Discharge Recommendations: Continue to assess pending progress    Patient Education:  PT Education: Goals, PT Role, Plan of Care, Transfer Training, Gait Training    Equipment Recommendations:  Equipment Needed: No(has RW and cane)    Plan:  Times per week: 5x/wk 90 min, 1x/wk 30 min  Current Treatment Recommendations: Strengthening, ROM, Balance Training, Functional Mobility Training, Transfer Training, Gait Training, Endurance Training, Stair training, Home Exercise Program, Safety Education & Training, Patient/Caregiver Education & Training, Neuromuscular Re-education    Goals:  Patient goals : To return home  Short term goals  Time Frame for Short term goals: 1 week  Short term goal 1: Pt to perform supine<>sit with CGA to promote ease of getting in and out of bed. Short term goal 2: Pt to perform sit<>stand with SBA to promote ease of transfers. Short term goal 3: Pt to ambulate >100ft with Rw at SBA to improve ability to navigate within the home. Short term goal 4: Pt to negotiate 2 steps with L HR at CGA to improve ability to enter and exit home. Long term goals  Time Frame for Long term goals : 2 weeks  Long term goal 1: Pt to perform supine<>sit with mod I to promote ease of getting in and out of bed. Long term goal 2: Pt to perform sit<>stand with mod I to promote ease of transfers. Long term goal 3: Pt to ambulate >200ft with Rw at mod I to improve ability to navigate within the home. Long term goal 4: Pt to negotiate 2 steps with L HR at mod I to improve ability to enter and exit home. Long term goal 5: Pt to perform car transfer with mod I to improve ability to get in and out of car. Following session, patient left in safe position with all fall risk precautions in place.

## 2021-03-04 NOTE — CONSULTS
Comprehensive Nutrition Assessment    Type and Reason for Visit:  Initial, Consult(Oral Nutrition Supplements/New Rehab Admit)    Nutrition Recommendations/Plan:   *Recommend a Multivitamin w/minerals daily. *Started Ensure Enlive TID. *Continue current diet. Nutrition Assessment: Pt. nutritionally compromised AEB pt report of decreased appetite consuming ~50% of most meals. At risk for further nutrition compromise r/t increased nutrient needs to aid in post-op healing and underlying medical condition (hx CAD, CHF, Colon Cancer, HTN, HLD). Nutrition recommendations/interventions as per above. Malnutrition Assessment:  Malnutrition Status: At risk for malnutrition (Comment)    Context:  Acute Illness     Findings of the 6 clinical characteristics of malnutrition:  Energy Intake:  (50% or less of meals x1 day)  Weight Loss:  Unable to assess(no weight hx per EMR)     Body Fat Loss:  No significant body fat loss     Muscle Mass Loss:  No significant muscle mass loss    Fluid Accumulation:  1 - Mild Extremities   Strength:  Not Performed    Estimated Daily Nutrient Needs:  Energy (kcal):  7587-9610 kcal/day (25-30 kcal/kg); Weight Used for Energy Requirements:  (61.2 kg on 3/3)     Protein (g):  73+ g/day (1.2+ g/kg); Weight Used for Protein Requirements:  (61.2 kg on 3/3)         Nutrition Related Findings:  s/p total revision on right knee arthroplasty on 2/27/21; pt seen; she report decreased appetite consuming ~50% of meals; pt with dentures and some difficulty chewing/swallowing food but declines need for a texture-modified diet; pt denies N/V; last BM x1 on 3/4; pt amenable to Vanilla Ensure TID; Labs noted. Rx includes: Vitamin C, Iron, Calcium/Vitamin D, Glycolax, Senna      Wounds:  Surgical Incision(s/p total revision on right knee arthroplasty on 2/27/21)       Current Nutrition Therapies:    DIET GENERAL;     Anthropometric Measures:  · Height: 5' 2\" (157.5 cm)  · Current Body Weight: 135 lb (61.2 kg)(3/3; unsure if actual or stated; +non-pitting RLE edema)   · Admission Body Weight: 135 lb (61.2 kg)(3/3; unsure if actual or stated; +non-pitting RLE edema)    · Usual Body Weight: (132 lb per pt report. No actual weights per EMR)     · Ideal Body Weight: 110 lbs  · BMI: 24.7   · BMI Categories: Normal Weight (BMI 18.5-24. 9)       Nutrition Diagnosis:   · Inadequate oral intake related to inadequate protein-energy intake as evidenced by intake 51-75%, intake 0-25%    Nutrition Interventions:   Food and/or Nutrient Delivery:  Continue Current Diet, Start Oral Nutrition Supplement, Vitamin Supplement  Nutrition Education/Counseling:  Education initiated(Encouraged po intake of meals and ONS at best effort during LOS)   Coordination of Nutrition Care:  Continue to monitor while inpatient    Goals:  Pt will consume 75% or more of meals during LOS       Nutrition Monitoring and Evaluation:   Behavioral-Environmental Outcomes:  None Identified   Food/Nutrient Intake Outcomes:  Diet Advancement/Tolerance, Food and Nutrient Intake, Supplement Intake, Vitamin/Mineral Intake  Physical Signs/Symptoms Outcomes:  Biochemical Data, Chewing or Swallowing, Weight, Skin, Nutrition Focused Physical Findings, Fluid Status or Edema     Discharge Planning:     Too soon to determine     Electronically signed by Marianna Dawson RD, LD on 3/4/21 at 11:21 AM EST    Contact: [phone]

## 2021-03-04 NOTE — PROGRESS NOTES
[address] [address]  Recreational Therapy  Evaluation  Inpatient Rehabilitation Unit      Time Spent with Patient: 40 minutes    Date:  3/4/2021       Patient Name: Cassie Buitrago      MRN: [id number]       YOB: 1931 (80 y.o.)       Gender: female  Diagnosis: Closed comminuted supracondylar fracture of right femur  Referring Practitioner: Dr. Nohemi Resendez,    RESTRICTIONS/PRECAUTIONS:  Restrictions/Precautions: Weight Bearing, General Precautions  Vision: Impaired  Hearing: Within functional limits    PAIN: 3-lying in bed    SUBJECTIVE:  Pt lives alone-she has 7 children with 3 living close by    VISION:  Glasses-has cataracts but sees good enough to read     HEARING: Hearing Aid - Left & Right    LEISURE INTERESTS:   Pt states she likes to get on her computer at home and plays games and  a lot of times does not get her housekeeping done when she wants to-she likes to cross stitch especially around ajith for presents she enjoys [employer], going to the grocery, carry out food at times and she was working at [employer] part time up until December 2020-affect bright and social-pleasant     BARRIERS TO LEISURE INTERESTS:    Decreased endurance           Patient Education  New Education Provided: Importance of Leisure, RT Plan of Care    Plan:  Continue to follow patient through this admission  See patient individually    Electronically signed by: JOELLE Gorman  Date: 3/4/2021

## 2021-03-04 NOTE — FLOWSHEET NOTE
03/04/21 1612   Provider Notification   Reason for Communication Review case   Provider Name teresita   Provider Notification Physician   Method of Communication Secure Message   Response Waiting for response   Notification Time [facility]: [employer] [id number]. Patient has a sore on her bottom gum, she believes it was from being intubated for her knee surgery. I've instructed her to leave her dentures out when not eating. She is requesting some oragel, may she have an order?

## 2021-03-04 NOTE — CONSULTS
Department of Family Practice  Consult Note        Reason for Consult:  Medical management while on the Inpatient Rehab unit. Requesting Physician:  Dr Jacques Medina:  The need to continue the time with therapies following the acute hospital stay. History Obtained From:  patient, EMR    HISTORY OF PRESENT ILLNESS:              The patient is a 80 y.o. female with significant past medical history of       Diagnosis Date    Arthritis     Cataract of both eyes     GERD (gastroesophageal reflux disease)     Hyperlipidemia     Hypertension     Keratosis     benign lichemoid    Psychiatric problem       who presents with a fall that resulted in a fracture of the right hip. She was brought to the ED and admitted. She was medically cleared and proceeded to have the hip surgically repaired. Post op she required PRBC. She comes to the Inpatient Rehab Unit in order to gain strength and independence before the return home again.     Past Medical History:        Diagnosis Date    Arthritis     Cataract of both eyes     GERD (gastroesophageal reflux disease)     Hyperlipidemia     Hypertension     Keratosis     benign lichemoid    Psychiatric problem      Past Surgical History:        Procedure Laterality Date    ABDOMEN SURGERY      APPENDECTOMY  1934    CHOLECYSTECTOMY  1999    COLON SURGERY  2000    resection for diverticulitis    COLONOSCOPY      DILATATION, ESOPHAGUS      ENDOSCOPY, COLON, DIAGNOSTIC      HYSTERECTOMY  1979    JOINT REPLACEMENT Right     right knee    KNEE ARTHROPLASTY Right 02/27/2021    RIGHT TOTAL KNEE REVISION WITH OSS performed by Víctor Chapa MD at [address] [place] [address] Rd    ROTATOR CUFF REPAIR Left 2006     Current Medications:   Current Facility-Administered Medications: acetaminophen (TYLENOL) tablet 650 mg, 650 mg, Oral, Q4H PRN  amLODIPine (NORVASC) tablet 5 mg, 5 mg, Oral, Daily  ascorbic acid (VITAMIN C) tablet 500 mg, 500 mg, Oral, Q48H busPIRone (BUSPAR) tablet 5 mg, 5 mg, Oral, BID PRN  diclofenac sodium (VOLTAREN) 1 % gel 4 g, 4 g, Topical, 4x Daily PRN  enoxaparin (LOVENOX) injection 40 mg, 40 mg, Subcutaneous, Q24H  ferrous sulfate (IRON 325) tablet 325 mg, 325 mg, Oral, Q48H  metoprolol (LOPRESSOR) tablet 100 mg, 100 mg, Oral, Nightly  metoprolol tartrate (LOPRESSOR) tablet 50 mg, 50 mg, Oral, Daily  oxyCODONE-acetaminophen (PERCOCET) 5-325 MG per tablet 1 tablet, 1 tablet, Oral, Q4H PRN  oyster shell calcium/vitamin D 250-125 MG-UNIT per tablet 250 mg, 1 tablet, Oral, BID  pantoprazole (PROTONIX) tablet 40 mg, 40 mg, Oral, QAM AC  phenol 1.4 % mouth spray 1 spray, 1 spray, Mouth/Throat, Q2H PRN  polyethylene glycol (GLYCOLAX) packet 17 g, 17 g, Oral, Daily  sennosides-docusate sodium (SENOKOT-S) 8.6-50 MG tablet 1 tablet, 1 tablet, Oral, BID  bisacodyl (DULCOLAX) suppository 10 mg, 10 mg, Rectal, Daily PRN  Allergies:  Penicillins, Tramadol, and Vicodin [hydrocodone-acetaminophen]    Social History:   OCCUPATION:  Retired    Family History:       Problem Relation Age of Onset    Heart Attack Mother      REVIEW OF SYSTEMS:    CONSTITUTIONAL:  positive for  fatigue  EYES:  positive for  glasses  HEENT:  negative for  epistaxis  RESPIRATORY:  negative for  dyspnea  CARDIOVASCULAR:  negative for  chest pain  GASTROINTESTINAL:  negative for diarrhea  GENITOURINARY:  negative for dysuria  INTEGUMENT/BREAST:  negative for rash  HEMATOLOGIC/LYMPHATIC:  negative for petechiae  ALLERGIC/IMMUNOLOGIC:  negative for anaphylaxis  ENDOCRINE:  negative for diabetic symptoms including polydipsia  MUSCULOSKELETAL:  positive for  myalgias, arthralgias, muscle weakness and bone pain  NEUROLOGICAL:  positive for gait problems  BEHAVIOR/PSYCH:  negative for increased energy level  PHYSICAL EXAM:      Vitals:    BP (!) 143/64   Pulse 86   Temp 98.6 °F (37 °C) (Oral)   Resp 19   Ht 5' 2\" (1.575 m)   Wt 135 lb (61.2 kg)   SpO2 97%   BMI 24.69 kg/m²     Well developed well nourished white female who is awake alert and cooperative  Skin atrophic  Membranes moist  Head normocephalic  Neck without mass  Chest symmetrical expansion  Heart S1S2 without murmur  Lungs CTA  Abd soft, non tender, normoactive BS and no mass  Ext without edema  Neuro weak  Psy pleasant    IMPRESSION/RECOMMENDATIONS:      Active Hospital Problems    Diagnosis Date Noted    Closed comminuted supracondylar fracture of right femur ([facility] [address] Bill Muñoz 03/03/2021    Displaced fracture of medial condyle of right femur, sequela [S72.431S] 03/03/2021

## 2021-03-04 NOTE — PROGRESS NOTES
[facility]  INPATIENT PHYSICAL THERAPY  DAILY NOTE  [address] [facility],4[address] - 7E-64/064-A    Time In: 1100  Time Out: 1200  Timed Code Treatment Minutes: 60 Minutes  Minutes: 60          Date: 3/4/2021  Patient Name: Elle Cruz,  Gender:  female        MRN: [id number]  : 3/4/1931  (80 y.o.)     Referring Practitioner: MOISÉS Sanders CNP and Luis Angel Palmer MD  Diagnosis: closed comminuted supracondylar fracture of right femur, initial encounter  Additional Pertinent Hx: per 21 ED note; Elle Cruz is a 80 y.o. female who presents to the emergency department for evaluation of fall at [facility] this morning. Mechanical trip and fall while walking to the parking lot. Patient denies loss of consciousness, head strike. States that her glasses hit her eye and bruised her upper eyelid. States she is feeling fine other than severe right knee pain. She admits to associated swelling. [id number] TOTAL KNEE REVISION. to IPR 3/3/2021. Prior Level of Function:  Lives With: Alone  Type of Home: House  Home Layout: One level  Home Access: Stairs to enter with rails  Entrance Stairs - Number of Steps: 2  Entrance Stairs - Rails: Left  Home Equipment: Cane, Rolling walker   Bathroom Shower/Tub: Tub/Shower unit  Bathroom Toilet: Handicap height  Bathroom Equipment: Grab bars in shower, Grab bars around toilet  Bathroom Accessibility: Walker accessible    Receives Help From: Family  ADL Assistance: Independent  Homemaking Assistance: Independent  Ambulation Assistance: Independent  Transfer Assistance: Independent  Active : Yes  Additional Comments: very indep PTA, did not use AD for mobility. Pt reports retired son lives close by and can help prn.     Restrictions/Precautions:  Restrictions/Precautions: Weight Bearing, General Precautions  Right Lower Extremity Weight Bearing: Weight Bearing As Tolerated Left Upper Extremity Weight Bearing: Weight Bearing As Tolerated  Position Activity Restriction  Other position/activity restrictions: s/p R TKA 2/27 and minimally displaced L 5th met fx, wrist splint PRN     SUBJECTIVE: Pt presented sitting in bedside chair with Rn passing meds, pleasant and agreeable to participate in therapy. Requested to use restroom for BM prior to leaving room, reporting upset stomach. Pt reporting anxiety about unknown of therapy. Post session, pt reclined in bedside chair with chair alarm on and all needs within reach.     PAIN: 10/10: R knee with mobility and WBing    Vitals: Vitals not assessed per clinical judgement, see nursing flowsheet    OBJECTIVE:  Bed Mobility:  Rolling to Left: Contact Guard Assistance, with verbal cues , with increased time for completion   Rolling to Right: Contact Guard Assistance, with verbal cues , with increased time for completion   Supine to Sit: Minimal Assistance, Moderate Assistance, with verbal cues , with increased time for completion, mod A at trunk, min A at R LE  Sit to Supine: Minimal Assistance, with verbal cues , with increased time for completion, assist at [address]:  Sit to Stand: [employer], with increased time for completion, to/from chair with arms  Stand to [place] [id number], with increased time for completion, to/from chair with arms  [address], with increased time for completion, with Rw  Car:Minimal Assistance, with increased time for completion, cues for hand placement, with verbal cues, assist at R LE, pt anxious with transfer    Ambulation:  Contact Guard Assistance, with increased time for completion  Distance: 20ft and 10ft  Surface: [facility] Gait Deviations: Forward Flexed Posture, Slow Yessenia, Decreased Step Length on Left, Decreased Weight Shift Right, Decreased Gait Speed, Decreased Heel Strike Bilaterally and step-to pattern, decreased foot clearance B    Balance:  Static Standing Balance: Contact Guard Assistance, dependent for pericare  Dynamic Standing Balance: Contact Guard Assistance, indep with donning and doffing pants    Exercise:  Patient was guided in 1 set(s) 10 reps of exercise to both lower extremities. Ankle pumps, Glut sets, Quad sets, Heelslides and Short arc quads. Exercises were completed for increased independence with functional mobility. R Knee ROM:  Active knee flexion: 59 deg  Active knee extension: lacking 3 deg ext    Stairs:  Platform:  4\" platform X 2 trials using Parallel Bars and Contact Guard Assistance, with increased time for completion, cues for technique. Functional Outcome Measures: Completed  Timed Up and Go: 1.48    ASSESSMENT:  Assessment: Patient progressing toward established goals. Activity Tolerance:  Patient tolerance of  treatment: fair. Limited by pain, anxiety toward mobility, and upset stomach     Equipment Recommendations:Equipment Needed: No(has RW and cane)  Discharge Recommendations:  Continue to assess pending progress    Plan: Times per week: 5x/wk 90 min, 1x/wk 30 min  Current Treatment Recommendations: Strengthening, ROM, Balance Training, Functional Mobility Training, Transfer Training, Gait Training, Endurance Training, Stair training, Home Exercise Program, Safety Education & Training, Patient/Caregiver Education & Training, Neuromuscular Re-education    Patient Education  Patient Education: Plan of Care, [employer] Mobility, Transfers, Gait, Stairs, Car Transfers, Verbal Exercise Instruction    Goals:  Patient goals :  To return home  Short term goals  Time Frame for Short term goals: 1 week Short term goal 1: Pt to perform supine<>sit with CGA to promote ease of getting in and out of bed. Short term goal 2: Pt to perform sit<>stand with SBA to promote ease of transfers. Short term goal 3: Pt to ambulate >100ft with Rw at SBA to improve ability to navigate within the home. Short term goal 4: Pt to negotiate 2 steps with L HR at CGA to improve ability to enter and exit home. Short term goal 5: Pt to reduce TUG time to <60 seconds to reduce fall risk and promote overall safety in the home. Long term goals  Time Frame for Long term goals : 2 weeks  Long term goal 1: Pt to perform supine<>sit with mod I to promote ease of getting in and out of bed. Long term goal 2: Pt to perform sit<>stand with mod I to promote ease of transfers. Long term goal 3: Pt to ambulate >200ft with Rw at mod I to improve ability to navigate within the home and community. Long term goal 4: Pt to negotiate 2 steps with L HR at mod I to improve ability to enter and exit home. Long term goal 5: Pt to perform car transfer with mod I to improve ability to get in and out of car. Following session, patient left in safe position with all fall risk precautions in place.

## 2021-03-04 NOTE — PROGRESS NOTES
[address]  EVALUATION    Time:    Time In: 845  Time Out: 945  Timed Code Treatment Minutes: 40 Minutes  Minutes: 60     Date: 3/4/2021  Patient Name: Carlos Dwyer,   Gender: female      MRN: [id number]  : 3/4/1931  (80 y.o.)  Referring Practitioner: Dr. Carrillo Reason,  Diagnosis: Closed comminuted supracondylar fracture of right femur  Additional Pertinent Hx: per 21 ED note; Carlos Dwyer is a 80 y.o. female who presents to the emergency department for evaluation of fall at grocery store. The patient says she lost balance and fell while she was grocery shopping on 2021. She landed on her right knee with sudden severe right knee pain and could not get up. She denies loss of consciousness. She was send to [facility] ER for evaluation. X-ray of right femur and right knee revealed status post right knee total arthroplasty with comminuted foreshortened and dorsally angulated fracture of distal right femur. Therefore she underwent right total knee revision by Dr. Gerda Coombs on 2021. Pt found to have a Left fifth metacarpal fracture and is WBAT in splint prn. To IPR on 3/3. Restrictions/Precautions:  Restrictions/Precautions: Weight Bearing, General Precautions  Right Lower Extremity Weight Bearing: Weight Bearing As Tolerated  Left Upper Extremity Weight Bearing: Weight Bearing As Tolerated  Position Activity Restriction  Other position/activity restrictions: s/p R TKA  and minimally displaced L 5th met fx, wrist splint PRN    Vitals: Nurse checked vitals prior to session    Subjective  Chart Reviewed: Yes, Orders, History and Physical, Imaging, Progress Notes  Patient assessed for rehabilitation services?: Yes    Subjective: Pt cooperative, very talkative and pleasant.     Pain:  Pain Assessment  Patient Currently in Pain: Yes  Pain Level: 2  Pain Type: Surgical pain Social/Functional History:  Lives With: Alone  Type of Home: House  Home Layout: One level  Home Access: Stairs to enter with rails  Entrance Stairs - Number of Steps: 2  Entrance Stairs - Rails: Left  Home Equipment: Cane, Rolling walker   Bathroom Shower/Tub: Tub/Shower unit  Bathroom Toilet: Handicap height  Bathroom Equipment: Grab bars in shower, Grab bars around toilet  Bathroom Accessibility: Walker accessible    [address] Help From: Family  ADL Assistance: Independent  Homemaking Assistance: Independent  Ambulation Assistance: Independent  Transfer Assistance: Independent    Active : Yes  Mode of Transportation: Car  Occupation: Retired  Type of occupation: [employer],  then worked part time at [employer] until Dec 2020. Additional Comments: very indep PTA, did not use AD for mobility. Pt reports retired son lives close by and can help prn. VISION:Corrected    HEARING:  WFL    COGNITION: Decreased Recall 13/15 BIMs      RANGE OF MOTION:  Bilateral Upper Extremity:  Impaired - should flexion to ~90 degrees. Pt endorses difficulty to curl her hair     STRENGTH:  Bilateral Upper Extremity:  Deconditioned     SENSATION:   WFL    ADL:   EATING:Setup or clean-up assistance. Radha Severance CARE Score: 5. ORAL HYGIENE:Supervision or touching assistance. CGA. CARE Score: 4. TOILETING HYGIENE:Dependent. A for clthing mgt up/down and hygiene. CARE Score: 1. SHOWERING/BATHING:Substantial/maximal assistance. A for LB and buttocks. CARE Score: 2.     UPPER BODY DRESSING:Supervision or touching assistance. SBA doff and donning shirt. CARE Score: 4. LOWER BODY DRESSING:Substantial/maximal assistance. A to thread clothing, and manage over hips. CARE Score: 2. FOOTWEAR:Dependent   . CARE Score: 1.     TOILET TRANSFER: Partial/moderate assistance. Mod A from [facility] over toilet. CARE Score: 3. BALANCE:  Sitting Balance:  Stand By Assistance. Equipment Recommendations: Other: Monitor for needing tub transfer bench vs shower chair    Plan:  Times per week: 5x/wk for 90 min and 1x/wk for 30 min  Times per day: Daily  Current Treatment Recommendations: Endurance Training, Neuromuscular Re-education, Patient/Caregiver Education & Training, Equipment Evaluation, Education, & procurement, Self-Care / ADL, Strengthening, Safety Education & Training. See long-term goal time frame for expected duration of plan of care. If no long-term goals established, a short length of stay is anticipated. Goals:  Patient goals : Return home independently  Short term goals  Time Frame for Short term goals: 1 week  Short term goal 1: Pt will complete LB dressing tasks with min A using LHAE prn to improve indep with self care. Short term goal 2: Pt will tolerate standing > 3 minutes with CGA and unilateral release to improve ability to complete sinkside grooming  Short term goal 3: Pt will transfer in and out of tub/shower with min A using grab bar and shower AE to improve indep with bathing. Short term goal 4: Pt will navigate RW to/from bathroom while transporting 2 items with CGA to improve indep with sinkside grooming. Short term goal 5: Pt will complete simple meal prep with CGA and min vcs for RW safety to improve indep within home. Long term goals  Time Frame for Long term goals : 2 weeks  Long term goal 1: Pt will complete BADL routine with set up to improve indep with self care. Long term goal 2: Pt will complete light IADL tasks with Supervision and 0-1 vcs for safety to improve indep within home. Following session, patient left in safe position with all fall risk precautions in place.

## 2021-03-04 NOTE — PROGRESS NOTES
[address]  Occupational Therapy  Daily Note  Time:   Time In: 1330  Time Out: 1400  Timed Code Treatment Minutes: 30 Minutes  Minutes: 30    Date: 3/4/2021  Patient Name: Carole Casarez,   Gender: female      Room: [facility]64/064-A  MRN: [id number]  : 3/4/1931  (80 y.o.)  Referring Practitioner: Dr. Brittney Martinez,  Diagnosis: Closed comminuted supracondylar fracture of right femur  Additional Pertinent Hx: per 21 ED note; Carole Casarez is a 80 y.o. female who presents to the emergency department for evaluation of fall at grocery store. The patient says she lost balance and fell while she was grocery shopping on 2021. She landed on her right knee with sudden severe right knee pain and could not get up. She denies loss of consciousness. She was send to [facility] ER for evaluation. X-ray of right femur and right knee revealed status post right knee total arthroplasty with comminuted foreshortened and dorsally angulated fracture of distal right femur. Therefore she underwent right total knee revision by Dr. Gretchen Ferrell on 2021. Pt found to have a Left fifth metacarpal fracture and is WBAT in splint prn. To IPR on 3/3. Restrictions/Precautions:  Restrictions/Precautions: Weight Bearing, General Precautions  Right Lower Extremity Weight Bearing: Weight Bearing As Tolerated  Left Upper Extremity Weight Bearing: Weight Bearing As Tolerated  Position Activity Restriction  Other position/activity restrictions: s/p R TKA  and minimally displaced L 5th met fx, wrist splint PRN     SUBJECTIVE: Patient in recliner upon arrival, agreeable to OT.      PAIN: 5/10: R knee at rest, 10/10 R knee with ambulation    Vitals: Vitals not assessed per clinical judgement, see nursing flowsheet    COGNITION: WFL    ADL:   Grooming: Contact Guard Assistance.  hand hygiene x 2 min Toileting: Contact Guard Assistance. during clothing mgmt, min cues for pt to self initate josefina care   Toilet Transfer: Contact Guard Assistance. ETS. BALANCE:  Sitting Balance:  Supervision. Standing Balance: Contact Guard Assistance. BED MOBILITY:  Sit to Supine: Minimal Assistance A with R LE.     TRANSFERS:  Sit to Stand:  Contact Guard Assistance. recliner, ETS. Good hand placement recall. Stand to Sit: Contact Guard Assistance. FUNCTIONAL MOBILITY:  Assistive Device: Rolling Walker  Assist Level:  Contact Guard Assistance. Distance: To and from bathroom  Slow guarded pace. ADDITIONAL ACTIVITIES:  Patient identified a personal goal to increase UB strength and improve overall endurance so they can complete their toilet & shower transfers; skilled edu on UE strengthening and patient completed BUE strengthening exercises x10 reps x1 set this date with a medium resistive band in all joints and all planes. Patient tolerated fair, requiring mod rest breaks. Pt required min cues for technique. ASSESSMENT:  Assessment: Pt presents to occupational therapy following R TKA with patient complaints of pain, weakness, decreased standing tolerance, and decreased mobility  impacting patient's ability to complete self care at prior level of functioning. Due to patient's performance deficits, patient would greatly benefit from continued occupational therapy for ADL remediation, endurance building, strengthening and adaptive strategies to return to prior level of functioning and safe return to home environment. Activity Tolerance:  Patient tolerance of  treatment: good. Discharge Recommendations: Home with Home health OT, Home with nursing aide   Equipment Recommendations:  Other: Monitor for needing tub transfer bench vs shower chair  Plan: Times per week: 5x/wk for 90 min and 1x/wk for 30 min  Times per day: Daily Current Treatment Recommendations: Endurance Training, Neuromuscular Re-education, Patient/Caregiver Education & Training, Equipment Evaluation, Education, & procurement, Self-Care / ADL, Strengthening, Safety Education & Training    Patient Education  Patient Education: ADL's, Home Exercise Program, Precautions, Equipment Education, Reviewed Prior Education and Assistive Device Safety    Goals  Short term goals  Time Frame for Short term goals: 1 week  Short term goal 1: Pt will complete LB dressing tasks with min A using LHAE prn to improve indep with self care. Short term goal 2: Pt will tolerate standing > 3 minutes with CGA and unilateral release to improve ability to complete sinkside grooming  Short term goal 3: Pt will transfer in and out of tub/shower with min A using grab bar and shower AE to improve indep with bathing. Short term goal 4: Pt will navigate RW to/from bathroom while transporting 2 items with CGA to improve indep with sinkside grooming. Short term goal 5: Pt will complete simple meal prep with CGA and min vcs for RW safety to improve indep within home. Long term goals  Time Frame for Long term goals : 2 weeks  Long term goal 1: Pt will complete BADL routine with set up to improve indep with self care. Long term goal 2: Pt will complete light IADL tasks with Supervision and 0-1 vcs for safety to improve indep within home. Following session, patient left in safe position with all fall risk precautions in place.

## 2021-03-04 NOTE — PROGRESS NOTES
Physical Medicine & Rehabilitation Progress Note    Chief Complaint:  Right knee pain causing impaired ADLs and ambulation due to right distal femur supracondylar fracture requiring revision of TKA     Subjective:    Tanvi Michelle is a 80y.o. years old  female with history of hypertension arthritis, GERD, hyperlipidemia, bilateral eyes cataract, status post right knee total knee arthroplasty, status post left shoulder rotator cuff repair, was admitted on 3/3/2021 for intensive inpatient management of impairment & disability secondary right distal femur supracondylar fracture with right knee pain requiring right total knee revision with new femur and tibia components done on 2/27/2021 by Dr. Gerhardt Gee. The patient today complained of poorly coordinated care. She says she had to wait for long time for her medication and get help. She still has pain at her right knee which is minimal at 1-2/10 level when she is not moving her right lower extremity. She took Percocet twice yesterday. However the pain goes up to 7/10 when she tries to bend her right knee. She had bowel movement yesterday. She appears to tolerated the inpatient rehab treatment well so far. Rehabilitation:  PT: Reviewed.     Bed Mobility:  Rolling to Left: Contact Guard Assistance, with verbal cues , with increased time for completion   Rolling to Right: Contact Guard Assistance, with verbal cues , with increased time for completion   Supine to Sit: Minimal Assistance, Moderate Assistance, with verbal cues , with increased time for completion, mod A at trunk, min A at R LE  Sit to Supine: Minimal Assistance, with verbal cues , with increased time for completion, assist at [address]:  Sit to Stand: [employer], with increased time for completion, to/from chair with arms  Stand to [facility] [address], with increased time for completion, to/from chair with arms Stand Pivot:Contact Guard Assistance, with increased time for completion, with Rw  Car:Minimal Assistance, with increased time for completion, cues for hand placement, with verbal cues, assist at R LE, pt anxious with transfer     Ambulation:  Contact Guard Assistance, with increased time for completion  Distance: 20ft and 10ft  Surface: Level Tile  Device:Rolling Walker  Gait Deviations: Forward Flexed Posture, Slow Yessenia, Decreased Step Length on Left, Decreased Weight Shift Right, Decreased Gait Speed, Decreased Heel Strike Bilaterally and step-to pattern, decreased foot clearance B     Balance:  Static Standing Balance: Contact Guard Assistance, dependent for pericare  Dynamic Standing Balance: Contact Guard Assistance, indep with donning and doffing pants       OT: Reviewed. ADL:   Grooming: Contact Guard Assistance.  hand hygiene x 2 min  Toileting: Contact Guard Assistance. during clothing mgmt, min cues for pt to self initate josefina care   Toilet Transfer: Contact Guard Assistance. ETS.     BALANCE:  Sitting Balance:  Supervision. Standing Balance: Contact Guard Assistance.       BED MOBILITY:  Sit to Supine: Minimal Assistance A with R LE.      TRANSFERS:  Sit to Stand:  Contact Guard Assistance. recliner, ETS. Good hand placement recall. Stand to Sit: Contact Guard Assistance.       FUNCTIONAL MOBILITY:  Assistive Device: Rolling Walker  Assist Level:  Contact Guard Assistance. Distance: To and from bathroom  Slow guarded pace.      ADDITIONAL ACTIVITIES:  Patient identified a personal goal to increase UB strength and improve overall endurance so they can complete their toilet & shower transfers; skilled edu on UE strengthening and patient completed BUE strengthening exercises x10 reps x1 set this date with a medium resistive band in all joints and all planes. Patient tolerated fair, requiring mod rest breaks. Pt required min cues for technique.       ST: Reviewed.   N/A      Review of Systems: Review of Systems   Constitutional: Negative for chills, diaphoresis and fever. HENT: Positive for hearing loss. Negative for mouth sores, rhinorrhea, sneezing, sore throat, tinnitus and trouble swallowing. Eyes: Negative for pain, discharge and visual disturbance. Respiratory: Positive for shortness of breath (sometimes). Negative for wheezing. Cardiovascular: Positive for chest pain (sometimes). Negative for palpitations. Gastrointestinal: Negative for abdominal pain, constipation, diarrhea, nausea and vomiting. Endocrine: Positive for cold intolerance. Negative for heat intolerance. Genitourinary: Positive for difficulty urinating and urgency. Negative for dysuria and frequency. Musculoskeletal: Positive for arthralgias (right knee, left shoulder), back pain and gait problem. Negative for myalgias. Neurological: Positive for weakness. Negative for dizziness, tremors, seizures, speech difficulty, light-headedness, numbness and headaches. Psychiatric/Behavioral: Negative for dysphoric mood and hallucinations. The patient is nervous/anxious.          Objective:  /71   Pulse 89   Temp 98.8 °F (37.1 °C) (Oral)   Resp 20   Ht 5' 2\" (1.575 m)   Wt 135 lb (61.2 kg)   SpO2 93%   BMI 24.69 kg/m²   Physical Exam   General:  well-developed, well nourished  female ; in no acute distress ; appropriate affect & mood ; lying on bed comfortably  Eyes: pupil equally round ; extra-ocular motion intact bilaterally  Head, Ear, Nose, Mouth & Throat : normocephalic ; presence of ecchymosis at left orbit area ; no tenderness at head or face ; no discharge from ears or nose ; no deformity ; no facial swelling ; oral mucosa pink   Neck :  supple ; no tenderness ; no muscle spasm  Cardiovascular : regular rate & rhythm ; normal S1 & S2 heart sound ; presence of S4 sound ; no murmur ; normal peripheral pulse at bilateral upper & lower extremities Pulmonary : lung clear to auscultation ; no wheezing ; no rale  Gastrointestinal : soft, flat abdomen without tenderness ; normal bowel sound present   Back : no tenderness; no muscle spasm  Skin: presence of new surgical scar with suture in place at right anterior knee from distal thigh to upper leg without open wound or discharge ; healed surgical scar at left lateral shoulder ; ecchymosis at right medial posterior thigh ; no skin lesion or rash ; no pitting edema at all 4 extremities ; mild nonpitting swelling at right distal thigh and proximal right leg  Musculoskeletal : no limb asymmetry; no limb deformity; tenderness at anterior, lower medial and posterior right knee ; no tenderness at bilateral upper extremities & the rest of lower extremities; no palpable mass at limbs ; no joints laxity or crepitation other than right knee which is not assessed; knee passive ROM limited to 0-20 degrees limited by increased right knee pain ; otherwise normal functional joints ROM at bilateral upper extremities & the rest of bilateral lower extremities  Cerebral :  alert ; awake ; oriented to place, person and time   Cerebellum : no dysmetria with bilateral finger-to-nose test   Cranial Nerves :  grossly intact CN II to XII function  Sensory : intact light touch and pin prick sensation at bilateral upper & lower extremities  Motor : normal tone at bilateral upper & left lower extremities ; right lower extremity muscle tone not assessed ; 2-/5 muscle strength at right knee and 2+/5 muscle strength at right hip due to increased right knee pain ; otherwise  normal 5/5 muscle strength at bilateral upper extremities & the rest of bilateral lower extremities  Reflex : zero bilateral ankle and left knee reflexes ; right knee reflex not tested  Pathological Reflex :   no ankle clonus  Gait :  Not assessed      Diagnostics:   Recent Results (from the past 24 hour(s))   Comprehensive Metabolic Panel w/ Reflex to MG Collection Time: 03/04/21  6:25 AM   Result Value Ref Range    Glucose 98 70 - 108 mg/dL    CREATININE 0.4 0.4 - 1.2 mg/dL    BUN 11 7 - 22 mg/dL    Sodium 133 (L) 135 - 145 meq/L    Potassium reflex Magnesium 4.3 3.5 - 5.2 meq/L    Chloride 100 98 - 111 meq/L    CO2 25 23 - 33 meq/L    Calcium 8.5 8.5 - 10.5 mg/dL    AST 18 5 - 40 U/L    Alkaline Phosphatase 70 38 - 126 U/L    Total Protein 5.4 (L) 6.1 - 8.0 g/dL    Albumin 2.8 (L) 3.5 - 5.1 g/dL    Total Bilirubin 1.1 0.3 - 1.2 mg/dL    ALT 10 (L) 11 - 66 U/L   Prealbumin    Collection Time: 03/04/21  6:25 AM   Result Value Ref Range    Prealbumin 9.7 (L) 20.0 - 40.0 mg/dl   CBC auto differential    Collection Time: 03/04/21  6:25 AM   Result Value Ref Range    WBC 9.5 4.8 - 10.8 thou/mm3    RBC 2.73 (L) 4.20 - 5.40 mill/mm3    Hemoglobin 8.3 (L) 12.0 - 16.0 gm/dl    Hematocrit 26.2 (L) 37.0 - 47.0 %    MCV 96.0 81.0 - 99.0 fL    MCH 30.4 26.0 - 33.0 pg    MCHC 31.7 (L) 32.2 - 35.5 gm/dl    RDW-CV 13.8 11.5 - 14.5 %    RDW-SD 47.9 (H) 35.0 - 45.0 fL    Platelets 021 675 - 908 thou/mm3    MPV 10.2 9.4 - 12.4 fL    Seg Neutrophils 59.1 %    Lymphocytes 18.7 %    Monocytes 15.7 %    Eosinophils 4.0 %    Basophils 0.7 %    Immature Granulocytes 1.8 %    Segs Absolute 5.6 1.8 - 7.7 thou/mm3    Lymphocytes Absolute 1.8 1.0 - 4.8 thou/mm3    Monocytes Absolute 1.5 (H) 0.4 - 1.3 thou/mm3    Eosinophils Absolute 0.4 0.0 - 0.4 thou/mm3    Basophils Absolute 0.1 0.0 - 0.1 thou/mm3    Immature Grans (Abs) 0.17 (H) 0.00 - 0.07 thou/mm3    nRBC 0 /100 wbc   Hepatic function panel    Collection Time: 03/04/21  6:25 AM   Result Value Ref Range    Bilirubin, Direct 0.4 (H) 0.0 - 0.3 mg/dL   Anion Gap    Collection Time: 03/04/21  6:25 AM   Result Value Ref Range    Anion Gap 8.0 8.0 - 16.0 meq/L   Glomerular Filtration Rate, Estimated    Collection Time: 03/04/21  6:25 AM   Result Value Ref Range    Est, Glom Filt Rate >90 ml/min/1.73m2         Impression: · Right distal femur supracondylar comminuted fracture with severe right knee pain causing impaired ADLs & ambulation due to fall accident requiring right total knee revision with OSS both femoral and tibial components  · Hypertension  · GERD  · Hyperlipidemia  · Bilateral eyes cataract     The patient continues having significant right knee pain when she moves and bends her right knee. Percocet appears to provide some relief. She is tolerating the initial inpatient rehab treatment. She continues to be allow WBAT at right lower extremity. Plan:  · Continue ongoing PT/OT/RT inpatient rehabilitation treatment at least 3 hours per day, 5 days per week in order to improve functional status prior to discharge. Family education and training will be completed. Equipment evaluations and recommendations will be completed as appropriate. · Rehabilitation nursing continues to be involved for bowel, bladder, skin, and pain management. Nursing will also provide education and training to patient and family. · Prophylaxis:  DVT: Lovenox, RON stocking and intermittent pneumatic compression. GI: Glycolax, Senokot-S and prn Dulcolax suppository.   · Pain: Tylenol prn, Voltaren gel prn, Percocet prn  · Continue allow weight bearing as tolerated at right lower extremity as per ortho  · Continue Norvasc and Lopressor for HTN  · Continue iron supplement for anemia  · Nutrition:  continue current diet    · Bladder: normal function ; will monitor  · Bowel: functionally normal ; will monitor for constipation  ·  and case management consultations for coordination of care and discharge planning       Missed Therapy Time:  · None    Oumra Sarmiento MD

## 2021-03-05 PROBLEM — F32.9 REACTIVE DEPRESSION: Status: ACTIVE | Noted: 2021-03-05

## 2021-03-05 PROCEDURE — 6360000002 HC RX W HCPCS: Performed by: ORTHOPAEDIC SURGERY

## 2021-03-05 PROCEDURE — 6370000000 HC RX 637 (ALT 250 FOR IP): Performed by: ORTHOPAEDIC SURGERY

## 2021-03-05 PROCEDURE — 97110 THERAPEUTIC EXERCISES: CPT

## 2021-03-05 PROCEDURE — 99232 SBSQ HOSP IP/OBS MODERATE 35: CPT | Performed by: PHYSICAL MEDICINE & REHABILITATION

## 2021-03-05 PROCEDURE — 1180000000 HC REHAB R&B

## 2021-03-05 PROCEDURE — 6370000000 HC RX 637 (ALT 250 FOR IP): Performed by: NURSE PRACTITIONER

## 2021-03-05 PROCEDURE — 97535 SELF CARE MNGMENT TRAINING: CPT

## 2021-03-05 PROCEDURE — 97530 THERAPEUTIC ACTIVITIES: CPT

## 2021-03-05 PROCEDURE — 6370000000 HC RX 637 (ALT 250 FOR IP): Performed by: PHYSICAL MEDICINE & REHABILITATION

## 2021-03-05 PROCEDURE — 94760 N-INVAS EAR/PLS OXIMETRY 1: CPT

## 2021-03-05 PROCEDURE — 97116 GAIT TRAINING THERAPY: CPT

## 2021-03-05 RX ORDER — TRIAMCINOLONE ACETONIDE 0.1 %
PASTE (GRAM) DENTAL
Status: DISCONTINUED | OUTPATIENT
Start: 2021-03-06 | End: 2021-03-13 | Stop reason: HOSPADM

## 2021-03-05 RX ORDER — OXYCODONE HYDROCHLORIDE 5 MG/1
2.5 TABLET ORAL EVERY 6 HOURS PRN
Status: DISCONTINUED | OUTPATIENT
Start: 2021-03-05 | End: 2021-03-06

## 2021-03-05 RX ORDER — OXYCODONE HYDROCHLORIDE 5 MG/1
5 TABLET ORAL EVERY 4 HOURS PRN
Status: DISCONTINUED | OUTPATIENT
Start: 2021-03-05 | End: 2021-03-13 | Stop reason: HOSPADM

## 2021-03-05 RX ORDER — ACETAMINOPHEN 325 MG/1
650 TABLET ORAL EVERY 6 HOURS
Status: DISCONTINUED | OUTPATIENT
Start: 2021-03-05 | End: 2021-03-13 | Stop reason: HOSPADM

## 2021-03-05 RX ADMIN — ENOXAPARIN SODIUM 40 MG: 40 INJECTION SUBCUTANEOUS at 09:20

## 2021-03-05 RX ADMIN — PANTOPRAZOLE SODIUM 40 MG: 40 TABLET, DELAYED RELEASE ORAL at 05:53

## 2021-03-05 RX ADMIN — OXYCODONE 2.5 MG: 5 TABLET ORAL at 18:34

## 2021-03-05 RX ADMIN — BENZOCAINE: 100 GEL TOPICAL at 16:35

## 2021-03-05 RX ADMIN — Medication 250 MG: at 09:19

## 2021-03-05 RX ADMIN — BUSPIRONE HYDROCHLORIDE 5 MG: 5 TABLET ORAL at 09:21

## 2021-03-05 RX ADMIN — BUSPIRONE HYDROCHLORIDE 5 MG: 5 TABLET ORAL at 20:36

## 2021-03-05 RX ADMIN — DOCUSATE SODIUM AND SENNOSIDES 1 TABLET: 8.6; 5 TABLET, FILM COATED ORAL at 20:40

## 2021-03-05 RX ADMIN — METOPROLOL TARTRATE 100 MG: 100 TABLET, FILM COATED ORAL at 20:35

## 2021-03-05 RX ADMIN — OXYCODONE 2.5 MG: 5 TABLET ORAL at 09:20

## 2021-03-05 RX ADMIN — ACETAMINOPHEN 650 MG: 325 TABLET ORAL at 15:17

## 2021-03-05 RX ADMIN — Medication 250 MG: at 20:35

## 2021-03-05 RX ADMIN — BENZOCAINE: 100 GEL TOPICAL at 12:27

## 2021-03-05 RX ADMIN — DICLOFENAC SODIUM 4 G: 10 GEL TOPICAL at 16:35

## 2021-03-05 RX ADMIN — ACETAMINOPHEN 650 MG: 325 TABLET ORAL at 02:56

## 2021-03-05 RX ADMIN — ACETAMINOPHEN 650 MG: 325 TABLET ORAL at 20:31

## 2021-03-05 RX ADMIN — METOPROLOL TARTRATE 50 MG: 100 TABLET, FILM COATED ORAL at 09:20

## 2021-03-05 RX ADMIN — ACETAMINOPHEN 650 MG: 325 TABLET ORAL at 07:05

## 2021-03-05 ASSESSMENT — PAIN DESCRIPTION - PROGRESSION: CLINICAL_PROGRESSION: NOT CHANGED

## 2021-03-05 ASSESSMENT — PAIN SCALES - GENERAL
PAINLEVEL_OUTOF10: 2
PAINLEVEL_OUTOF10: 8
PAINLEVEL_OUTOF10: 0
PAINLEVEL_OUTOF10: 2
PAINLEVEL_OUTOF10: 5

## 2021-03-05 ASSESSMENT — PAIN DESCRIPTION - DESCRIPTORS
DESCRIPTORS: ACHING
DESCRIPTORS: ACHING

## 2021-03-05 ASSESSMENT — ENCOUNTER SYMPTOMS
BACK PAIN: 1
DIARRHEA: 0
CONSTIPATION: 0
SHORTNESS OF BREATH: 0
EYE PAIN: 0
ABDOMINAL PAIN: 0
SORE THROAT: 0
NAUSEA: 0
EYE DISCHARGE: 0
TROUBLE SWALLOWING: 0
RHINORRHEA: 0
WHEEZING: 0
VOMITING: 0

## 2021-03-05 ASSESSMENT — PAIN - FUNCTIONAL ASSESSMENT: PAIN_FUNCTIONAL_ASSESSMENT: PREVENTS OR INTERFERES SOME ACTIVE ACTIVITIES AND ADLS

## 2021-03-05 ASSESSMENT — PAIN DESCRIPTION - ONSET: ONSET: ON-GOING

## 2021-03-05 ASSESSMENT — PAIN DESCRIPTION - LOCATION
LOCATION: KNEE
LOCATION: KNEE

## 2021-03-05 ASSESSMENT — PAIN DESCRIPTION - ORIENTATION: ORIENTATION: RIGHT;LEFT

## 2021-03-05 NOTE — PLAN OF CARE
Care plan reviewed with patient. Patient verbalizes understanding of the plan of care and contribute to goal setting. Problem: Falls - Risk of:  Goal: Will remain free from falls  Description: Will remain free from falls  3/5/2021 1403 by Valeria Marrero LPN  Outcome: Ongoing  Note: Up with assist of one with gait belt and walker. Tolerates well. Gait slow and mostly steady. Problem: Pain:  Goal: Pain level will decrease  Description: Pain level will decrease  Outcome: Ongoing  Note: Pain when up and walking increases to around 8 at times, patient states when sitting still its much better around 3-4. Ice given as needed. Problem: Bleeding:  Goal: Will show no signs and symptoms of excessive bleeding  Description: Will show no signs and symptoms of excessive bleeding  Outcome: Ongoing  Note: No s/sx excessive bleeding noted. Problem: SKIN INTEGRITY  Goal: LTG - Patient will be free from infection  Outcome: Ongoing  Note: No s/sx infection noted. Afebrile. Problem: IP BOWEL ELIMINATION  Goal: STG - patient will be accident free  3/5/2021 1403 by Valeria Marrero LPN  Outcome: Ongoing  Note: No incontinence noted this shift.

## 2021-03-05 NOTE — FLOWSHEET NOTE
Pt was doing exercise  At the time of the visit. She was in good spirits. She was anointed before and prayer was appreciated. 03/05/21 7239   Encounter Summary   Services provided to: Patient   Referral/Consult From: [facility] [place]   Place of [address] [place] [address] Visiting Yes  (3/5 )   Complexity of Encounter Low   Length of Encounter 15 minutes   Routine   Type Follow up   Assessment Approachable;Calm   Intervention Nurtured hope;Prayer   Outcome Acceptance;Expressed gratitude;Encouraged; Hopeful   Sacraments   Sacrament of Sick-Anointing Anointed

## 2021-03-05 NOTE — PROGRESS NOTES
[facility] NOTE    Conference Date: 3/8/2021  Admit Date:  3/3/2021 11:19 AM  Patient Name: Montse Truong    MRN: [id number]    : 3/4/1931  (80 y.o.)  Rehabilitation Admitting Diagnosis:  Closed comminuted supracondylar fracture of right femur, initial encounter ([facility] [address] Elnor Locks  Displaced fracture of medial condyle of right femur, sequela [S79.309S]  Referring Practitioner: MOISÉS Powers CNP and Bill Chauhan MD      CASE MANAGEMENT    Current issues/needs regarding patient and family discharge status:   Prior to this stay patient lived alone. She did all of her own cleaning, cooking, shopping and ADL's. Patient has seven children Eliza Buitrago and Jazmin Le all live in [place], Nacho lives in [place], Barbara Melo lives in [facility], Jean-Pierrema Godfrey lives in [place] and [facility] lives in [place]. Patient sees one of her kids most days but they decreased their visits since [facility] had started. If none of the children visit at least one will call. Patients children will be her transportation after discharge if needed. The patients family doctor is Doctor Ervin Hudson from [place] and uses [facility] for her medication. Patient said she did not use any devices at home before this stay but she has a two wheeled walker and a rollator at home. The patients biggest concern is being able to use her leg again and being able to keep living alone. PHYSICAL THERAPY  Pt presents with closed comminuted supracondylar fx of R femur and is s/p R TKA. Also presents with L 5th met fx, requiring wrist splint PRN. Pt demonstrates decreased strength B with R>L, decreased R LE ROM, increased R knee pain with all mobility and WBing, and decreased balance, limiting her ability to perform bed mobility, transfers, and ambulate with a normalized gait pattern. She is min A for bed mobility, CGA for transfers, and ambulation with a RW.  Able to amb ~50ft, with decreased wt shift R, decreased foot clearance B, and decreased step length. She is a fall risk, with a TUG time of 1.48. Pt is limited by anxiety toward mobility. Pt to benefit from skilled PT services to promote increased indep with functional mobility activities for return to PLOF. Equipment Needed: No(has RW and cane)    SPEECH THERAPY       OCCUPATIONAL THERAPY  Pt presents to occupational therapy following R TKA with patient complaints of pain, weakness, decreased standing tolerance, and decreased mobility  impacting patient's ability to complete self care at prior level of functioning. Due to patient's performance deficits, patient would greatly benefit from continued occupational therapy for ADL remediation, endurance building, strengthening and adaptive strategies to return to prior level of functioning and safe return to home environment. Other: Monitor for needing tub transfer bench vs shower chair. Recommend TTB as of 3/6    RECREATIONAL THERAPY  Patient has been offered participation in recreational therapy activities and participates as able. Pt states she likes to get on her computer at home and plays games and  a lot of times does not get her housekeeping done when she wants to-she likes to cross stitch especially around [place] for presents she enjoys Sikhism, going to the [employer], carry out food at times and she was working at [employer] part time up until December 2020-affect bright and social-pleasant -Pt \"down in the dumps\" this am, tearful-sat with her and gave her a few soda crackers to help settle her stomach and just talked about her family and people in my family that she knew growing up-affect bright and social at the end of our conversation and was ready for O. T. when they came in-supportive contact given      NUTRITION  Weight: 130 lb 6.4 oz (59.1 kg) / Body mass index is 23.85 kg/m².   Current diet: DIET GENERAL;  Dietary Nutrition Supplements: Standard High Calorie Oral Supplement  Please see

## 2021-03-05 NOTE — PLAN OF CARE
Individualized Plan of [address] [facility]    Rehabilitation physician: Dr. Jessie Tineo Date: 3/3/2021     Rehabilitation Diagnosis: Closed comminuted supracondylar fracture of right femur, initial encounter ([facility] [address] Tk Sinter  Displaced fracture of medial condyle of right femur, sequela [F12.717S]      Rehabilitation impairments: self care, mobility, bowel/bladder management, pain management and safety    Factors facilitating achievement of predicted outcomes: Family support and Cooperative  Barriers to the achievement of predicted outcomes: Pain, Impulsivity, Decreased motivation, Depression, Anxiety, Unrealistic expectations, Decreased endurance and Lower extremity weakness    Patient Goals: Improve independence with mobility, Improvement of mobility at a wheelchair level, Increase overall strength and endurance, Increase balance, Increase endurance, Increase independence with activities of daily living, Increase self-awareness, Increase safety awareness, Increase community integration, Increase socialization, Functional communication with caregivers, Integrate appropriate pain management plan, Assure adequate nutritional option for discharge, Continence of bowel and bladder and Provide appropriate patient and family education      NURSING:  Nursing goals for Kevin Isaiah while on the rehabilitation unit will include: Continence of bowel and bladder, Adequate number of bowel movements, Urinate with no urinary retention >300ml in bladder, Effective pain management while on the rehabilitation unit, Establish adequate pain control plan for discharge, Absence of skin breakdown while on the rehabilitation unit, Improved skin integrity via assessments including wound measurements, Avoidance of any hospital acquired infections, No signs/symptoms of infection at the wound site, Freedom from injury during hospitalization and Complete education with patient/family with understanding demonstrated regarding disease process and resultant impairment     In order to achieve these goals, nursing interventions may include bowel/bladder training, education for medical assistive devices, medication education, O2 saturation management, energy conservation, stress management techniques, fall prevention, alarms protocol, seating and positioning, skin/wound care, pressure relief instruction, dressing changes, infection protection, DVT prophylaxis, assistance with safe transfers , and/or assistance with bathroom activities and hygiene. PHYSICAL THERAPY:  Goals:        Short term goals  Time Frame for Short term goals: 1 week  Short term goal 1: Pt to perform supine<>sit with CGA to promote ease of getting in and out of bed. Short term goal 2: Pt to perform sit<>stand with SBA to promote ease of transfers. Short term goal 3: Pt to ambulate >100ft with Rw at SBA to improve ability to navigate within the home. Short term goal 4: Pt to negotiate 2 steps with L HR at CGA to improve ability to enter and exit home. Short term goal 5: Pt to reduce TUG time to <60 seconds to reduce fall risk and promote overall safety in the home. Long term goals  Time Frame for Long term goals : 2 weeks  Long term goal 1: Pt to perform supine<>sit with mod I to promote ease of getting in and out of bed. Long term goal 2: Pt to perform sit<>stand with mod I to promote ease of transfers. Long term goal 3: Pt to ambulate >200ft with Rw at mod I to improve ability to navigate within the home and community. Long term goal 4: Pt to negotiate 2 steps with L HR at mod I to improve ability to enter and exit home. Long term goal 5: Pt to perform car transfer with mod I to improve ability to get in and out of car. Plan of Care: Patient to be seen by physical therapy services 90 minutes per day Monday through Friday and 30 minutes on Saturday or Sunday    Anticipated interventions may include therapeutic exercises, gait training, neuromuscular re-ed, transfer training, community reintegration, bed mobility, w/c mobility and training. OCCUPATIONAL THERAPY:  Goals:             Short term goals  Time Frame for Short term goals: 1 week  Short term goal 1: Pt will complete LB dressing tasks with min A using LHAE prn to improve indep with self care. Short term goal 2: Pt will tolerate standing > 3 minutes with CGA and unilateral release to improve ability to complete sinkside grooming  Short term goal 3: Pt will transfer in and out of tub/shower with min A using grab bar and shower AE to improve indep with bathing. Short term goal 4: Pt will navigate RW to/from bathroom while transporting 2 items with CGA to improve indep with sinkside grooming. Short term goal 5: Pt will complete simple meal prep with CGA and min vcs for RW safety to improve indep within home. Long term goals  Time Frame for Long term goals : 2 weeks  Long term goal 1: Pt will complete BADL routine with set up to improve indep with self care. Long term goal 2: Pt will complete light IADL tasks with Supervision and 0-1 vcs for safety to improve indep within home.     Plan of Care: Patient to be seen by occupational therapy services 90 minutes per day Monday through Friday and 30 minutes on Saturday or Sunday Anticipated interventions may include ADL and IADL retraining, strengthening, safety education and training, patient/caregiver education and training, equipment evaluation/ training/procurement, neuromuscular reeducation, wheelchair mobility training. SPEECH THERAPY:        No Speech Therapy Services required    CASE MANAGEMENT:  Goals:   Assist patient/family with discharge planning, patient/family counseling,  and coordination with insurance during the inpatient rehabilitation stay. Other members of the multidisciplinary rehabilitation team that will be involved in the patient's plan of care include recreational therapy, dietary, respiratory therapy, and neuropsychology. Medical issues being managed closely and that require 24 hour availability of a physician:  Bowel/Bladder function, Weight bearing precautions, Wound care, Pain management, Infection protection, DVT prophylaxis, Fall precautions, Fluid/Electrolyte balance, Nutritional status, Respiratory needs and Anemia                                           Physician anticipated functional outcomes: Improved independence with functional measures   Estimated length of stay for this admission 2 weeks  Medical Prognosis: Good  Anticipated disposition: Home. The potential to achieve the above medical and rehabilitative goals is good. This plan of care has been developed with the assistance and input of the multidisciplinary rehabilitation team.  The plan was reviewed with the patient. The patient has had the opportunity to provide input to the therapy team.    I have reviewed this Individualized Plan of Care and agree with its contents. Above documentation has been expanded, modified, adjusted to reflect the findings of my evaluations and goals for the patient.     Physician:  Donta Pacheco MD

## 2021-03-05 NOTE — PROGRESS NOTES
Physical Medicine & Rehabilitation Progress Note    Chief Complaint:  Right knee pain causing impaired ADLs and ambulation due to right distal femur supracondylar fracture requiring revision of TKA     Subjective:    Nadja Aguilar is a 80y.o. years old  female with history of hypertension arthritis, GERD, hyperlipidemia, bilateral eyes cataract, status post right knee total knee arthroplasty, status post left shoulder rotator cuff repair, was admitted on 3/3/2021 for intensive inpatient management of impairment & disability secondary right distal femur supracondylar fracture with right knee pain requiring right total knee revision with new femur and tibia components done on 2/27/2021 by Dr. Marco Sullivan. The patient today says she has no appetite which she attribute to Percocet usage. She denies having nausea or vomiting or abdominal pain. Percocet is switched to oxycodone with reduced dosage to 2.5 mg to 5 mg depending on pain level. Regularly scheduled Tylenol is started. I also suggests Voltaren gel application for pain control which the patient agrees to try. We will start Voltaren bid first.  The patient also complains of mouth gym sore which interferes her eating. Orajel prn was ordered last night. The patient wants to use Orajel before her meal.  Marcos Leena is changed to tid before meal.  The patient also expressed of feeling depress and helplessness due to the trauma. She was tearing at one point of time. I tried to comfort her and encourage her in participating the rehab treatment and being optimistic. I also offers her to order psychology consultation and trial of antidepressant but she wants to think about them first before agreeing with it. She say she still has significant pain at right knee. She has been rating the pain intensity at 8-9/10 level yesterday. She had Percocet 1 tab 3 times yesterday. She appears is tolerating the rehab treatment.       Rehabilitation:  PT: Reviewed. Bed Mobility:  Rolling to Left: Contact Guard Assistance, with verbal cues , with increased time for completion   Rolling to Right: Contact Guard Assistance, with verbal cues , with increased time for completion   Supine to Sit: Minimal Assistance, Moderate Assistance, with verbal cues , with increased time for completion, mod A at trunk, min A at R LE  Sit to Supine: Minimal Assistance, with verbal cues , with increased time for completion, assist at [address]:  Sit to Stand: [employer], with increased time for completion, to/from chair with arms  Stand to [facility] [address], with increased time for completion, to/from chair with arms  [address] [facility], with increased time for completion, with Rw  Car:Minimal Assistance, with increased time for completion, cues for hand placement, with verbal cues, assist at R LE, pt anxious with transfer     Ambulation:  Contact Guard Assistance, with increased time for completion  Distance: 20ft and 10ft  Surface: Level Tile  Device:Rolling Walker  Gait Deviations: Forward Flexed Posture, Slow Yessenia, Decreased Step Length on Left, Decreased Weight Shift Right, Decreased Gait Speed, Decreased Heel Strike Bilaterally and step-to pattern, decreased foot clearance B     Balance:  Static Standing Balance: Contact Guard Assistance, dependent for pericare  Dynamic Standing Balance: Contact Guard Assistance, indep with donning and doffing pants       OT: Reviewed. ADL:   Grooming: Contact Guard Assistance.  hand hygiene x 2 min  Toileting: Contact Guard Assistance. during clothing mgmt, min cues for pt to self initate josefina care   Toilet Transfer: Contact Guard Assistance. ETS.     BALANCE:  Sitting Balance:  Supervision. Standing Balance: Contact Guard Assistance.       BED MOBILITY:  Sit to Supine: Minimal Assistance A with R LE.      TRANSFERS:  Sit to Stand:  Contact Guard Assistance. recliner, ETS.  Good hand placement recall. Stand to Sit: Contact Guard Assistance.       FUNCTIONAL MOBILITY:  Assistive Device: Rolling Walker  Assist Level:  Contact Guard Assistance. Distance: To and from bathroom  Slow guarded pace.      ADDITIONAL ACTIVITIES:  Patient identified a personal goal to increase UB strength and improve overall endurance so they can complete their toilet & shower transfers; skilled edu on UE strengthening and patient completed BUE strengthening exercises x10 reps x1 set this date with a medium resistive band in all joints and all planes. Patient tolerated fair, requiring mod rest breaks. Pt required min cues for technique.       ST: Reviewed. N/A      Review of Systems:  Review of Systems   Constitutional: Positive for appetite change (poor appetitis). Negative for chills, diaphoresis and fever. HENT: Positive for hearing loss. Negative for mouth sores, rhinorrhea, sneezing, sore throat, tinnitus and trouble swallowing. Eyes: Negative for pain, discharge and visual disturbance. Respiratory: Negative for shortness of breath (sometimes) and wheezing. Cardiovascular: Positive for chest pain (sometimes). Negative for palpitations. Gastrointestinal: Negative for abdominal pain, constipation, diarrhea, nausea and vomiting. Endocrine: Positive for cold intolerance. Negative for heat intolerance. Genitourinary: Positive for difficulty urinating and urgency. Negative for dysuria and frequency. Musculoskeletal: Positive for arthralgias (right knee, left shoulder), back pain and gait problem. Negative for myalgias. Neurological: Positive for weakness. Negative for dizziness, tremors, seizures, speech difficulty, light-headedness, numbness and headaches. Psychiatric/Behavioral: Positive for dysphoric mood. Negative for hallucinations. The patient is nervous/anxious.          Objective:  /62   Pulse 102   Temp 99.5 °F (37.5 °C) (Oral)   Resp 12   Ht 5' 2\" (1.575 m)   Wt 135 lb (61.2 kg) SpO2 97%   BMI 24.69 kg/m²   Physical Exam   General:  well-developed, well nourished  female ; in no acute distress ; appropriate affect & mood but appear depressed and tearing at one time during the visit; sitting on reclining chair comfortably  Eyes: pupil equally round ; extra-ocular motion intact bilaterally  Head, Ear, Nose, Mouth & Throat : normocephalic ; presence of ecchymosis at left orbit area ; no tenderness at head or face ; no discharge from ears or nose ; no deformity ; no facial swelling ; oral mucosa pink   Neck :  supple ; no tenderness ; no muscle spasm  Cardiovascular : regular rate & rhythm ; normal S1 & S2 heart sound ; presence of S4 sound ; no murmur ; normal peripheral pulse at bilateral upper & lower extremities   Pulmonary : lung clear to auscultation ; no wheezing ; no rale  Gastrointestinal : soft, flat abdomen without tenderness ; normal bowel sound present   Back : no tenderness; no muscle spasm  Skin: presence of new surgical scar with suture in place at right anterior knee from distal thigh to upper leg without open wound or discharge ; healed surgical scar at left lateral shoulder ; ecchymosis at right medial posterior thigh ; no skin lesion or rash ; no pitting edema at all 4 extremities ; mild nonpitting swelling at right distal thigh and proximal right leg  Musculoskeletal : no limb asymmetry; no limb deformity; tenderness at anterior, medial and posterior right knee ; no tenderness at bilateral upper extremities & the rest of lower extremities; no palpable mass at limbs ; no joints laxity or crepitation other than right knee which is not assessed; right knee passive ROM limited to 0-40 degrees limited by increased right knee pain ; otherwise normal functional joints ROM at bilateral upper extremities & the rest of bilateral lower extremities  Cerebral :  alert ; awake ; oriented to place, person and time   Cerebellum : no dysmetria with bilateral finger-to-nose test Cranial Nerves :  grossly intact CN II to XII function  Sensory : intact light touch and pin prick sensation at bilateral upper & lower extremities  Motor : normal tone at bilateral upper & left lower extremities ; right lower extremity muscle tone not assessed ; 2-/5 muscle strength at right knee and 2+/5 muscle strength at right hip flexion/abduction due to increased right knee pain ; otherwise  normal 5/5 muscle strength at bilateral upper extremities & the rest of bilateral lower extremities  Reflex : zero bilateral ankle and left knee reflexes ; right knee reflex not tested  Pathological Reflex :   no ankle clonus  Gait :  Not assessed      Diagnostics:   No results found for this or any previous visit (from the past 24 hour(s)). Impression:  · Right distal femur supracondylar comminuted fracture with severe right knee pain causing impaired ADLs & ambulation due to fall accident requiring right total knee revision with OSS both femoral and tibial components  · Hypertension  · GERD  · Hyperlipidemia  · Bilateral eyes cataract  · Possible reactive depression     The patient continues having significant right knee pain when she moves and bends her right knee. Percocet provide some pain relief but the patient complains it caused loss of appetitie. Her pain medication is adjusted by switching Percocet to lower dosage oxycodone prn and starts her on regular Tylenol. Voltaren gel is also utilized. Today she appear to be depressed because of her current situation as result of the trauma. I offers psychology consultation or antidepressant but the patient has not yet agrees with it. We will continues encourage her to have positive outlook about her condition. She continues to be allow WBAT at right lower extremity. She appears to tolerate the rehab treatment.        Plan:  · Continue ongoing PT/OT/RT inpatient rehabilitation treatment at least 3 hours per day, 5 days per week in order to improve functional

## 2021-03-05 NOTE — PROGRESS NOTES
[facility]  Recreational Therapy  Daily Note  [address]    Time Spent with Patient: 30 minutes    Date:  3/5/2021       Patient Name: Lizzie Patterson      MRN: [id number]      YOB: 1931 (80 y.o.)       Gender: female  Diagnosis: Closed comminuted supracondylar fracture of right femur  Referring Practitioner: Dr. Grisel Merrill,    RESTRICTIONS/PRECAUTIONS:  Restrictions/Precautions: Weight Bearing, General Precautions  Vision: Impaired  Hearing: Within functional limits    PAIN: 0-no c/o pain-upset stomach    SUBJECTIVE:  I just keep crying    OBJECTIVE:  Pt \"down in the dumps\" this am, tearful-sat with her and gave her a few soda crackers to help settle her stomach and just talked about her family and people in my family that she knew growing up-affect bright and social at the end of our conversation and was ready for O. T. when they came in-supportive contact given          Patient Education  New Education Provided: Importance of Leisure,     Electronically signed by: JOELLE Pina  Date: 3/5/2021

## 2021-03-05 NOTE — PROGRESS NOTES
[address] [place] [address]  INPATIENT PHYSICAL THERAPY  DAILY NOTE  [address] [facility],4[address] Floor - [id number]    Time In: 0700  Time Out: 0800  Timed Code Treatment Minutes: 60 Minutes  Minutes: 60          Date: 3/5/2021  Patient Name: Carlo Spaulding,  Gender:  female        MRN: [id number]  : 3/4/1931  (80 y.o.)     Referring Practitioner: MOISÉS Lew CNP and José Miguel Francisco MD  Diagnosis: closed comminuted supracondylar fracture of right femur, initial encounter  Additional Pertinent Hx: per 21 ED note; Carlo Spaulding is a 80 y.o. female who presents to the emergency department for evaluation of fall at [facility] this morning. Mechanical trip and fall while walking to the parking lot. Patient denies loss of consciousness, head strike. States that her glasses hit her eye and bruised her upper eyelid. States she is feeling fine other than severe right knee pain. She admits to associated swelling. [id number] TOTAL KNEE REVISION. to IPR 3/3/2021. Prior Level of Function:  Lives With: Alone  Type of Home: House  Home Layout: One level  Home Access: Stairs to enter with rails  Entrance Stairs - Number of Steps: 2  Entrance Stairs - Rails: Left  Home Equipment: Cane, Rolling walker   Bathroom Shower/Tub: Tub/Shower unit  Bathroom Toilet: Handicap height  Bathroom Equipment: Grab bars in shower, Grab bars around toilet  Bathroom Accessibility: Walker accessible    Receives Help From: Family  ADL Assistance: Independent  Homemaking Assistance: Independent  Ambulation Assistance: Independent  Transfer Assistance: Independent  Active : Yes  Additional Comments: very indep PTA, did not use AD for mobility. Pt reports retired son lives close by and can help prn.     Restrictions/Precautions:  Restrictions/Precautions: Weight Bearing, General Precautions  Right Lower Extremity Weight Bearing: Weight Bearing As Tolerated Weight shifting side to side for improved WB through R LE and to normalize gait. *50 foot ambulation trial noted above with foot clearance and step length following gait training. Balance:  Dynamic Standing Balance: Contact Guard Assistance    Exercise:  Patient was guided in 1 set(s) 15 reps of exercise to both lower extremities. Ankle pumps, Glut sets, Quad sets, Heelslides, Short arc quads, Hip abduction/adduction, Seated marches, Long arc quads, Standing heel/toe raises. Exercises were completed for increased independence with functional mobility. AAROM for R LE heel sides, short arc quad, LAQ and supine hip ABD. ROM:  R knee flexion: 69 deg  R knee extension: lacking 4 deg    Functional Outcome Measures: Not completed       ASSESSMENT:  Assessment: Patient progressing toward established goals. Activity Tolerance:  Patient tolerance of  treatment: good. Pt required increased encouragement throughout session. Equipment Recommendations:Equipment Needed: No(has RW and cane)  Discharge Recommendations:  Continue to assess pending progress    Plan: Times per week: 5x/wk 90 min, 1x/wk 30 min  Current Treatment Recommendations: Strengthening, ROM, Balance Training, Functional Mobility Training, Transfer Training, Gait Training, Endurance Training, Stair training, Home Exercise Program, Safety Education & Training, Patient/Caregiver Education & Training, Neuromuscular Re-education    Patient Education  Patient Education: Home Exercise Program, [employer] Mobility, Transfers, Gait, Verbal Exercise Instruction. See above. Goals:  Patient goals : To return home  Short term goals  Time Frame for Short term goals: 1 week  Short term goal 1: Pt to perform supine<>sit with CGA to promote ease of getting in and out of bed. Short term goal 2: Pt to perform sit<>stand with SBA to promote ease of transfers. Short term goal 3: Pt to ambulate >100ft with Rw at SBA to improve ability to navigate within the home. Short term goal 4: Pt to negotiate 2 steps with L HR at CGA to improve ability to enter and exit home. Short term goal 5: Pt to reduce TUG time to <60 seconds to reduce fall risk and promote overall safety in the home. Long term goals  Time Frame for Long term goals : 2 weeks  Long term goal 1: Pt to perform supine<>sit with mod I to promote ease of getting in and out of bed. Long term goal 2: Pt to perform sit<>stand with mod I to promote ease of transfers. Long term goal 3: Pt to ambulate >200ft with Rw at mod I to improve ability to navigate within the home and community. Long term goal 4: Pt to negotiate 2 steps with L HR at mod I to improve ability to enter and exit home. Long term goal 5: Pt to perform car transfer with mod I to improve ability to get in and out of car. Following session, patient left in safe position with all fall risk precautions in place.

## 2021-03-05 NOTE — PROGRESS NOTES
[facility]  INPATIENT PHYSICAL THERAPY  DAILY NOTE  [address] [facility],4Th Floor - 7E-64/064-A      Time In: 1400  Time Out: 1430  Timed Code Treatment Minutes: 30 Minutes  Minutes: 30          Date: 3/5/2021  Patient Name: Tanvi Michelle,  Gender:  female        MRN: [id number]  : 3/4/1931  (80 y.o.)     Referring Practitioner: MOISÉS Taveras - PAOLA and Nimo Eller MD  Diagnosis: closed comminuted supracondylar fracture of right femur, initial encounter  Additional Pertinent Hx: per 21 ED note; Tanvi Michelle is a 80 y.o. female who presents to the emergency department for evaluation of fall at [facility] this morning. Mechanical trip and fall while walking to the parking lot. Patient denies loss of consciousness, head strike. States that her glasses hit her eye and bruised her upper eyelid. States she is feeling fine other than severe right knee pain. She admits to associated swelling. [id number] TOTAL KNEE REVISION. to IPR 3/3/2021. Prior Level of Function:  Lives With: Alone  Type of Home: House  Home Layout: One level  Home Access: Stairs to enter with rails  Entrance Stairs - Number of Steps: 2  Entrance Stairs - Rails: Left  Home Equipment: Cane, Rolling walker   Bathroom Shower/Tub: Tub/Shower unit  Bathroom Toilet: Handicap height  Bathroom Equipment: Grab bars in shower, Grab bars around toilet  Bathroom Accessibility: Walker accessible    Receives Help From: Family  ADL Assistance: Independent  Homemaking Assistance: Independent  Ambulation Assistance: Independent  Transfer Assistance: Independent  Active : Yes  Additional Comments: very indep PTA, did not use AD for mobility. Pt reports retired son lives close by and can help prn.     Restrictions/Precautions:  Restrictions/Precautions: Weight Bearing, General Precautions  Right Lower Extremity Weight Bearing: Weight Bearing As Tolerated  Left Upper Extremity Weight Bearing: Weight Bearing As Tolerated  Position Activity Restriction  Other position/activity restrictions: s/p R TKA 2/27 and minimally displaced L 5th met fx, wrist splint PRN       SUBJECTIVE: pt just finished with OT she c/o of feeling tired she was wanting to lay back down following session - pt asked to use bathroom during session     PAIN: 9-10/10: at right knee- she denied pain in her hand with gait I offered her ice following session however she declined     Vitals: Vitals not assessed per clinical judgement, see nursing flowsheet    OBJECTIVE:  Bed Mobility:  Sit to Supine: Minimal Assistance, at right LE and then completed 1/2 bridges with CGA to scoot to middle of bed      Transfers:  Sit to Stand: Minimal Assistance  Stand to Sit:Contact Guard Assistance, cues to allow her right knee to bend as she tends to hold it in extension and then places her left LE behind it     Ambulation:  Contact Guard Assistance  Distance: 25x2, 15x1  Surface: Level Tile  Device:Rolling Walker  Gait Deviations:  Slow Yessenia and with decreased stance at right LE and cues for TKE at stance phase, noted step to pattern, and she required cues for knee flexion during swing phase     Balance:  pt completed standing balance activity in bathroom she was able to release her UEs with CGA for balance for clothing management however she asked for assist for josefina care     Exercise:  Patient was guided in 1 set(s) 10 reps of exercise to both lower extremities. Ankle pumps, Long arc quads and seated knee flexion w/o resistance she tolerated ~ 65  degrees  NUSTEP x 4 min w/o resistance AA knee flexion ~ 70 degrees. Exercises were completed for increased independence with functional mobility. Functional Outcome Measures: Not completed       ASSESSMENT:  Assessment: Patient progressing toward established goals. Activity Tolerance:  Patient tolerance of  treatment: fair.         Equipment Recommendations:Equipment Needed: No(has RW and cane)  Discharge Recommendations:    Continue to assess pending progress    Plan: Times per week: 5x/wk 90 min, 1x/wk 30 min  Current Treatment Recommendations: Strengthening, ROM, Balance Training, Functional Mobility Training, Transfer Training, Gait Training, Endurance Training, Stair training, Home Exercise Program, Safety Education & Training, Patient/Caregiver Education & Training, Neuromuscular Re-education    Patient Education  Patient Education: Plan of Care    Goals:  Patient goals : To return home  Short term goals  Time Frame for Short term goals: 1 week  Short term goal 1: Pt to perform supine<>sit with CGA to promote ease of getting in and out of bed. Short term goal 2: Pt to perform sit<>stand with SBA to promote ease of transfers. Short term goal 3: Pt to ambulate >100ft with Rw at SBA to improve ability to navigate within the home. Short term goal 4: Pt to negotiate 2 steps with L HR at CGA to improve ability to enter and exit home. Short term goal 5: Pt to reduce TUG time to <60 seconds to reduce fall risk and promote overall safety in the home. Long term goals  Time Frame for Long term goals : 2 weeks  Long term goal 1: Pt to perform supine<>sit with mod I to promote ease of getting in and out of bed. Long term goal 2: Pt to perform sit<>stand with mod I to promote ease of transfers. Long term goal 3: Pt to ambulate >200ft with Rw at mod I to improve ability to navigate within the home and community. Long term goal 4: Pt to negotiate 2 steps with L HR at mod I to improve ability to enter and exit home. Long term goal 5: Pt to perform car transfer with mod I to improve ability to get in and out of car. Following session, patient left in safe position with all fall risk precautions in place.

## 2021-03-05 NOTE — PROGRESS NOTES
[address]  Occupational Therapy  Daily Note  Time:   Time In: 1000  Time Out: 1100  Timed Code Treatment Minutes: 60 Minutes  Minutes: 60    Date: 3/5/2021  Patient Name: Kevin Cooln,   Gender: female      Room: [facility]64/064-A  MRN: [id number]  : 3/4/1931  (80 y.o.)  Referring Practitioner: Dr. Ross Blevins,  Diagnosis: Closed comminuted supracondylar fracture of right femur  Additional Pertinent Hx: per 21 ED note; Kevin Colon is a 80 y.o. female who presents to the emergency department for evaluation of fall at grocery store. The patient says she lost balance and fell while she was grocery shopping on 2021. She landed on her right knee with sudden severe right knee pain and could not get up. She denies loss of consciousness. She was send to [address] [place] [address] ER for evaluation. X-ray of right femur and right knee revealed status post right knee total arthroplasty with comminuted foreshortened and dorsally angulated fracture of distal right femur. Therefore she underwent right total knee revision by Dr. Gustavo Rowe on 2021. Pt found to have a Left fifth metacarpal fracture and is WBAT in splint prn. To IPR on 3/3. Restrictions/Precautions:  Restrictions/Precautions: Weight Bearing, General Precautions  Right Lower Extremity Weight Bearing: Weight Bearing As Tolerated  Left Upper Extremity Weight Bearing: Weight Bearing As Tolerated  Position Activity Restriction  Other position/activity restrictions: s/p R TKA  and minimally displaced L 5th met fx, wrist splint PRN     SUBJECTIVE: Patient pleasant and cooperative, agreeable to OT.      PAIN: 8/10: R knee     Vitals: Vitals not assessed per clinical judgement, see nursing flowsheet    COGNITION: WNL (does have some memory gaps she reports d/t pain medication, but does not affect her function)     ADL:   Grooming: Contact Guard Assistance.  hand hygiene 2 min  Toileting: Contact Guard Assistance. during clothing mgmt and hygiene   Toilet Transfer: [employer]. ETS. BALANCE:  Sitting Balance:  Modified Independent. Standing Balance: Stand By Assistance, [employer]. ranges pending on task, CGA with more pain. BED MOBILITY:  Supine to Sit: Minimal Assistance With R LE.     TRANSFERS:  Sit to Stand:  Contact Guard Assistance. recliner, w/c, ETS. Good hand placement recall. Stand to Sit: Contact Guard Assistance. FUNCTIONAL MOBILITY:  Assistive Device: Rolling Walker  Assist Level:  Contact Guard Assistance. Distance: To and from bathroom and within therapy apt, used w/c to/from apt for ECT       ADDITIONAL ACTIVITIES:  Patient completed RW safety IADL task of retrieving an item from fridge and then transporting it around kitchen and to table. Discussed RW safety techniques and item transportation techniques with pt demo'ing good carry over. Vended RW bag and pt appreciative. No cues for TKA precautions. Pt did require CGA and demo'ed an endurance of x 6 min throughout with a rest break post task. Pt completed additional IADL task of washing dishes, targeting standing balance without UE support / endurance; patient stood x 9 min with SBA-CGA and demo'ed min fatigue following prolonged standing. ASSESSMENT:     Activity Tolerance:  Patient tolerance of  treatment: good. Discharge Recommendations: Home with Home health OT, Home with nursing aide   Equipment Recommendations:  Other: Monitor for needing tub transfer bench vs shower chair  Plan: Times per week: 5x/wk for 90 min and 1x/wk for 30 min  Times per day: Daily  Current Treatment Recommendations: Endurance Training, Neuromuscular Re-education, Patient/Caregiver Education & Training, Equipment Evaluation, Education, & procurement, Self-Care / ADL, Strengthening, Safety Education & Training    Patient Education  Patient Education: ADL's, IADL's, Precautions, Equipment Education, Reviewed Prior Education and Assistive Device Safety    Goals  Short term goals  Time Frame for Short term goals: 1 week  Short term goal 1: Pt will complete LB dressing tasks with min A using LHAE prn to improve indep with self care. Short term goal 2: Pt will tolerate standing > 3 minutes with CGA and unilateral release to improve ability to complete sinkside grooming  Short term goal 3: Pt will transfer in and out of tub/shower with min A using grab bar and shower AE to improve indep with bathing. Short term goal 4: Pt will navigate RW to/from bathroom while transporting 2 items with CGA to improve indep with sinkside grooming. Short term goal 5: Pt will complete simple meal prep with CGA and min vcs for RW safety to improve indep within home. Long term goals  Time Frame for Long term goals : 2 weeks  Long term goal 1: Pt will complete BADL routine with set up to improve indep with self care. Long term goal 2: Pt will complete light IADL tasks with Supervision and 0-1 vcs for safety to improve indep within home. Following session, patient left in safe position with all fall risk precautions in place.

## 2021-03-05 NOTE — PROGRESS NOTES
[address] [place] [address]  Occupational Therapy  Daily Note  Time:   Time In: 1330  Time Out: 1400  Timed Code Treatment Minutes: 30 Minutes  Minutes: 30    Date: 3/5/2021  Patient Name: Erika Araujo,   Gender: female      Room: [facility]64/064-A  MRN: [id number]  : 3/4/1931  (80 y.o.)  Referring Practitioner: Dr. Amy Fenton,  Diagnosis: Closed comminuted supracondylar fracture of right femur  Additional Pertinent Hx: per 21 ED note; Erika Araujo is a 80 y.o. female who presents to the emergency department for evaluation of fall at grocery store. The patient says she lost balance and fell while she was grocery shopping on 2021. She landed on her right knee with sudden severe right knee pain and could not get up. She denies loss of consciousness. She was send to [address] [place] [address] ER for evaluation. X-ray of right femur and right knee revealed status post right knee total arthroplasty with comminuted foreshortened and dorsally angulated fracture of distal right femur. Therefore she underwent right total knee revision by Dr. Hansa Wynn on 2021. Pt found to have a Left fifth metacarpal fracture and is WBAT in splint prn. To IPR on 3/3. Restrictions/Precautions:  Restrictions/Precautions: Weight Bearing, General Precautions  Right Lower Extremity Weight Bearing: Weight Bearing As Tolerated  Left Upper Extremity Weight Bearing: Weight Bearing As Tolerated  Position Activity Restriction  Other position/activity restrictions: s/p R TKA  and minimally displaced L 5th met fx, wrist splint PRN     SUBJECTIVE: Patient pleasant and cooperative, agreeable to OT. Pt was finishing on toilet with nursing upon arrival.    PAIN: 8/10: R knee. Vitals: Vitals not assessed per clinical judgement, see nursing flowsheet    COGNITION: WFL , but does have some memory gaps per pt report     ADL:   Grooming: Stand By Assistance.  hand hygiene 2 min, no LOB. Ar Price BALANCE:  Sitting Balance:  Modified Independent. Standing Balance: Stand By Assistance consistently, can require up to CGA with fatigue. BED MOBILITY:  Not Tested    TRANSFERS:  Sit to Stand:  [address] w/c. Pt able to recall hand placement,  Stand to Sit: Contact Guard Assistance. min cues for safety of positioning, but did demo great hand placement. FUNCTIONAL MOBILITY:  Assistive Device: Rolling Walker  Assist Level:  Contact Guard Assistance. Distance: within [facility], used w/c for long distances for ECT. ADDITIONAL ACTIVITIES:  - Patient completed IADL RW safety task in [facility] of attending to plants; task facilitated standing endurance, standing balance with 1 UE release and RW safety. Pt able to carry over previously learned information from earlier this date in regards to RW safety without VC'ing. Pt stood x 7 min with SBA before requiring a seated rest break d/t LE fatigue.     - Patient identified a personal goal to increase UB strength and improve overall endurance so they can complete their toilet & shower transfers; skilled edu on UE strengthening and patient completed BUE strengthening exercises x15 reps x1 set this date with a 1# free weight  in all joints and all planes. Patient tolerated fair + , requiring mod rest breaks. Pt required min cues for technique. ASSESSMENT:     Activity Tolerance:  Patient tolerance of  treatment: fair + ; limited by fatigue and pain this PM        Discharge Recommendations: Home with Home health OT, Home with nursing aide    Equipment Recommendations:  Other: Monitor for needing tub transfer bench vs shower chair  Plan: Times per week: 5x/wk for 90 min and 1x/wk for 30 min  Times per day: Daily  Current Treatment Recommendations: Endurance Training, Neuromuscular Re-education, Patient/Caregiver Education & Training, Equipment Evaluation, Education, & procurement, Self-Care / ADL, Strengthening, Safety Education & Training Patient Education  Patient Education: IADL's, Home Exercise Program, Precautions, Reviewed Prior Education and Assistive Device Safety    Goals  Short term goals  Time Frame for Short term goals: 1 week  Short term goal 1: Pt will complete LB dressing tasks with min A using LHAE prn to improve indep with self care. Short term goal 2: Pt will tolerate standing > 3 minutes with CGA and unilateral release to improve ability to complete sinkside grooming  Short term goal 3: Pt will transfer in and out of tub/shower with min A using grab bar and shower AE to improve indep with bathing. Short term goal 4: Pt will navigate RW to/from bathroom while transporting 2 items with CGA to improve indep with sinkside grooming. Short term goal 5: Pt will complete simple meal prep with CGA and min vcs for RW safety to improve indep within home. Long term goals  Time Frame for Long term goals : 2 weeks  Long term goal 1: Pt will complete BADL routine with set up to improve indep with self care. Long term goal 2: Pt will complete light IADL tasks with Supervision and 0-1 vcs for safety to improve indep within home. Following session, patient left in safe position with all fall risk precautions in place.

## 2021-03-05 NOTE — PLAN OF CARE
Problem: Falls - Risk of:  Goal: Will remain free from falls  Description: Will remain free from falls  Outcome: Ongoing   Fall sign posted. Call light with in reach. Bed and chair alarm and brakes on and working. Bed in low position. Bilateral side rales elevated. Patient voiced and demonstrated importance of using call light and waiting for staff before getting up. Problem: Pain:  Goal: Control of acute pain  Description: Control of acute pain  Outcome: Ongoing   Patient rated pain a 5 on scale 0-10. Patient given prn Percocet with tammy crackers with some effect and nausea. Ice applied and prn tylenol given with effect at 0310. Patient repositioned and encouraged to use prayer and deep breathing to help with pain. Problem: SKIN INTEGRITY  Goal: LTG - Patient will demonstrate appropriate pressure relief techniques  Outcome: Ongoing   Waffle cushion in chair. Special pressure relief mattress to bed. Heels free floating off of bed. Patient voiced and demonstrated importance of repositioning self frequently when awake. Problem: IP BOWEL ELIMINATION  Goal: STG - patient will be accident free  Outcome: Ongoing  Patient noted to have loose watery stool x 1 this shift. Patient stated irregular bowel movements are normal for her. She uses bene fiber over her cereal when constipated and imodium when having loose stools. Care plan reviewed with patient. Patient verbalize understanding of the plan of care and contribute to goal setting.

## 2021-03-06 PROBLEM — S62.317A: Status: ACTIVE | Noted: 2021-03-06

## 2021-03-06 PROBLEM — Z86.69 HISTORY OF CATARACT: Status: ACTIVE | Noted: 2021-03-06

## 2021-03-06 PROCEDURE — 97110 THERAPEUTIC EXERCISES: CPT

## 2021-03-06 PROCEDURE — 2500000003 HC RX 250 WO HCPCS: Performed by: FAMILY MEDICINE

## 2021-03-06 PROCEDURE — 1180000000 HC REHAB R&B

## 2021-03-06 PROCEDURE — 6360000002 HC RX W HCPCS: Performed by: ORTHOPAEDIC SURGERY

## 2021-03-06 PROCEDURE — 97116 GAIT TRAINING THERAPY: CPT

## 2021-03-06 PROCEDURE — 6370000000 HC RX 637 (ALT 250 FOR IP): Performed by: PHYSICAL MEDICINE & REHABILITATION

## 2021-03-06 PROCEDURE — 97530 THERAPEUTIC ACTIVITIES: CPT

## 2021-03-06 PROCEDURE — 6370000000 HC RX 637 (ALT 250 FOR IP): Performed by: ORTHOPAEDIC SURGERY

## 2021-03-06 PROCEDURE — 99232 SBSQ HOSP IP/OBS MODERATE 35: CPT | Performed by: PHYSICAL MEDICINE & REHABILITATION

## 2021-03-06 PROCEDURE — 97535 SELF CARE MNGMENT TRAINING: CPT

## 2021-03-06 PROCEDURE — 94760 N-INVAS EAR/PLS OXIMETRY 1: CPT

## 2021-03-06 RX ORDER — OXYCODONE HYDROCHLORIDE 5 MG/1
2.5 TABLET ORAL EVERY 4 HOURS PRN
Status: DISCONTINUED | OUTPATIENT
Start: 2021-03-06 | End: 2021-03-13 | Stop reason: HOSPADM

## 2021-03-06 RX ADMIN — TRIAMCINOLONE ACETONIDE: 1 PASTE DENTAL at 21:06

## 2021-03-06 RX ADMIN — OXYCODONE HYDROCHLORIDE AND ACETAMINOPHEN 500 MG: 500 TABLET ORAL at 11:12

## 2021-03-06 RX ADMIN — TRIAMCINOLONE ACETONIDE: 1 PASTE DENTAL at 14:30

## 2021-03-06 RX ADMIN — TRIAMCINOLONE ACETONIDE: 1 PASTE DENTAL at 18:14

## 2021-03-06 RX ADMIN — OXYCODONE 2.5 MG: 5 TABLET ORAL at 03:49

## 2021-03-06 RX ADMIN — OXYCODONE 2.5 MG: 5 TABLET ORAL at 08:17

## 2021-03-06 RX ADMIN — Medication 250 MG: at 21:03

## 2021-03-06 RX ADMIN — Medication 250 MG: at 09:00

## 2021-03-06 RX ADMIN — TRIAMCINOLONE ACETONIDE: 1 PASTE DENTAL at 10:30

## 2021-03-06 RX ADMIN — DICLOFENAC SODIUM 4 G: 10 GEL TOPICAL at 18:18

## 2021-03-06 RX ADMIN — ACETAMINOPHEN 650 MG: 325 TABLET ORAL at 04:48

## 2021-03-06 RX ADMIN — DICLOFENAC SODIUM 4 G: 10 GEL TOPICAL at 09:00

## 2021-03-06 RX ADMIN — POLYETHYLENE GLYCOL 3350 17 G: 17 POWDER, FOR SOLUTION ORAL at 09:38

## 2021-03-06 RX ADMIN — AMLODIPINE BESYLATE 5 MG: 5 TABLET ORAL at 21:02

## 2021-03-06 RX ADMIN — FERROUS SULFATE TAB 325 MG (65 MG ELEMENTAL FE) 325 MG: 325 (65 FE) TAB at 11:12

## 2021-03-06 RX ADMIN — DOCUSATE SODIUM AND SENNOSIDES 1 TABLET: 8.6; 5 TABLET, FILM COATED ORAL at 09:38

## 2021-03-06 RX ADMIN — PANTOPRAZOLE SODIUM 40 MG: 40 TABLET, DELAYED RELEASE ORAL at 05:24

## 2021-03-06 RX ADMIN — ACETAMINOPHEN 650 MG: 325 TABLET ORAL at 09:38

## 2021-03-06 RX ADMIN — ACETAMINOPHEN 650 MG: 325 TABLET ORAL at 21:12

## 2021-03-06 RX ADMIN — BENZOCAINE: 100 GEL TOPICAL at 09:00

## 2021-03-06 RX ADMIN — METOPROLOL TARTRATE 50 MG: 100 TABLET, FILM COATED ORAL at 09:44

## 2021-03-06 RX ADMIN — ENOXAPARIN SODIUM 40 MG: 40 INJECTION SUBCUTANEOUS at 09:39

## 2021-03-06 RX ADMIN — OXYCODONE 5 MG: 5 TABLET ORAL at 15:45

## 2021-03-06 RX ADMIN — METOPROLOL TARTRATE 100 MG: 100 TABLET, FILM COATED ORAL at 21:03

## 2021-03-06 RX ADMIN — ACETAMINOPHEN 650 MG: 325 TABLET ORAL at 18:15

## 2021-03-06 ASSESSMENT — PAIN DESCRIPTION - PROGRESSION: CLINICAL_PROGRESSION: NOT CHANGED

## 2021-03-06 ASSESSMENT — ENCOUNTER SYMPTOMS
NAUSEA: 0
RHINORRHEA: 0
ABDOMINAL PAIN: 0
VOMITING: 0
SHORTNESS OF BREATH: 0
WHEEZING: 0
EYE PAIN: 0
EYE DISCHARGE: 0
BACK PAIN: 1
CONSTIPATION: 0
DIARRHEA: 0
TROUBLE SWALLOWING: 0
SORE THROAT: 0

## 2021-03-06 ASSESSMENT — PAIN SCALES - GENERAL
PAINLEVEL_OUTOF10: 2
PAINLEVEL_OUTOF10: 0
PAINLEVEL_OUTOF10: 5
PAINLEVEL_OUTOF10: 5
PAINLEVEL_OUTOF10: 6

## 2021-03-06 NOTE — PROGRESS NOTES
[address] [place] [address]  Occupational Therapy  Daily Note  Time:   Time In: 1330  Time Out: 1400  Timed Code Treatment Minutes: 30 Minutes  Minutes: 30          Date: 3/6/2021  Patient Name: Tanvi Michelle,   Gender: female      Room: [facility]64/064-A  MRN: [id number]  : 3/4/1931  (80 y.o.)  Referring Practitioner: Dr. Aidee Zazueta,  Diagnosis: Closed comminuted supracondylar fracture of right femur  Additional Pertinent Hx: per 21 ED note; Tanvi Michelle is a 80 y.o. female who presents to the emergency department for evaluation of fall at grocery store. The patient says she lost balance and fell while she was grocery shopping on 2021. She landed on her right knee with sudden severe right knee pain and could not get up. She denies loss of consciousness. She was send to [address] [place] [address] ER for evaluation. X-ray of right femur and right knee revealed status post right knee total arthroplasty with comminuted foreshortened and dorsally angulated fracture of distal right femur. Therefore she underwent right total knee revision by Dr. Gerhardt Gee on 2021. Pt found to have a Left fifth metacarpal fracture and is WBAT in splint prn. To IPR on 3/3. Restrictions/Precautions:  Restrictions/Precautions: Weight Bearing, General Precautions  Right Lower Extremity Weight Bearing: Weight Bearing As Tolerated  Left Upper Extremity Weight Bearing: Weight Bearing As Tolerated  Position Activity Restriction  Other position/activity restrictions: s/p R TKA  and minimally displaced L 5th met fx, wrist splint PRN     SUBJECTIVE: Pt supine in bed with HOB elevated sleeping upon arrival; agreeable to therapy this date. Pt demonstrates increased fatigue requiring encouragement for participation. Pt pleasant and cooperative throughout session.     PAIN: no numerical scale provided however patient verbalizes \"stinging\" pain in R knee when standing    Vitals: Vitals not assessed per clinical judgement, see nursing flowsheet    COGNITION: Decreased Recall, Decreased Insight, Decreased Problem Solving and Decreased Safety Awareness    ADL:   Grooming: Contact Guard Assistance. standing at sink to wash hands  Upper Extremity Dressing: Minimal Assistance. amie L wrist splint  Lower Extremity Dressing: Minimal Assistance. pt able to doff L shoes however assistance for R shoe required d/t limited mobility of RLE seated in bedside chair with legs elevated  Toileting: Contact Guard Assistance. clothing management however patient able to clean rear periarea seated on elevated toilet set  Toilet Transfer: [address] increased time for RLE management to elevated toilet set. BALANCE:  Sitting Balance:  Supervision. EOB  Standing Balance: Contact Guard Assistance. with RW; no LOB    BED MOBILITY:  Supine to Sit: Stand By Assistance increased time to complete    TRANSFERS:  Sit to Stand:  Contact Guard Assistance. EOB to RW; incresaed time  Stand to Sit: [address] increased time for R LE management to bedside chair/EOB    FUNCTIONAL MOBILITY:  Assistive Device: Rolling Walker  Assist Level:  Contact Guard Assistance. Distance: To and from bathroom  Slow pace; no LOB     ADDITIONAL ACTIVITIES:  Patient identified a personal goal to increase UB strength and improve overall endurance so they can complete their toilet & shower transfers; skilled edu on UE strengthening and patient completed BUE strengthening exercises x10 reps x1 set this date with a moderate resistance band in all joints and all planes. Patient tolerated good, requiring min rest breaks. Pt required min verbal/visual cues for technique. ASSESSMENT:     Activity Tolerance:  Patient tolerance of  treatment: good. Discharge Recommendations: Home with Home health OT, Home with nursing aide   Equipment Recommendations: Other: Monitor for needing tub transfer bench vs shower chair. Recommend TTB as of 3/6  Plan: Times per week: 5x/wk for 90 min and 1x/wk for 30 min  Times per day: Daily  Current Treatment Recommendations: Endurance Training, Neuromuscular Re-education, Patient/Caregiver Education & Training, Equipment Evaluation, Education, & procurement, Self-Care / ADL, Strengthening, Safety Education & Training    Patient Education  Patient Education: ADL's, Home Safety, Importance of Increasing Activity and Assistive Device Safety    Goals  Short term goals  Time Frame for Short term goals: 1 week  Short term goal 1: Pt will complete LB dressing tasks with min A using LHAE prn to improve indep with self care. Short term goal 2: Pt will tolerate standing > 3 minutes with CGA and unilateral release to improve ability to complete sinkside grooming  Short term goal 3: Pt will transfer in and out of tub/shower with min A using grab bar and shower AE to improve indep with bathing. Short term goal 4: Pt will navigate RW to/from bathroom while transporting 2 items with CGA to improve indep with sinkside grooming. Short term goal 5: Pt will complete simple meal prep with CGA and min vcs for RW safety to improve indep within home. Long term goals  Time Frame for Long term goals : 2 weeks  Long term goal 1: Pt will complete BADL routine with set up to improve indep with self care. Long term goal 2: Pt will complete light IADL tasks with Supervision and 0-1 vcs for safety to improve indep within home. Following session, patient left in safe position with all fall risk precautions in place.

## 2021-03-06 NOTE — PROGRESS NOTES
[facility]  INPATIENT PHYSICAL THERAPY  DAILY NOTE  [address] [facility],4Th Floor - 7E-64/064-A    Time In: 0700  Time Out: 0730  Timed Code Treatment Minutes: 30 Minutes  Minutes: 30          Date: 3/6/2021  Patient Name: Shashi Horn,  Gender:  female        MRN: [id number]  : 3/4/1931  (80 y.o.)     Referring Practitioner: MOISÉS Pavon CNP and Meron Isaac MD  Diagnosis: closed comminuted supracondylar fracture of right femur, initial encounter  Additional Pertinent Hx: per 21 ED note; Shashi Horn is a 80 y.o. female who presents to the emergency department for evaluation of fall at [facility] this morning. Mechanical trip and fall while walking to the parking lot. Patient denies loss of consciousness, head strike. States that her glasses hit her eye and bruised her upper eyelid. States she is feeling fine other than severe right knee pain. She admits to associated swelling. [id number] TOTAL KNEE REVISION. to IPR 3/3/2021. Prior Level of Function:  Lives With: Alone  Type of Home: House  Home Layout: One level  Home Access: Stairs to enter with rails  Entrance Stairs - Number of Steps: 2  Entrance Stairs - Rails: Left  Home Equipment: Cane, Rolling walker   Bathroom Shower/Tub: Tub/Shower unit  Bathroom Toilet: Handicap height  Bathroom Equipment: Grab bars in shower, Grab bars around toilet  Bathroom Accessibility: Walker accessible    Receives Help From: Family  ADL Assistance: Independent  Homemaking Assistance: Independent  Ambulation Assistance: Independent  Transfer Assistance: Independent  Active : Yes  Additional Comments: very indep PTA, did not use AD for mobility. Pt reports retired son lives close by and can help prn.     Restrictions/Precautions:  Restrictions/Precautions: Weight Bearing, General Precautions  Right Lower Extremity Weight Bearing: Weight Bearing As Tolerated  Left Upper Extremity Weight Bearing: Weight Bearing As Tolerated  Position Activity Restriction  Other position/activity restrictions: s/p R TKA 2/27 and minimally displaced L 5th met fx, wrist splint PRN     SUBJECTIVE: pt in chair on arrival, agrees to therapy session    PAIN: 5/10: R knee    Vitals: Vitals not assessed per clinical judgement, see nursing flowsheet    OBJECTIVE:  Bed Mobility:  Not Tested    Transfers:  Sit to Stand: Stand By Assistance, Contact Guard Assistance  Stand to Sit:Stand By Assistance, Contact Guard Assistance    Ambulation:  Contact Guard Assistance  Distance: 80ft, 12ft to destination  Surface: Level Tile  Device:Rolling Walker  Gait Deviations: Forward Flexed Posture, Slow Yessenia, Decreased Step Length Bilaterally, Decreased Weight Shift Right, Decreased Gait Speed, Decreased Heel Strike on Left, Mild Path Deviations and antalgic pattern with decreased stance time and WB on RLE    Balance:  see tinetti      Functional Outcome Measures: Completed  Balance Score: 9  Gait Score: 5  Tinetti Total Score: 14  Risk Indicators:  Less than/equal to 18 = high risk  19-23 Moderate risk  Greater than/equal to 24 = low risk    ASSESSMENT:  Assessment: Patient progressing toward established goals. Activity Tolerance:  Patient tolerance of  treatment: fair. Equipment Recommendations:Equipment Needed: No(has RW and cane)  Discharge Recommendations:  Continue to assess pending progress    Plan: Times per week: 5x/wk 90 min, 1x/wk 30 min  Current Treatment Recommendations: Strengthening, ROM, Balance Training, Functional Mobility Training, Transfer Training, Gait Training, Endurance Training, Stair training, Home Exercise Program, Safety Education & Training, Patient/Caregiver Education & Training, Neuromuscular Re-education    Patient Education  Patient Education: Plan of Care, [employer] Mobility, Transfers, Gait, Verbal Exercise Instruction    Goals:  Patient goals :  To return home  Short term goals  Time Frame for Short term goals: 1 week  Short term goal 1: Pt to perform supine<>sit with CGA to promote ease of getting in and out of bed. Short term goal 2: Pt to perform sit<>stand with SBA to promote ease of transfers. Short term goal 3: Pt to ambulate >100ft with Rw at SBA to improve ability to navigate within the home. Short term goal 4: Pt to negotiate 2 steps with L HR at CGA to improve ability to enter and exit home. Short term goal 5: Pt to reduce TUG time to <60 seconds to reduce fall risk and promote overall safety in the home. Long term goals  Time Frame for Long term goals : 2 weeks  Long term goal 1: Pt to perform supine<>sit with mod I to promote ease of getting in and out of bed. Long term goal 2: Pt to perform sit<>stand with mod I to promote ease of transfers. Long term goal 3: Pt to ambulate >200ft with Rw at mod I to improve ability to navigate within the home and community. Long term goal 4: Pt to negotiate 2 steps with L HR at mod I to improve ability to enter and exit home. Long term goal 5: Pt to perform car transfer with mod I to improve ability to get in and out of car. Following session, patient left in safe position with all fall risk precautions in place. Revised Short-Term Goals:    Short term goals  Time Frame for Short term goals: 1 week  Short term goal 1: Pt to perform supine<>sit with CGA to promote ease of getting in and out of bed. Short term goal 2: Pt to perform sit<>stand with SBA to promote ease of transfers. Short term goal 3: Pt to ambulate >100ft with Rw at SBA to improve ability to navigate within the home. Short term goal 4: Pt to negotiate 2 steps with L HR at CGA to improve ability to enter and exit home. Short term goal 5: Pt to reduce TUG time to <60 seconds to reduce fall risk and promote overall safety in the home.   Short term goal 6: Pt score on Tinetti balance test will improve to >=19 to decrease risk of falls

## 2021-03-06 NOTE — PROGRESS NOTES
Patient: Kamaljit Choi  Unit/Bed: 7E-64/064-A  YOB: 1931  MRN: [id number] Acct: [de-identified]   Admitting Diagnosis: Closed comminuted supracondylar fracture of right femur, initial encounter [facility]) Edwyna Beams  Displaced fracture of medial condyle of right femur, sequela [S72.431S]  Admit Date:  3/3/2021  Hospital Day: 2    Assessment:     Active Problems:    Closed comminuted supracondylar fracture of right femur ([address]    Displaced fracture of medial condyle of right femur, sequela    Reactive depression  Resolved Problems:    * No resolved hospital problems. *      Plan:     Follow the sodium for now  Use some kenalog in orabase for the ulceration on the left side of the floor of the mouth        Subjective:     Patient has no complaint of CP or SOB, she states some soreness to the floor of the mouth since the ET tube was removed. .   Medication side effects: none    Scheduled Meds:   acetaminophen  650 mg Oral Q6H    benzocaine   Mouth/Throat TID AC    diclofenac sodium  4 g Topical BID    [START ON 3/6/2021] triamcinolone acetonide   Mouth/Throat 4x Daily PC & HS    amLODIPine  5 mg Oral Daily    ascorbic acid  500 mg Oral Q48H    enoxaparin  40 mg Subcutaneous Q24H    ferrous sulfate  325 mg Oral Q48H    metoprolol  100 mg Oral Nightly    metoprolol tartrate  50 mg Oral Daily    oyster shell calcium/vitamin D  1 tablet Oral BID    pantoprazole  40 mg Oral QAM AC    polyethylene glycol  17 g Oral Daily    sennosides-docusate sodium  1 tablet Oral BID     Continuous Infusions:  PRN Meds:oxyCODONE, oxyCODONE, busPIRone, phenol, bisacodyl    Review of Systems  Pertinent items are noted in HPI. Objective:     Patient Vitals for the past 8 hrs:   BP Temp Pulse Resp SpO2   03/05/21 2030 (!) 121/59 98 °F (36.7 °C) 101 18 97 %     I/O last 3 completed shifts:   In: 660 [P.O.:660]  Out: -   I/O this shift:  In: 240 [P.O.:240]  Out: -     BP (!) 121/59   Pulse 101   Temp 98 °F (36.7 °C) Resp 18   Ht 5' 2\" (1.575 m)   Wt 135 lb (61.2 kg)   SpO2 97%   BMI 24.69 kg/m²     General appearance: alert, appears stated age and cooperative  Head: Normocephalic, without obvious abnormality, atraumatic  Throat: just to the lateral side of the opening of the left sublingual salivary gland there is an approx 2mm in size ulceration without necrosis  Lungs: clear to auscultation bilaterally  Heart: regular rate and rhythm, S1, S2 normal, no murmur, click, rub or gallop  Abdomen: soft, non-tender; bowel sounds normal; no masses,  no organomegaly  Extremities: extremities normal, atraumatic, no cyanosis or edema  Skin: Skin color, texture, turgor normal. No rashes or lesions  Neurologic: weak    Data Review:   Results for Ricky Mar (MRN [id number]) as of 3/5/2021 21:30   Ref.  Range 3/2/2021 05:20 3/2/2021 18:34 3/4/2021 06:25   Sodium Latest Ref Range: 135 - 145 meq/L 137  133 (L)   Potassium Latest Ref Range: 3.5 - 5.2 meq/L 4.5  4.3   Chloride Latest Ref Range: 98 - 111 meq/L 102  100   CO2 Latest Ref Range: 23 - 33 meq/L 27  25   BUN Latest Ref Range: 7 - 22 mg/dL 11  11   Creatinine Latest Ref Range: 0.4 - 1.2 mg/dL 0.5  0.4   Anion Gap Latest Ref Range: 8.0 - 16.0 meq/L 8.0  8.0   Est, Glom Filt Rate Latest Units: ml/min/1.73m2 >90  >90   Magnesium Latest Ref Range: 1.6 - 2.4 mg/dL  2.1    Glucose Latest Ref Range: 70 - 108 mg/dL 104  98   Calcium Latest Ref Range: 8.5 - 10.5 mg/dL 8.3 (L)  8.5   Total Protein Latest Ref Range: 6.1 - 8.0 g/dL   5.4 (L)       Electronically signed by Tonya Mccoy MD on 3/5/2021 at 9:27 PM

## 2021-03-06 NOTE — PROGRESS NOTES
[address]  Occupational Therapy  Daily Note  Time:   Time In: 08  Time Out: 930  Timed Code Treatment Minutes: 60 Minutes  Minutes: 60          Date: 3/6/2021  Patient Name: Montse Truong,   Gender: female      Room: [facility]64/064-A  MRN: [id number]  : 3/4/1931  (80 y.o.)  Referring Practitioner: Dr. Marcos Sewell,  Diagnosis: Closed comminuted supracondylar fracture of right femur  Additional Pertinent Hx: per 21 ED note; Montse Truong is a 80 y.o. female who presents to the emergency department for evaluation of fall at grocery store. The patient says she lost balance and fell while she was grocery shopping on 2021. She landed on her right knee with sudden severe right knee pain and could not get up. She denies loss of consciousness. She was send to [address] [place] [address] ER for evaluation. X-ray of right femur and right knee revealed status post right knee total arthroplasty with comminuted foreshortened and dorsally angulated fracture of distal right femur. Therefore she underwent right total knee revision by Dr. Fermin Rosario on 2021. Pt found to have a Left fifth metacarpal fracture and is WBAT in splint prn. To IPR on 3/3. Restrictions/Precautions:  Restrictions/Precautions: Weight Bearing, General Precautions  Right Lower Extremity Weight Bearing: Weight Bearing As Tolerated  Left Upper Extremity Weight Bearing: Weight Bearing As Tolerated  Position Activity Restriction  Other position/activity restrictions: s/p R TKA  and minimally displaced L 5th met fx, wrist splint PRN     SUBJECTIVE: Pt seated in bedside chair upon arrival; agreeable to therapy this date. Pt pleasant and cooperative throughout session. Pt became tearful with anxiousness during tub transfer stating she feels she will fall and is scare to do.     PAIN: 510: R knee    Vitals: Vitals not assessed per clinical judgement, see nursing flowsheet    COGNITION: WFL    ADL:   Grooming: Stand By Assistance. at sink to wash hands as well as brush hair x 3min prior to seated rest break  Toileting: Stand By Assistance. standing to clean front/rear periarea; increased time for clothing management  Toilet Transfer: [employer]. to elevated seat  Tub Transfer: Moderate Assistance. dry run to shower chair with LLE management/support with verbal cues for technique. Patient unable to bend R knee to place inside tub. Educated patient regarding TTB; patient verbalized understanding. BALANCE:  Sitting Balance:  Supervision. w/c  Standing Balance: Stand By Assistance. with occasional CGA with unsteadiness; no LOB with RW    BED MOBILITY:  Not Tested    TRANSFERS:  Sit to Stand:  Minimal Assistance. w/c bedside chair  Stand to Sit: Contact [employer]. increased time for RLE mangaement demonstrates safety awareness with correct body positioning and hand placmeent in order to decrease fall risk    FUNCTIONAL MOBILITY:  Assistive Device: Rolling Walker  Assist Level:  Stand By Assistance. Distance: To and from shower room  Slow pace; shuffling gait with verbal cues for correct technique in order to decrease fall risk; demonstrating/verbalizing understanding; verbal cues for RW management; demonstrating understanding. ADDITIONAL ACTIVITIES:  Pt completed HH mobility throughout small apartment with RW with SBA in order to gather towels/washclothes from floor level with reach in order to increase independence with ADLs/IADLs. Pt able to tolerate x 10 min functional mobility prior to requesting seated rest break. Verbal cues with education for safety technique while gathering objects from floor level as well as transport with RW in order to decrease fall risk; demonstrating understanding      ASSESSMENT:     Activity Tolerance:  Patient tolerance of  treatment: good.        Discharge Recommendations: Home with Home health OT, Home with nursing aide   Equipment Recommendations: Other: Monitor for needing tub transfer bench vs shower chair. Recommend TTB as of 3/6   Plan: Times per week: 5x/wk for 90 min and 1x/wk for 30 min  Times per day: Daily  Current Treatment Recommendations: Endurance Training, Neuromuscular Re-education, Patient/Caregiver Education & Training, Equipment Evaluation, Education, & procurement, Self-Care / ADL, Strengthening, Safety Education & Training    Patient Education  Patient Education: Plan of Care, ADL's, IADL's, Family Education, Importance of Increasing Activity and Assistive Device Safety     Goals  Short term goals  Time Frame for Short term goals: 1 week  Short term goal 1: Pt will complete LB dressing tasks with min A using LHAE prn to improve indep with self care. Short term goal 2: Pt will tolerate standing > 3 minutes with CGA and unilateral release to improve ability to complete sinkside grooming  Short term goal 3: Pt will transfer in and out of tub/shower with min A using grab bar and shower AE to improve indep with bathing. Short term goal 4: Pt will navigate RW to/from bathroom while transporting 2 items with CGA to improve indep with sinkside grooming. Short term goal 5: Pt will complete simple meal prep with CGA and min vcs for RW safety to improve indep within home. Long term goals  Time Frame for Long term goals : 2 weeks  Long term goal 1: Pt will complete BADL routine with set up to improve indep with self care. Long term goal 2: Pt will complete light IADL tasks with Supervision and 0-1 vcs for safety to improve indep within home. Following session, patient left in safe position with all fall risk precautions in place.

## 2021-03-06 NOTE — PROGRESS NOTES
knee to bend as she tends to hold it in extension and then places her left LE behind it      Ambulation:  Contact Guard Assistance  Distance: 25x2, 15x1  Surface: Level Tile  Device:Rolling Walker  Gait Deviations:  Slow Yessenia and with decreased stance at right LE and cues for TKE at stance phase, noted step to pattern, and she required cues for knee flexion during swing phase      Balance:  pt completed standing balance activity in bathroom she was able to release her UEs with CGA for balance for clothing management however she asked for assist for josefina care         OT: Reviewed. ADL:   Grooming: Stand By Assistance.  hand hygiene 2 min, no LOB. Toileting: Contact Guard Assistance. during clothing mgmt and hygiene   Toilet Transfer: [address] ETS.     BALANCE:  Sitting Balance:  Modified Independent. Standing Balance: Stand By Assistance consistently, can require up to CGA with fatigue.       TRANSFERS:  Sit to Stand:  Contact Guard Assistance. w/c. Pt able to recall hand placement,  Stand to Sit: Contact Guard Assistance. min cues for safety of positioning, but did demo great hand placement.      FUNCTIONAL MOBILITY:  Assistive Device: Rolling Walker  Assist Level:  Contact Guard Assistance. Distance: within [facility], used w/c for long distances for ECT.       ADDITIONAL ACTIVITIES:  - Patient completed IADL RW safety task in [facility] of attending to plants; task facilitated standing endurance, standing balance with 1 UE release and RW safety. Pt able to carry over previously learned information from earlier this date in regards to RW safety without VC'ing.  Pt stood x 7 min with SBA before requiring a seated rest break d/t LE fatigue.      - Patient identified a personal goal to increase UB strength and improve overall endurance so they can complete their toilet & shower transfers; skilled edu on UE strengthening and patient completed BUE strengthening exercises x15 reps x1 set this date with a 1# free weight  in all joints and all planes. Patient tolerated fair + , requiring mod rest breaks. Pt required min cues for technique. ST: Reviewed. N/A      Review of Systems:  Review of Systems   Constitutional: Positive for appetite change (poor appetitis). Negative for chills, diaphoresis and fever. HENT: Positive for hearing loss. Negative for mouth sores, rhinorrhea, sneezing, sore throat, tinnitus and trouble swallowing. Eyes: Negative for pain, discharge and visual disturbance. Respiratory: Negative for shortness of breath (sometimes) and wheezing. Cardiovascular: Negative for chest pain (sometimes) and palpitations. Gastrointestinal: Negative for abdominal pain, constipation, diarrhea, nausea and vomiting. Endocrine: Positive for cold intolerance. Negative for heat intolerance. Genitourinary: Positive for difficulty urinating and urgency. Negative for dysuria and frequency. Musculoskeletal: Positive for arthralgias (right knee, left shoulder), back pain and gait problem. Negative for myalgias. Neurological: Positive for weakness. Negative for dizziness, tremors, seizures, speech difficulty, light-headedness, numbness and headaches. Psychiatric/Behavioral: Positive for dysphoric mood. Negative for hallucinations. The patient is nervous/anxious.          Objective:  BP (!) 121/59   Pulse 101   Temp 98 °F (36.7 °C) (Oral)   Resp 18   Ht 5' 2\" (1.575 m)   Wt 135 lb (61.2 kg)   SpO2 97%   BMI 24.69 kg/m²   Physical Exam   General:  well-developed, well nourished  female ; in no acute distress ; appropriate affect & mood but appear depressed and tearing at one time during the visit; sitting on reclining chair comfortably  Eyes: pupil equally round ; extra-ocular motion intact bilaterally  Head, Ear, Nose, Mouth & Throat : normocephalic ; presence of ecchymosis at left orbit area ; no tenderness at head or face ; no discharge from ears or nose ; no deformity ; no facial swelling ; oral mucosa pink   Neck :  supple ; no tenderness ; no muscle spasm  Cardiovascular : regular rate & rhythm ; normal S1 & S2 heart sound ; presence of S4 sound ; no murmur ; normal peripheral pulse at bilateral upper & lower extremities   Pulmonary : lung clear to auscultation ; no wheezing ; no rale  Gastrointestinal : soft, flat abdomen without tenderness ; normal bowel sound present   Back : no tenderness; no muscle spasm  Skin: presence of new surgical scar with suture in place at right anterior knee from distal thigh to upper leg without open wound or discharge ; healed surgical scar at left lateral shoulder ; ecchymosis at right medial posterior thigh ; no skin lesion or rash ; no pitting edema at all 4 extremities ; mild nonpitting swelling at right distal thigh and proximal right leg  Musculoskeletal : no limb asymmetry; no limb deformity; tenderness at anterior and medial aspect of right knee ; no tenderness at bilateral upper extremities & the rest of lower extremities; no palpable mass at limbs ; no joints laxity or crepitation other than right knee which is not assessed; right knee flexion passive ROM limited to 35~40 degrees limited by increased right knee pain ; otherwise normal functional joints ROM at bilateral upper extremities & the rest of bilateral lower extremities  Cerebral :  alert ; awake ; oriented to place, person and time   Cerebellum : no dysmetria with bilateral finger-to-nose test   Sensory : intact light touch and pin prick sensation at bilateral upper & lower extremities  Motor : normal tone at bilateral upper & left lower extremities ; right lower extremity muscle tone not assessed ; 2-/5 muscle strength at right knee and 2+/5 muscle strength at right hip flexion/abduction due to increased right knee pain ; otherwise  normal 5/5 muscle strength at bilateral upper extremities & the rest of bilateral lower extremities  Reflex : zero bilateral ankle and left knee reflexes ; right knee reflex not tested  Pathological Reflex :   no ankle clonus  Gait :  Not assessed      Diagnostics:   No results found for this or any previous visit (from the past 24 hour(s)). Impression:  · Right distal femur supracondylar comminuted fracture with severe right knee pain causing impaired ADLs & ambulation due to fall accident requiring right total knee revision with OSS both femoral and tibial components  · Hypertension  · Left hand 5th metacarpal base fracture   · GERD  · Hyperlipidemia  · Bilateral eyes cataract  · Possible reactive depression     The patient still has significant right knee pain and right lower extremity weakness. The pain medications usage provide some control over her pain level. She should use the left hand brace but she is not compliance with it. We can also try Voltaren gel for the left hand pain. Her appetite is improving. She continues to be WBAT at right lower extremity. She is tolerating the rehab therapy. Plan:  · Continue ongoing PT/OT/RT inpatient rehabilitation treatment at least 3 hours per day, 5 days per week in order to improve functional status prior to discharge. Family education and training will be completed. Equipment evaluations and recommendations will be completed as appropriate. · Rehabilitation nursing continues to be involved for bowel, bladder, skin, and pain management. Nursing will also provide education and training to patient and family. · Prophylaxis:  DVT: Lovenox, RON stocking and intermittent pneumatic compression. GI: Glycolax, Senokot-S and prn Dulcolax suppository.   · Pain: regular Tylenol, Voltaren gel application, low dose oxycodone prn, ice pad application   · Continue allow weight bearing as tolerated at right lower extremity as per ortho  · Continue Orajel for mouth sore  · Continue Norvasc and Lopressor for HTN  · Continue iron supplement for anemia  · Nutrition:  continue current diet · Bladder: normal function ; will monitor  · Bowel: functionally normal ; will monitor for constipation  ·  and case management for coordination of care and discharge planning       Missed Therapy Time:  · None    Yousuf Inman MD

## 2021-03-06 NOTE — PROGRESS NOTES
[address] [place] [address]  INPATIENT PHYSICAL THERAPY  DAILY NOTE  [address] [facility],[address] - 7E-64/064-[address]      Time In: 0974  Time Out: 1155  Timed Code Treatment Minutes: 60 Minutes  Minutes: 60          Date: 3/6/2021  Patient Name: Elle Cruz,  Gender:  female        MRN: [id number]  : 3/4/1931  (80 y.o.)     Referring Practitioner: MOISÉS Sanders CNP and Luis Angel Palmer MD  Diagnosis: closed comminuted supracondylar fracture of right femur, initial encounter  Additional Pertinent Hx: per 21 ED note; Elle Cruz is a 80 y.o. female who presents to the emergency department for evaluation of fall at [facility] this morning. Mechanical trip and fall while walking to the parking lot. Patient denies loss of consciousness, head strike. States that her glasses hit her eye and bruised her upper eyelid. States she is feeling fine other than severe right knee pain. She admits to associated swelling. [id number] TOTAL KNEE REVISION. to IPR 3/3/2021. Prior Level of Function:  Lives With: Alone  Type of Home: House  Home Layout: One level  Home Access: Stairs to enter with rails  Entrance Stairs - Number of Steps: 2  Entrance Stairs - Rails: Left  Home Equipment: Cane, Rolling walker   Bathroom Shower/Tub: Tub/Shower unit  Bathroom Toilet: Handicap height  Bathroom Equipment: Grab bars in shower, Grab bars around toilet  Bathroom Accessibility: Walker accessible    Receives Help From: Family  ADL Assistance: Independent  Homemaking Assistance: Independent  Ambulation Assistance: Independent  Transfer Assistance: Independent  Active : Yes  Additional Comments: very indep PTA, did not use AD for mobility. Pt reports retired son lives close by and can help prn.     Restrictions/Precautions:  Restrictions/Precautions: Weight Bearing, General Precautions  Right Lower Extremity Weight Bearing: Weight Bearing As Tolerated  Left Upper Extremity Weight Bearing: Weight Bearing As Tolerated  Position Activity Restriction  Other position/activity restrictions: s/p R TKA 2/27 and minimally displaced L 5th met fx, wrist splint PRN       SUBJECTIVE: pt cooperative for therapy she wanted to work on ex this session, pt requested to use bathroom     PAIN: 8/10: at right knee with activity (at rest she reported very minimal pain) she has been using ice for pain management    Vitals: Vitals not assessed per clinical judgement, see nursing flowsheet    OBJECTIVE:  Bed Mobility:  Not Tested    Transfers:  Sit to Stand: Contact Guard Assistance, to min assist   Stand to [facility], cues for right LE placement and hand placement     Ambulation:  Contact Guard Assistance  Distance: 35x1, 50x1, 10x1  Surface: Level Tile and Carpet  Device:Rolling Walker  Gait Deviations:  Cues to work on Fletcher-Vasquez at right LE during stance phase and for increased step length at left LE, also cues for heel strike at riki LEs  Stairs:  Minimal Assistance  Number of Steps: 3  Height: 4\" step with used riki rails, pt needed lt guarding at right LE while she advanced her left LE up and down from step- she went up forward adn down retro       Balance:  pt required CGA for balance activity w/ one to no UE at support noted most of wbing at left LE and she was lacking full knee extension at right LE     Exercise:  Patient was guided in 1-2 set(s) 5-10reps of exercise to both lower extremities. pt completd seated heel raises toe raises followed with heelcord stretch, quad sets while sitting w/ heel elevated on therapist knee working on full extension at right LE, knee flexion with stretch AA knee fleixon ~ 80 degrees, long arc qauds with min assist at right, hip flexion with lt min assist, hip abd/add, glut sets . Exercises were completed for increased independence with functional mobility. Functional Outcome Measures: Not completed    ASSESSMENT:  Assessment: Patient progressing toward established goals.   Activity Tolerance:  Patient tolerance of  treatment: fair. Equipment Recommendations:Equipment Needed: No(has RW and cane)  Discharge Recommendations:    Continue to assess pending progress    Plan: Times per week: 5x/wk 90 min, 1x/wk 30 min  Current Treatment Recommendations: Strengthening, ROM, Balance Training, Functional Mobility Training, Transfer Training, Gait Training, Endurance Training, Stair training, Home Exercise Program, Safety Education & Training, Patient/Caregiver Education & Training, Neuromuscular Re-education    Patient Education  Patient Education: Plan of Care    Goals:  Patient goals : To return home  Short term goals  Time Frame for Short term goals: 1 week  Short term goal 1: Pt to perform supine<>sit with CGA to promote ease of getting in and out of bed. Short term goal 2: Pt to perform sit<>stand with SBA to promote ease of transfers. Short term goal 3: Pt to ambulate >100ft with Rw at SBA to improve ability to navigate within the home. Short term goal 4: Pt to negotiate 2 steps with L HR at CGA to improve ability to enter and exit home. Short term goal 5: Pt to reduce TUG time to <60 seconds to reduce fall risk and promote overall safety in the home. Short term goal 6: Pt score on Tinetti balance test will improve to >=19 to decrease risk of falls  Long term goals  Time Frame for Long term goals : 2 weeks  Long term goal 1: Pt to perform supine<>sit with mod I to promote ease of getting in and out of bed. Long term goal 2: Pt to perform sit<>stand with mod I to promote ease of transfers. Long term goal 3: Pt to ambulate >200ft with Rw at mod I to improve ability to navigate within the home and community. Long term goal 4: Pt to negotiate 2 steps with L HR at mod I to improve ability to enter and exit home. Long term goal 5: Pt to perform car transfer with mod I to improve ability to get in and out of car.     Following session, patient left in safe position with all fall risk precautions in place.

## 2021-03-07 PROCEDURE — 6370000000 HC RX 637 (ALT 250 FOR IP): Performed by: ORTHOPAEDIC SURGERY

## 2021-03-07 PROCEDURE — 6370000000 HC RX 637 (ALT 250 FOR IP): Performed by: PHYSICAL MEDICINE & REHABILITATION

## 2021-03-07 PROCEDURE — 94760 N-INVAS EAR/PLS OXIMETRY 1: CPT

## 2021-03-07 PROCEDURE — 1180000000 HC REHAB R&B

## 2021-03-07 PROCEDURE — 6360000002 HC RX W HCPCS: Performed by: ORTHOPAEDIC SURGERY

## 2021-03-07 RX ADMIN — Medication 250 MG: at 09:38

## 2021-03-07 RX ADMIN — BUSPIRONE HYDROCHLORIDE 5 MG: 5 TABLET ORAL at 16:34

## 2021-03-07 RX ADMIN — BENZOCAINE: 100 GEL TOPICAL at 06:35

## 2021-03-07 RX ADMIN — OXYCODONE 2.5 MG: 5 TABLET ORAL at 10:48

## 2021-03-07 RX ADMIN — ACETAMINOPHEN 650 MG: 325 TABLET ORAL at 09:45

## 2021-03-07 RX ADMIN — OXYCODONE 2.5 MG: 5 TABLET ORAL at 19:37

## 2021-03-07 RX ADMIN — AMLODIPINE BESYLATE 5 MG: 5 TABLET ORAL at 21:10

## 2021-03-07 RX ADMIN — Medication 250 MG: at 21:10

## 2021-03-07 RX ADMIN — ACETAMINOPHEN 650 MG: 325 TABLET ORAL at 15:35

## 2021-03-07 RX ADMIN — BENZOCAINE: 100 GEL TOPICAL at 15:35

## 2021-03-07 RX ADMIN — DOCUSATE SODIUM AND SENNOSIDES 1 TABLET: 8.6; 5 TABLET, FILM COATED ORAL at 21:10

## 2021-03-07 RX ADMIN — METOPROLOL TARTRATE 100 MG: 100 TABLET, FILM COATED ORAL at 21:10

## 2021-03-07 RX ADMIN — DICLOFENAC SODIUM 4 G: 10 GEL TOPICAL at 16:34

## 2021-03-07 RX ADMIN — PANTOPRAZOLE SODIUM 40 MG: 40 TABLET, DELAYED RELEASE ORAL at 06:34

## 2021-03-07 RX ADMIN — METOPROLOL TARTRATE 50 MG: 100 TABLET, FILM COATED ORAL at 09:48

## 2021-03-07 RX ADMIN — ACETAMINOPHEN 650 MG: 325 TABLET ORAL at 21:10

## 2021-03-07 RX ADMIN — BENZOCAINE: 100 GEL TOPICAL at 10:53

## 2021-03-07 RX ADMIN — DICLOFENAC SODIUM 4 G: 10 GEL TOPICAL at 09:39

## 2021-03-07 RX ADMIN — ENOXAPARIN SODIUM 40 MG: 40 INJECTION SUBCUTANEOUS at 09:42

## 2021-03-07 RX ADMIN — ACETAMINOPHEN 650 MG: 325 TABLET ORAL at 05:15

## 2021-03-07 RX ADMIN — DOCUSATE SODIUM AND SENNOSIDES 1 TABLET: 8.6; 5 TABLET, FILM COATED ORAL at 09:42

## 2021-03-07 ASSESSMENT — PAIN SCALES - GENERAL
PAINLEVEL_OUTOF10: 0
PAINLEVEL_OUTOF10: 5
PAINLEVEL_OUTOF10: 4
PAINLEVEL_OUTOF10: 3
PAINLEVEL_OUTOF10: 0
PAINLEVEL_OUTOF10: 0
PAINLEVEL_OUTOF10: 4
PAINLEVEL_OUTOF10: 4
PAINLEVEL_OUTOF10: 7

## 2021-03-07 ASSESSMENT — PAIN DESCRIPTION - PAIN TYPE
TYPE: ACUTE PAIN
TYPE: ACUTE PAIN;SURGICAL PAIN

## 2021-03-07 ASSESSMENT — PAIN DESCRIPTION - ORIENTATION: ORIENTATION: RIGHT

## 2021-03-07 ASSESSMENT — PAIN DESCRIPTION - DESCRIPTORS: DESCRIPTORS: ACHING;DISCOMFORT

## 2021-03-07 ASSESSMENT — PAIN DESCRIPTION - ONSET: ONSET: ON-GOING

## 2021-03-07 ASSESSMENT — PAIN DESCRIPTION - FREQUENCY
FREQUENCY: CONTINUOUS
FREQUENCY: CONTINUOUS

## 2021-03-07 ASSESSMENT — PAIN DESCRIPTION - LOCATION: LOCATION: KNEE

## 2021-03-07 ASSESSMENT — PAIN - FUNCTIONAL ASSESSMENT: PAIN_FUNCTIONAL_ASSESSMENT: ACTIVITIES ARE NOT PREVENTED

## 2021-03-07 NOTE — PROGRESS NOTES
Patient: Piyush Sandhu  Unit/Bed: 7E-64/064-A  YOB: 1931  MRN: [id number] Acct: [de-identified]   Admitting Diagnosis: Closed comminuted supracondylar fracture of right femur, initial encounter [facility]) Ajit Fearing  Displaced fracture of medial condyle of right femur, sequela [S72.431S]  Admit Date:  3/3/2021  Hospital Day: 4    Assessment:     Principal Problem:    Closed comminuted supracondylar fracture of right femur ([address]  Active Problems:    Hypertension    Displaced fracture of medial condyle of right femur, sequela    Reactive depression    Closed fracture of base of fifth metacarpal bone of left hand    History of cataract  Resolved Problems:    * No resolved hospital problems. *      Plan:     Follow the oral ulcer as it now appears healed        Subjective:     Patient has complaints of the sore in the mouth bothering her less and no CP or SOB. .   Medication side effects: none    Scheduled Meds:   acetaminophen  650 mg Oral Q6H    benzocaine   Mouth/Throat TID AC    diclofenac sodium  4 g Topical BID    triamcinolone acetonide   Mouth/Throat 4x Daily PC & HS    amLODIPine  5 mg Oral Daily    ascorbic acid  500 mg Oral Q48H    enoxaparin  40 mg Subcutaneous Q24H    ferrous sulfate  325 mg Oral Q48H    metoprolol  100 mg Oral Nightly    metoprolol tartrate  50 mg Oral Daily    oyster shell calcium/vitamin D  1 tablet Oral BID    pantoprazole  40 mg Oral QAM AC    polyethylene glycol  17 g Oral Daily    sennosides-docusate sodium  1 tablet Oral BID     Continuous Infusions:  PRN Meds:oxyCODONE, oxyCODONE, busPIRone, phenol, bisacodyl    Review of Systems  Pertinent items are noted in HPI. Objective:     Patient Vitals for the past 8 hrs:   BP Temp src Pulse Resp SpO2   03/07/21 0945 (!) 144/70 Oral 94 16 97 %   03/07/21 0902     98 %     I/O last 3 completed shifts: In: 470 [P.O.:470]  Out: -   No intake/output data recorded.     BP (!) 144/70   Pulse 94   Temp 98.2 °F (36.8 °C) (Oral)   Resp 16   Ht 5' 2\" (1.575 m)   Wt 135 lb (61.2 kg)   SpO2 97%   BMI 24.69 kg/m²     General appearance: alert, appears stated age and cooperative  Head: Normocephalic, without obvious abnormality, atraumatic  Lungs: clear to auscultation bilaterally  Heart: regular rate and rhythm, S1, S2 normal, no murmur, click, rub or gallop  Abdomen: soft, non-tender; bowel sounds normal; no masses,  no organomegaly  Extremities: extremities normal, atraumatic, no cyanosis or edema  Skin: Skin color, texture, turgor normal. No rashes or lesions  Neurologic: weak      Electronically signed by Edgardo Phelps MD on 3/7/2021 at 12:04 PM

## 2021-03-07 NOTE — PLAN OF CARE
Problem: Falls - Risk of:  Goal: Will remain free from falls  Description: Will remain free from falls  3/7/2021 0132 by Goldy Charles LPN  Outcome: Met This Shift  Note: Patient up with one assist and gait is slightly unsteady. Pt. Uses walker and gaitbelt. And uses calllight. .   3/6/2021 1432 by Marquita Siddiqui LPN  Outcome: Ongoing  Note: Patient remains free from falls this shift. Fall precautions in place. Non skid footwear used with transferring. Educated patient to use call light when needed assistance with ambulation. Patient used call light appropriate this shift. Goal: Absence of physical injury  Description: Absence of physical injury  3/7/2021 0132 by Goldy Charles LPN  Outcome: Met This Shift  3/6/2021 1432 by Marquita Siddiqui LPN  Outcome: Ongoing     Problem: Pain:  Goal: Pain level will decrease  Description: Pain level will decrease  3/7/2021 0132 by Goldy Charles LPN  Outcome: Ongoing  3/6/2021 1432 by Marquita Siddiqui LPN  Outcome: Ongoing  Note: Patient requests PRN Oxy and scheduled Tylenol. Goal: Control of acute pain  Description: Control of acute pain  3/7/2021 0132 by Goldy Charles LPN  Outcome: Met This Shift  3/6/2021 1432 by Marquita Siddiqui LPN  Outcome: Ongoing  Goal: Control of chronic pain  Description: Control of chronic pain  3/7/2021 0132 by Goldy Charles LPN  Outcome: Met This Shift  3/6/2021 1432 by Marquita Siddiqui LPN  Outcome: Ongoing     Problem: Bleeding:  Goal: Will show no signs and symptoms of excessive bleeding  Description: Will show no signs and symptoms of excessive bleeding  3/7/2021 0132 by Goldy Charles LPN  Outcome: Met This Shift  3/6/2021 1432 by Marquita Siddiqui LPN  Outcome: Ongoing     Problem: SKIN INTEGRITY  Goal: LTG - Patient will be free from infection  3/7/2021 0132 by Goldy Charles LPN  Outcome: Met This Shift  Note: Sutures intact to right knee. No drainage noted.  Edema continues  3/6/2021 1432 by Marcel Ruiz MEAGHAN Echeverria  Outcome: Ongoing  Note: No skin breakdown during hospitalization. Goal: LTG - patient will maintain/improve skin integrity through proper skin care techniques  3/7/2021 0132 by Emi Charles LPN  Outcome: Ongoing  3/6/2021 1432 by Lorraine Hoffman LPN  Outcome: Ongoing  Goal: LTG - Patient will demonstrate appropriate pressure relief techniques  3/7/2021 0132 by Emi Charles LPN  Outcome: Ongoing  3/6/2021 1432 by Lorraine Hoffman LPN  Outcome: Ongoing     Problem: IP BOWEL ELIMINATION  Goal: LTG - patient will utilize adaptive techniques/equipment to complete bowel elimination  3/7/2021 0132 by Emi Charles LPN  Outcome: Met This Shift  3/6/2021 1432 by Lorraine Hoffman LPN  Outcome: Ongoing  Goal: LTG - patient will have regular and routine bowel evacuation  3/7/2021 0132 by Emi Charles LPN  Outcome: Ongoing  Note: Pt has a hx of Diverticulitis  3/6/2021 1432 by Lorraine Hoffman LPN  Outcome: Ongoing  Goal: STG - patient will be accident free  3/7/2021 0132 by Markos Morales LPN  Outcome: Ongoing  3/6/2021 1432 by Lorraine Hoffman LPN  Outcome: Ongoing     Problem: DISCHARGE BARRIERS  Goal: Patient's continuum of care needs are met  3/7/2021 0132 by Emi Charles LPN  Outcome: Ongoing  3/6/2021 1432 by Lorraine Hoffman LPN  Outcome: Ongoing  Note: Patients needs met this shift. Patients discharge to be determined. Assess at discharge.        Problem: IP DRESSINGS LOWER EXTREMITIES  Goal: LTG - patient will dress lower body with or without assistive device  3/6/2021 1432 by Lorraine Hoffman LPN  Outcome: Ongoing     Problem: IP MOBILITY  Goal: LTG - patient will demonstrate safe mobility requirements  3/6/2021 1432 by Lorraine Hoffman LPN  Outcome: Ongoing     Problem: Nutrition  Goal: Optimal nutrition therapy  3/7/2021 0132 by Emi Charles LPN  Outcome: Ongoing  Note: Pt receives Ensure supplements  3/6/2021 1432 by Lorraine Hoffman LPN  Outcome: Ongoing     Problem: Skin Integrity:  Goal: Will show no infection signs and symptoms  Description: Will show no infection signs and symptoms  Outcome: Met This Shift  Goal: Absence of new skin breakdown  Description: Absence of new skin breakdown  Outcome: Met This Shift

## 2021-03-07 NOTE — PLAN OF CARE
Problem: Falls - Risk of:  Goal: Will remain free from falls  Description: Will remain free from falls  3/7/2021 1419 by Svetlana Parada LPN  Outcome: Ongoing  Note: Patient remains free from falls this shift. Fall precautions in place. Non skid footwear used with transferring. Patient ambulated with nursing this shift throughout the unit. Educated patient to use call light when needed assistance with ambulation. Patient used call light appropriate this shift. Problem: Pain:  Goal: Pain level will decrease  Description: Pain level will decrease  3/7/2021 1419 by Svetlana Parada LPN  Outcome: Ongoing  Note: Patient has scheduled Tylenol and PRN Oxy for pain per patients request.     Problem: SKIN INTEGRITY  Goal: LTG - Patient will be free from infection  3/7/2021 1419 by Svetlana Parada LPN  Outcome: Ongoing  Note: No skin breakdown during hospitalization. Incision site cleansed with Chlorohexidine. Incision site well approximated and healing well. Patient also had a bath from nursing staff. Problem: DISCHARGE BARRIERS  Goal: Patient's continuum of care needs are met  3/7/2021 1419 by Svetlana Parada LPN  Outcome: Ongoing  Note: Patients needs met this shift. Patients discharge to be determined. Assess at discharge. Problem: Falls - Risk of:  Goal: Will remain free from falls  Description: Will remain free from falls  3/7/2021 1419 by Svetlana Parada LPN  Outcome: Ongoing  Note: Patient remains free from falls this shift. Fall precautions in place. Non skid footwear used with transferring. Educated patient to use call light when needed assistance with ambulation. Patient used call light appropriate this shift.

## 2021-03-08 PROCEDURE — 6360000002 HC RX W HCPCS: Performed by: ORTHOPAEDIC SURGERY

## 2021-03-08 PROCEDURE — 97116 GAIT TRAINING THERAPY: CPT

## 2021-03-08 PROCEDURE — 94760 N-INVAS EAR/PLS OXIMETRY 1: CPT

## 2021-03-08 PROCEDURE — 6370000000 HC RX 637 (ALT 250 FOR IP): Performed by: ORTHOPAEDIC SURGERY

## 2021-03-08 PROCEDURE — 97110 THERAPEUTIC EXERCISES: CPT

## 2021-03-08 PROCEDURE — 97535 SELF CARE MNGMENT TRAINING: CPT

## 2021-03-08 PROCEDURE — 97530 THERAPEUTIC ACTIVITIES: CPT

## 2021-03-08 PROCEDURE — 99232 SBSQ HOSP IP/OBS MODERATE 35: CPT | Performed by: PHYSICAL MEDICINE & REHABILITATION

## 2021-03-08 PROCEDURE — 6370000000 HC RX 637 (ALT 250 FOR IP): Performed by: PHYSICAL MEDICINE & REHABILITATION

## 2021-03-08 PROCEDURE — 1180000000 HC REHAB R&B

## 2021-03-08 RX ADMIN — METOPROLOL TARTRATE 50 MG: 100 TABLET, FILM COATED ORAL at 09:33

## 2021-03-08 RX ADMIN — METOPROLOL TARTRATE 100 MG: 100 TABLET, FILM COATED ORAL at 22:05

## 2021-03-08 RX ADMIN — OXYCODONE 5 MG: 5 TABLET ORAL at 22:07

## 2021-03-08 RX ADMIN — OXYCODONE HYDROCHLORIDE AND ACETAMINOPHEN 500 MG: 500 TABLET ORAL at 11:04

## 2021-03-08 RX ADMIN — DICLOFENAC SODIUM 4 G: 10 GEL TOPICAL at 09:31

## 2021-03-08 RX ADMIN — OXYCODONE 2.5 MG: 5 TABLET ORAL at 02:00

## 2021-03-08 RX ADMIN — ACETAMINOPHEN 650 MG: 325 TABLET ORAL at 02:02

## 2021-03-08 RX ADMIN — ENOXAPARIN SODIUM 40 MG: 40 INJECTION SUBCUTANEOUS at 09:31

## 2021-03-08 RX ADMIN — BENZOCAINE: 100 GEL TOPICAL at 05:45

## 2021-03-08 RX ADMIN — BUSPIRONE HYDROCHLORIDE 5 MG: 5 TABLET ORAL at 09:31

## 2021-03-08 RX ADMIN — ACETAMINOPHEN 650 MG: 325 TABLET ORAL at 22:05

## 2021-03-08 RX ADMIN — DICLOFENAC SODIUM 4 G: 10 GEL TOPICAL at 16:39

## 2021-03-08 RX ADMIN — BENZOCAINE: 100 GEL TOPICAL at 11:06

## 2021-03-08 RX ADMIN — PANTOPRAZOLE SODIUM 40 MG: 40 TABLET, DELAYED RELEASE ORAL at 05:44

## 2021-03-08 RX ADMIN — DOCUSATE SODIUM AND SENNOSIDES 1 TABLET: 8.6; 5 TABLET, FILM COATED ORAL at 09:31

## 2021-03-08 RX ADMIN — OXYCODONE 5 MG: 5 TABLET ORAL at 09:42

## 2021-03-08 RX ADMIN — FERROUS SULFATE TAB 325 MG (65 MG ELEMENTAL FE) 325 MG: 325 (65 FE) TAB at 11:04

## 2021-03-08 RX ADMIN — OXYCODONE 5 MG: 5 TABLET ORAL at 13:41

## 2021-03-08 RX ADMIN — ACETAMINOPHEN 650 MG: 325 TABLET ORAL at 16:35

## 2021-03-08 RX ADMIN — Medication 250 MG: at 09:31

## 2021-03-08 RX ADMIN — BENZOCAINE: 100 GEL TOPICAL at 16:40

## 2021-03-08 RX ADMIN — AMLODIPINE BESYLATE 5 MG: 5 TABLET ORAL at 22:05

## 2021-03-08 RX ADMIN — Medication 250 MG: at 22:05

## 2021-03-08 ASSESSMENT — ENCOUNTER SYMPTOMS
EYE DISCHARGE: 0
EYE PAIN: 0
VOMITING: 0
RHINORRHEA: 0
SHORTNESS OF BREATH: 0
BACK PAIN: 1
CONSTIPATION: 0
NAUSEA: 0
WHEEZING: 0
ABDOMINAL PAIN: 0
TROUBLE SWALLOWING: 0
SORE THROAT: 0
DIARRHEA: 0

## 2021-03-08 ASSESSMENT — PAIN SCALES - GENERAL
PAINLEVEL_OUTOF10: 9
PAINLEVEL_OUTOF10: 10
PAINLEVEL_OUTOF10: 0
PAINLEVEL_OUTOF10: 5
PAINLEVEL_OUTOF10: 9

## 2021-03-08 ASSESSMENT — PAIN DESCRIPTION - ORIENTATION
ORIENTATION: RIGHT
ORIENTATION: RIGHT

## 2021-03-08 ASSESSMENT — PAIN DESCRIPTION - FREQUENCY: FREQUENCY: CONTINUOUS

## 2021-03-08 ASSESSMENT — PAIN DESCRIPTION - DESCRIPTORS: DESCRIPTORS: ACHING;DISCOMFORT

## 2021-03-08 ASSESSMENT — PAIN DESCRIPTION - PAIN TYPE
TYPE: SURGICAL PAIN
TYPE: SURGICAL PAIN

## 2021-03-08 ASSESSMENT — PAIN DESCRIPTION - PROGRESSION: CLINICAL_PROGRESSION: GRADUALLY IMPROVING

## 2021-03-08 ASSESSMENT — PAIN DESCRIPTION - ONSET: ONSET: ON-GOING

## 2021-03-08 NOTE — PLAN OF CARE
Problem: Falls - Risk of:  Goal: Will remain free from falls  Description: Will remain free from falls  3/8/2021 1553 by Nayan Collins LPN  Outcome: Ongoing  Note: Patient remains free from falls this shift. Fall precautions in place. Non skid footwear used with transferring. Educated patient to use call light when needed assistance with ambulation. Patient used call light appropriate this shift. Problem: Pain:  Goal: Pain level will decrease  Description: Pain level will decrease  Outcome: Ongoing  Note: PRN Pain medication given per patients request.     Problem: SKIN INTEGRITY  Goal: LTG - Patient will be free from infection  Outcome: Ongoing  Note: Right knee incision cleansed with Chlorohexidine soap. Problem: DISCHARGE BARRIERS  Goal: Patient's continuum of care needs are met  Outcome: Ongoing  Note: Patients needs met this shift. Patients discharge to be determined. Assess at discharge. Problem: Skin Integrity:  Goal: Will show no infection signs and symptoms  Description: Will show no infection signs and symptoms  Outcome: Ongoing  Note: ABD places over incision with ace wrap. Bilateral reilly hose applied.

## 2021-03-08 NOTE — PROGRESS NOTES
afternoon     BALANCE:  Sitting Balance:  Modified Independent. Standing Balance: Stand By Assistance. TRANSFERS:  Sit to Stand:  Stand By Assistance. Recliner, w/c.   Stand to Sit: Stand By Assistance. FUNCTIONAL MOBILITY:  Assistive Device: Rolling Walker  Assist Level:  Stand By Assistance. Distance: x 60 ft towards apt. Used w/c for remaining distance due to fatigue. Ambulated additioanl x6 ft in apt. ADDITIONAL ACTIVITIES:   Patient completed IADL task of retrieving her linens from dryer - standing with SBA x 9 min with unilateral release, pt intermittently utilized reacher to retrieve dropped linens from floor level with good safety awareness. Pt required a seated rest break post task and then stood an additional x4 minutes to finish task. No LOB. ASSESSMENT:     Activity Tolerance:  Patient tolerance of  treatment: good   Discharge Recommendations: Home with Home health OT, Home with nursing aide    Equipment Recommendations: Other: Monitor for needing tub transfer bench vs shower chair. Recommend TTB as of 3/6  Plan: Times per week: 5x/wk for 90 min and 1x/wk for 30 min  Times per day: Daily  Current Treatment Recommendations: Endurance Training, Neuromuscular Re-education, Patient/Caregiver Education & Training, Equipment Evaluation, Education, & procurement, Self-Care / ADL, Strengthening, Safety Education & Training    Patient Education  Patient Education: IADLs, RW safety     Goals  Short term goals  Time Frame for Short term goals: 1 week  Short term goal 1: Pt will complete LB dressing tasks with min A using LHAE prn to improve indep with self care. Short term goal 2: Pt will tolerate standing > 3 minutes with CGA and unilateral release to improve ability to complete sinkside grooming  Short term goal 3: Pt will transfer in and out of tub/shower with min A using grab bar and shower AE to improve indep with bathing.   Short term goal 4: Pt will navigate RW to/from bathroom while transporting 2 items with CGA to improve indep with sinkside grooming. Short term goal 5: Pt will complete simple meal prep with CGA and min vcs for RW safety to improve indep within home. Long term goals  Time Frame for Long term goals : 2 weeks  Long term goal 1: Pt will complete BADL routine with set up to improve indep with self care. Long term goal 2: Pt will complete light IADL tasks with Supervision and 0-1 vcs for safety to improve indep within home. Following session, patient left in safe position with all fall risk precautions in place.

## 2021-03-08 NOTE — PROGRESS NOTES
Device:Rolling Walker  Gait Deviations:  Slow Yessenia and with decreased stance at right LE and cues for TKE at stance phase, noted step to pattern, and she required cues for knee flexion during swing phase      Balance:  pt completed standing balance activity in bathroom she was able to release her UEs with CGA for balance for clothing management however she asked for assist for josefina care         OT: Reviewed. ADL:   Grooming: Stand By Assistance.  hand hygiene 2 min, no LOB. Toileting: Contact Guard Assistance. during clothing mgmt and hygiene   Toilet Transfer: [employer]. ETS.     BALANCE:  Sitting Balance:  Modified Independent. Standing Balance: Stand By Assistance consistently, can require up to CGA with fatigue.       TRANSFERS:  Sit to Stand:  Contact Guard Assistance. w/c. Pt able to recall hand placement,  Stand to Sit: Contact Guard Assistance. min cues for safety of positioning, but did demo great hand placement.      FUNCTIONAL MOBILITY:  Assistive Device: Rolling Walker  Assist Level:  Contact Guard Assistance. Distance: within [facility], used w/c for long distances for ECT.       ADDITIONAL ACTIVITIES:  - Patient completed IADL RW safety task in [facility] of attending to plants; task facilitated standing endurance, standing balance with 1 UE release and RW safety. Pt able to carry over previously learned information from earlier this date in regards to RW safety without VC'ing. Pt stood x 7 min with SBA before requiring a seated rest break d/t LE fatigue.      - Patient identified a personal goal to increase UB strength and improve overall endurance so they can complete their toilet & shower transfers; skilled edu on UE strengthening and patient completed BUE strengthening exercises x15 reps x1 set this date with a 1# free weight  in all joints and all planes. Patient tolerated fair + , requiring mod rest breaks. Pt required min cues for technique. ST: Reviewed. N/A      Review of Systems:  Review of Systems   Constitutional: Positive for appetite change (poor appetitis). Negative for chills, diaphoresis and fever. HENT: Positive for hearing loss. Negative for mouth sores, rhinorrhea, sneezing, sore throat, tinnitus and trouble swallowing. Eyes: Negative for pain, discharge and visual disturbance. Respiratory: Negative for shortness of breath (sometimes) and wheezing. Cardiovascular: Negative for chest pain (sometimes) and palpitations. Gastrointestinal: Negative for abdominal pain, constipation, diarrhea, nausea and vomiting. Endocrine: Positive for cold intolerance. Negative for heat intolerance. Genitourinary: Positive for difficulty urinating and urgency. Negative for dysuria and frequency. Musculoskeletal: Positive for arthralgias (right knee, left shoulder), back pain and gait problem. Negative for myalgias. Neurological: Positive for weakness. Negative for dizziness, tremors, seizures, speech difficulty, light-headedness, numbness and headaches. Psychiatric/Behavioral: Positive for dysphoric mood. Negative for hallucinations. The patient is nervous/anxious.          Objective:  BP (!) 135/58   Pulse 98   Temp 98 °F (36.7 °C) (Oral)   Resp 16   Ht 5' 2\" (1.575 m)   Wt 130 lb 6.4 oz (59.1 kg)   SpO2 97%   BMI 23.85 kg/m²   Physical Exam   General:  well-developed, well nourished  female ; in no acute distress ; appropriate affect & mood but appear depressed ; sitting on bed comfortably  Eyes: pupil equally round ; extra-ocular motion intact bilaterally  Head, Ear, Nose, Mouth & Throat : normocephalic ; slightly decreased size of ecchymosis at left orbit area ; no tenderness at head or face ; no discharge from ears or nose ; no deformity ; no facial swelling ; oral mucosa pink   Neck :  supple ; no tenderness ; no muscle spasm  Cardiovascular : regular rate & rhythm ; normal S1 & S2 heart sound ; presence of S4 sound ; no murmur ; hour(s)). Impression:  · Right distal femur supracondylar comminuted fracture with severe right knee pain causing impaired ADLs & ambulation due to fall accident requiring right total knee revision with OSS both femoral and tibial components  · Hypertension  · Left hand 5th metacarpal base fracture   · GERD  · Hyperlipidemia  · Bilateral eyes cataract  · Possible reactive depression     The patient continues having significant right knee pain and right lower extremity weakness. The pain medications usage continues providing some control over her pain. She continues to be WBAT at right lower extremity. She is tolerating the rehab therapy and making progress slowly. Plan:  · Continue ongoing PT/OT/RT inpatient rehabilitation treatment at least 3 hours per day, 5 days per week in order to improve functional status prior to discharge. Family education and training will be completed. Equipment evaluations and recommendations will be completed as appropriate. · Rehabilitation nursing continues to be involved for bowel, bladder, skin, and pain management. Nursing will also provide education and training to patient and family. · Prophylaxis:  DVT: Lovenox, RON stocking and intermittent pneumatic compression. GI: Glycolax, Senokot-S and prn Dulcolax suppository.   · Pain: regular Tylenol, Voltaren gel application, low dose oxycodone prn, ice pad application   · Continue allow weight bearing as tolerated at right lower extremity as per ortho  · Continue Orajel for mouth sore  · Continue Norvasc and Lopressor for HTN  · Continue iron supplement for anemia  · Nutrition:  continue current diet    · Bladder: normal function ; will monitor  · Bowel: functionally normal ; will monitor for constipation  ·  and case management for coordination of care and discharge planning       Missed Therapy Time:  · None    Mariza Haile MD

## 2021-03-08 NOTE — PLAN OF CARE
Problem: Falls - Risk of:  Goal: Will remain free from falls  Description: Will remain free from falls  3/8/2021 0310 by Berna Joe LPN  Outcome: Ongoing   Fall sign posted. Call light with in reach. Bed and chair alarm and brakes on and working. Bed in low position. Bilateral side rails elevated. Patient voiced and demonstrated importance of using call light and waiting for staff before getting up. Problem: Pain:  Goal: Control of acute pain  Description: Control of acute pain  3/8/2021 0310 by Berna Joe LPN  Outcome: Ongoing  Patient rated pain 10 on scale 0-10. 10 the worst. Prn Oxycodone given with scheduled tylenol effect pending. From right knee pain not resolved with elevation, ice, and rest. Patient voiced and demonstrated importance of pain scale and effect it has on medication ordered. Patient educated on importance of taking pain medication prior to therapy. Care plan reviewed with patient. Patient verbalize understanding of the plan of care and contribute to goal setting.

## 2021-03-08 NOTE — PROGRESS NOTES
[facility]  INPATIENT PHYSICAL THERAPY  DAILY NOTE  [address] [facility], [facility],4Th Floor - [address]64/064-A    Time In: 1400  Time Out: 1430  Timed Code Treatment Minutes: 30 Minutes  Minutes: 30          Date: 3/8/2021  Patient Name: Dirk Lima,  Gender:  female        MRN: [id number]  : 3/4/1931  (80 y.o.)     Referring Practitioner: Brigida Cowden, APRN - PAOLA and Lemont Bloch, MD  Diagnosis: closed comminuted supracondylar fracture of right femur, initial encounter  Additional Pertinent Hx: per 21 ED note; Dirk Lima is a 80 y.o. female who presents to the emergency department for evaluation of fall at [facility] this morning. Mechanical trip and fall while walking to the parking lot. Patient denies loss of consciousness, head strike. States that her glasses hit her eye and bruised her upper eyelid. States she is feeling fine other than severe right knee pain. She admits to associated swelling. [id number] TOTAL KNEE REVISION. to IPR 3/3/2021. Prior Level of Function:  Lives With: Alone  Type of Home: House  Home Layout: One level  Home Access: Stairs to enter with rails  Entrance Stairs - Number of Steps: 2  Entrance Stairs - Rails: Left  Home Equipment: Cane, Rolling walker   Bathroom Shower/Tub: Tub/Shower unit  Bathroom Toilet: Handicap height  Bathroom Equipment: Grab bars in shower, Grab bars around toilet  Bathroom Accessibility: Walker accessible    Receives Help From: Family  ADL Assistance: Independent  Homemaking Assistance: Independent  Ambulation Assistance: Independent  Transfer Assistance: Independent  Active : Yes  Additional Comments: very indep PTA, did not use AD for mobility. Pt reports retired son lives close by and can help prn.     Restrictions/Precautions:  Restrictions/Precautions: Weight Bearing, General Precautions  Right Lower Extremity Weight Bearing: Weight Bearing As Tolerated  Left Upper Extremity Weight Bearing: Weight Bearing As Tolerated  Position Activity Restriction  Other position/activity restrictions: s/p R TKA 2/27 and minimally displaced L 5th met fx, wrist splint PRN     SUBJECTIVE: pt in w/c finishing with OT on arrival, agrees to therapy session    PAIN: 9/10: R knee    Vitals: Vitals not assessed per clinical judgement, see nursing flowsheet    OBJECTIVE:  Bed Mobility:  Sit to Supine: Contact Guard Assistance, with verbal cues , with increased time for completion, with encouragement     Transfers:  Sit to Stand: [employer], with increased time for completion, cues for hand placement  Stand to [place] [address], with increased time for completion, cues for hand placement  Car:Contact Guard Assistance, with increased time for completion, cues for hand placement, increased difficulty advancing LEs in and out of car but able to complete    Ambulation:  Stand By Assistance, [employer], with verbal cues , cues for step through gait pattern with improved fluidity with increased distance prior to fatigue  Distance: 45ft, 15ft to destination  Surface: Level Tile  Device:Rolling Walker  Gait Deviations: Forward Flexed Posture, Slow Yessenia, Decreased Weight Shift Right, Decreased Gait Speed, Decreased Heel Strike on Left, Mild Path Deviations and step to pattern with antalgia and decreased stance time on R    Balance:  Dynamic Standing Balance: Contact Guard Assistance, pt perfromed dynamic standing activity with no UE support while performing reaching to limit and slightly outside HIGINIO in all planes. no LOB noted and increased difficulty with R weight shift. Tolerated ~2 minutes prior to requesting seated break      Functional Outcome Measures: Not completed       ASSESSMENT:  Assessment: Patient progressing toward established goals. Activity Tolerance:  Patient tolerance of  treatment: fair.  Fatigued throughout session     Equipment Recommendations:Equipment Needed: No(has RW and cane)  Discharge Recommendations:  Continue to assess pending progress    Plan: Times per week: 5x/wk 90 min, 1x/wk 30 min  Current Treatment Recommendations: Strengthening, ROM, Balance Training, Functional Mobility Training, Transfer Training, Gait Training, Endurance Training, Stair training, Home Exercise Program, Safety Education & Training, Patient/Caregiver Education & Training, Neuromuscular Re-education    Patient Education  Patient Education: Plan of Care, Transfers, Gait, Car Transfers, Verbal Exercise Instruction    Goals:  Patient goals : To return home  Short term goals  Time Frame for Short term goals: 1 week  Short term goal 1: Pt to perform supine<>sit with CGA to promote ease of getting in and out of bed. Short term goal 2: Pt to perform sit<>stand with SBA to promote ease of transfers. Short term goal 3: Pt to ambulate >100ft with Rw at SBA to improve ability to navigate within the home. Short term goal 4: Pt to negotiate 2 steps with L HR at CGA to improve ability to enter and exit home. Short term goal 5: Pt to reduce TUG time to <60 seconds to reduce fall risk and promote overall safety in the home. Short term goal 6: Pt score on Tinetti balance test will improve to >=19 to decrease risk of falls  Long term goals  Time Frame for Long term goals : 2 weeks  Long term goal 1: Pt to perform supine<>sit with mod I to promote ease of getting in and out of bed. Long term goal 2: Pt to perform sit<>stand with mod I to promote ease of transfers. Long term goal 3: Pt to ambulate >200ft with Rw at mod I to improve ability to navigate within the home and community. Long term goal 4: Pt to negotiate 2 steps with L HR at mod I to improve ability to enter and exit home. Long term goal 5: Pt to perform car transfer with mod I to improve ability to get in and out of car. Following session, patient left in safe position with all fall risk precautions in place.

## 2021-03-08 NOTE — PROGRESS NOTES
Tolerated  Position Activity Restriction  Other position/activity restrictions: s/p R TKA 2/27 and minimally displaced L 5th met fx, wrist splint PRN     SUJECTIVE: Patient sitting up in chair. Requested not to wear L wrist brace during therapy. Went to bathroom  before heading to gym. Patient c/o R ankle pain during exercise stating they think they might have rolled it during fall. Became teary and emotional towards end of session, voiced frustration about knee pain with activity. PAIN: 10/10: R knee    Vitals: Vitals not assessed per clinical judgement, see nursing flowsheet    OBJECTIVE:  Bed Mobility:  Supine to Sit: Minimal Assistance, to guide RLE over edge of bed. Sit to Supine: Minimal Assistance, to asssit RLE onto bed. Transfers:  Sit to Stand: Contact Guard Assistance  Stand to  Corporation, Minimal Assistance, required Min A at end of session in gym due to c/o RLE pain, but only needed contact guard by the time patient returned to room. Stand Pivot:Contact Guard Assistance, Minimal Assistance, with RW, required Min A following steps in gym d/t increased c/o pain in RLE. Ambulation:  Contact Guard Assistance  Distance: 70 feet x 1, 10 feet x 1  Surface: Level Tile  Device:Rolling Walker  Gait Deviations:  Slow Yessenia, Decreased Step Length on Left, Decreased Heel Strike on Right and Decreased Terminal Knee Extension on Right, VC for upright posture and eye gaze. Stairs:  Platform:  6\" platform X 1 using Parallel Bars and Moderate Assistance. Patient ascended and descended in // bars with BUE.:     Exercise:  Patient was guided in 1 set(s) 10 reps of exercise to right lower extremities. Ankle pumps, Glut sets, Quad sets, Heelslides, Short arc quads and Hip abduction/adduction. Exercises were completed for increased independence with functional mobility. R Knee passive knee extension lacking 3 degrees, active extension lacking 2 degrees.  R knee active assisted knee flexion 76 degrees. Functional Outcome Measures: Not completed       ASSESSMENT:  Assessment: Patient progressing toward established goals. and patient tolerated exercise well, did have temporary increase in pain following exercise. Patient completed 1 6\" step with Mod A, needs continued skilled therapy for LE strengthening to safely complete steps for return to home. Activity Tolerance:  Patient tolerance of  treatment: good. Equipment Recommendations:Equipment Needed: No(has RW and cane)  Discharge Recommendations:  Continue to assess pending progress    Plan: Times per week: 5x/wk 90 min, 1x/wk 30 min  Current Treatment Recommendations: Strengthening, ROM, Balance Training, Functional Mobility Training, Transfer Training, Gait Training, Endurance Training, Stair training, Home Exercise Program, Safety Education & Training, Patient/Caregiver Education & Training, Neuromuscular Re-education    Patient Education  Patient Education: [employer], Transfers, Gait, Stairs,  - Patient Verbalized Understanding, - Patient Requires Continued Education    Goals:  Patient goals : To return home  Short term goals  Time Frame for Short term goals: 1 week  Short term goal 1: Pt to perform supine<>sit with CGA to promote ease of getting in and out of bed. Short term goal 2: Pt to perform sit<>stand with SBA to promote ease of transfers. Short term goal 3: Pt to ambulate >100ft with Rw at SBA to improve ability to navigate within the home. Short term goal 4: Pt to negotiate 2 steps with L HR at CGA to improve ability to enter and exit home. Short term goal 5: Pt to reduce TUG time to <60 seconds to reduce fall risk and promote overall safety in the home. Short term goal 6: Pt score on Tinetti balance test will improve to >=19 to decrease risk of falls  Long term goals  Time Frame for Long term goals : 2 weeks  Long term goal 1: Pt to perform supine<>sit with mod I to promote ease of getting in and out of bed.   Long

## 2021-03-08 NOTE — PROGRESS NOTES
[address]  Occupational Therapy  Daily Note  Time:   Time In: 08  Time Out: 930  Timed Code Treatment Minutes: 60 Minutes  Minutes: 60    Date: 3/8/2021  Patient Name: Chiqui Bowman,   Gender: female      Room: [facility]/064-A  MRN: [id number]  : 3/4/1931  (80 y.o.)  Referring Practitioner: Dr. Lnae Hall,  Diagnosis: Closed comminuted supracondylar fracture of right femur  Additional Pertinent Hx: per 21 ED note; Chiqui Bowman is a 80 y.o. female who presents to the emergency department for evaluation of fall at grocery store. The patient says she lost balance and fell while she was grocery shopping on 2021. She landed on her right knee with sudden severe right knee pain and could not get up. She denies loss of consciousness. She was send to [address] [place] [address] ER for evaluation. X-ray of right femur and right knee revealed status post right knee total arthroplasty with comminuted foreshortened and dorsally angulated fracture of distal right femur. Therefore she underwent right total knee revision by Dr. Vania Briggs on 2021. Pt found to have a Left fifth metacarpal fracture and is WBAT in splint prn. To IPR on 3/3. Restrictions/Precautions:  Restrictions/Precautions: Weight Bearing, General Precautions  Right Lower Extremity Weight Bearing: Weight Bearing As Tolerated  Left Upper Extremity Weight Bearing: Weight Bearing As Tolerated  Position Activity Restriction  Other position/activity restrictions: s/p R TKA  and minimally displaced L 5th met fx, wrist splint PRN     SUBJECTIVE: Pt cooperative, anxiousness noted. Pt with self depreciating conversation today, requiring excessive encouragement and education on the role of therapy. Pt reports she is just frustrated that recovery is not happening overnight. Active listening with emotional support provided.  Pt responds well to comedic relief and began to joke appropriately throughout session and built a rapport with this writer. PAIN: 9/10: R knee. Nsg aware and provided pain medication towards EOS, and ice provided as well. Vitals: Vitals not assessed per clinical judgement, see nursing flowsheet    COGNITION: WFL    ADL:   Grooming: Stand By Assistance.  hand hygiene   Bathing: Minimal Assistance. min A for feet. Pt reports use of LH sponge at home. Pt too anxious to  shower this date, sat down for josefina care / bottom via scooting side to side. No torque on knee noted. Upper Extremity Dressing: with set-up.  with bra and t-shirt. Lower Extremity Dressing: Minimal Assistance. with socks. SBA balance. Toileting: Stand By Assistance. during clothing mgmt and hygiene  Toilet Transfer: Stand By Assistance. ETS  Tub Transfer: Minimal Assistance. bringing R knee into tub, pt able to bring R knee out of tub. use of tub tf bench. Inez Mathew BALANCE:  Sitting Balance:  Modified Independent. Standing Balance: Stand By Assistance. BED MOBILITY:  Supine to Sit: Supervision no A required for R LE this date. TRANSFERS:  Sit to Stand:  Stand By Assistance. EOB, w/c, tub tf bench, ETS. Good hand placement. min cues in tub. Stand to Sit: Stand By Assistance. FUNCTIONAL MOBILITY:  Assistive Device: Rolling Walker  Assist Level:  Stand By Assistance. Distance: To and from bathroom and To and from shower room  No LOB. Slow, guarded pace. Does ambulate more fluently with B shoes on vs slipper socks. ASSESSMENT:     Activity Tolerance:  Patient tolerance of  treatment: fair. Limited by anxiousness at times      Discharge Recommendations: Home with Home health OT, Home with nursing aide    Equipment Recommendations: Other: Monitor for needing tub transfer bench vs shower chair.   Recommend TTB as of 3/6  Plan: Times per week: 5x/wk for 90 min and 1x/wk for 30 min  Times per day: Daily  Current Treatment Recommendations: Endurance Training, Neuromuscular Re-education, Patient/Caregiver Education & Training, Equipment Evaluation, Education, & procurement, Self-Care / ADL, Strengthening, Safety Education & Training    Patient Education  Patient Education: ADL's, Precautions, Equipment Education, Reviewed Prior Education and Assistive Device Safety    Goals  Short term goals  Time Frame for Short term goals: 1 week  Short term goal 1: Pt will complete LB dressing tasks with min A using LHAE prn to improve indep with self care. Short term goal 2: Pt will tolerate standing > 3 minutes with CGA and unilateral release to improve ability to complete sinkside grooming  Short term goal 3: Pt will transfer in and out of tub/shower with min A using grab bar and shower AE to improve indep with bathing. Short term goal 4: Pt will navigate RW to/from bathroom while transporting 2 items with CGA to improve indep with sinkside grooming. Short term goal 5: Pt will complete simple meal prep with CGA and min vcs for RW safety to improve indep within home. Long term goals  Time Frame for Long term goals : 2 weeks  Long term goal 1: Pt will complete BADL routine with set up to improve indep with self care. Long term goal 2: Pt will complete light IADL tasks with Supervision and 0-1 vcs for safety to improve indep within home. Following session, patient left in safe position with all fall risk precautions in place.

## 2021-03-08 NOTE — PROGRESS NOTES
[address] [place] [address]  Recreational Therapy  Daily Note  [address]    Time Spent with Patient: 25 minutes    Date:  3/8/2021       Patient Name: Feliciano Cotton      MRN: [id number]      YOB: 1931 (80 y.o.)       Gender: female  Diagnosis: Closed comminuted supracondylar fracture of right femur  Referring Practitioner: Dr. Cici Mccord,    RESTRICTIONS/PRECAUTIONS:  Restrictions/Precautions: Weight Bearing, General Precautions  Vision: Impaired  Hearing: Within functional limits    PAIN: 0-no c/o pain     SUBJECTIVE:  Do you have a moment     OBJECTIVE:  Pt shared a book of her high school classmates with me that also has a family member of mine in it-we went thru the book together and she was reminiscing about her school days-affect bright and social-asked this worker over and over again how her classmate was related to me-I wrote it down on her notebook which helped her to remember-pt enjoys having someone to talk to in her free time         Patient Education  New Education Provided: Importance of Leisure,     Electronically signed by: JOELLE Gracia  Date: 3/8/2021

## 2021-03-08 NOTE — PROGRESS NOTES
[facility]  Diagnosis List for Inpatient Rehab facility (IRF) - Patient Assessment Instrument (PADMA)    Patient Name: Onelia Young        MRN: [id number]    : 3/4/1931  (80 y.o.)  Gender: female     Primary impairment requiring rehabilitation: 8.2 Femur (Shaft) Fracture     Etiologic Diagnosis that led to the condition: Right distal femur supracondylar comminuted fracture    Comorbid conditions affecting rehabilitation:  · Right distal femur supracondylar comminuted fracture with severe right knee pain causing impaired ADLs & ambulation due to fall accident requiring right total knee revision with OSS both femoral and tibial components  · Hypertension  · GERD  · Hyperlipidemia  · Bilateral eyes cataract  · Acute blood loss anemia  · Left 5th Metacarpal fracture  · Possible reactive depression    Daisy Barr MD

## 2021-03-09 PROCEDURE — 6370000000 HC RX 637 (ALT 250 FOR IP): Performed by: ORTHOPAEDIC SURGERY

## 2021-03-09 PROCEDURE — 99232 SBSQ HOSP IP/OBS MODERATE 35: CPT | Performed by: PHYSICAL MEDICINE & REHABILITATION

## 2021-03-09 PROCEDURE — 97530 THERAPEUTIC ACTIVITIES: CPT

## 2021-03-09 PROCEDURE — 97116 GAIT TRAINING THERAPY: CPT

## 2021-03-09 PROCEDURE — 6370000000 HC RX 637 (ALT 250 FOR IP): Performed by: PHYSICAL MEDICINE & REHABILITATION

## 2021-03-09 PROCEDURE — 97535 SELF CARE MNGMENT TRAINING: CPT

## 2021-03-09 PROCEDURE — 6360000002 HC RX W HCPCS: Performed by: ORTHOPAEDIC SURGERY

## 2021-03-09 PROCEDURE — 97110 THERAPEUTIC EXERCISES: CPT

## 2021-03-09 PROCEDURE — 1180000000 HC REHAB R&B

## 2021-03-09 RX ADMIN — PANTOPRAZOLE SODIUM 40 MG: 40 TABLET, DELAYED RELEASE ORAL at 05:39

## 2021-03-09 RX ADMIN — Medication 250 MG: at 20:34

## 2021-03-09 RX ADMIN — BUSPIRONE HYDROCHLORIDE 5 MG: 5 TABLET ORAL at 20:37

## 2021-03-09 RX ADMIN — DICLOFENAC SODIUM 4 G: 10 GEL TOPICAL at 08:08

## 2021-03-09 RX ADMIN — Medication 250 MG: at 08:08

## 2021-03-09 RX ADMIN — BENZOCAINE: 100 GEL TOPICAL at 10:03

## 2021-03-09 RX ADMIN — OXYCODONE 5 MG: 5 TABLET ORAL at 07:25

## 2021-03-09 RX ADMIN — OXYCODONE 5 MG: 5 TABLET ORAL at 12:34

## 2021-03-09 RX ADMIN — BENZOCAINE: 100 GEL TOPICAL at 14:45

## 2021-03-09 RX ADMIN — ENOXAPARIN SODIUM 40 MG: 40 INJECTION SUBCUTANEOUS at 08:09

## 2021-03-09 RX ADMIN — ACETAMINOPHEN 650 MG: 325 TABLET ORAL at 08:08

## 2021-03-09 RX ADMIN — BENZOCAINE: 100 GEL TOPICAL at 05:32

## 2021-03-09 RX ADMIN — TRIAMCINOLONE ACETONIDE: 1 PASTE DENTAL at 20:36

## 2021-03-09 RX ADMIN — AMLODIPINE BESYLATE 5 MG: 5 TABLET ORAL at 20:34

## 2021-03-09 RX ADMIN — METOPROLOL TARTRATE 100 MG: 100 TABLET, FILM COATED ORAL at 20:34

## 2021-03-09 RX ADMIN — ACETAMINOPHEN 650 MG: 325 TABLET ORAL at 05:32

## 2021-03-09 RX ADMIN — ACETAMINOPHEN 650 MG: 325 TABLET ORAL at 14:41

## 2021-03-09 RX ADMIN — ACETAMINOPHEN 650 MG: 325 TABLET ORAL at 20:34

## 2021-03-09 RX ADMIN — DOCUSATE SODIUM AND SENNOSIDES 1 TABLET: 8.6; 5 TABLET, FILM COATED ORAL at 20:34

## 2021-03-09 RX ADMIN — METOPROLOL TARTRATE 50 MG: 100 TABLET, FILM COATED ORAL at 08:08

## 2021-03-09 RX ADMIN — OXYCODONE 5 MG: 5 TABLET ORAL at 20:35

## 2021-03-09 RX ADMIN — BUSPIRONE HYDROCHLORIDE 5 MG: 5 TABLET ORAL at 07:25

## 2021-03-09 RX ADMIN — DICLOFENAC SODIUM 4 G: 10 GEL TOPICAL at 15:51

## 2021-03-09 ASSESSMENT — PAIN DESCRIPTION - LOCATION
LOCATION: KNEE
LOCATION: KNEE

## 2021-03-09 ASSESSMENT — PAIN SCALES - GENERAL
PAINLEVEL_OUTOF10: 8
PAINLEVEL_OUTOF10: 10
PAINLEVEL_OUTOF10: 8
PAINLEVEL_OUTOF10: 7
PAINLEVEL_OUTOF10: 5
PAINLEVEL_OUTOF10: 8

## 2021-03-09 ASSESSMENT — ENCOUNTER SYMPTOMS
DIARRHEA: 0
EYE PAIN: 0
WHEEZING: 0
BACK PAIN: 1
ABDOMINAL PAIN: 0
EYE DISCHARGE: 0
VOMITING: 0
TROUBLE SWALLOWING: 0
SORE THROAT: 0
NAUSEA: 0
SHORTNESS OF BREATH: 0
CONSTIPATION: 0
RHINORRHEA: 0

## 2021-03-09 ASSESSMENT — PAIN DESCRIPTION - PAIN TYPE: TYPE: SURGICAL PAIN

## 2021-03-09 ASSESSMENT — PAIN DESCRIPTION - ORIENTATION: ORIENTATION: RIGHT

## 2021-03-09 ASSESSMENT — PAIN DESCRIPTION - FREQUENCY: FREQUENCY: CONTINUOUS

## 2021-03-09 NOTE — PROGRESS NOTES
Physical Medicine & Rehabilitation Progress Note    Chief Complaint:  Right knee pain causing impaired ADLs and ambulation due to right distal femur supracondylar fracture requiring revision of TKA     Subjective:    Tomasa Padilla is a 80y.o. years old  female with history of hypertension arthritis, GERD, hyperlipidemia, bilateral eyes cataract, status post right knee total knee arthroplasty, status post left shoulder rotator cuff repair, was admitted on 3/3/2021 for intensive inpatient management of impairment & disability secondary right distal femur supracondylar fracture with right knee pain requiring right total knee revision with new femur and tibia components done on 2/27/2021 by Dr. Ramon Calderon. The patient says her right knee still hurt especially after ambulation. The right knee pain is an throbbing pain with intensity rated up to 10/10 level. She is receiving Tylenol around the clock for pain. She also received oxycodone 3 times yesterday for the pain. Her last recorded bowel movement was 3/7/2021. She say she is still using Orajel for mouth sore pain. She is tolerating the rehab therapy but she says she was very exhausted at the end of the therapy session. Her case was discussed in multidisciplinary meeting yesterday. She is expected to be discharged on 3/12/2021. The patient and her sons were informed yesterday. Her family member stated that some family members who are going to provide care for patient may not be available for training until 3/13/2021. Rehabilitation:  PT: Reviewed.     Transfers:  Sit to Stand: Stand By Assistance, [employer], with increased time for completion, to/from chair with arms  Stand to Sit:Stand By Assistance, [employer], with increased time for completion, to/from chair with arms     Ambulation:  [employer], with verbal cues , with increased time for completion, cues to reduce step length on R Distance: 115ft and 8 feet  Surface: Level Tile  Device:Rolling Walker  Gait Deviations:  Slow Yessenia, Decreased Step Length on Left, Decreased Weight Shift Right, Decreased Gait Speed, Decreased Heel Strike Bilaterally, Narrow Base of Support and Decreased Terminal Knee Extension on R. Increased reliance on UE for support     Balance:  Static Standing Balance: Stand By Assistance, Contact Guard Assistance  Dynamic Standing Balance: Stand By Assistance, [employer]   *pt performed ring toss with SBA-CGA and without UE support. Required pt to reach outside HIGINIO to challenge pt dynamic stability, improve proprioception and increased R LE wt shift. Emphasis on reach R to promote increased WBing tolerance to R LE. Good stability throughout        OT: Reviewed. ADL:   Lower Extremity Dressing: Pt reports difficulty this AM with shoes. Discussed trialing lower chair compared to high recliner. While sitting in patient's visitor chair, pt able to doff, don and tie riki shoes with moderate effort needed with R shoe. Toilet transfer from elevated toilet seat (apartment bathroom with raised toilet seat and grab bar to simulate home environment. Discussed purchasing options of raised toilet seat and also OT ordering BSC for in bedroom (night time voiding as pt gets up 1-2 times a night)   Edu provided on tub/shower transfers with recommendation of a tub transfer bench as well as removing shower doors and installing curtain willian.      BALANCE:  Sitting Balance:  Modified Independent. Standing Balance: Stand By Assistance.       BED MOBILITY:  Supine to Sit: Supervision       TRANSFERS:  Sit to Stand:  Stand By Assistance. EOB. Good hand placement. Stand to Sit: Stand By Assistance.       FUNCTIONAL MOBILITY:  Assistive Device: Rolling Walker  Assist Level:  Stand By Assistance. Distance: Within room during IADL, x 45 ft in hallway towards gym.  Ended session in hallway with PT.       ADDITIONAL ACTIVITIES: Patient completed IADL homemaking task this date of preparing cup of coffee - task was graded to challenge walker safety, and knee precautions. Patient was able to retrieve all items from cupboard, keurig with SBA and  min VCs for not twisting back to retrieve coffee cup during the retrieval and transportation of items. Patient required SBA throughout task with a standing endurance of 5 minutes. Daughter present for edu . ST: Reviewed. N/A      Review of Systems:  Review of Systems   Constitutional: Negative for appetite change (poor appetitis), chills, diaphoresis and fever. HENT: Positive for hearing loss. Negative for mouth sores, rhinorrhea, sneezing, sore throat, tinnitus and trouble swallowing. Eyes: Negative for pain, discharge and visual disturbance. Respiratory: Negative for shortness of breath (sometimes) and wheezing. Cardiovascular: Negative for chest pain (sometimes) and palpitations. Gastrointestinal: Negative for abdominal pain, constipation, diarrhea, nausea and vomiting. Endocrine: Positive for cold intolerance. Negative for heat intolerance. Genitourinary: Positive for difficulty urinating and urgency. Negative for dysuria and frequency. Musculoskeletal: Positive for arthralgias (right knee, left shoulder), back pain and gait problem. Negative for myalgias and neck pain. Neurological: Positive for weakness. Negative for dizziness, tremors, seizures, speech difficulty, light-headedness, numbness and headaches. Psychiatric/Behavioral: Negative for dysphoric mood and hallucinations. The patient is nervous/anxious.          Objective:  /61   Pulse 75   Temp 97.9 °F (36.6 °C) (Oral)   Resp 18   Ht 5' 2\" (1.575 m)   Wt 130 lb 6.4 oz (59.1 kg)   SpO2 98%   BMI 23.85 kg/m²   Physical Exam   General:  well-developed, well nourished  female ; in no acute distress ; appropriate affect & mood but appear depressed ; sitting on bed comfortably  Eyes: pupil equally round ; extra-ocular motion intact bilaterally  Head, Ear, Nose, Mouth & Throat : normocephalic ; slightly decreased size of ecchymosis at left orbit area ; no tenderness at head or face ; no discharge from ears or nose ; no deformity ; no facial swelling ; oral mucosa pink   Neck :  supple ; no tenderness ; no muscle spasm  Cardiovascular : regular rate & rhythm ; normal S1 & S2 heart sound ; presence of S4 sound ; no murmur ; normal peripheral pulse at bilateral upper & lower extremities   Pulmonary : lung clear to auscultation ; no wheezing ; no rale  Gastrointestinal : soft, flat abdomen without tenderness ; normal bowel sound present   Back : no tenderness; no muscle spasm  Skin: presence of surgical scar with suture in place at right anterior knee from distal thigh to upper leg without open wound or discharge ; healed surgical scar at left lateral shoulder ; ecchymosis at right medial posterior thigh ; no skin lesion or rash ; no pitting edema at all 4 extremities ; mild nonpitting swelling at right distal thigh and proximal right leg  Musculoskeletal : no limb asymmetry; no limb deformity; tenderness at anterior and medial aspect of right knee ; no tenderness at bilateral upper extremities &  rest of lower extremities; no palpable mass at limbs ; no joints laxity or crepitation other than right knee which is not assessed; right knee flexion passive ROM limited to 50 degrees limited by increased right knee pain ; otherwise normal functional joints ROM at bilateral upper extremities & the rest of bilateral lower extremities  Cerebral :  alert ; awake ; oriented to place, person and time   Cerebellum : no dysmetria with bilateral finger-to-nose test   Sensory : intact light touch and pin prick sensation at bilateral upper & lower extremities  Motor : normal tone at bilateral upper & left lower extremities ; right lower extremity muscle tone not assessed ; 3-/5 muscle strength at right knee and 2+/5 to 3-/5 muscle strength at right hip flexion/abduction due to increased right knee pain ; otherwise  normal 5/5 muscle strength at bilateral upper extremities & the rest of bilateral lower extremities  Reflex : zero bilateral ankle and left knee reflexes ; right knee reflex not tested  Pathological Reflex :   no ankle clonus  Gait :  Not assessed      Diagnostics:   No results found for this or any previous visit (from the past 24 hour(s)). Impression:  · Right distal femur supracondylar comminuted fracture with severe right knee pain causing impaired ADLs & ambulation due to fall accident requiring right total knee revision with OSS both femoral and tibial components  · Hypertension  · Left hand 5th metacarpal base fracture   · GERD  · Hyperlipidemia  · Bilateral eyes cataract  · Possible reactive depression     The patient is tolerating the inpatient rehab treatment and is making progress slowly. She continues having significant right knee pain and right lower extremity weakness. The pain medications usage continues to provide some control over her pain intensity. She continues to be WBAT at right lower extremity. She is expecting to be discharged either on 3/12/2021 or 3/13/2021.     Cassie Buitrago requires a Bedside Commode to complete bathing, toileting, dressing and grooming tasks. Patient requires a Bedside Commode due to Upper Extremity/Lower Extremity Weakness and limited ambulation and is unable to walk to the bathroom at home. Without the Bedside Commode, Cassie Buitrago is at increased risk for falls and would require increased assistance for ADL's and mobility. Plan:  · Continue ongoing PT/OT/RT inpatient rehabilitation treatment at least 3 hours per day, 5 days per week in order to improve functional status prior to discharge. Family education and training will be completed. Equipment evaluations and recommendations will be completed as appropriate.        · Rehabilitation nursing continues to be involved for bowel, bladder, skin, and pain management. Nursing will also provide education and training to patient and family. · Prophylaxis:  DVT: Lovenox, RON stocking and intermittent pneumatic compression. GI: Glycolax, Senokot-S and prn Dulcolax suppository.   · Pain: regular Tylenol, Voltaren gel application, low dose oxycodone prn, ice pad application   · Continue allow weight bearing as tolerated at right lower extremity as per ortho  · Continue Orajel for mouth sore  · Continue Norvasc and Lopressor for HTN  · Continue iron supplement for anemia  · Nutrition:  continue current diet    · Bladder: normal function ; will monitor  · Bowel: functionally normal ; will monitor for constipation  ·  and case management for coordination of care and discharge planning       Missed Therapy Time:  · None    Kari Gibbs MD

## 2021-03-09 NOTE — PROGRESS NOTES
Comprehensive Nutrition Assessment    Type and Reason for Visit:  Reassess(PO Monitor)    Nutrition Recommendations/Plan:   *Recommend a Multivitamin w/minerals daily. *Changed ONS to Ensure Compact BID. *Continue current diet. Nutrition Assessment: Pt slightly improving from a nutritional standpoint AEB pt report of ongoing decreased appetite consuming 50% of most meals and one Ensure Enlive daily. Remains at risk for further nutritional compromise r/t increased nutrient needs to aid in post-op healing and underlying medical condition (hx CAD, CHF, Colon Cancer, HTN, HLD). Nutrition recommendations/interventions as per above. Malnutrition Assessment:  Malnutrition Status: At risk for malnutrition (Comment)    Context:  Acute Illness     Findings of the 6 clinical characteristics of malnutrition:  Energy Intake:  (50% orless of meals x1 day)  Weight Loss:  Unable to assess(no weight hx per EMR)     Body Fat Loss:  No significant body fat loss     Muscle Mass Loss:  No significant muscle mass loss    Fluid Accumulation:  1 - Mild Extremities   Strength:  Not Performed    Estimated Daily Nutrient Needs:  Energy (kcal):  0440-5998 kcal/day (25-30 kcal/kg); Weight Used for Energy Requirements:  (61.2 kg on 3/3)     Protein (g):  73+ g/day (1.2+ g/kg); Weight Used for Protein Requirements:  (61.2 kg on 3/3)          Nutrition Related Findings:  s/p total revision on right knee arthroplasty on 2/27/21; pt seen; she reports ongoing decreased appetite consuming ~50% of meals; pt reports she snacks on chips, candy and cookies in-between meals; pt with dentures and some difficulty chewing/swallowing food but declines need for a texture-modified diet; pt denies N/V; last BM x2 on 3/7; pt states she likes Ensure, but states they are too big and only gets one down daily; pt amenable to try Ensure Compact BID instead; Labs noted.  Rx includes: Vitamin C, Iron, Calcium/Vitamin D, Glycolax, Senna      Wounds: Surgical Incision(s/p total revision on right knee arthroplasty on 2/27/21)       Current Nutrition Therapies:    DIET GENERAL;  Dietary Nutrition Supplements: Standard High Calorie Oral Supplement    Anthropometric Measures:  · Height: 5' 2\" (157.5 cm)  · Current Body Weight: 130 lb 6.4 oz (59.1 kg)(3/8; +1 non-pitting RLE)   · Admission Body Weight: 135 lb (61.2 kg)(3/3; unsure if actual or stated; +non-pitting RLE edema)    · Usual Body Weight: (132 lb per pt report. No actual weights per EMR)     · Ideal Body Weight: 110 lbs  · BMI: 23.8  · BMI Categories: Normal Weight (BMI 18.5-24. 9)       Nutrition Diagnosis:   · Inadequate oral intake related to inadequate protein-energy intake as evidenced by intake 26-50%, intake 51-75%    Nutrition Interventions:   Food and/or Nutrient Delivery:  Continue Current Diet, Modify Oral Nutrition Supplement, Vitamin Supplement  Nutrition Education/Counseling:  Education initiated(Encouraged po intake of meals and ONS at best effort during LOS)   Coordination of Nutrition Care:  Continue to monitor while inpatient    Goals:  Pt will consume 75% or more of meals during LOS       Nutrition Monitoring and Evaluation:   Behavioral-Environmental Outcomes:  None Identified   Food/Nutrient Intake Outcomes:  Diet Advancement/Tolerance, Food and Nutrient Intake, Supplement Intake, Vitamin/Mineral Intake  Physical Signs/Symptoms Outcomes:  Biochemical Data, Chewing or Swallowing, Weight, Skin, Nutrition Focused Physical Findings, Fluid Status or Edema     Discharge Planning:     Too soon to determine     Electronically signed by Lenora Cancino RD, LD on 3/9/21 at 2:55 PM EST    Contact: [phone]

## 2021-03-09 NOTE — PLAN OF CARE
Problem: Falls - Risk of:  Goal: Will remain free from falls  Description: Will remain free from falls  3/9/2021 0342 by Richard Wilkerson LPN  Outcome: Ongoing   Fall sign posted. Call light with in reach. Bed and chair alarm and brakes on and working. Bed in low position. Bilateral upper side rales elevated. NO skid socks on. Patient voiced and demonstrated importance of using call light and waiting for staff before getting up. Problem: Pain:  Goal: Control of acute pain  Description: Control of acute pain  3/9/2021 0342 by Richard Wilkerson LPN  Outcome: Ongoing  Patient rate pain 7 on scale 0-10. Prn Oxy given with effect after repositioning, and cold tx not effective for knee pain. Patient voiced concern re not bearing complete weight on right leg. Will message Dr. Tanvi Inman and ask re stitches removal date. Care plan reviewed with patient. Patient verbalize understanding of the plan of care and contribute to goal setting.

## 2021-03-09 NOTE — PLAN OF CARE
Problem: Falls - Risk of:  Goal: Will remain free from falls  Description: Will remain free from falls  3/9/2021 1046 by Qian Enriquez RN  Outcome: Ongoing  Note: Patient's gait continues to improve with therapy. Contact assist with ambulating       Problem: Pain:  Goal: Pain level will decrease  Description: Pain level will decrease  Outcome: Ongoing  Note: Patient complains of pain with ambulating. This morning rated pain 10/10. Patient was given prn medication before therapy. Will continue to monitor.

## 2021-03-09 NOTE — PROGRESS NOTES
[address]  Occupational Therapy  Daily Note  Time:   Time In: 0700  Time Out: 0730  Timed Code Treatment Minutes: 30 Minutes  Minutes: 30     Date: 3/9/2021  Patient Name: Carlos Dwyer,   Gender: female      Room: [facility]64/064-A  MRN: [id number]  : 3/4/1931  (80 y.o.)  Referring Practitioner: Dr. Carrillo Reason,  Diagnosis: Closed comminuted supracondylar fracture of right femur  Additional Pertinent Hx: per 21 ED note; Carlos Dwyer is a 80 y.o. female who presents to the emergency department for evaluation of fall at grocery store. The patient says she lost balance and fell while she was grocery shopping on 2021. She landed on her right knee with sudden severe right knee pain and could not get up. She denies loss of consciousness. She was send to [facility] ER for evaluation. X-ray of right femur and right knee revealed status post right knee total arthroplasty with comminuted foreshortened and dorsally angulated fracture of distal right femur. Therefore she underwent right total knee revision by Dr. Gerda Coombs on 2021. Pt found to have a Left fifth metacarpal fracture and is WBAT in splint prn. To IPR on 3/3. Restrictions/Precautions:  Restrictions/Precautions: Weight Bearing, General Precautions  Right Lower Extremity Weight Bearing: Weight Bearing As Tolerated  Left Upper Extremity Weight Bearing: Weight Bearing As Tolerated  Position Activity Restriction  Other position/activity restrictions: s/p R TKA  and minimally displaced L 5th met fx, wrist splint PRN     SUBJECTIVE: Patient supine upon arrival, agreeable to OT with min encouragement. Support, encouragement and active listening provided throughout session - pt appears to be correlating progress with pain mgmt and is frustrated she is still having pain, thus believing she is not making progress.  Reviewed all of her goals and progress made towards them, and pt nods in agremeent, but continues to be very tearful and cried throughout session    PAIN: 10/10: R knee. Discussed managing pain medications prior to therapy sessions, pt nodded head. Nsg aware and did provide pain medication during session. Vitals: Vitals not assessed per clinical judgement, see nursing flowsheet    COGNITION: WFL    ADL:   Lower Extremity Dressing: Minimal Assistance. Pt able to doff socks, and don shoes. min A for laces only. Vonsreedharla Goodpasture BALANCE:  Sitting Balance:  Modified Independent. Standing Balance: Stand By Assistance. BED MOBILITY:  Supine to Sit: Supervision      TRANSFERS:  Sit to Stand:  Stand By Assistance. EOB. Good hand placement. Stand to Sit: Stand By Assistance. FUNCTIONAL MOBILITY:  Assistive Device: Rolling Walker  Assist Level:  Stand By Assistance. Distance: Within room during IADL, x 45 ft in hallway towards gym. Ended session in hallway with PT.      ADDITIONAL ACTIVITIES:  Patient completed IADL task of organizing closet and drawers- task facilitated standing endurance as well as balance with 1 UE to B UE release from AD reaching into cupboards to place clothing on hangers and to fold linens. Pt stood x 10 min and x 6 min with fatigue reported post standing duration, requiring a seated rest break between each event. SBA required throughout, good placement of RW safety throughout. Pt also had to side step through tight spaces in room with x 1 cue to initate side step method when she couldn't fit, good carry over from initial cue. ASSESSMENT:     Activity Tolerance:  Patient tolerance of  treatment: good. Discharge Recommendations: Home with Home health OT, Home with nursing aide   Equipment Recommendations: Other: Monitor for needing tub transfer bench vs shower chair.   Recommend TTB as of 3/6  Plan: Times per week: 5x/wk for 90 min and 1x/wk for 30 min  Times per day: Daily  Current Treatment Recommendations: Endurance Training, Neuromuscular Re-education, Patient/Caregiver Education & Training, Equipment Evaluation, Education, & procurement, Self-Care / ADL, Strengthening, Safety Education & Training    Patient Education  Patient Education: ADL's, IADL's, Precautions, Reviewed Prior Education and Assistive Device Safety    Goals  Short term goals  Time Frame for Short term goals: 1 week  Short term goal 1: Pt will complete LB dressing tasks with min A using LHAE prn to improve indep with self care. Short term goal 2: Pt will tolerate standing > 3 minutes with CGA and unilateral release to improve ability to complete sinkside grooming  Short term goal 3: Pt will transfer in and out of tub/shower with min A using grab bar and shower AE to improve indep with bathing. Short term goal 4: Pt will navigate RW to/from bathroom while transporting 2 items with CGA to improve indep with sinkside grooming. Short term goal 5: Pt will complete simple meal prep with CGA and min vcs for RW safety to improve indep within home. Long term goals  Time Frame for Long term goals : 2 weeks  Long term goal 1: Pt will complete BADL routine with set up to improve indep with self care. Long term goal 2: Pt will complete light IADL tasks with Supervision and 0-1 vcs for safety to improve indep within home. Following session, patient left in safe position with all fall risk precautions in place.

## 2021-03-09 NOTE — FLOWSHEET NOTE
[facility]. [facility] 88 PROGRESS NOTE      Patient: Sky Santos  Room #: 7U-44/939-E            YOB: 1931  Age: 80 y.o. Gender: female            Admit Date & Time: 3/3/2021 11:19 AM    Assessment:  Pt is a 89y. o. female, propped up in bed watching television, in 7E-64. Patricia Brewster is pleasant, coping with some pain as she rehabs repaired and replaced knee, is approachable and talkative. She shared that the staff on the floor have been wonderful, and that while she complains a lot, knows they are doing their job. She mentioned that they tell her she is doing well and improving, but not fast enough for her taste. She mentioned that she can get down on herself when, in her estimation, things don't go well. But, she also can perk up and be encouraged when things do go well. She also mentioned that she would like to have communion soon, if possible. Interventions:   provided active listening, encouragement, nurtured hope, conversation and prayer. Outcomes:  Patricia Brewster expressed gratitude for our visit and shared that she was encouraged as well-restating how she can get down on herself but also uplifted, just the same. Plan:  1. Continue visiting, exploring her tendency to get down on herself at times. Electronically signed by Mora Holstein, on 3/8/2021 at 9:39 PM.  [facility]  [phone]       03/08/21 2045   Encounter Summary   Services provided to: Patient   Referral/Consult From: [facility]   Support System Children   Continue Visiting Yes  (3/8)   Complexity of Encounter Low   Length of Encounter 15 minutes   Spiritual/Scientology   Type Spiritual support   Assessment Calm; Approachable;Coping   Intervention Active listening;Explored feelings, thoughts, concerns;Prayer;Nurtured hope   Outcome Expressed gratitude;Receptive;Encouraged;Engaged in conversation

## 2021-03-09 NOTE — PROGRESS NOTES
[address] [place] [address]  INPATIENT PHYSICAL THERAPY  DAILY NOTE  [address] [facility],4Th Floor - 7E-64/064-A    Time In: 0730  Time Out: 0800  Timed Code Treatment Minutes: 30 Minutes  Minutes: 30          Date: 3/9/2021  Patient Name: Emily Munoz,  Gender:  female        MRN: [id number]  : 3/4/1931  (80 y.o.)     Referring Practitioner: MOISÉS Krueger CNP and Stephanie Astudillo MD  Diagnosis: closed comminuted supracondylar fracture of right femur, initial encounter  Additional Pertinent Hx: per 21 ED note; Emily Munoz is a 80 y.o. female who presents to the emergency department for evaluation of fall at [facility] this morning. Mechanical trip and fall while walking to the parking lot. Patient denies loss of consciousness, head strike. States that her glasses hit her eye and bruised her upper eyelid. States she is feeling fine other than severe right knee pain. She admits to associated swelling. [id number] TOTAL KNEE REVISION. to IPR 3/3/2021. Prior Level of Function:  Lives With: Alone  Type of Home: House  Home Layout: One level  Home Access: Stairs to enter with rails  Entrance Stairs - Number of Steps: 2  Entrance Stairs - Rails: Left  Home Equipment: Cane, Rolling walker   Bathroom Shower/Tub: Tub/Shower unit  Bathroom Toilet: Handicap height  Bathroom Equipment: Grab bars in shower, Grab bars around toilet  Bathroom Accessibility: Walker accessible    Receives Help From: Family  ADL Assistance: Independent  Homemaking Assistance: Independent  Ambulation Assistance: Independent  Transfer Assistance: Independent  Active : Yes  Additional Comments: very indep PTA, did not use AD for mobility. Pt reports retired son lives close by and can help prn.     Restrictions/Precautions:  Restrictions/Precautions: Weight Bearing, General Precautions  Right Lower Extremity Weight Bearing: Weight Bearing As Tolerated  Left Upper Extremity Weight Bearing: Weight Bearing As Tolerated  Position Activity Restriction  Other position/activity restrictions: s/p R TKA 2/27 and minimally displaced L 5th met fx, wrist splint PRN     SUBJECTIVE: Pt presented in [place] with OT, very emotional and tearful regarding increased pain which is not improving and expressing that she does not feel she is progressing. Support and encouragement provided by staff throughout session. By the conclusion of the session pt in better spirits and reported being pleased with increased ambulation distance, although continued to report high levels of pain. Pt reclined in bedside chair with chair alarm on and all needs within reach. PAIN: 10/10: R knee    Vitals: Vitals not assessed per clinical judgement, see nursing flowsheet    OBJECTIVE:  Bed Mobility:  Not Tested    Transfers:  Sit to Stand: Stand By Assistance, [employer], with increased time for completion, to/from chair with arms  Stand to Sit:Stand By Assistance, [employer], with increased time for completion, to/from chair with arms    Ambulation:  [employer], with verbal cues , with increased time for completion, cues to reduce step length on R  Distance: 115ft and 8 feet  Surface: Level Tile  Device:Rolling Walker  Gait Deviations:  Slow Yessenia, Decreased Step Length on Left, Decreased Weight Shift Right, Decreased Gait Speed, Decreased Heel Strike Bilaterally, Narrow Base of Support and Decreased Terminal Knee Extension on R. Increased reliance on UE for support    Balance:  Static Standing Balance: Stand By Assistance, Contact Guard Assistance  Dynamic Standing Balance: Stand By Assistance, Contact Guard Assistance   *pt performed ring toss with SBA-CGA and without UE support. Required pt to reach outside HIGINIO to challenge pt dynamic stability, improve proprioception and increased R LE wt shift. Emphasis on reach R to promote increased WBing tolerance to R LE.  Good stability throughout    Exercise:  Patient was to reduce fall risk and promote overall safety in the home. Short term goal 6: Pt score on Tinetti balance test will improve to >=19 to decrease risk of falls  Long term goals  Time Frame for Long term goals : 2 weeks  Long term goal 1: Pt to perform supine<>sit with mod I to promote ease of getting in and out of bed. Long term goal 2: Pt to perform sit<>stand with mod I to promote ease of transfers. Long term goal 3: Pt to ambulate >200ft with Rw at mod I to improve ability to navigate within the home and community. Long term goal 4: Pt to negotiate 2 steps with L HR at mod I to improve ability to enter and exit home. Long term goal 5: Pt to perform car transfer with mod I to improve ability to get in and out of car. Following session, patient left in safe position with all fall risk precautions in place.

## 2021-03-09 NOTE — PROGRESS NOTES
Date: 3/9/2021  Patient Name: Tomasa Padilla        MRN: [id number]   Account: [de-identified]   : 3/4/1931  (80 y.o.)  Gender: female   Referring Practitioner: Dr. Dave Harris,  Diagnosis: Closed comminuted supracondylar fracture of right femur  Additional Pertinent Hx: per 21 ED note; Tomasa Padilla is a 80 y.o. female who presents to the emergency department for evaluation of fall at grocery store. The patient says she lost balance and fell while she was grocery shopping on 2021. She landed on her right knee with sudden severe right knee pain and could not get up. She denies loss of consciousness. She was send to [address] [place] [address] ER for evaluation. X-ray of right femur and right knee revealed status post right knee total arthroplasty with comminuted foreshortened and dorsally angulated fracture of distal right femur. Therefore she underwent right total knee revision by Dr. Ramon Calderon on 2021. Pt found to have a Left fifth metacarpal fracture and is WBAT in splint prn. To IPR on 3/3. Tomasa Padilla requires a Bedside Commode to complete bathing, toileting, dressing and grooming tasks. Patient requires a Bedside Commode due to Upper Extremity/Lower Extremity Weakness and limited ambulation and is unable to walk to the bathroom at home. Without the Bedside Commode, Tomasa Padilla is at increased risk for falls and would require increased assistance for ADL's and mobility.

## 2021-03-09 NOTE — PROGRESS NOTES
[address] [address]  INPATIENT PHYSICAL THERAPY  DAILY NOTE  [facility] - 7E-64/064-A    Time In: 3957  Time Out: 1202  Timed Code Treatment Minutes: 60 Minutes  Minutes: 60          Date: 3/9/2021  Patient Name: Barb Amor,  Gender:  female        MRN: [id number]  : 3/4/1931  (80 y.o.)     Referring Practitioner: MOISÉS Galarza CNP and Reggie Parrish MD  Diagnosis: closed comminuted supracondylar fracture of right femur, initial encounter  Additional Pertinent Hx: per 21 ED note; Barb Amor is a 80 y.o. female who presents to the emergency department for evaluation of fall at [facility] this morning. Mechanical trip and fall while walking to the parking lot. Patient denies loss of consciousness, head strike. States that her glasses hit her eye and bruised her upper eyelid. States she is feeling fine other than severe right knee pain. She admits to associated swelling. [id number] TOTAL KNEE REVISION. to IPR 3/3/2021. Prior Level of Function:  Lives With: Alone  Type of Home: House  Home Layout: One level  Home Access: Stairs to enter with rails  Entrance Stairs - Number of Steps: 2  Entrance Stairs - Rails: Left  Home Equipment: Cane, Rolling walker   Bathroom Shower/Tub: Tub/Shower unit  Bathroom Toilet: Handicap height  Bathroom Equipment: Grab bars in shower, Grab bars around toilet  Bathroom Accessibility: Walker accessible    Receives Help From: Family  ADL Assistance: Independent  Homemaking Assistance: Independent  Ambulation Assistance: Independent  Transfer Assistance: Independent  Active : Yes  Additional Comments: very indep PTA, did not use AD for mobility. Pt reports retired son lives close by and can help prn.     Restrictions/Precautions:  Restrictions/Precautions: Weight Bearing, General Precautions  Right Lower Extremity Weight Bearing: Weight Bearing As Tolerated  Left Upper Extremity Weight Bearing: Weight Bearing As Tolerated  Position Activity Restriction  Other position/activity restrictions: s/p R TKA 2/27 and minimally displaced L 5th met fx, wrist splint PRN     SUBJECTIVE: Patient supine in bed upon arrival. Patient pleasant and agreeable to therapy. Patient states feeling sore from therapy yesterday. PAIN: 7/10: R knee    Vitals: Vitals not assessed per clinical judgement, see nursing flowsheet    OBJECTIVE:  Bed Mobility:  Rolling to Right: Stand By Assistance, with head of bed raised, with increased time for completion   Supine to Sit: Contact Guard Assistance, with head of bed raised, with increased time for completion  Sit to Supine: Contact Guard Assistance, with head of bed raised, with increased time for completion     Transfers:  Sit to Stand: Contact Guard Assistance  Stand to Sit:Contact Guard Assistance    Ambulation:  Contact Guard Assistance  Distance: 100'x2  Surface: Level Tile  Device:Rolling Walker  Gait Deviations:  Slow Yessenia, Decreased Step Length on Left, Decreased Weight Shift Right, Narrow Base of Support and Decreased terminal knee extension on R, R antalgic gait. Balance:  Static Standing Balance: Contact Guard Assistance, Patient performed NBOS x2 with eyes open x2 with eye closes 20 seconds each without UE support. Patient presented with one LOB  eyes closed with self correction on RW with CGA from therapist.  Dynamic Standing Balance: Contact Guard Assistance, Patient performed toe taps with R LE onto cone to promote knee flexion with ambulation and improved functional balance. Exercise:  Patient was guided in 1 set(s) 15 reps of exercise to both lower extremities. Ankle pumps, Glut sets, Quad sets, Heelslides, Hip abduction/adduction, Long arc quads and  Patient performed TKE with yellow band while standing at rolling walker and therapist applying resistance on band. .  Exercises were completed for increased independence with functional mobility.     Functional Outcome Measures: Not completed       ASSESSMENT:  Assessment: Patient progressing toward established goals. Activity Tolerance:  Patient tolerance of  treatment: good. Patient demonstrated decreased strength and decreased endurance while performing therex and gait training on this date. Patient Wbing through R LE improved with continuous ambulation. Equipment Recommendations:Equipment Needed: No(has RW and cane)  Discharge Recommendations:  Continue to assess pending progress    Plan: Times per week: 5x/wk 90 min, 1x/wk 30 min  Current Treatment Recommendations: Strengthening, ROM, Balance Training, Functional Mobility Training, Transfer Training, Gait Training, Endurance Training, Stair training, Home Exercise Program, Safety Education & Training, Patient/Caregiver Education & Training, Neuromuscular Re-education    Patient Education  Patient Education: Plan of Care, Transfers, Gait, Verbal Exercise Instruction    Goals:  Patient goals : To return home  Short term goals  Time Frame for Short term goals: 1 week  Short term goal 1: Pt to perform supine<>sit with CGA to promote ease of getting in and out of bed. Short term goal 2: Pt to perform sit<>stand with SBA to promote ease of transfers. Short term goal 3: Pt to ambulate >100ft with Rw at SBA to improve ability to navigate within the home. Short term goal 4: Pt to negotiate 2 steps with L HR at CGA to improve ability to enter and exit home. Short term goal 5: Pt to reduce TUG time to <60 seconds to reduce fall risk and promote overall safety in the home. Short term goal 6: Pt score on Tinetti balance test will improve to >=19 to decrease risk of falls  Long term goals  Time Frame for Long term goals : 2 weeks  Long term goal 1: Pt to perform supine<>sit with mod I to promote ease of getting in and out of bed. Long term goal 2: Pt to perform sit<>stand with mod I to promote ease of transfers.   Long term goal 3: Pt to ambulate >200ft with Rw at mod I to improve ability to navigate within the home and community. Long term goal 4: Pt to negotiate 2 steps with L HR at mod I to improve ability to enter and exit home. Long term goal 5: Pt to perform car transfer with mod I to improve ability to get in and out of car. Following session, patient left in safe position with all fall risk precautions in place.

## 2021-03-09 NOTE — PROGRESS NOTES
Patient: Kirstin Titus  Unit/Bed: 7E-64/064-A  YOB: 1931  MRN: [id number] Acct: [de-identified]   Admitting Diagnosis: Closed comminuted supracondylar fracture of right femur, initial encounter [facility]) Singh Ricks  Displaced fracture of medial condyle of right femur, sequela [S72.431S]  Admit Date:  3/3/2021  Hospital Day: 6    Assessment:     Principal Problem:    Closed comminuted supracondylar fracture of right femur ([address]  Active Problems:    Hypertension    Displaced fracture of medial condyle of right femur, sequela    Reactive depression    Closed fracture of base of fifth metacarpal bone of left hand    History of cataract  Resolved Problems:    * No resolved hospital problems. *      Plan:     Continue to follow        Subjective:     Patient has no complaint of CP or SOB. .   Medication side effects: none    Scheduled Meds:   acetaminophen  650 mg Oral Q6H    benzocaine   Mouth/Throat TID AC    diclofenac sodium  4 g Topical BID    triamcinolone acetonide   Mouth/Throat 4x Daily PC & HS    amLODIPine  5 mg Oral Daily    ascorbic acid  500 mg Oral Q48H    enoxaparin  40 mg Subcutaneous Q24H    ferrous sulfate  325 mg Oral Q48H    metoprolol  100 mg Oral Nightly    metoprolol tartrate  50 mg Oral Daily    oyster shell calcium/vitamin D  1 tablet Oral BID    pantoprazole  40 mg Oral QAM AC    polyethylene glycol  17 g Oral Daily    sennosides-docusate sodium  1 tablet Oral BID     Continuous Infusions:  PRN Meds:oxyCODONE, oxyCODONE, busPIRone, phenol, bisacodyl    Review of Systems  Pertinent items are noted in HPI. Objective:     Patient Vitals for the past 8 hrs:   BP Temp Temp src Pulse Resp SpO2   03/09/21 0755 124/61 97.9 °F (36.6 °C) Oral 75 18 98 %     I/O last 3 completed shifts:   In: 680 [P.O.:680]  Out: -   I/O this shift:  In: 220 [P.O.:220]  Out: -     /61   Pulse 75   Temp 97.9 °F (36.6 °C) (Oral)   Resp 18   Ht 5' 2\" (1.575 m)   Wt 130 lb 6.4 oz (59.1 kg) SpO2 98%   BMI 23.85 kg/m²     General appearance: alert, appears stated age and cooperative  Head: Normocephalic, without obvious abnormality, atraumatic  Lungs: clear to auscultation bilaterally  Heart: regular rate and rhythm, S1, S2 normal, no murmur, click, rub or gallop  Abdomen: soft, non-tender; bowel sounds normal; no masses,  no organomegaly  Extremities: extremities normal, atraumatic, no cyanosis or edema  Skin: Skin color, texture, turgor normal. No rashes or lesions  Neurologic: weak      Electronically signed by Carol Avilez MD on 3/9/2021 at 1:54 PM

## 2021-03-09 NOTE — PROGRESS NOTES
[address]  Occupational Therapy  Daily Note  Time:   Time In: 930  Time Out: 1030  Timed Code Treatment Minutes: 60 Minutes  Minutes: 60     Date: 3/9/2021  Patient Name: Airam Soto,   Gender: female      Room: [facility]64/064-A  MRN: [id number]  : 3/4/1931  (80 y.o.)  Referring Practitioner: Dr. Andree Reyes,  Diagnosis: Closed comminuted supracondylar fracture of right femur  Additional Pertinent Hx: per 21 ED note; Airam Soto is a 80 y.o. female who presents to the emergency department for evaluation of fall at grocery store. The patient says she lost balance and fell while she was grocery shopping on 2021. She landed on her right knee with sudden severe right knee pain and could not get up. She denies loss of consciousness. She was send to [address] [place] [address] ER for evaluation. X-ray of right femur and right knee revealed status post right knee total arthroplasty with comminuted foreshortened and dorsally angulated fracture of distal right femur. Therefore she underwent right total knee revision by Dr. Daily Colunga on 2021. Pt found to have a Left fifth metacarpal fracture and is WBAT in splint prn. To IPR on 3/3. Restrictions/Precautions:  Restrictions/Precautions: Weight Bearing, General Precautions  Right Lower Extremity Weight Bearing: Weight Bearing As Tolerated  Left Upper Extremity Weight Bearing: Weight Bearing As Tolerated  Position Activity Restriction  Other position/activity restrictions: s/p R TKA  and minimally displaced L 5th met fx, wrist splint PRN     SUBJECTIVE: Patient seated in recliner at start of session. Pt's daughter present and agreeable to initiating family education today, but states will be in tomorrow too for scheduled family education.    Discussed progress with daughter and patient, explaining how patient needed significant assist on eval, to now regaining that independence with discharge within home. Following session, patient left in safe position with all fall risk precautions in place.

## 2021-03-09 NOTE — PLAN OF CARE
Problem: DISCHARGE BARRIERS  Goal: Patient's continuum of care needs are met  Note: Team conference held 3/8/21. Recommendations of the team were explained to the patient by Shira Briones and Dr. Brock Sicard. Team is recommending that patient continue on acute inpatient rehab for four more days, with expected discharge date of 3/12/2021. Prior to discharge team is recommending family education. Family education is set up for 3/10/21 from 9-11:30am  Following discharge, team is recommending home health. [facility] will be set up with RN, HHA, PT and OT. Care plan reviewed with patient and son. Patient and son verbalize understanding of the plan of care and contribute to goal setting.  will continue to follow for discharge needs.

## 2021-03-10 PROCEDURE — 97116 GAIT TRAINING THERAPY: CPT

## 2021-03-10 PROCEDURE — 6370000000 HC RX 637 (ALT 250 FOR IP): Performed by: ORTHOPAEDIC SURGERY

## 2021-03-10 PROCEDURE — 97535 SELF CARE MNGMENT TRAINING: CPT

## 2021-03-10 PROCEDURE — 97110 THERAPEUTIC EXERCISES: CPT

## 2021-03-10 PROCEDURE — 6360000002 HC RX W HCPCS: Performed by: ORTHOPAEDIC SURGERY

## 2021-03-10 PROCEDURE — 97530 THERAPEUTIC ACTIVITIES: CPT

## 2021-03-10 PROCEDURE — 6370000000 HC RX 637 (ALT 250 FOR IP): Performed by: PHYSICAL MEDICINE & REHABILITATION

## 2021-03-10 PROCEDURE — 99232 SBSQ HOSP IP/OBS MODERATE 35: CPT | Performed by: PHYSICAL MEDICINE & REHABILITATION

## 2021-03-10 PROCEDURE — 1180000000 HC REHAB R&B

## 2021-03-10 RX ADMIN — OXYCODONE 5 MG: 5 TABLET ORAL at 13:28

## 2021-03-10 RX ADMIN — ENOXAPARIN SODIUM 40 MG: 40 INJECTION SUBCUTANEOUS at 08:43

## 2021-03-10 RX ADMIN — Medication: at 21:11

## 2021-03-10 RX ADMIN — OXYCODONE 5 MG: 5 TABLET ORAL at 08:44

## 2021-03-10 RX ADMIN — ACETAMINOPHEN 650 MG: 325 TABLET ORAL at 02:27

## 2021-03-10 RX ADMIN — PANTOPRAZOLE SODIUM 40 MG: 40 TABLET, DELAYED RELEASE ORAL at 05:40

## 2021-03-10 RX ADMIN — DICLOFENAC SODIUM 4 G: 10 GEL TOPICAL at 16:54

## 2021-03-10 RX ADMIN — AMLODIPINE BESYLATE 5 MG: 5 TABLET ORAL at 21:13

## 2021-03-10 RX ADMIN — DICLOFENAC SODIUM 4 G: 10 GEL TOPICAL at 08:44

## 2021-03-10 RX ADMIN — OXYCODONE HYDROCHLORIDE AND ACETAMINOPHEN 500 MG: 500 TABLET ORAL at 12:40

## 2021-03-10 RX ADMIN — Medication 250 MG: at 07:54

## 2021-03-10 RX ADMIN — OXYCODONE 5 MG: 5 TABLET ORAL at 18:30

## 2021-03-10 RX ADMIN — BENZOCAINE: 100 GEL TOPICAL at 05:40

## 2021-03-10 RX ADMIN — DOCUSATE SODIUM AND SENNOSIDES 1 TABLET: 8.6; 5 TABLET, FILM COATED ORAL at 21:13

## 2021-03-10 RX ADMIN — DOCUSATE SODIUM AND SENNOSIDES 1 TABLET: 8.6; 5 TABLET, FILM COATED ORAL at 08:43

## 2021-03-10 RX ADMIN — ACETAMINOPHEN 650 MG: 325 TABLET ORAL at 15:01

## 2021-03-10 RX ADMIN — FERROUS SULFATE TAB 325 MG (65 MG ELEMENTAL FE) 325 MG: 325 (65 FE) TAB at 12:40

## 2021-03-10 RX ADMIN — METOPROLOL TARTRATE 50 MG: 100 TABLET, FILM COATED ORAL at 08:43

## 2021-03-10 RX ADMIN — BUSPIRONE HYDROCHLORIDE 5 MG: 5 TABLET ORAL at 07:52

## 2021-03-10 RX ADMIN — ACETAMINOPHEN 650 MG: 325 TABLET ORAL at 21:11

## 2021-03-10 RX ADMIN — BUSPIRONE HYDROCHLORIDE 5 MG: 5 TABLET ORAL at 21:13

## 2021-03-10 RX ADMIN — METOPROLOL TARTRATE 100 MG: 100 TABLET, FILM COATED ORAL at 21:13

## 2021-03-10 ASSESSMENT — ENCOUNTER SYMPTOMS
NAUSEA: 0
SHORTNESS OF BREATH: 0
SORE THROAT: 0
EYE PAIN: 0
VOMITING: 0
EYE DISCHARGE: 0
ABDOMINAL PAIN: 0
TROUBLE SWALLOWING: 0
DIARRHEA: 0
RHINORRHEA: 0
CONSTIPATION: 0
WHEEZING: 0

## 2021-03-10 ASSESSMENT — PAIN SCALES - GENERAL
PAINLEVEL_OUTOF10: 7
PAINLEVEL_OUTOF10: 9
PAINLEVEL_OUTOF10: 7
PAINLEVEL_OUTOF10: 5

## 2021-03-10 ASSESSMENT — PAIN DESCRIPTION - ONSET: ONSET: ON-GOING

## 2021-03-10 ASSESSMENT — PAIN DESCRIPTION - LOCATION
LOCATION: KNEE;LEG
LOCATION: KNEE

## 2021-03-10 ASSESSMENT — PAIN DESCRIPTION - ORIENTATION
ORIENTATION: RIGHT
ORIENTATION: RIGHT

## 2021-03-10 ASSESSMENT — PAIN DESCRIPTION - PAIN TYPE: TYPE: SURGICAL PAIN

## 2021-03-10 NOTE — PROGRESS NOTES
Patient: Kamaljit Choi  Unit/Bed: 7E-64/064-A  YOB: 1931  MRN: [id number] Acct: [de-identified]   Admitting Diagnosis: Closed comminuted supracondylar fracture of right femur, initial encounter [facility]) Edwyna Beams  Displaced fracture of medial condyle of right femur, sequela [S72.431S]  Admit Date:  3/3/2021  Hospital Day: 7    Assessment:     Principal Problem:    Closed comminuted supracondylar fracture of right femur ([address]  Active Problems:    Hypertension    Displaced fracture of medial condyle of right femur, sequela    Reactive depression    Closed fracture of base of fifth metacarpal bone of left hand    History of cataract  Resolved Problems:    * No resolved hospital problems. *      Plan: Will try an ACE wrap to the left hand an wrist instead of the present velcro type. Subjective:     Patient has no complaint of CP, SOB . [place] Medication side effects: none    Scheduled Meds:   acetaminophen  650 mg Oral Q6H    diclofenac sodium  4 g Topical BID    triamcinolone acetonide   Mouth/Throat 4x Daily PC & HS    amLODIPine  5 mg Oral Daily    ascorbic acid  500 mg Oral Q48H    enoxaparin  40 mg Subcutaneous Q24H    ferrous sulfate  325 mg Oral Q48H    metoprolol  100 mg Oral Nightly    metoprolol tartrate  50 mg Oral Daily    oyster shell calcium/vitamin D  1 tablet Oral BID    pantoprazole  40 mg Oral QAM AC    polyethylene glycol  17 g Oral Daily    sennosides-docusate sodium  1 tablet Oral BID     Continuous Infusions:  PRN Meds:oxyCODONE, oxyCODONE, busPIRone, phenol, bisacodyl    Review of Systems  Pertinent items are noted in HPI. Objective:     Patient Vitals for the past 8 hrs:   BP Temp Temp src Pulse Resp SpO2   03/10/21 1300 134/62 98.3 °F (36.8 °C) Oral 82 11 98 %   03/10/21 0745 134/63 96.9 °F (36.1 °C) Oral 90 20 96 %     I/O last 3 completed shifts:   In: 806 [P.O.:935]  Out: -   I/O this shift:  In: 180 [P.O.:180]  Out: -     /62   Pulse 82   Temp 98.3 °F (36.8 °C) (Oral)   Resp 11   Ht 5' 2\" (1.575 m)   Wt 130 lb 6.4 oz (59.1 kg)   SpO2 98%   BMI 23.85 kg/m²     General appearance: alert, appears stated age and cooperative  Head: Normocephalic, without obvious abnormality, atraumatic  Lungs: clear to auscultation bilaterally  Heart: regular rate and rhythm, S1, S2 normal, no murmur, click, rub or gallop  Abdomen: soft, non-tender; bowel sounds normal; no masses,  no organomegaly  Extremities: extremities normal, atraumatic, no cyanosis or edema  Skin: Skin color, texture, turgor normal. No rashes or lesions  Neurologic: Grossly normal    Data Review:   Results for La Nena Nguyen (MRN [id number]) as of 3/10/2021 13:16   Ref.  Range 3/1/2021 05:28 3/2/2021 05:20 3/2/2021 18:34 3/4/2021 06:25   Sodium Latest Ref Range: 135 - 145 meq/L 134 (L) 137  133 (L)   Potassium Latest Ref Range: 3.5 - 5.2 meq/L 4.2 4.5  4.3   Chloride Latest Ref Range: 98 - 111 meq/L 100 102  100   CO2 Latest Ref Range: 23 - 33 meq/L 25 27  25   BUN Latest Ref Range: 7 - 22 mg/dL 15 11  11   Creatinine Latest Ref Range: 0.4 - 1.2 mg/dL 0.5 0.5  0.4   Anion Gap Latest Ref Range: 8.0 - 16.0 meq/L 9.0 8.0  8.0   Est, Glom Filt Rate Latest Units: ml/min/1.73m2 >90 >90  >90   Magnesium Latest Ref Range: 1.6 - 2.4 mg/dL   2.1    Glucose Latest Ref Range: 70 - 108 mg/dL 89 104  98   Calcium Latest Ref Range: 8.5 - 10.5 mg/dL 8.1 (L) 8.3 (L)  8.5   Total Protein Latest Ref Range: 6.1 - 8.0 g/dL    5.4 (L)       Electronically signed by Ashly Vasquez MD on 3/10/2021 at 1:14 PM

## 2021-03-10 NOTE — PROGRESS NOTES
Physical Medicine & Rehabilitation Progress Note    Chief Complaint:  Right knee pain causing impaired ADLs and ambulation due to right distal femur supracondylar fracture requiring revision of TKA     Subjective:    Carole Casarez is a 80y.o. years old  female with history of hypertension arthritis, GERD, hyperlipidemia, bilateral eyes cataract, status post right knee total knee arthroplasty, status post left shoulder rotator cuff repair, was admitted on 3/3/2021 for intensive inpatient management of impairment & disability secondary right distal femur supracondylar fracture with right knee pain requiring right total knee revision with new femur and tibia components done on 2/27/2021 by Dr. Gretchen Ferrell. The patient says her right knee continues to be painful. She rates the right knee pain at 9-10/10 level. She had oxycodone three times yesterday which helps to reduces the pain intensity. She continues receiving Tylenol around the clock. She says she had no bowel movement yesterday. She continues using Orajel for mouth sore pain. She is tolerating the rehab therapy. Currently she is expected to be discharged on 3/12/2021. However she says she is not comfortable to go home on Friday but feels more comfortable to go home on the next day. Therefore the expected date of discharge is changed to 3/13/2021. Rehabilitation:  PT: Reviewed.     Bed Mobility:  Rolling to Right: Stand By Assistance, with head of bed raised, with increased time for completion   Supine to Sit: Contact Guard Assistance, with head of bed raised, with increased time for completion  Sit to Supine: Contact Guard Assistance, with head of bed raised, with increased time for completion      Transfers:  Sit to Stand: Contact Guard Assistance  Stand to [employer] Assistance     Ambulation:  Contact Guard Assistance  Distance: 100'x2  Surface: Level Tile  Device:Rolling Walker  Gait Deviations:  Slow Yessenia, Decreased Step Length on Left, Decreased Weight Shift Right, Narrow Base of Support and Decreased terminal knee extension on R, R antalgic gait.     Balance:  Static Standing Balance: Contact Guard Assistance, Patient performed NBOS x2 with eyes open x2 with eye closes 20 seconds each without UE support. Patient presented with one LOB  eyes closed with self correction on RW with CGA from therapist.  Dynamic Standing Balance: Contact Guard Assistance, Patient performed toe taps with R LE onto cone to promote knee flexion with ambulation and improved functional balance. OT: Reviewed. ADL:   Lower Extremity Dressing: Pt reports difficulty this AM with shoes. Discussed trialing lower chair compared to high recliner. While sitting in patient's visitor chair, pt able to doff, don and tie riki shoes with moderate effort needed wth R shoe. Toilet transfer from elevated toilet seat (apartment bathroom with raised toilet seat and grab bar to simulate home environment. Discussed purchasing options of raised toilet seat and also OT ordering BSC for in bedroom (night time voiding as pt gets up 1-2 times a night)   Edu provided on tub/shower transfers with recommendation of a tub transfer bench as well as removing shower doors and installing curtain willian.      BALANCE:  Sitting Balance:  Modified Independent. Standing Balance: Stand By Assistance.       BED MOBILITY:  Supine to Sit: Supervision       TRANSFERS:  Sit to Stand:  Stand By Assistance. EOB. Good hand placement. Stand to Sit: Stand By Assistance.       FUNCTIONAL MOBILITY:  Assistive Device: Rolling Walker  Assist Level:  Stand By Assistance. Distance: Within room during IADL, x 45 ft in hallway towards gym. Ended session in hallway with PT.       ADDITIONAL ACTIVITIES:  Patient completed IADL homemaking task this date of preparing cup of coffee - task was graded to challenge walker safety, and knee precautions.   Patient was able to retrieve all items from cupboard, keurig with SBA and  min VCs for not twisting back to retrieve coffee cup during the retrieval and transportation of items. Patient required SBA throughout task with a standing endurance of 5 minutes. Daughter present for edu . ST: Reviewed. N/A      Review of Systems:  Review of Systems   Constitutional: Negative for appetite change (poor appetitis), chills, diaphoresis and fever. HENT: Positive for hearing loss. Negative for mouth sores, rhinorrhea, sneezing, sore throat, tinnitus and trouble swallowing. Eyes: Negative for pain, discharge and visual disturbance. Respiratory: Negative for shortness of breath (sometimes) and wheezing. Cardiovascular: Negative for chest pain (sometimes) and palpitations. Gastrointestinal: Negative for abdominal pain, constipation, diarrhea, nausea and vomiting. Endocrine: Positive for cold intolerance. Negative for heat intolerance. Genitourinary: Positive for difficulty urinating and urgency. Negative for dysuria and frequency. Musculoskeletal: Positive for arthralgias (right knee, left shoulder) and gait problem. Negative for myalgias and neck pain. Neurological: Positive for weakness. Negative for dizziness, tremors, seizures, speech difficulty, light-headedness, numbness and headaches. Psychiatric/Behavioral: Positive for dysphoric mood. Negative for hallucinations. The patient is nervous/anxious.          Objective:  BP (!) 146/63   Pulse 97   Temp 98.7 °F (37.1 °C) (Oral)   Resp 18   Ht 5' 2\" (1.575 m)   Wt 130 lb 6.4 oz (59.1 kg)   SpO2 97%   BMI 23.85 kg/m²   Physical Exam   General:  well-developed, well nourished  female ; in no acute distress ; appropriate affect & mood but appear depressed ; lying on bed comfortably  Eyes: pupil equally round ; extra-ocular motion intact bilaterally  Head, Ear, Nose, Mouth & Throat : normocephalic ; slightly decreased size of ecchymosis at left orbit area ; no tenderness at head or face ; no discharge from ears or nose ; no deformity ; no facial swelling ; oral mucosa pink   Neck :  supple ; no tenderness ; no muscle spasm  Cardiovascular : regular rate & rhythm ; normal S1 & S2 heart sound ; presence of S4 sound ; no murmur ; normal peripheral pulse at bilateral upper & lower extremities   Pulmonary : lung clear to auscultation ; no wheezing ; no rale  Gastrointestinal : soft, flat abdomen without tenderness ; normal bowel sound present   Back : no tenderness; no muscle spasm  Skin: presence of surgical scar with suture in place at right anterior knee from distal thigh to upper leg without open wound or discharge ; healed surgical scar at left lateral shoulder ; ecchymosis at right medial posterior thigh with decreased size ; no skin lesion or rash ; no pitting edema at all 4 extremities ; mild nonpitting swelling at right distal thigh and proximal right leg  Musculoskeletal : no limb asymmetry; no limb deformity; tenderness at anterior and medial aspect of right knee ; no tenderness at bilateral upper extremities &  rest of lower extremities; no palpable mass at limbs ; no joints laxity or crepitation other than right knee which is not assessed; right knee flexion passive ROM limited to 50 degrees limited by increased right knee pain ; otherwise normal functional joints ROM at bilateral upper extremities & the rest of bilateral lower extremities  Cerebral :  alert ; awake ; oriented to place, person and time   Cerebellum : no dysmetria with bilateral finger-to-nose test   Sensory : intact light touch and pin prick sensation at bilateral upper & lower extremities  Motor : normal tone at bilateral upper & left lower extremities ; right lower extremity muscle tone not assessed ; 3-/5 muscle strength at right knee and 3-/5 muscle strength at right hip flexion/abduction due to increased right knee pain ; otherwise  normal 5/5 muscle strength at bilateral upper extremities & the rest of bilateral lower extremities  Reflex : zero bilateral ankle and left knee reflexes ; right knee reflex not tested  Pathological Reflex :   no ankle clonus  Gait :  Not assessed      Diagnostics:   No results found for this or any previous visit (from the past 24 hour(s)). Impression:  · Right distal femur supracondylar comminuted fracture with severe right knee pain causing impaired ADLs & ambulation due to fall accident requiring right total knee revision with OSS both femoral and tibial components  · Hypertension  · Left hand 5th metacarpal base fracture   · GERD  · Hyperlipidemia  · Bilateral eyes cataract  · Possible reactive depression     The patient is still tolerating the inpatient rehab treatment and is making progress slowly. She still has significant right knee pain and right lower extremity weakness but her right lower extremity muscle strength is improving slowly. The pain medications usage continues to provide good pain control. She continues to be WBAT at right lower extremity. She is expecting to be discharged on 3/13/2021. Plan:  · Continue ongoing PT/OT/RT inpatient rehabilitation treatment at least 3 hours per day, 5 days per week in order to improve functional status prior to discharge. Family education and training will be completed. Equipment evaluations and recommendations will be completed as appropriate. · Rehabilitation nursing continues to be involved for bowel, bladder, skin, and pain management. Nursing will also provide education and training to patient and family. · Prophylaxis:  DVT: Lovenox, RON stocking and intermittent pneumatic compression. GI: Glycolax, Senokot-S and prn Dulcolax suppository.   · Pain: regular Tylenol, Voltaren gel application, low dose oxycodone prn, ice pad application   · Continue allow weight bearing as tolerated at right lower extremity as per ortho  · Continue Orajel for mouth sore  · Continue Norvasc and Lopressor for HTN  · Continue iron supplement for anemia  · Nutrition:  continue current diet    · Bladder: normal function ; will monitor  · Bowel: functionally normal ; will monitor for constipation  ·  and case management for coordination of care and discharge planning       Missed Therapy Time:  · None    Flori Batista MD

## 2021-03-10 NOTE — PLAN OF CARE
Problem: Falls - Risk of:  Goal: Will remain free from falls  Description: Will remain free from falls  Outcome: Ongoing  Note: Patient walks steady assist, contact. Uses walker and gait belt. Problem: Pain:  Goal: Pain level will decrease  Description: Pain level will decrease  Outcome: Ongoing  Note: Patient complains of pain in knee. She uses her prn medication along with her scheduled Voltaren gel. Patient states dressing change \"makes my knee feel better. \"      Problem: SKIN INTEGRITY  Goal: LTG - Patient will be free from infection  Outcome: Ongoing  Note: Patient incision is well approximated, no redness. Bruising scattered around knee.  No signs of infection

## 2021-03-10 NOTE — PROGRESS NOTES
[address] [place] [address]  Occupational Therapy  Daily Note  Time:   Time In: 8004  Time Out: 1407  Timed Code Treatment Minutes: 32 Minutes  Minutes: 32    Date: 3/10/2021  Patient Name: Airam Soto,   Gender: female      Room: -64/064-A  MRN: [id number]  : 3/4/1931  (80 y.o.)  Referring Practitioner: Dr. Andree Reyes,  Diagnosis: Closed comminuted supracondylar fracture of right femur  Additional Pertinent Hx: per 21 ED note; Airam Soto is a 80 y.o. female who presents to the emergency department for evaluation of fall at grocery store. The patient says she lost balance and fell while she was grocery shopping on 2021. She landed on her right knee with sudden severe right knee pain and could not get up. She denies loss of consciousness. She was send to [address] [place] [address] ER for evaluation. X-ray of right femur and right knee revealed status post right knee total arthroplasty with comminuted foreshortened and dorsally angulated fracture of distal right femur. Therefore she underwent right total knee revision by Dr. Daily Colunga on 2021. Pt found to have a Left fifth metacarpal fracture and is WBAT in splint prn. To IPR on 3/3. Restrictions/Precautions:  Restrictions/Precautions: Weight Bearing, General Precautions  Right Lower Extremity Weight Bearing: Weight Bearing As Tolerated  Left Upper Extremity Weight Bearing: Weight Bearing As Tolerated  Position Activity Restriction  Other position/activity restrictions: s/p R TKA  and minimally displaced L 5th met fx, wrist splint PRN     SUBJECTIVE: Pt pleasant and cooperative and agreeable to OT with encouragement. Two sons still present for family education, supportive. Patient vocalizing increased pain this afternoon and does also appear to have some anxiety as well. Deep breathing strategies educated on, increased time for patient to become \"composed\" prior to resuming therapy.   Pt only agreeable to EOB therex at this time. Pt also reporting she did not like her wrist brace, called distribution and ordered one that was more size appropriate (small) vs the large one she had. Pt and family with several questions regarding pain medication and dressing to knee incision. Collaborated with RN Yakov Escobedo to educate family     PAIN: Did not ask to rate as it was noted there was some perseveration on pain, pt c/o high pain, up to time on all pain meds and ice provided at EOS       Vitals: Vitals not assessed per clinical judgement, see nursing flowsheet    COGNITION: WFL    ADL:   No ADL's completed this session. Ulus Reusing BALANCE:  Sitting Balance:  Modified Independent. Standing Balance: Stand By Assistance. in prep to ambulate only     BED MOBILITY:  Supine to Sit: Modified Independent      TRANSFERS:  Sit to Stand:  Stand By Assistance. EOB  Stand to Sit: Stand By Assistance. FUNCTIONAL MOBILITY:  Assistive Device: Rolling Walker  Assist Level:  Stand By Assistance. Distance: x 5 ft in room from EOB to recliner      ADDITIONAL ACTIVITIES:  Patient identified a personal goal to increase UB strength and improve overall endurance so they can complete their toilet & shower transfers; skilled edu on UE strengthening and patient completed BUE strengthening exercises x15 reps x1 set this date with a med resistive band in all joints and all planes. Patient tolerated well, requiring occasional rest breaks. Pt required min cues for technique. ASSESSMENT:     Activity Tolerance:  Patient tolerance of  treatment: limited by pain, can be self limiting at times too     Discharge Recommendations: Home with Home health OT, Home with nursing aide   Equipment Recommendations: Other: Recommend tub transfer bench, raised toilet seat and BSC (in bedroom).   BSC orderd 3/9  Plan: Times per week: 5x/wk for 90 min and 1x/wk for 30 min  Times per day: Daily  Current Treatment Recommendations: Endurance Training,

## 2021-03-10 NOTE — PROGRESS NOTES
[address]  Occupational Therapy  Daily Note  Time:   Time In: 930  Time Out: 1040  Timed Code Treatment Minutes: 70 Minutes  Minutes: 70   Increased time required for family education     Date: 3/10/2021  Patient Name: Kirstin Titus,   Gender: female      Room: [facility]64/064-A  MRN: [id number]  : 3/4/1931  (80 y.o.)  Referring Practitioner: Dr. Kelly Valente,  Diagnosis: Closed comminuted supracondylar fracture of right femur  Additional Pertinent Hx: per 21 ED note; Kirstin Titus is a 80 y.o. female who presents to the emergency department for evaluation of fall at grocery store. The patient says she lost balance and fell while she was grocery shopping on 2021. She landed on her right knee with sudden severe right knee pain and could not get up. She denies loss of consciousness. She was send to [facility] ER for evaluation. X-ray of right femur and right knee revealed status post right knee total arthroplasty with comminuted foreshortened and dorsally angulated fracture of distal right femur. Therefore she underwent right total knee revision by Dr. Verona Reyes on 2021. Pt found to have a Left fifth metacarpal fracture and is WBAT in splint prn. To IPR on 3/3. Restrictions/Precautions:  Restrictions/Precautions: Weight Bearing, General Precautions  Right Lower Extremity Weight Bearing: Weight Bearing As Tolerated  Left Upper Extremity Weight Bearing: Weight Bearing As Tolerated  Position Activity Restriction  Other position/activity restrictions: s/p R TKA  and minimally displaced L 5th met fx, wrist splint PRN     SUBJECTIVE: Patient pleasant and cooperative. Agreeable to OT. Family present (5 children) for education, very supportive.      PAIN: /10: L knee, ice applied at EOS, nursing aware and was checking on pain medication      Vitals: Vitals not assessed per clinical judgement, see nursing flowsheet    COGNITION: WFL    ADL:   No ADLs completed this session, but reviewed ADLs with family education. Reviewed occupational therapy goals and patient's progress. Reviewed DME (BSC and tub tf bench); BSC to be delieved and tub tf bench family is ordering. Did complete dry tub transfer bench run and skilled education provided with all children on placement of curtain vs shower doors and how to safely keep curtain in tub tf bench to decrease wet floor / decrease fall risk. reviewed adjustment of tub tf bench as well. Family supportive, verbalized understanding. Johana Rios BALANCE:  Sitting Balance:  Modified Independent. Standing Balance: Stand By Assistance. BED MOBILITY:  Not Tested    TRANSFERS:  Sit to Stand:  Stand By Assistance. recliner, tub tf bench, standard chair without arm rests. Discussed adaptive strategies to decrease pain in L wrist, support brace is donned. Stand to Sit: Stand By Assistance. FUNCTIONAL MOBILITY:  Assistive Device: Rolling Walker  Assist Level:  Stand By Assistance. Distance: To shower room , used w/c to/from apt for energy conservation     ADDITIONAL ACTIVITIES:  Family education also held in therapy apartment, reviewed RW safety and item transportation and retrieval techniques. Reviewed fall risk prevention strategies as well as reviewed simple environmental adapations. Patient completed simple item retrieval and transportation task in apartment, discussed adaptive technique and use of RW bag. Family verbalized understanding, asking appropriate questions and very supportive. Reviewed recommendation to have family present initially to help reintegrate patient into home, family nodded in agreement. Also reviewed recommendation of no driving until cleared by physician, patient and family in agreement. ASSESSMENT:     Activity Tolerance:  Patient tolerance of  treatment: good.        Discharge Recommendations: Home with Home health OT, Home with nursing aide   Equipment Recommendations:

## 2021-03-10 NOTE — PROGRESS NOTES
[address] [place] [address]  INPATIENT PHYSICAL THERAPY  DAILY NOTE  [address] [facility],4Th Floor - 7[address]64[address]064[address]    Time In: 1130  Time Out: 1242  Timed Code Treatment Minutes: 72 Minutes  Minutes: 72          Date: 3/10/2021  Patient Name: Cruz Sanchez,  Gender:  female        MRN: [id number]  : 3/4/1931  (80 y.o.)     Referring Practitioner: MOISÉS Grant CNP and Kirill Sifuentes MD  Diagnosis: closed comminuted supracondylar fracture of right femur, initial encounter  Additional Pertinent Hx: per 21 ED note; Cruz Sanchez is a 80 y.o. female who presents to the emergency department for evaluation of fall at [facility] this morning. Mechanical trip and fall while walking to the parking lot. Patient denies loss of consciousness, head strike. States that her glasses hit her eye and bruised her upper eyelid. States she is feeling fine other than severe right knee pain. She admits to associated swelling. [id number] TOTAL KNEE REVISION. to IPR 3/3/2021. Prior Level of Function:  Lives With: Alone  Type of Home: House  Home Layout: One level  Home Access: Stairs to enter with rails  Entrance Stairs - Number of Steps: 2  Entrance Stairs - Rails: Left  Home Equipment: Cane, Rolling walker   Bathroom Shower/Tub: Tub/Shower unit  Bathroom Toilet: Handicap height  Bathroom Equipment: Grab bars in shower, Grab bars around toilet  Bathroom Accessibility: Walker accessible    Receives Help From: Family  ADL Assistance: Independent  Homemaking Assistance: Independent  Ambulation Assistance: Independent  Transfer Assistance: Independent  Active : Yes  Additional Comments: very indep PTA, did not use AD for mobility. Pt reports retired son lives close by and can help prn.     Restrictions/Precautions:  Restrictions/Precautions: Weight Bearing, General Precautions  Right Lower Extremity Weight Bearing: Weight Bearing As Tolerated  Left Upper Extremity Weight Bearing: Weight Bearing As Tolerated  Position Activity Restriction  Other position/activity restrictions: s/p R TKA 2/27 and minimally displaced L 5th met fx, wrist splint PRN     SUBJECTIVE: Pt presented reclined in bedside chair with 5 children present for family education. Requested to use restroom prior to leaving room. Post session, pt requesting to return to bed due to increased R LE pain. Bed alarm on and all needs within reach.     PAIN: 10/10: R knee    Vitals: Vitals not assessed per clinical judgement, see nursing flowsheet    OBJECTIVE:  Bed Mobility:  Supine to Sit: Stand By Assistance, with increased time for completion  Sit to Supine: Stand By Assistance, with increased time for completion   Scooting: Stand By Assistance   *HOB flat    Transfers:  Sit to Stand: Stand By Assistance, with increased time for completion, to/from chair with arms  Stand to Sit:Stand By Assistance, with increased time for completion, to/from chair with arms  Stand Pivot:Stand By Assistance, with Rw, pt swiveling occassionally on L LE  Car:Stand By Assistance, with increased time for completion, with verbal cues, cues for technique, educated family on use of trash bag to allow pt to slide back into car to reduce amount of knee flexion required to get in and out of car    Ambulation:  Stand By Assistance, with verbal cues , with increased time for completion, cues for increased step length on L  Distance: 10ft, 85ft, 10 ft up/down ramp, and shorter distances around easy street  Surface: Level Tile, Carpet and Ramp  Device:Rolling Walker  Gait Deviations:  Slow Yessenia, Decreased Step Length on Left, Decreased Weight Shift Right, Decreased Gait Speed, Decreased Heel Strike Bilaterally and Decreased Terminal Knee Extension on R    Balance:  Static Standing Balance: Stand By Assistance  Dynamic Standing Balance: Stand By Assistance, indep to don and doff pants and indep with pericare for toileting    Exercise:  Reviewed HEP with family, performing ~2 reps of supine therex each, including ankle pumps, glut set, quad set, heel slides, SAQ, and hip ABD/ADD. HEP handout to be provided    Stairs:  Stairs:  6\" steps. X 2 steps using Bilateral Handrails and Contact Guard Assistance, with verbal cues , with increased time for completion, CGA at R quad to prevent buckling, ascending forward, descending retro due to increased pain. Functional Outcome Measures: Not completed       ASSESSMENT:  Assessment: Patient progressing toward established goals. Activity Tolerance:  Patient tolerance of  treatment: good. Pt with increased pain at end of session     Equipment Recommendations:Equipment Needed: No(has RW and cane)  Discharge Recommendations:  Continue to assess pending progress    Plan: Times per week: 5x/wk 90 min, 1x/wk 30 min  Current Treatment Recommendations: Strengthening, ROM, Balance Training, Functional Mobility Training, Transfer Training, Gait Training, Endurance Training, Stair training, Home Exercise Program, Safety Education & Training, Patient/Caregiver Education & Training, Neuromuscular Re-education    Patient Education  Patient Education: Plan of Care, Home Exercise Program, Family Education, [employer], Transfers, Gait, Stairs, Car Transfers, Verbal Exercise Instruction   *increased time spent discussing home set up with pt family. Pt has 2 DENISE with L HR into garage, unable to install second HR due to direction door swings open. Family reporting having ramp that can be placed over step, however with further discussion determined ramp too steep for pt to negotiate. Determined pt entering/exiting home from front entrance rather than through garage would be safest option due to having 1 platform step to negotiate with RW and 1 step into home. Goals:  Patient goals : To return home  Short term goals  Time Frame for Short term goals: 1 week  Short term goal 1: Pt to perform supine<>sit with CGA to promote ease of getting in and out of bed.   Short

## 2021-03-10 NOTE — PROGRESS NOTES
[address] [place] [address]  INPATIENT PHYSICAL THERAPY  DAILY NOTE  [address] [facility],4[address] Floor - [id number]    Time In: 3050  Time Out: 1445  Timed Code Treatment Minutes: 30 Minutes  Minutes: 30          Date: 3/10/2021  Patient Name: Kirstin Titus,  Gender:  female        MRN: [id number]  : 3/4/1931  (80 y.o.)     Referring Practitioner: Cedric Esposito, MOISÉS - PAOLA and Katheryn Gongora MD  Diagnosis: closed comminuted supracondylar fracture of right femur, initial encounter  Additional Pertinent Hx: per 21 ED note; Kirstin Titus is a 80 y.o. female who presents to the emergency department for evaluation of fall at [facility] this morning. Mechanical trip and fall while walking to the parking lot. Patient denies loss of consciousness, head strike. States that her glasses hit her eye and bruised her upper eyelid. States she is feeling fine other than severe right knee pain. She admits to associated swelling. [id number] TOTAL KNEE REVISION. to IPR 3/3/2021. Prior Level of Function:  Lives With: Alone  Type of Home: House  Home Layout: One level  Home Access: Stairs to enter with rails  Entrance Stairs - Number of Steps: 2  Entrance Stairs - Rails: Left  Home Equipment: Cane, Rolling walker   Bathroom Shower/Tub: Tub/Shower unit  Bathroom Toilet: Handicap height  Bathroom Equipment: Grab bars in shower, Grab bars around toilet  Bathroom Accessibility: Walker accessible    Receives Help From: Family  ADL Assistance: Independent  Homemaking Assistance: Independent  Ambulation Assistance: Independent  Transfer Assistance: Independent  Active : Yes  Additional Comments: very indep PTA, did not use AD for mobility. Pt reports retired son lives close by and can help prn.     Restrictions/Precautions:  Restrictions/Precautions: Weight Bearing, General Precautions  Right Lower Extremity Weight Bearing: Weight Bearing As Tolerated  Left Upper Extremity Weight Bearing: Weight Bearing As Plan: Times per week: 5x/wk 90 min, 1x/wk 30 min  Current Treatment Recommendations: Strengthening, ROM, Balance Training, Functional Mobility Training, Transfer Training, Gait Training, Endurance Training, Stair training, Home Exercise Program, Safety Education & Training, Patient/Caregiver Education & Training, Neuromuscular Re-education    Patient Education  Patient Education: Plan of Care, Home Exercise Program, Gait    Goals:  Patient goals : To return home  Short term goals  Time Frame for Short term goals: 1 week  Short term goal 1: Pt to perform supine<>sit with CGA to promote ease of getting in and out of bed. Short term goal 2: Pt to perform sit<>stand with SBA to promote ease of transfers. Short term goal 3: Pt to ambulate >100ft with Rw at SBA to improve ability to navigate within the home. Short term goal 4: Pt to negotiate 2 steps with L HR at CGA to improve ability to enter and exit home. Short term goal 5: Pt to reduce TUG time to <60 seconds to reduce fall risk and promote overall safety in the home. Short term goal 6: Pt score on Tinetti balance test will improve to >=19 to decrease risk of falls  Long term goals  Time Frame for Long term goals : 2 weeks  Long term goal 1: Pt to perform supine<>sit with mod I to promote ease of getting in and out of bed. Long term goal 2: Pt to perform sit<>stand with mod I to promote ease of transfers. Long term goal 3: Pt to ambulate >200ft with Rw at mod I to improve ability to navigate within the home and community. Long term goal 4: Pt to negotiate 2 steps with L HR at mod I to improve ability to enter and exit home. Long term goal 5: Pt to perform car transfer with mod I to improve ability to get in and out of car. Following session, patient left in safe position with all fall risk precautions in place.

## 2021-03-11 PROCEDURE — 6370000000 HC RX 637 (ALT 250 FOR IP): Performed by: ORTHOPAEDIC SURGERY

## 2021-03-11 PROCEDURE — 6370000000 HC RX 637 (ALT 250 FOR IP): Performed by: PHYSICAL MEDICINE & REHABILITATION

## 2021-03-11 PROCEDURE — 97530 THERAPEUTIC ACTIVITIES: CPT

## 2021-03-11 PROCEDURE — 1180000000 HC REHAB R&B

## 2021-03-11 PROCEDURE — 97116 GAIT TRAINING THERAPY: CPT

## 2021-03-11 PROCEDURE — 6360000002 HC RX W HCPCS: Performed by: ORTHOPAEDIC SURGERY

## 2021-03-11 PROCEDURE — 97535 SELF CARE MNGMENT TRAINING: CPT

## 2021-03-11 PROCEDURE — 99232 SBSQ HOSP IP/OBS MODERATE 35: CPT | Performed by: PHYSICAL MEDICINE & REHABILITATION

## 2021-03-11 PROCEDURE — 97110 THERAPEUTIC EXERCISES: CPT

## 2021-03-11 RX ORDER — VITAMIN B COMPLEX
1000 TABLET ORAL DAILY
Status: CANCELLED | OUTPATIENT
Start: 2021-03-11

## 2021-03-11 RX ADMIN — OXYCODONE 5 MG: 5 TABLET ORAL at 13:58

## 2021-03-11 RX ADMIN — DICLOFENAC SODIUM 4 G: 10 GEL TOPICAL at 17:08

## 2021-03-11 RX ADMIN — METOPROLOL TARTRATE 50 MG: 100 TABLET, FILM COATED ORAL at 08:57

## 2021-03-11 RX ADMIN — ACETAMINOPHEN 650 MG: 325 TABLET ORAL at 17:07

## 2021-03-11 RX ADMIN — ENOXAPARIN SODIUM 40 MG: 40 INJECTION SUBCUTANEOUS at 08:59

## 2021-03-11 RX ADMIN — OXYCODONE 5 MG: 5 TABLET ORAL at 05:55

## 2021-03-11 RX ADMIN — METOPROLOL TARTRATE 100 MG: 100 TABLET, FILM COATED ORAL at 19:38

## 2021-03-11 RX ADMIN — AMLODIPINE BESYLATE 5 MG: 5 TABLET ORAL at 19:38

## 2021-03-11 RX ADMIN — DICLOFENAC SODIUM 4 G: 10 GEL TOPICAL at 08:59

## 2021-03-11 RX ADMIN — OXYCODONE 5 MG: 5 TABLET ORAL at 00:42

## 2021-03-11 RX ADMIN — BUSPIRONE HYDROCHLORIDE 5 MG: 5 TABLET ORAL at 19:38

## 2021-03-11 RX ADMIN — ACETAMINOPHEN 650 MG: 325 TABLET ORAL at 08:58

## 2021-03-11 RX ADMIN — TRIAMCINOLONE ACETONIDE: 1 PASTE DENTAL at 19:38

## 2021-03-11 RX ADMIN — OXYCODONE 2.5 MG: 5 TABLET ORAL at 18:39

## 2021-03-11 RX ADMIN — TRIAMCINOLONE ACETONIDE: 1 PASTE DENTAL at 08:59

## 2021-03-11 RX ADMIN — TRIAMCINOLONE ACETONIDE: 1 PASTE DENTAL at 15:48

## 2021-03-11 RX ADMIN — DOCUSATE SODIUM AND SENNOSIDES 1 TABLET: 8.6; 5 TABLET, FILM COATED ORAL at 09:03

## 2021-03-11 RX ADMIN — Medication 250 MG: at 08:58

## 2021-03-11 RX ADMIN — PANTOPRAZOLE SODIUM 40 MG: 40 TABLET, DELAYED RELEASE ORAL at 05:56

## 2021-03-11 RX ADMIN — DOCUSATE SODIUM AND SENNOSIDES 1 TABLET: 8.6; 5 TABLET, FILM COATED ORAL at 22:11

## 2021-03-11 ASSESSMENT — PAIN SCALES - GENERAL
PAINLEVEL_OUTOF10: 5
PAINLEVEL_OUTOF10: 0
PAINLEVEL_OUTOF10: 2
PAINLEVEL_OUTOF10: 4

## 2021-03-11 ASSESSMENT — ENCOUNTER SYMPTOMS
EYE PAIN: 0
DIARRHEA: 0
SHORTNESS OF BREATH: 0
WHEEZING: 0
RHINORRHEA: 0
CONSTIPATION: 0
EYE DISCHARGE: 0
TROUBLE SWALLOWING: 0
NAUSEA: 0
SORE THROAT: 0
VOMITING: 0
ABDOMINAL PAIN: 0

## 2021-03-11 ASSESSMENT — PAIN DESCRIPTION - ONSET: ONSET: ON-GOING

## 2021-03-11 ASSESSMENT — PAIN DESCRIPTION - PAIN TYPE: TYPE: SURGICAL PAIN

## 2021-03-11 ASSESSMENT — PAIN DESCRIPTION - ORIENTATION: ORIENTATION: RIGHT

## 2021-03-11 ASSESSMENT — PAIN DESCRIPTION - LOCATION: LOCATION: KNEE

## 2021-03-11 NOTE — PROGRESS NOTES
Physical Medicine & Rehabilitation Progress Note    Chief Complaint:  Right knee pain causing impaired ADLs and ambulation due to right distal femur supracondylar fracture requiring revision of TKA     Subjective:    Miriam Eller is a 80y.o. years old  female with history of hypertension arthritis, GERD, hyperlipidemia, bilateral eyes cataract, status post right knee total knee arthroplasty, status post left shoulder rotator cuff repair, was admitted on 3/3/2021 for intensive inpatient management of impairment & disability secondary right distal femur supracondylar fracture with right knee pain requiring right total knee revision with new femur and tibia components done on 2/27/2021 by Dr. Cheryl Gaucher. The patient's right knee is still painful but she says the pain is less than yesterday. She says the right knee pain is worse when she sit with knee bends for a period of time. She rates the right knee pain at 5-9/10 level. She had oxycodone three times yesterday which continues helping to reduces the pain intensity. She continues receiving Tylenol around the clock. She had bowel movement yesterday. She continues using Orajel for mouth sore pain. She is tolerating the rehab therapy. Currently she is expected to be discharged on 3/13/2021. Rehabilitation:  PT: Reviewed.     Bed Mobility:  Supine to Sit: Stand By Assistance, with increased time for completion  Sit to Supine: Stand By Assistance, with increased time for completion   Scooting: Stand By Assistance   *HOB flat     Transfers:  Sit to Stand: Contact Guard Assistance  Stand to [address] By Assistance, with Rw, pt swiveling occasionally on L LE  Car:Stand By Assistance, with increased time for completion, with verbal cues, cues for technique, educated family on use of trash bag to allow pt to slide back into car to reduce amount of knee flexion required to get in and out of car Ambulation:  Contact Guard Assistance  Distance: 5'x2  Surface: Level Tile  Device:Rolling Walker  Gait Deviations:  Slow Yessenia, Decreased Step Length on Left, Decreased Weight Shift Right, Decreased Gait Speed, Decreased Heel Strike Bilaterally and Decreased Terminal Knee Extension     Stairs:  Platform:  6\" platform X 2 using Rolling Walker and Contact Guard Assistance. Patient needed increased time to perform step up/down. Patient needed initial VC for which lower extremity to ascend/descend with. OT: Reviewed. ADL:   Reviewed ADLs with family education. Reviewed occupational therapy goals and patient's progress. Reviewed DME (BSC and tub tf bench); BSC to be delivered and tub tf bench family is ordering. Did complete dry tub transfer bench run and skilled education provided with all children on placement of curtain vs shower doors and how to safely keep curtain in tub tf bench to decrease wet floor / decrease fall risk. reviewed adjustment of tub tf bench as well. Family supportive, verbalized understanding. BALANCE:  Sitting Balance:  Modified Independent. Standing Balance: Stand By Assistance. in prep to ambulate only      BED MOBILITY:  Supine to Sit: Modified Independent       TRANSFERS:  Sit to Stand:  Stand By Assistance. EOB  Stand to Sit: Stand By Assistance.       FUNCTIONAL MOBILITY:  Assistive Device: Rolling Walker  Assist Level:  Stand By Assistance. Distance: x 5 ft in room from EOB to [address] [facility]:  Patient identified a personal goal to increase UB strength and improve overall endurance so they can complete their toilet & shower transfers; skilled edu on UE strengthening and patient completed BUE strengthening exercises x15 reps x1 set this date with a med resistive band in all joints and all planes. Patient tolerated well, requiring occasional rest breaks. Pt required min cues for technique. ST: Reviewed.   N/A      Review of Systems:  Review of Systems   Constitutional: Negative for appetite change (poor appetitis), chills, diaphoresis and fever. HENT: Positive for hearing loss. Negative for mouth sores, rhinorrhea, sneezing, sore throat, tinnitus and trouble swallowing. Eyes: Negative for pain, discharge and visual disturbance. Respiratory: Negative for shortness of breath (sometimes) and wheezing. Cardiovascular: Negative for chest pain (sometimes) and palpitations. Gastrointestinal: Negative for abdominal pain, constipation, diarrhea, nausea and vomiting. Endocrine: Positive for cold intolerance. Negative for heat intolerance. Genitourinary: Positive for difficulty urinating and urgency. Negative for dysuria and frequency. Musculoskeletal: Positive for arthralgias (right knee, left shoulder) and gait problem. Negative for myalgias and neck pain. Neurological: Positive for weakness. Negative for dizziness, tremors, seizures, speech difficulty, light-headedness, numbness and headaches. Psychiatric/Behavioral: Positive for dysphoric mood. Negative for hallucinations. The patient is nervous/anxious.          Objective:  /86   Pulse 98   Temp 100 °F (37.8 °C)   Resp 18   Ht 5' 2\" (1.575 m)   Wt 130 lb 6.4 oz (59.1 kg)   SpO2 96%   BMI 23.85 kg/m²   Physical Exam   General:  well-developed, well nourished  female ; in no acute distress ; appropriate affect & mood ; sitting on reclining chair comfortably  Eyes: pupil equally round ; extra-ocular motion intact bilaterally  Head, Ear, Nose, Mouth & Throat : normocephalic ; decreased size of ecchymosis at left orbit area ; no discharge from ears or nose ; no deformity ; no facial swelling ; oral mucosa pink   Neck :  supple ; no tenderness ; no muscle spasm  Cardiovascular : regular rate & rhythm ; normal S1 & S2 heart sound ; presence of S4 sound ; no murmur ; normal peripheral pulse at bilateral upper & lower extremities   Pulmonary : lung clear to auscultation ; no wheezing ; no rale  Gastrointestinal : soft, flat abdomen without tenderness ; normal bowel sound present   Back : no tenderness; no muscle spasm  Skin: presence of surgical scar with suture in place at right anterior knee from distal thigh to upper leg without open wound or discharge ; healed surgical scar at left lateral shoulder ; ecchymosis at right medial posterior thigh with decreased size ; no skin lesion or rash ; no pitting edema at all 4 extremities ; mild nonpitting swelling at right distal thigh and proximal right leg  Musculoskeletal : no limb asymmetry; no limb deformity; tenderness at anterior and medial aspect of right knee ; no tenderness at bilateral upper extremities &  rest of lower extremities; no palpable mass at limbs ; no joints laxity or crepitation other than right knee which is not assessed; right knee flexion passive ROM limited to 50 degrees limited by increased right knee pain ; otherwise normal functional joints ROM at bilateral upper extremities & the rest of bilateral lower extremities  Cerebral :  alert ; awake ; oriented to place, person and time   Sensory : intact light touch and pin prick sensation at bilateral upper & lower extremities except reduced sensation at right anterior knee surgical site  Motor : normal tone at bilateral upper & left lower extremities ; right lower extremity muscle tone not assessed ; 3-/5 muscle strength at right knee and 3-/5 muscle strength at right hip flexion/abduction due to increased right knee pain ; otherwise  normal 5/5 muscle strength at bilateral upper extremities & the rest of bilateral lower extremities  Reflex : zero bilateral ankle and left knee reflexes ; right knee reflex not tested  Pathological Reflex :   no ankle clonus  Gait :  Right antalgic gait with reduced right knee flexion range of motion during the gait cycle    Diagnostics:   No results found for this or any previous visit (from the past 24 hour(s)).       Impression:  · Right distal femur supracondylar comminuted fracture with severe right knee pain causing impaired ADLs & ambulation due to fall accident requiring right total knee revision with OSS both femoral and tibial components  · Hypertension  · Left hand 5th metacarpal base fracture   · GERD  · Hyperlipidemia  · Bilateral eyes cataract  · Possible reactive depression     The patient is tolerating the inpatient rehab treatment and continues making progress slowly. She still has significant right knee pain and right lower extremity weakness but her right lower extremity muscle strength continues to slowly. She was advised to perform active right knee ROM exercise frequently to prevent contracture. The pain medications usage continues to provide good pain control. She continues on WBAT status at right lower extremity. She is expecting to be discharged on 3/13/2021. Plan:  · Continue ongoing PT/OT/RT inpatient rehabilitation treatment at least 3 hours per day, 5 days per week in order to improve functional status prior to discharge. Family education and training will be completed. Equipment evaluations and recommendations will be completed as appropriate. · Rehabilitation nursing continues to be involved for bowel, bladder, skin, and pain management. Nursing will also provide education and training to patient and family. · Prophylaxis:  DVT: Lovenox, RON stocking and intermittent pneumatic compression. GI: Glycolax, Senokot-S and prn Dulcolax suppository.   · Pain: regular Tylenol, Voltaren gel application, low dose oxycodone prn, ice pad application   · Continue allow weight bearing as tolerated at right lower extremity as per ortho  · Continue Orajel for mouth sore  · Continue Norvasc and Lopressor for HTN  · Continue iron supplement for anemia  · Nutrition:  continue current diet    · Bladder: normal function ; will monitor  · Bowel: functionally normal ; will monitor for constipation  ·  and case management for coordination of care and discharge planning       Missed Therapy Time:  · None    Sage Bernard MD

## 2021-03-11 NOTE — PLAN OF CARE
Problem: Falls - Risk of:  Goal: Will remain free from falls  Description: Will remain free from falls  Outcome: Ongoing  Note: No noted falls. Ambulates with 1 assist and a gait belt/walker. Problem: Pain:  Goal: Control of acute pain  Description: Control of acute pain  Outcome: Ongoing  Note: Repositioning complete. Continue to encourage movement of R knee. Continues on routine Tylenol and PRN Roxicodone with effect. Problem: Bleeding:  Goal: Will show no signs and symptoms of excessive bleeding  Description: Will show no signs and symptoms of excessive bleeding  Outcome: Ongoing  Note: No s/s of bleeding noted. Continues on Lovenox     Problem: SKIN INTEGRITY  Goal: LTG - Patient will demonstrate appropriate pressure relief techniques  Outcome: Ongoing  Note: Able to reposition with encouragement     Problem: IP BOWEL ELIMINATION  Goal: LTG - patient will have regular and routine bowel evacuation  Outcome: Ongoing  Note: Declined Miralax this AM. States her bowels are moving without difficulty and she verbalized anxiety over incontinence. Problem: DISCHARGE BARRIERS  Goal: Patient's continuum of care needs are met  Outcome: Ongoing  Note: Discharge planning continues. Lives at home alone. She will be returning home with home health PT/OT/SN/HHA. Problem: Skin Integrity:  Goal: Will show no infection signs and symptoms  Description: Will show no infection signs and symptoms  Outcome: Ongoing  Note: Right knee incision well approximated with sutures intact and open to air.

## 2021-03-11 NOTE — PROGRESS NOTES
[address] [place] [address]  INPATIENT PHYSICAL THERAPY  DAILY NOTE  [address] [facility],4Th Floor - 7E-64/064-A    Time In: 1400  Time Out: 1430  Timed Code Treatment Minutes: 30 Minutes  Minutes: 30          Date: 3/11/2021  Patient Name: Miriam Eller,  Gender:  female        MRN: [id number]  : 3/4/1931  (80 y.o.)     Referring Practitioner: MOISÉS Henning CNP and Adal Powers MD  Diagnosis: closed comminuted supracondylar fracture of right femur, initial encounter  Additional Pertinent Hx: per 21 ED note; Miriam Eller is a 80 y.o. female who presents to the emergency department for evaluation of fall at [facility] this morning. Mechanical trip and fall while walking to the parking lot. Patient denies loss of consciousness, head strike. States that her glasses hit her eye and bruised her upper eyelid. States she is feeling fine other than severe right knee pain. She admits to associated swelling. [id number] TOTAL KNEE REVISION. to IPR 3/3/2021. Prior Level of Function:  Lives With: Alone  Type of Home: House  Home Layout: One level  Home Access: Stairs to enter with rails  Entrance Stairs - Number of Steps: 2  Entrance Stairs - Rails: Left  Home Equipment: Cane, Rolling walker   Bathroom Shower/Tub: Tub/Shower unit  Bathroom Toilet: Handicap height  Bathroom Equipment: Grab bars in shower, Grab bars around toilet  Bathroom Accessibility: Walker accessible    Receives Help From: Family  ADL Assistance: Independent  Homemaking Assistance: Independent  Ambulation Assistance: Independent  Transfer Assistance: Independent  Active : Yes  Additional Comments: very indep PTA, did not use AD for mobility. Pt reports retired son lives close by and can help prn.     Restrictions/Precautions:  Restrictions/Precautions: Weight Bearing, General Precautions  Right Lower Extremity Weight Bearing: Weight Bearing As Tolerated  Left Upper Extremity Weight Bearing: Weight Bearing As Tolerated  Position Activity Restriction  Other position/activity restrictions: s/p R TKA 2/27 and minimally displaced L 5th met fx, wrist splint PRN     SUBJECTIVE: pt in functional apartment finishing with OT on arrival, agrees to therapy session. Reports concern of step negotiation and bed mobility at home. Son present for session    PAIN: 5/10: R knee    Vitals: Vitals not assessed per clinical judgement, see nursing flowsheet    OBJECTIVE:  Bed Mobility:OBJECTIVE:  Bed Mobility:  Supine to Sit: Stand By Assistance, with increased time for completion  Sit to Supine: Stand By Assistance, with increased time for completion     Transfers:  Sit to Stand: Stand By Assistance, with increased time for completion  Stand to Sit:Stand By Assistance, with increased time for completion    Ambulation:  Stand By Assistance, with increased time for completion  Distance: 80ft, 10ft x3 to destination  Surface: Level Tile and Carpet  Device:Rolling Walker  Gait Deviations:  Slow Yessenia, Decreased Step Length Bilaterally, Decreased Weight Shift Right, Decreased Gait Speed, Decreased Heel Strike on Right, Decreased Terminal Knee Extension and downward gaze    Stairs:  Platform:  6\" platform X 2, consecutively using Rolling Walker and Stand By Assistance, verbal cues for sequencing prior to performance. Functional Outcome Measures: Not completed       ASSESSMENT:  Assessment: Patient progressing toward established goals. Activity Tolerance:  Patient tolerance of  treatment: fair.       Equipment Recommendations:Equipment Needed: No(has RW and cane)  Discharge Recommendations:  Continue to assess pending progress    Plan: Times per week: 5x/wk 90 min, 1x/wk 30 min  Current Treatment Recommendations: Strengthening, ROM, Balance Training, Functional Mobility Training, Transfer Training, Gait Training, Endurance Training, Stair training, Home Exercise Program, Safety Education & Training, Patient/Caregiver Education & Training, Neuromuscular Re-education    Patient Education  Patient Education: Plan of Care, Bed Mobility, Transfers, Gait, Stairs, Verbal Exercise Instruction, Home Safety Education    Goals:  Patient goals : To return home  Short term goals  Time Frame for Short term goals: 1 week  Short term goal 1: Pt to perform supine<>sit with CGA to promote ease of getting in and out of bed. Short term goal 2: Pt to perform sit<>stand with SBA to promote ease of transfers. Short term goal 3: Pt to ambulate >100ft with Rw at SBA to improve ability to navigate within the home. Short term goal 4: Pt to negotiate 2 steps with L HR at CGA to improve ability to enter and exit home. Short term goal 5: Pt to reduce TUG time to <60 seconds to reduce fall risk and promote overall safety in the home. Short term goal 6: Pt score on Tinetti balance test will improve to >=19 to decrease risk of falls  Long term goals  Time Frame for Long term goals : 2 weeks  Long term goal 1: Pt to perform supine<>sit with mod I to promote ease of getting in and out of bed. Long term goal 2: Pt to perform sit<>stand with mod I to promote ease of transfers. Long term goal 3: Pt to ambulate >200ft with Rw at mod I to improve ability to navigate within the home and community. Long term goal 4: Pt to negotiate 2 steps with L HR at mod I to improve ability to enter and exit home. Long term goal 5: Pt to perform car transfer with mod I to improve ability to get in and out of car. Following session, patient left in safe position with all fall risk precautions in place.

## 2021-03-11 NOTE — PROGRESS NOTES
[address]  Occupational Therapy  Daily Note  Time:   Time In: 1330  Time Out: 1400  Timed Code Treatment Minutes: 30 Minutes  Minutes: 30    Date: 3/11/2021   Patient Name: Светлана Arguelles,   Gender: female      Room: -64/064-A  MRN: [id number]  : 3/4/1931  (80 y.o.)   Referring Practitioner: Dr. Nazario Sandoval,  Diagnosis: Closed comminuted supracondylar fracture of right femur  Additional Pertinent Hx: per 21 ED note; Светлана Arguelles is a 80 y.o. female who presents to the emergency department for evaluation of fall at grocery store. The patient says she lost balance and fell while she was grocery shopping on 2021. She landed on her right knee with sudden severe right knee pain and could not get up. She denies loss of consciousness. She was send to [facility] ER for evaluation. X-ray of right femur and right knee revealed status post right knee total arthroplasty with comminuted foreshortened and dorsally angulated fracture of distal right femur. Therefore she underwent right total knee revision by Dr. Gonzalo Hicks on 2021. Pt found to have a Left fifth metacarpal fracture and is WBAT in splint prn. To IPR on 3/3. Restrictions/Precautions:  Restrictions/Precautions: Weight Bearing, General Precautions  Right Lower Extremity Weight Bearing: Weight Bearing As Tolerated  Left Upper Extremity Weight Bearing: Weight Bearing As Tolerated  Position Activity Restriction  Other position/activity restrictions: s/p R TKA  and minimally displaced L 5th met fx, wrist splint PRN     SUBJECTIVE: Patient pleasant and cooperative. Pt's son present for family education prior to discharge home later this week. PAIN: 10 / 10: R knee, Pain meds requested and provided during session      Vitals: Vitals not assessed per clinical judgement, see nursing flowsheet    COGNITION: WFL    ADL:   Grooming: Stand By Assistance.      Toileting; stand by assistance  Toilet transfer; stand by assistance     BALANCE:  Sitting Balance:  Modified Independent. Standing Balance: Stand By Assistance. TRANSFERS:  Sit to Stand:  Stand By Assistance. Stand to Sit: Stand By Assistance. Wheelchair, toilet, kitchen chair without arms with review of hand placement and tech provided     FUNCTIONAL MOBILITY:  Assistive Device: Rolling Walker  Assist Level:  Stand By Assistance. Distance: To and from bathroom and within rehab apartmetn     Patient completed IADL homemaking task this date of gathering coffee for herself and her son - task was completed for repetition to assist with carryover of knee precautions, walker safety, and IADL indep. Patient was able to retrieve all items from cupboard, counter and table with SBA  and 1 VCs for safety during the retrieval and transportation of items. Patient required sba throughout task with a standing endurance of 10 min. Son present to observe task    ASSESSMENT:  Activity Tolerance:  Patient tolerance of  treatment: good. Discharge Recommendations: Home with Home health OT, Home with nursing aide   Equipment Recommendations: Other: Recommend tub transfer bench, raised toilet seat and BSC (in bedroom). BSC orderd 3/9  Plan: Times per week: 5x/wk for 90 min and 1x/wk for 30 min  Times per day: Daily  Current Treatment Recommendations: Endurance Training, Neuromuscular Re-education, Patient/Caregiver Education & Training, Equipment Evaluation, Education, & procurement, Self-Care / ADL, Strengthening, Safety Education & Training    Patient Education  Patient Education: ADL's, Precautions and Reviewed Prior Education    Goals  Short term goals  Time Frame for Short term goals: 1 week  Short term goal 1: Pt will complete LB dressing tasks with min A using LHAE prn to improve indep with self care.   Short term goal 2: Pt will tolerate standing > 3 minutes with CGA and unilateral release to improve ability to complete

## 2021-03-11 NOTE — PROGRESS NOTES
[address] [place] [address]  INPATIENT PHYSICAL THERAPY  DAILY NOTE  [address] [facility],4Th Floor - 7E-64/064-A    Time In: 1030  Time Out: 1130  Timed Code Treatment Minutes: 60 Minutes  Minutes: 60          Date: 3/11/2021  Patient Name: Frances Cadena,  Gender:  female        MRN: [id number]  : 3/4/1931  (80 y.o.)     Referring Practitioner: Derick Hodgkins, APRN - PAOLA and Toribio Correa MD  Diagnosis: closed comminuted supracondylar fracture of right femur, initial encounter  Additional Pertinent Hx: per 21 ED note; Frances Cadena is a 80 y.o. female who presents to the emergency department for evaluation of fall at [facility] this morning. Mechanical trip and fall while walking to the parking lot. Patient denies loss of consciousness, head strike. States that her glasses hit her eye and bruised her upper eyelid. States she is feeling fine other than severe right knee pain. She admits to associated swelling. [id number] TOTAL KNEE REVISION. to IPR 3/3/2021. Prior Level of Function:  Lives With: Alone  Type of Home: House  Home Layout: One level  Home Access: Stairs to enter with rails  Entrance Stairs - Number of Steps: 2  Entrance Stairs - Rails: Left  Home Equipment: Cane, Rolling walker   Bathroom Shower/Tub: Tub/Shower unit  Bathroom Toilet: Handicap height  Bathroom Equipment: Grab bars in shower, Grab bars around toilet  Bathroom Accessibility: Walker accessible    Receives Help From: Family  ADL Assistance: Independent  Homemaking Assistance: Independent  Ambulation Assistance: Independent  Transfer Assistance: Independent  Active : Yes  Additional Comments: very indep PTA, did not use AD for mobility. Pt reports retired son lives close by and can help prn.     Restrictions/Precautions:  Restrictions/Precautions: Weight Bearing, General Precautions  Right Lower Extremity Weight Bearing: Weight Bearing As Tolerated  Left Upper Extremity Weight Bearing: Weight Bearing As completion. Functional Outcome Measures: Not completed       ASSESSMENT:  Assessment: Patient progressing toward established goals. Activity Tolerance:  Patient tolerance of  treatment: good. Equipment Recommendations:Equipment Needed: No(has RW and cane)  Discharge Recommendations:  Continue to assess pending progress    Plan: Times per week: 5x/wk 90 min, 1x/wk 30 min  Current Treatment Recommendations: Strengthening, ROM, Balance Training, Functional Mobility Training, Transfer Training, Gait Training, Endurance Training, Stair training, Home Exercise Program, Safety Education & Training, Patient/Caregiver Education & Training, Neuromuscular Re-education    Patient Education  Patient Education: Plan of Care, Home Exercise Program, [employer] Mobility, Transfers, Gait, Stairs, Verbal Exercise Instruction, HO provided for HEP    Goals:  Patient goals : To return home  Short term goals  Time Frame for Short term goals: 1 week  Short term goal 1: Pt to perform supine<>sit with CGA to promote ease of getting in and out of bed. Short term goal 2: Pt to perform sit<>stand with SBA to promote ease of transfers. Short term goal 3: Pt to ambulate >100ft with Rw at SBA to improve ability to navigate within the home. Short term goal 4: Pt to negotiate 2 steps with L HR at CGA to improve ability to enter and exit home. Short term goal 5: Pt to reduce TUG time to <60 seconds to reduce fall risk and promote overall safety in the home. Short term goal 6: Pt score on Tinetti balance test will improve to >=19 to decrease risk of falls  Long term goals  Time Frame for Long term goals : 2 weeks  Long term goal 1: Pt to perform supine<>sit with mod I to promote ease of getting in and out of bed. Long term goal 2: Pt to perform sit<>stand with mod I to promote ease of transfers. Long term goal 3: Pt to ambulate >200ft with Rw at mod I to improve ability to navigate within the home and community.   Long term goal 4: Pt to negotiate 2 steps with L HR at mod I to improve ability to enter and exit home. Long term goal 5: Pt to perform car transfer with mod I to improve ability to get in and out of car. Following session, patient left in safe position with all fall risk precautions in place.

## 2021-03-11 NOTE — PROGRESS NOTES
[address]  Occupational Therapy  Daily Note  Time:   Time In: 0700  Time Out: 0800  Timed Code Treatment Minutes: 60 Minutes  Minutes: 60    Date: 3/11/2021   Patient Name: Elle Cruz,   Gender: female      Room: -64/064-A  MRN: [id number]  : 3/4/1931  (80 y.o.)   Referring Practitioner: Dr. Kelley Moore,  Diagnosis: Closed comminuted supracondylar fracture of right femur  Additional Pertinent Hx: per 21 ED note; Elle Cruz is a 80 y.o. female who presents to the emergency department for evaluation of fall at grocery store. The patient says she lost balance and fell while she was grocery shopping on 2021. She landed on her right knee with sudden severe right knee pain and could not get up. She denies loss of consciousness. She was send to [facility] ER for evaluation. X-ray of right femur and right knee revealed status post right knee total arthroplasty with comminuted foreshortened and dorsally angulated fracture of distal right femur. Therefore she underwent right total knee revision by Dr. Celia Wright on 2021. Pt found to have a Left fifth metacarpal fracture and is WBAT in splint prn. To IPR on 3/3. Restrictions/Precautions:  Restrictions/Precautions: Weight Bearing, General Precautions  Right Lower Extremity Weight Bearing: Weight Bearing As Tolerated  Left Upper Extremity Weight Bearing: Weight Bearing As Tolerated  Position Activity Restriction  Other position/activity restrictions: s/p R TKA  and minimally displaced L 5th met fx, wrist splint PRN     SUBJECTIVE: Patient pleasant and cooperative. Pt with less guarded behaviors noted this AM    PAIN: 7 / 10: R knee, up to time on all pain medication     Vitals: Vitals not assessed per clinical judgement, see nursing flowsheet    COGNITION: WFL    ADL:   Grooming: Stand By Assistance. Bathing: Stand By Assistance. in tub shower combo.    Upper Extremity Dressing: with set-up. Lower Extremity Dressing: Stand By Assistance. during clothing mgmt, min encouragement to self don socks, but was able to however   Tub Transfer: Stand By Assistance. Using tub tf bench, min increased time. BALANCE:  Sitting Balance:  Modified Independent. Standing Balance: Stand By Assistance. BED MOBILITY:  Not Tested    TRANSFERS:  Sit to Stand:  Stand By Assistance. EOB, tub tf bench. good hand placement. Stand to Sit: Stand By Assistance. FUNCTIONAL MOBILITY:  Assistive Device: Rolling Walker  Assist Level:  Stand By Assistance. Distance: To and from bathroom and To and from shower room     ASSESSMENT:     Activity Tolerance:  Patient tolerance of  treatment: good. Discharge Recommendations: Home with Home health OT, Home with nursing aide   Equipment Recommendations: Other: Recommend tub transfer bench, raised toilet seat and BSC (in bedroom). BSC orderd 3/9  Plan: Times per week: 5x/wk for 90 min and 1x/wk for 30 min  Times per day: Daily  Current Treatment Recommendations: Endurance Training, Neuromuscular Re-education, Patient/Caregiver Education & Training, Equipment Evaluation, Education, & procurement, Self-Care / ADL, Strengthening, Safety Education & Training    Patient Education  Patient Education: ADL's, Precautions and Reviewed Prior Education    Goals  Short term goals  Time Frame for Short term goals: 1 week  Short term goal 1: Pt will complete LB dressing tasks with min A using LHAE prn to improve indep with self care. Short term goal 2: Pt will tolerate standing > 3 minutes with CGA and unilateral release to improve ability to complete sinkside grooming  Short term goal 3: Pt will transfer in and out of tub/shower with min A using grab bar and shower AE to improve indep with bathing. Short term goal 4: Pt will navigate RW to/from bathroom while transporting 2 items with CGA to improve indep with sinkside grooming.   Short term

## 2021-03-11 NOTE — PROGRESS NOTES
Patient: Cruz Sanchez  Unit/Bed: 7E-64/064-A  YOB: 1931  MRN: [id number] Acct: [de-identified]   Admitting Diagnosis: Closed comminuted supracondylar fracture of right femur, initial encounter [facility]) Michael Aquino  Displaced fracture of medial condyle of right femur, sequela [S72.431S]  Admit Date:  3/3/2021  Hospital Day: 8    Assessment:     Principal Problem:    Closed comminuted supracondylar fracture of right femur ([address]  Active Problems:    Hypertension    Displaced fracture of medial condyle of right femur, sequela    Reactive depression    Closed fracture of base of fifth metacarpal bone of left hand    History of cataract  Resolved Problems:    * No resolved hospital problems. *      Plan:     Continue to follow  I talked again to her OT and will now try just a plain ACE wrap to the left hand to see if it works better with the walker. Subjective:     Patient has no complaint of CP or SOB. .   Medication side effects: none    Scheduled Meds:   acetaminophen  650 mg Oral Q6H    diclofenac sodium  4 g Topical BID    triamcinolone acetonide   Mouth/Throat 4x Daily PC & HS    amLODIPine  5 mg Oral Daily    ascorbic acid  500 mg Oral Q48H    enoxaparin  40 mg Subcutaneous Q24H    ferrous sulfate  325 mg Oral Q48H    metoprolol  100 mg Oral Nightly    metoprolol tartrate  50 mg Oral Daily    oyster shell calcium/vitamin D  1 tablet Oral BID    pantoprazole  40 mg Oral QAM AC    polyethylene glycol  17 g Oral Daily    sennosides-docusate sodium  1 tablet Oral BID     Continuous Infusions:  PRN Meds:oxyCODONE, oxyCODONE, busPIRone, phenol, bisacodyl    Review of Systems  Pertinent items are noted in HPI. Objective:     Patient Vitals for the past 8 hrs:   BP Temp Temp src Pulse Resp SpO2   03/11/21 0820 (!) 144/64 98.5 °F (36.9 °C) Oral 88 16 95 %   03/11/21 0557  100 °F (37.8 °C)         I/O last 3 completed shifts: In: 1 [P.O.:970]  Out: -   I/O this shift:   In: 450 [P.O.:450]  Out: -     BP (!) 144/64   Pulse 88   Temp 98.5 °F (36.9 °C) (Oral)   Resp 16   Ht 5' 2\" (1.575 m)   Wt 130 lb 6.4 oz (59.1 kg)   SpO2 95%   BMI 23.85 kg/m²     General appearance: alert, appears stated age and cooperative  Head: Normocephalic, without obvious abnormality, atraumatic  Lungs: clear to auscultation bilaterally  Heart: regular rate and rhythm, S1, S2 normal, no murmur, click, rub or gallop  Abdomen: soft, non-tender; bowel sounds normal; no masses,  no organomegaly  Extremities: extremities normal, atraumatic, no cyanosis or edema, there is some soreness to the 5th metacarpal on the left hand  Skin: Skin color, texture, turgor normal. No rashes or lesions  Neurologic: Grossly normal      Electronically signed by Marline Spatz, MD on 3/11/2021 at 1:15 PM

## 2021-03-12 PROCEDURE — 6370000000 HC RX 637 (ALT 250 FOR IP): Performed by: ORTHOPAEDIC SURGERY

## 2021-03-12 PROCEDURE — 99232 SBSQ HOSP IP/OBS MODERATE 35: CPT | Performed by: PHYSICAL MEDICINE & REHABILITATION

## 2021-03-12 PROCEDURE — 1180000000 HC REHAB R&B

## 2021-03-12 PROCEDURE — 6370000000 HC RX 637 (ALT 250 FOR IP): Performed by: PHYSICAL MEDICINE & REHABILITATION

## 2021-03-12 PROCEDURE — 97110 THERAPEUTIC EXERCISES: CPT

## 2021-03-12 PROCEDURE — 97535 SELF CARE MNGMENT TRAINING: CPT

## 2021-03-12 PROCEDURE — 6360000002 HC RX W HCPCS: Performed by: ORTHOPAEDIC SURGERY

## 2021-03-12 PROCEDURE — 97530 THERAPEUTIC ACTIVITIES: CPT

## 2021-03-12 PROCEDURE — 97116 GAIT TRAINING THERAPY: CPT

## 2021-03-12 RX ORDER — CALCIUM CARBONATE/VITAMIN D3 250-3.125
1 TABLET ORAL 2 TIMES DAILY
Qty: 60 TABLET | Refills: 1 | Status: SHIPPED | OUTPATIENT
Start: 2021-03-12

## 2021-03-12 RX ORDER — BISACODYL 10 MG
10 SUPPOSITORY, RECTAL RECTAL DAILY PRN
Qty: 30 SUPPOSITORY | Refills: 1 | Status: SHIPPED | OUTPATIENT
Start: 2021-03-12 | End: 2021-04-11

## 2021-03-12 RX ORDER — OXYCODONE HYDROCHLORIDE 5 MG/1
2.5-5 TABLET ORAL EVERY 4 HOURS PRN
Qty: 42 TABLET | Refills: 0 | Status: SHIPPED | OUTPATIENT
Start: 2021-03-12 | End: 2021-03-19

## 2021-03-12 RX ORDER — TRIAMCINOLONE ACETONIDE 0.1 %
PASTE (GRAM) DENTAL
Qty: 1 TUBE | Refills: 1 | Status: SHIPPED | OUTPATIENT
Start: 2021-03-12 | End: 2021-03-19

## 2021-03-12 RX ADMIN — ENOXAPARIN SODIUM 40 MG: 40 INJECTION SUBCUTANEOUS at 08:40

## 2021-03-12 RX ADMIN — DOCUSATE SODIUM AND SENNOSIDES 1 TABLET: 8.6; 5 TABLET, FILM COATED ORAL at 08:40

## 2021-03-12 RX ADMIN — TRIAMCINOLONE ACETONIDE: 1 PASTE DENTAL at 17:18

## 2021-03-12 RX ADMIN — TRIAMCINOLONE ACETONIDE: 1 PASTE DENTAL at 01:11

## 2021-03-12 RX ADMIN — Medication 250 MG: at 01:12

## 2021-03-12 RX ADMIN — TRIAMCINOLONE ACETONIDE: 1 PASTE DENTAL at 12:39

## 2021-03-12 RX ADMIN — Medication 250 MG: at 21:35

## 2021-03-12 RX ADMIN — OXYCODONE HYDROCHLORIDE AND ACETAMINOPHEN 500 MG: 500 TABLET ORAL at 12:38

## 2021-03-12 RX ADMIN — OXYCODONE 2.5 MG: 5 TABLET ORAL at 06:22

## 2021-03-12 RX ADMIN — Medication 250 MG: at 08:41

## 2021-03-12 RX ADMIN — ACETAMINOPHEN 650 MG: 325 TABLET ORAL at 16:37

## 2021-03-12 RX ADMIN — ACETAMINOPHEN 650 MG: 325 TABLET ORAL at 21:34

## 2021-03-12 RX ADMIN — TRIAMCINOLONE ACETONIDE: 1 PASTE DENTAL at 21:36

## 2021-03-12 RX ADMIN — POLYETHYLENE GLYCOL 3350 17 G: 17 POWDER, FOR SOLUTION ORAL at 08:40

## 2021-03-12 RX ADMIN — AMLODIPINE BESYLATE 5 MG: 5 TABLET ORAL at 21:35

## 2021-03-12 RX ADMIN — TRIAMCINOLONE ACETONIDE: 1 PASTE DENTAL at 08:42

## 2021-03-12 RX ADMIN — ACETAMINOPHEN 650 MG: 325 TABLET ORAL at 08:49

## 2021-03-12 RX ADMIN — DOCUSATE SODIUM AND SENNOSIDES 1 TABLET: 8.6; 5 TABLET, FILM COATED ORAL at 21:36

## 2021-03-12 RX ADMIN — OXYCODONE 5 MG: 5 TABLET ORAL at 12:38

## 2021-03-12 RX ADMIN — DICLOFENAC SODIUM 4 G: 10 GEL TOPICAL at 16:43

## 2021-03-12 RX ADMIN — DICLOFENAC SODIUM 4 G: 10 GEL TOPICAL at 08:42

## 2021-03-12 RX ADMIN — METOPROLOL TARTRATE 100 MG: 100 TABLET, FILM COATED ORAL at 21:35

## 2021-03-12 RX ADMIN — BUSPIRONE HYDROCHLORIDE 5 MG: 5 TABLET ORAL at 23:56

## 2021-03-12 RX ADMIN — ACETAMINOPHEN 650 MG: 325 TABLET ORAL at 01:10

## 2021-03-12 RX ADMIN — METOPROLOL TARTRATE 50 MG: 100 TABLET, FILM COATED ORAL at 08:41

## 2021-03-12 RX ADMIN — PANTOPRAZOLE SODIUM 40 MG: 40 TABLET, DELAYED RELEASE ORAL at 06:22

## 2021-03-12 RX ADMIN — OXYCODONE 5 MG: 5 TABLET ORAL at 23:53

## 2021-03-12 RX ADMIN — FERROUS SULFATE TAB 325 MG (65 MG ELEMENTAL FE) 325 MG: 325 (65 FE) TAB at 12:38

## 2021-03-12 RX ADMIN — OXYCODONE 5 MG: 5 TABLET ORAL at 01:12

## 2021-03-12 ASSESSMENT — ENCOUNTER SYMPTOMS
ABDOMINAL PAIN: 0
BACK PAIN: 0
RHINORRHEA: 0
WHEEZING: 0
SORE THROAT: 0
SHORTNESS OF BREATH: 0
DIARRHEA: 0
NAUSEA: 0
EYE PAIN: 0
CONSTIPATION: 0
VOMITING: 0
TROUBLE SWALLOWING: 0
EYE DISCHARGE: 0

## 2021-03-12 ASSESSMENT — PAIN SCALES - GENERAL
PAINLEVEL_OUTOF10: 4
PAINLEVEL_OUTOF10: 7
PAINLEVEL_OUTOF10: 6
PAINLEVEL_OUTOF10: 2
PAINLEVEL_OUTOF10: 4
PAINLEVEL_OUTOF10: 2
PAINLEVEL_OUTOF10: 2

## 2021-03-12 NOTE — PLAN OF CARE
Problem: Falls - Risk of:  Goal: Will remain free from falls  Description: Will remain free from falls  Outcome: Ongoing  Goal: Absence of physical injury  Description: Absence of physical injury  Outcome: Ongoing     Patient will remain free from falls during this admission as evidenced by no falls during this admission patient and staff will utilize safety devices and techniques for transfer     Problem: Pain:  Description: Pain management should include both nonpharmacologic and pharmacologic interventions. Goal: Pain level will decrease  Description: Pain level will decrease  Outcome: Ongoing  Goal: Control of acute pain  Description: Control of acute pain  Outcome: Ongoing  Goal: Control of chronic pain  Description: Control of chronic pain  Outcome: Ongoing    Patient will participate in pain management to decrease pain as evidenced by patient use of breathing and meditation for pain management     Problem: SKIN INTEGRITY  Goal: LTG - Patient will be free from infection  Outcome: Ongoing  Goal: LTG - patient will maintain/improve skin integrity through proper skin care techniques  Outcome: Ongoing  Goal: LTG - Patient will demonstrate appropriate pressure relief techniques  Outcome: Ongoing    Patient will participate in skin integrity promotion during this admission as patient offloading weight while in bed and in chair. Patient and family will alert staff to any concerns he has related to break in intact skin. Problem: DISCHARGE BARRIERS  Goal: Patient's continuum of care needs are met  Outcome: Ongoing     Patient will participate in discharge planning during this admission as evidenced by patient will continue to work with nursing and therapy for home going education.

## 2021-03-12 NOTE — PROGRESS NOTES
Extremity Dressing: Modified Independent. donning/doffing bra and shirt   Lower Extremity Dressing: Modified Independent. required encouragement to complete and increased time however able to complete with MI   Toileting: Modified Independent. Toilet Transfer: Modified Independent. Tub Transfer: Modified Independent. with encouragement however able to complete . BALANCE:  Sitting Balance:  Modified Independent. Standing Balance: Modified Independent. BED MOBILITY:  Not Tested    TRANSFERS:  Sit to Stand:  Modified Independent. Stand to Sit: Modified Independent. FUNCTIONAL MOBILITY:  Assistive Device: Rolling Walker  Assist Level:  Modified Independent. Distance: To and from bathroom and To and from shower room     ASSESSMENT:     Activity Tolerance:  Patient tolerance of  treatment: good. Discharge Recommendations: Home with Home health OT, Home with nursing aide   Equipment Recommendations: Other: Recommend tub transfer bench, raised toilet seat and BSC (in bedroom). BSC orderd 3/9  Plan: Times per week: 5x/wk for 90 min and 1x/wk for 30 min  Times per day: Daily  Current Treatment Recommendations: Endurance Training, Neuromuscular Re-education, Patient/Caregiver Education & Training, Equipment Evaluation, Education, & procurement, Self-Care / ADL, Strengthening, Safety Education & Training    Patient Education  Patient Education: ADL's    Goals  Short term goals  Time Frame for Short term goals: 1 week  Short term goal 1: Pt will complete LB dressing tasks with min A using LHAE prn to improve indep with self care. Short term goal 2: Pt will tolerate standing > 3 minutes with CGA and unilateral release to improve ability to complete sinkside grooming   Short term goal 3: Pt will transfer in and out of tub/shower with min A using grab bar and shower AE to improve indep with bathing.    Short term goal 4: Pt will navigate RW to/from bathroom while transporting 2 items with CGA to improve indep with sinkside grooming. Short term goal 5: Pt will complete simple meal prep with CGA and min vcs for RW safety to improve indep within home. Long term goals  Time Frame for Long term goals : 2 weeks  Long term goal 1: Pt will complete BADL routine with set up to improve indep with self care. Long term goal 2: Pt will complete light IADL tasks with Supervision and 0-1 vcs for safety to improve indep  within home. Following session, patient left in safe position with all fall risk precautions in place.

## 2021-03-12 NOTE — PROGRESS NOTES
[address] [address]  INPATIENT PHYSICAL THERAPY  DAILY NOTE  [address] [facility],4Th Floor - [id number]    Time In: 1000  Time Out: 1130  Timed Code Treatment Minutes: 90 Minutes  Minutes: 90          Date: 3/12/2021  Patient Name: Piyush Sandhu,  Gender:  female        MRN: [id number]  : 3/4/1931  (80 y.o.)     Referring Practitioner: MOISÉS López - PAOLA and Benjamin Antonio MD  Diagnosis: closed comminuted supracondylar fracture of right femur, initial encounter  Additional Pertinent Hx: per 21 ED note; Piyush Sandhu is a 80 y.o. female who presents to the emergency department for evaluation of fall at [facility] this morning. Mechanical trip and fall while walking to the parking lot. Patient denies loss of consciousness, head strike. States that her glasses hit her eye and bruised her upper eyelid. States she is feeling fine other than severe right knee pain. She admits to associated swelling. [id number] TOTAL KNEE REVISION. to IPR 3/3/2021. Prior Level of Function:  Lives With: Alone  Type of Home: House  Home Layout: One level  Home Access: Stairs to enter with rails  Entrance Stairs - Number of Steps: 2  Entrance Stairs - Rails: Left  Home Equipment: Cane, Rolling walker   Bathroom Shower/Tub: Tub/Shower unit  Bathroom Toilet: Handicap height  Bathroom Equipment: Grab bars in shower, Grab bars around toilet  Bathroom Accessibility: Walker accessible    Receives Help From: Family  ADL Assistance: Independent  Homemaking Assistance: Independent  Ambulation Assistance: Independent  Transfer Assistance: Independent  Active : Yes  Additional Comments: very indep PTA, did not use AD for mobility. Pt reports retired son lives close by and can help prn.     Restrictions/Precautions:  Restrictions/Precautions: Weight Bearing, General Precautions  Right Lower Extremity Weight Bearing: Weight Bearing As Tolerated  Left Upper Extremity Weight Bearing: Weight Bearing As Tolerated  Position Activity Restriction  Other position/activity restrictions: s/p R TKA 2/27 and minimally displaced L 5th met fx, wrist splint PRN     SUBJECTIVE: Pt presented reclined in bedside chair, pleasant and agreeable to participate in therapy. Post session, pt returned to supine in bed to rest with bed alarm on and all needs within reach. Ice applied to R knee    PAIN: 4/10: R knee    Vitals: Vitals not assessed per clinical judgement, see nursing flowsheet    OBJECTIVE:  Bed Mobility:  Rolling to Left: Modified Independent   Rolling to Right: Modified Independent   Supine to Sit: Supervision, with increased time for completion  Sit to Supine: Supervision, with increased time for completion     Transfers:  Sit to Stand: Supervision, with increased time for completion, to/from chair with arms  Stand to [address] [place] [address], with increased time for completion, to/from chair with arms  Stand Pivot:Supervision, with increased time for completion, with RW  Car:Stand By Assistance, with increased time for completion    Ambulation:  Supervision, with verbal cues , with increased time for completion, cues for reduced step length R for increased step length L  Distance: 160ft, 75ft, 20ft on ramp +20ft level tile  Surface: Level Tile, Carpet and Ramp  Device:Rolling Walker  Gait Deviations:  Slow Yessenia, Decreased Step Length on Left, Decreased Weight Shift Right, Decreased Gait Speed and Decreased Heel Strike Bilaterally    Balance:  Static Standing Balance: Stand By Assistance  Dynamic Standing Balance: Stand By Assistance   *indep with toileting, hand hygiene, and donning pants, 1 mod LOB noted when doffing pants with pt able to recover without increased assist    *pt performed ring toss without UE support and SBA requiring pt to reach outside HIGINIO in all directions overhead and below knee level. Emphasis placed on reaching R to promote R wt shift. Good stability throughout with no LOB noted.     *pt able to  object from floor with reacher and single UE support on RW with supervision. Exercise:  Patient was guided in 1 set(s) 15 reps of exercise to both lower extremities. Ankle pumps, Glut sets, Quad sets, Heelslides (1 set of 10 on R), Short arc quads (1 set of 10 on R) and Hip abduction/adduction. Exercises were completed for increased independence with functional mobility. Stairs:  Platform:  6\" platform X 1 step using [employer] and Stand By Assistance, with increased time for completion. Stairs:  6\" steps. X 4 steps using Bilateral Handrails and Contact Guard Assistance, with increased time for completion, cues for proper sequencing. Functional Outcome Measures: Not completed       ASSESSMENT:  Assessment: Patient progressing toward established goals. Activity Tolerance:  Patient tolerance of  treatment: good. Although pt reporting increased pain in R knee throughout session, pt tolerated all interventions well and demonstrated increased motivation this date. Equipment Recommendations:Equipment Needed: No(has RW and cane)  Discharge Recommendations:  Continue to assess pending progress    Plan: Times per week: 5x/wk 90 min, 1x/wk 30 min  Current Treatment Recommendations: Strengthening, ROM, Balance Training, Functional Mobility Training, Transfer Training, Gait Training, Endurance Training, Stair training, Home Exercise Program, Safety Education & Training, Patient/Caregiver Education & Training, Neuromuscular Re-education    Patient Education  Patient Education: Plan of Care, [employer] Mobility, Transfers, Gait, Stairs, Car Transfers, Verbal Exercise Instruction    Goals:  Patient goals : To return home  Short term goals  Time Frame for Short term goals: 1 week  Short term goal 1: Pt to perform supine<>sit with CGA to promote ease of getting in and out of bed. Short term goal 2: Pt to perform sit<>stand with SBA to promote ease of transfers.   Short term goal 3: Pt to ambulate >100ft with Rw at

## 2021-03-12 NOTE — PROGRESS NOTES
[address] [address]  Recreational Therapy  Discharge Note  Inpatient Rehabilitation Unit           Date:  3/12/2021       Patient Name: Shashi Horn      MRN: [id number]       YOB: 1931 (80 y.o.)       Gender: female  Diagnosis: Closed comminuted supracondylar fracture of right femur  Referring Practitioner: Dr. Zelpha Alpers,    Patient discharged from Recreational Therapy at this time. See recreational therapy notes for details.     Electronically signed by: JOELLE Perez  Date: 3/12/2021

## 2021-03-12 NOTE — DISCHARGE SUMMARY
Physical Medicine & Rehabilitation   Discharge Summary     Patient Identification:  Kamaljit Choi  : 3/4/1931  Admit date: 3/3/2021  Discharge date: 3/13/2021   Attending provider: Praveen Avitia MD        Primary care provider: Rupali Donahue MD     Discharge Diagnoses:   · Right distal femur supracondylar comminuted fracture with severe right knee pain causing impaired ADLs & ambulation due to fall accident requiring right total knee revision with OSS both femoral and tibial components  · Hypertension  · Left hand 5th metacarpal base fracture   · GERD  · Hyperlipidemia  · Bilateral eyes cataract  · Possible reactive depression    Discharge Functional Status:    Physical therapy:  Bed Mobility:  Rolling to Left: Modified Independent   Rolling to Right: Modified Independent   Supine to Sit: Supervision, with increased time for completion  Sit to Supine: Supervision, with increased time for completion      Transfers:  Sit to Stand: Supervision, with increased time for completion, to/from chair with arms  Stand to [address] [place] [address], with increased time for completion, to/from chair with arms  Stand Pivot:Supervision, with increased time for completion, with RW  Car:Stand By Assistance, with increased time for completion     Ambulation:  Supervision, with verbal cues , with increased time for completion, cues for reduced step length R for increased step length L  Distance: 160ft, 75ft, 20ft on ramp +20ft level tile  Surface: Level Tile, Carpet and Ramp  Device:Rolling Walker  Gait Deviations:  Slow Yessenia, Decreased Step Length on Left, Decreased Weight Shift Right, Decreased Gait Speed and Decreased Heel Strike Bilaterally     Balance:  Static Standing Balance: Stand By Assistance  Dynamic Standing Balance: Stand By Assistance   *indep with toileting, hand hygiene, and donning pants, 1 mod LOB noted when doffing pants with pt able to recover without increased assist     *pt performed ring toss without UE support and SBA requiring pt to reach outside HIGINIO in all directions overhead and below knee level. Emphasis placed on reaching R to promote R wt shift. Good stability throughout with no LOB noted.     *pt able to  object from floor with reacher and single UE support on RW with supervision.     Stairs:  Platform:  6\" platform X 2, consecutively using Rolling Walker and Stand By Assistance, verbal cues for sequencing prior to performance. PT Equipment Recommendations  Equipment Needed: No(has RW and cane),       Occupational therapy:   ADL:   Grooming: Modified Independent. Hand hygiene    Bathing: Modified Independent. seated to complete utilized grab bars appropriately in shower   Upper Extremity Dressing: Modified Independent. donning/doffing bra and shirt   Lower Extremity Dressing: Modified Independent. required encouragement to complete and increased time however able to complete with MI   Toileting: Modified Independent. Increased time for BM and personal hygiene   Toilet Transfer: Modified Independent. Tub Transfer: Modified Independent. with encouragement however able to complete      BALANCE:  Sitting Balance:  Modified Independent. Standing Balance: Modified Independent.       TRANSFERS:  Sit to Stand:  Modified Independent. Stand to Sit: Modified Independent.       FUNCTIONAL MOBILITY:  Assistive Device: Rolling Walker  Assist Level:  Modified Independent. Distance: To and from bathroom and around unit     Discharge Recommendations: [facility] with [facility], Home with nursing aide   Equipment Recommendations: Other: Recommend tub transfer bench, raised toilet seat and BSC (in bedroom). BSC orderd 3/9      Speech therapy: N/A      Inpatient Rehabilitation Course:   Kamaljit Choi is a 80 y.o.   female with history of hypertension arthritis, GERD, hyperlipidemia, bilateral eyes cataract, status post right knee total knee arthroplasty, status post left shoulder rotator cuff repair, was admitted to inpatient rehabilitation on 3/3/2021 for intensive inpatient management of impairment & disability secondary right distal femur supracondylar fracture with right knee pain requiring right total knee revision with new femur and tibia components done on 2/27/2021 by Dr. Paloma Talley patient participated in an aggressive multidisciplinary inpatient rehabilitation program involving 3 hours per day, 5 days per week of rehabilitation. Hypertension management was undertaken with medication management on any patient with systolic blood pressure greater than 360 or diastolic blood pressure greater than 90. Appropriate DVT prophylaxis options were considered throughout rehabilitation stay. Dr Kaylin Lucas followed during the IPR stay for medical management    The patient's inpatient rehabilitation course was uneventful. She tolerated the inpatient rehabilitation treatment well and gained functional independence in performing ADLs & ambulation. Patient was discharged Home with [facility] in Stable condition.     Consults:   none    Significant Diagnostics:   CBC with Differential:    Lab Results   Component Value Date    WBC 9.5 03/04/2021    RBC 2.73 03/04/2021    RBC 4.43 12/09/2020    HGB 8.3 03/04/2021    HCT 26.2 03/04/2021     03/04/2021    MCV 96.0 03/04/2021    MCH 30.4 03/04/2021    MCHC 31.7 03/04/2021    RDW 12.9 12/09/2020    NRBC 0 03/04/2021    SEGSPCT 59.1 03/04/2021    LYMPHOPCT 25.4 12/09/2020    MONOPCT 15.7 03/04/2021    EOSPCT 4.6 12/09/2020    BASOPCT 2.4 12/09/2020    MONOSABS 1.5 03/04/2021    LYMPHSABS 1.8 03/04/2021    EOSABS 0.4 03/04/2021    BASOSABS 0.1 03/04/2021     CMP:    Lab Results   Component Value Date     03/04/2021    K 4.3 03/04/2021     03/04/2021    CO2 25 03/04/2021    BUN 11 03/04/2021    CREATININE 0.4 03/04/2021    LABGLOM >90 03/04/2021    GLUCOSE 98 03/04/2021    GLUCOSE 88 12/09/2020    PROT 5.4 03/04/2021    LABALBU 2.8 MG-UNIT per tablet  Take 1 tablet by mouth 2 times daily             diclofenac sodium (VOLTAREN) 1 % GEL  Apply 4 g topically 2 times daily             enoxaparin (LOVENOX) 40 MG/0.4ML injection  Inject 0.4 mLs into the skin every 24 hours for 8 days             ferrous sulfate (IRON 325) 325 (65 Fe) MG tablet  Take 1 tablet by mouth every 48 hours             Krill Oil 350 MG CAPS  Take 1 tablet by mouth daily             metoprolol (LOPRESSOR) 100 MG tablet  Take 1 tablet by mouth 2 times daily Pt takes 50 in AM and 100 in PM             NONFORMULARY  Reliv, powder supplement             omeprazole (PRILOSEC) 20 MG capsule  Take 20 mg by mouth daily. oxyCODONE (ROXICODONE) 5 MG immediate release tablet  Take 0.5-1 tablets by mouth every 4 hours as needed for Pain (take 1/2 tab for pain level 5-7/10 ; take 1 tab for pain level 8-10/10) for up to 7 days. polyethylene glycol (GLYCOLAX) 17 g packet  Take 17 g by mouth daily             sennosides-docusate sodium (SENOKOT-S) 8.6-50 MG tablet  Take 1 tablet by mouth 2 times daily             triamcinolone acetonide (KENALOG) 0.1 % paste  Apply to teeth 2 times daily. Controlled substances monitoring: possible medication side effects, risk of tolerance and/or dependence, and alternative treatments discussed.      40 minutes spent preparing the patient for discharge    Stefan Vail MD

## 2021-03-12 NOTE — PROGRESS NOTES
Physical Medicine & Rehabilitation Progress Note    Chief Complaint:  Right knee pain causing impaired ADLs and ambulation due to right distal femur supracondylar fracture requiring revision of TKA     Subjective:    Kirstin Titus is a 80y.o. years old  female with history of hypertension arthritis, GERD, hyperlipidemia, bilateral eyes cataract, status post right knee total knee arthroplasty, status post left shoulder rotator cuff repair, was admitted on 3/3/2021 for intensive inpatient management of impairment & disability secondary right distal femur supracondylar fracture with right knee pain requiring right total knee revision with new femur and tibia components done on 2/27/2021 by Dr. Verona Reyes. The patient says she feels well but still complains of achy pain at right knee which she rated at 6-7/10 level. She took oxycodone 5 mg 3 times yesterday and 2.5 mg once. She is still getting Tylenol around the clock. She denies having constipation. She is tolerating the inpatient rehab therapy well. Today she agrees to go home tomorrow 3/13/2021. We contact the ortho service and was told that the suture will be removed during her follow up visit scheduled on 3/24/2021. Currently she is scheduled to be discharged on 3/13/2021. Rehabilitation:  PT: Reviewed.     Bed Mobility:  Supine to Sit: Stand By Assistance, with increased time for completion  Sit to Supine: Stand By Assistance, with increased time for completion      Transfers:  Sit to Stand: Stand By Assistance, with increased time for completion  Stand to Sit:Stand By Assistance, with increased time for completion     Ambulation:  Stand By Assistance, with increased time for completion  Distance: 80ft, 10ft x3 to destination  Surface: Level Tile and Carpet  Device:Rolling Walker  Gait Deviations:  Slow Yessenia, Decreased Step Length Bilaterally, Decreased Weight Shift Right, Decreased Gait Speed, Decreased Heel Strike on Right, Decreased Terminal Knee Extension and downward gaze     Stairs:  Platform:  6\" platform X 2, consecutively using Rolling Walker and Stand By Assistance, verbal cues for sequencing prior to performance. OT: Reviewed. ADL:   Grooming: Stand By Assistance. Bathing: Stand By Assistance. in tub shower combo. Upper Extremity Dressing: with set-up. Lower Extremity Dressing: Stand By Assistance. during clothing mgmt, min encouragement to self don socks, but was able to however   Tub Transfer: Stand By Assistance. Using tub tf bench, min increased time. Toileting; stand by assistance  Toilet transfer; stand by assistance      BALANCE:  Sitting Balance:  Modified Independent. Standing Balance: Stand By Assistance.       TRANSFERS:  Sit to Stand:  Stand By Assistance. Stand to Sit: Stand By Assistance. Wheelchair, toilet, kitchen chair without arms with review of hand placement and tech provided      FUNCTIONAL MOBILITY:  Assistive Device: Rolling Walker  Assist Level:  Stand By Assistance. Distance: To and from bathroom and within rehab apartment      Patient completed IADL homemaking task this date of gathering coffee for herself and her son - task was completed for repetition to assist with carryover of knee precautions, walker safety, and IADL indep. Patient was able to retrieve all items from cupboard, counter and table with SBA  and 1 VCs for safety during the retrieval and transportation of items. Patient required sba throughout task with a standing endurance of 10 min. Son present to observe task      ST: Reviewed. N/A      Review of Systems:  Review of Systems   Constitutional: Negative for appetite change (poor appetitis), chills, diaphoresis and fever. HENT: Positive for hearing loss. Negative for mouth sores, rhinorrhea, sneezing, sore throat, tinnitus and trouble swallowing. Eyes: Negative for pain, discharge and visual disturbance.    Respiratory: Negative for shortness of breath (sometimes) and wheezing. Cardiovascular: Negative for chest pain (sometimes) and palpitations. Gastrointestinal: Negative for abdominal pain, constipation, diarrhea, nausea and vomiting. Endocrine: Positive for cold intolerance. Negative for heat intolerance. Genitourinary: Positive for difficulty urinating and urgency. Negative for dysuria and frequency. Musculoskeletal: Positive for arthralgias (right knee, left shoulder) and gait problem. Negative for back pain, myalgias and neck pain. Neurological: Positive for weakness. Negative for dizziness, tremors, seizures, speech difficulty, light-headedness, numbness and headaches. Psychiatric/Behavioral: Positive for dysphoric mood. Negative for hallucinations. The patient is nervous/anxious.          Objective:  /66   Pulse 90   Temp 98.1 °F (36.7 °C) (Oral)   Resp 16   Ht 5' 2\" (1.575 m)   Wt 135 lb 6.4 oz (61.4 kg)   SpO2 94%   BMI 24.76 kg/m²   Physical Exam   General:  well-developed, well nourished  female ; in no acute distress ; appropriate affect & mood ; sitting on reclining chair comfortably  Eyes: pupil equally round ; extra-ocular motion intact bilaterally  Head, Ear, Nose, Mouth & Throat : normocephalic ; decreased size of ecchymosis at left orbit area ; no discharge from ears or nose ; no deformity ; no facial swelling ; oral mucosa pink   Neck :  supple ; no tenderness ; no muscle spasm  Cardiovascular : regular rate & rhythm ; normal S1 & S2 heart sound ; presence of S4 sound ; no murmur ; normal peripheral pulse at bilateral upper & lower extremities   Pulmonary : lung clear to auscultation ; no wheezing ; no rale  Gastrointestinal : soft, flat abdomen without tenderness ; normal bowel sound present   Back : no tenderness; no muscle spasm  Skin: presence of surgical scar with sutures in place at right anterior knee from distal thigh to upper leg without open wound or discharge or erythema ; healed surgical scar at left lateral shoulder ; ecchymosis at right medial and lateral posterior thigh with decreasing size ; no skin lesion or rash ; no pitting edema at all 4 extremities ; mild nonpitting swelling at right distal thigh and proximal right leg  Musculoskeletal : no limb asymmetry; no limb deformity; tenderness at anterior and medial aspect of right knee ; no tenderness at bilateral upper extremities &  rest of lower extremities; no palpable mass at limbs ; no joints laxity or crepitation other than right knee which is not assessed; right knee flexion passive ROM limited to 60 degrees limited by increased right knee pain ; otherwise normal functional joints ROM at bilateral upper extremities & the rest of bilateral lower extremities  Cerebral :  alert ; awake ; oriented to place, person and time   Sensory : intact light touch and pin prick sensation at bilateral upper & lower extremities except reduced sensation at right anterior knee surgical site  Motor : normal tone at bilateral upper & left lower extremities ; right lower extremity muscle tone not assessed ; 3-/5 muscle strength at right knee and 3-/5 muscle strength at right hip flexion/abduction due to increased right knee pain ; otherwise  normal 5/5 muscle strength at bilateral upper extremities & the rest of bilateral lower extremities  Reflex : zero bilateral ankle and left knee reflexes ; right knee reflex not tested  Pathological Reflex :   no ankle clonus  Gait :  Right antalgic gait with reduced right knee flexion range of motion during the gait cycle    Diagnostics:   No results found for this or any previous visit (from the past 24 hour(s)).       Impression:  · Right distal femur supracondylar comminuted fracture with severe right knee pain causing impaired ADLs & ambulation due to fall accident requiring right total knee revision with OSS both femoral and tibial components  · Hypertension  · Left hand 5th metacarpal base fracture   · GERD  · Hyperlipidemia  · Bilateral eyes cataract  · Possible reactive depression     The patient continues tolerating the inpatient rehab treatment and making progress slowly. She still has significant right knee pain and right lower extremity weakness but the pain is controlled and her right lower extremity is getting stronger slowly. She continues on WBAT status at right lower extremity. She is expecting to be discharged on 3/13/2021. Plan:  · Continue ongoing PT/OT/RT inpatient rehabilitation treatment at least 3 hours per day, 5 days per week in order to improve functional status prior to discharge. Family education and training will be completed. Equipment evaluations and recommendations will be completed as appropriate. · Rehabilitation nursing continues to be involved for bowel, bladder, skin, and pain management. Nursing will also provide education and training to patient and family. · Prophylaxis:  DVT: Lovenox, RON stocking and intermittent pneumatic compression. GI: Glycolax, Senokot-S and prn Dulcolax suppository. · Pain: regular Tylenol, Voltaren gel application, low dose oxycodone prn, ice pad application   · Continue allow weight bearing as tolerated at right lower extremity as per ortho  · Continue Orajel for mouth sore  · Continue Norvasc and Lopressor for HTN  · Continue iron supplement for anemia  · Nutrition:  continue current diet    · Bladder: normal function ; will monitor  · Bowel: functionally normal ; will monitor for constipation  ·  and case management for coordination of care and discharge planning  · Current scheduled follow up appointments :  Jagdeep Steele MD (Orthopedic Surgery) on Wednesday March 24 at 1:50 pm ([address] [place] [address] ; [phone]) ;  Hilda Scott MD (Family Medicine) on Friday, March 26 at 10:00 am [facility]), [facility], [facility], [facility] [address] [facility] ; [phone]) ;  Chauncey Hawthorne MD (Cardiology) on April 15 at 9 a.m. [facility]), [facility], [address] [place] [address].[facility] [address] ; [phone]) ;      Missed Therapy Time:  · None    Fredy Treadwell MD

## 2021-03-12 NOTE — PROGRESS NOTES
[facility]  Recreational Therapy  Daily Note  [address]    Time Spent with Patient: 25 minutes    Date:  3/12/2021       Patient Name: Cruz Sanchez      MRN: [id number]      YOB: 1931 (80 y.o.)       Gender: female  Diagnosis: Closed comminuted supracondylar fracture of right femur  Referring Practitioner: Dr. Neno Bee,    RESTRICTIONS/PRECAUTIONS:  Restrictions/Precautions: Weight Bearing, General Precautions  Vision: Impaired  Hearing: Within functional limits    PAIN: 0-no c/o pain     SUBJECTIVE:  I feel good about going home    OBJECTIVE:  Pt states she feels good about going home tomorrow-has thought a lot about it and feels she is ready-states the kids have figured out how to take turns and stay with me when I get home -states she thinks she will eat better at home too-pleasant and social-appreciative of staff's help here-         Patient Education  New Education Provided: Importance of Leisure,     Electronically signed by: Leticia Mo CTRS  Date: 3/12/2021

## 2021-03-12 NOTE — PROGRESS NOTES
[address]  Occupational Therapy  Daily Note  Time:   Time In: 1330  Time Out: 1400  Timed Code Treatment Minutes: 30 Minutes  Minutes: 30          Date: 3/12/2021  Patient Name: Carole Casarez,   Gender: female      Room: [facility]64/064-A  MRN: [id number]  : 3/4/1931  (80 y.o.)  Referring Practitioner: Dr. Brittney Martinez,  Diagnosis: Closed comminuted supracondylar fracture of right femur  Additional Pertinent Hx: per 21 ED note; Carole Casarez is a 80 y.o. female who presents to the emergency department for evaluation of fall at grocery store. The patient says she lost balance and fell while she was grocery shopping on 2021. She landed on her right knee with sudden severe right knee pain and could not get up. She denies loss of consciousness. She was send to [address] [place] [address] ER for evaluation. X-ray of right femur and right knee revealed status post right knee total arthroplasty with comminuted foreshortened and dorsally angulated fracture of distal right femur. Therefore she underwent right total knee revision by Dr. Gretchen Ferrell on 2021. Pt found to have a Left fifth metacarpal fracture and is WBAT in splint prn. To IPR on 3/3. Restrictions/Precautions:  Restrictions/Precautions: Weight Bearing, General Precautions  Right Lower Extremity Weight Bearing: Weight Bearing As Tolerated  Left Upper Extremity Weight Bearing: Weight Bearing As Tolerated  Position Activity Restriction  Other position/activity restrictions: s/p R TKA  and minimally displaced L 5th met fx, wrist splint PRN     SUBJECTIVE: Patient in chair upon arrival agreeable to OT treatment     PAIN: 3/10: knee     Vitals: Vitals not assessed per clinical judgement, see nursing flowsheet    COGNITION: WFL    ADL:   Grooming: Modified Independent. Hand hygiene    Toileting: Modified Independent.   Increased time for BM and personal hygiene   Toilet Transfer: Modified Independent. BALANCE:  Sitting Balance:  Modified Independent. Standing Balance: Modified Independent. TRANSFERS:  Sit to Stand:  Modified Independent. Stand to Sit: Modified Independent. FUNCTIONAL MOBILITY:  Assistive Device: Rolling Walker  Assist Level:  Modified Independent. Distance: To and from bathroom and around unit    ASSESSMENT:     Activity Tolerance:  Patient tolerance of  treatment: fair. Discharge Recommendations: Home with Home health OT, Home with nursing aide   Equipment Recommendations: Other: Recommend tub transfer bench, raised toilet seat and BSC (in bedroom). BSC orderd 3/9  Plan: Times per week: 5x/wk for 90 min and 1x/wk for 30 min  Times per day: Daily  Current Treatment Recommendations: Endurance Training, Neuromuscular Re-education, Patient/Caregiver Education & Training, Equipment Evaluation, Education, & procurement, Self-Care / ADL, Strengthening, Safety Education & Training    Patient Education  Patient Education: ADL's    Goals  Short term goals  Time Frame for Short term goals: 1 week  Short term goal 1: Pt will complete LB dressing tasks with min A using LHAE prn to improve indep with self care. Short term goal 2: Pt will tolerate standing > 3 minutes with CGA and unilateral release to improve ability to complete sinkside grooming  Short term goal 3: Pt will transfer in and out of tub/shower with min A using grab bar and shower AE to improve indep with bathing. Short term goal 4: Pt will navigate RW to/from bathroom while transporting 2 items with CGA to improve indep with sinkside grooming. Short term goal 5: Pt will complete simple meal prep with CGA and min vcs for RW safety to improve indep within home. Long term goals  Time Frame for Long term goals : 2 weeks  Long term goal 1: Pt will complete BADL routine with set up to improve indep with self care.   Long term goal 2: Pt will complete light IADL tasks with Supervision and 0-1 vcs for safety to improve indep within home. Following session, patient left in safe position with all fall risk precautions in place.

## 2021-03-13 VITALS
BODY MASS INDEX: 24.92 KG/M2 | DIASTOLIC BLOOD PRESSURE: 72 MMHG | OXYGEN SATURATION: 94 % | TEMPERATURE: 97.7 F | HEIGHT: 62 IN | SYSTOLIC BLOOD PRESSURE: 162 MMHG | HEART RATE: 81 BPM | WEIGHT: 135.4 LBS | RESPIRATION RATE: 18 BRPM

## 2021-03-13 PROCEDURE — 6370000000 HC RX 637 (ALT 250 FOR IP): Performed by: ORTHOPAEDIC SURGERY

## 2021-03-13 PROCEDURE — 97110 THERAPEUTIC EXERCISES: CPT

## 2021-03-13 PROCEDURE — 6360000002 HC RX W HCPCS: Performed by: ORTHOPAEDIC SURGERY

## 2021-03-13 PROCEDURE — 97530 THERAPEUTIC ACTIVITIES: CPT

## 2021-03-13 PROCEDURE — 97535 SELF CARE MNGMENT TRAINING: CPT

## 2021-03-13 PROCEDURE — 99239 HOSP IP/OBS DSCHRG MGMT >30: CPT | Performed by: PHYSICAL MEDICINE & REHABILITATION

## 2021-03-13 PROCEDURE — 6370000000 HC RX 637 (ALT 250 FOR IP): Performed by: PHYSICAL MEDICINE & REHABILITATION

## 2021-03-13 PROCEDURE — 97116 GAIT TRAINING THERAPY: CPT

## 2021-03-13 RX ADMIN — ENOXAPARIN SODIUM 40 MG: 40 INJECTION SUBCUTANEOUS at 08:36

## 2021-03-13 RX ADMIN — PANTOPRAZOLE SODIUM 40 MG: 40 TABLET, DELAYED RELEASE ORAL at 05:51

## 2021-03-13 RX ADMIN — Medication 250 MG: at 08:35

## 2021-03-13 RX ADMIN — OXYCODONE 5 MG: 5 TABLET ORAL at 10:22

## 2021-03-13 RX ADMIN — METOPROLOL TARTRATE 50 MG: 100 TABLET, FILM COATED ORAL at 08:35

## 2021-03-13 RX ADMIN — BUSPIRONE HYDROCHLORIDE 5 MG: 5 TABLET ORAL at 10:22

## 2021-03-13 RX ADMIN — DOCUSATE SODIUM AND SENNOSIDES 1 TABLET: 8.6; 5 TABLET, FILM COATED ORAL at 08:36

## 2021-03-13 RX ADMIN — DICLOFENAC SODIUM 4 G: 10 GEL TOPICAL at 08:36

## 2021-03-13 RX ADMIN — ACETAMINOPHEN 650 MG: 325 TABLET ORAL at 08:36

## 2021-03-13 ASSESSMENT — PAIN SCALES - GENERAL
PAINLEVEL_OUTOF10: 3
PAINLEVEL_OUTOF10: 4
PAINLEVEL_OUTOF10: 3

## 2021-03-13 NOTE — PROGRESS NOTES
[facility]  INPATIENT PHYSICAL THERAPY  DISCHARGE NOTE  [facility] - 7E-64/064-A    Time In: 0930  Time Out: 1000  Timed Code Treatment Minutes: 30 Minutes  Minutes: 30          Date: 3/13/2021  Patient Name: Kimberly Sheets,  Gender:  female        MRN: [id number]  : 3/4/1931  (80 y.o.)     Referring Practitioner: MOISÉS Duran CNP and Abdirahman Rios MD  Diagnosis: closed comminuted supracondylar fracture of right femur, initial encounter  Additional Pertinent Hx: per 21 ED note; Kimberly Sheets is a 80 y.o. female who presents to the emergency department for evaluation of fall at [facility] this morning. Mechanical trip and fall while walking to the parking lot. Patient denies loss of consciousness, head strike. States that her glasses hit her eye and bruised her upper eyelid. States she is feeling fine other than severe right knee pain. She admits to associated swelling. [id number] TOTAL KNEE REVISION. to IPR 3/3/2021. Prior Level of Function:  Lives With: Alone  Type of Home: House  Home Layout: One level  Home Access: Stairs to enter with rails  Entrance Stairs - Number of Steps: 2  Entrance Stairs - Rails: Left  Home Equipment: Cane, Rolling walker   Bathroom Shower/Tub: Tub/Shower unit  Bathroom Toilet: Handicap height  Bathroom Equipment: Grab bars in shower, Grab bars around toilet  Bathroom Accessibility: Walker accessible    Receives Help From: Family  ADL Assistance: Independent  Homemaking Assistance: Independent  Ambulation Assistance: Independent  Transfer Assistance: Independent  Active : Yes  Additional Comments: very indep PTA, did not use AD for mobility. Pt reports retired son lives close by and can help prn.     Restrictions/Precautions:  Restrictions/Precautions: Weight Bearing, General Precautions  Right Lower Extremity Weight Bearing: Weight Bearing As Tolerated  Left Upper Extremity Weight Bearing: Weight Bearing As Tolerated  Position Activity Restriction  Other position/activity restrictions: s/p R TKA 2/27 and minimally displaced L 5th met fx, wrist splint PRN     SUBJECTIVE: Patient in recliner upon arrival, agreed and cooperative for therapy. PAIN: Yes in R knee, not rated. Vitals: Vitals not assessed per clinical judgement, see nursing flowsheet    OBJECTIVE:    Transfers:  Sit to Stand: Supervision  Stand to Sit:Supervision    Ambulation:  Stand By Assistance  Distance: 80 ft. X1   Surface: Level Tile  Device:Rolling Walker  Gait Deviations: Forward Flexed Posture, Slow Yessenia, Decreased Step Length on Left, Decreased Weight Shift Right, Decreased Gait Speed and Decreased Heel Strike Bilaterally    Stairs:  Platform:  6\" platform X 2 using [employer] and Stand By Assistance, cues for technique. Functional Outcome Measures: Completed  Balance Score: 11  Gait Score: 7  Tinetti Total Score: 18     Risk Indicators:  Less than/equal to 18 = high risk  19-23 Moderate risk  Greater than/equal to 24 = low risk    ASSESSMENT:  Assessment: Patient progressing toward established goals. The patient tolerated session well, showing steady progress towards goals at this time. Scored a 18/28 on the tinetti balance assessment, which demonstrates that patient is at high risk for falls. Also demonstrates limitations due to generalized weakness and decreased endurance overall. Patient being discharged home today with family to assist and [facility] to follow up. Yoana Pearl made steady progress toward established PT goals during admission to [facility] following R TKA revision. Pt has met 4/6 STG and 1/5 LTG. She is mod I for bed mobility, supervision for transfers, and SBA for ambulation with a RW. Her stability has improved, increasing her Tinetti score from 14/28 to 18/28, however she remains at a high fall risk. She is safe to d/c home with HHPT to follow up. Activity Tolerance:  Patient tolerance of  treatment: good. Equipment Recommendations:Equipment Needed: No(has RW and cane)  Discharge Recommendations:  Home with HHPT to follow up    Plan: pt d/c home with HHPT to follow up  Current Treatment Recommendations: Strengthening, ROM, Balance Training, Functional Mobility Training, Transfer Training, Gait Training, Endurance Training, Stair training, Home Exercise Program, Safety Education & Training, Patient/Caregiver Education & Training, Neuromuscular Re-education    Patient Education  Patient Education: Plan of Care, Precautions/Restrictions, [employer] Mobility, Transfers, Reviewed Prior Education, Gait, Stairs, Health Promotion and Wellness Education, Home Safety Education, - Patient Requires Continued Education    Goals:  Patient goals : To return home  Short term goals  Time Frame for Short term goals: 1 week  Short term goal 1: Pt to perform supine<>sit with CGA to promote ease of getting in and out of bed. MET, SEE LTG  Short term goal 2: Pt to perform sit<>stand with SBA to promote ease of transfers. MET, SEE LTG  Short term goal 3: Pt to ambulate >100ft with Rw at SBA to improve ability to navigate within the home. MET, SEE LTG  Short term goal 4: Pt to negotiate 2 steps with L HR at CGA to improve ability to enter and exit home. MET, SEE LTG  Short term goal 5: Pt to reduce TUG time to <60 seconds to reduce fall risk and promote overall safety in the home. NOT MET   Short term goal 6: Pt score on Tinetti balance test will improve to >=19 to decrease risk of falls NOT MET   Long term goals  Time Frame for Long term goals : 2 weeks  Long term goal 1: Pt to perform supine<>sit with mod I to promote ease of getting in and out of bed. MET, CONTINUE  Long term goal 2: Pt to perform sit<>stand with mod I to promote ease of transfers. NOT MET  Long term goal 3: Pt to ambulate >200ft with Rw at mod I to improve ability to navigate within the home an  community.  NOT MET   Long term goal 4: Pt to negotiate 2 steps with L HR at mod I

## 2021-03-13 NOTE — PROGRESS NOTES
[address] [address]  Inpatient Rehabilitation  Occupational Therapy  Discharge Note  Time:  Time In: 786  Time Out: 730  Timed Code Treatment Minutes: 23 Minutes  Minutes: 23    Date: 3/13/2021  Patient Name: Carlos Dwyer,   Gender: female      Room: [facility]064-A  MRN: [id number]  : 3/4/1931  (80 y.o.)  Referring Practitioner: Dr. Carrillo Reason,  Diagnosis: Closed comminuted supracondylar fracture of right femur  Additional Pertinent Hx: per 21 ED note; Carlos Dwyer is a 80 y.o. female who presents to the emergency department for evaluation of fall at grocery store. The patient says she lost balance and fell while she was grocery shopping on 2021. She landed on her right knee with sudden severe right knee pain and could not get up. She denies loss of consciousness. She was send to [address] [place] [address] ER for evaluation. X-ray of right femur and right knee revealed status post right knee total arthroplasty with comminuted foreshortened and dorsally angulated fracture of distal right femur. Therefore she underwent right total knee revision by Dr. Gerda Coombs on 2021. Pt found to have a Left fifth metacarpal fracture and is WBAT in splint prn. To IPR on 3/3. Restrictions/Precautions:  Restrictions/Precautions: Weight Bearing, General Precautions  Right Lower Extremity Weight Bearing: Weight Bearing As Tolerated  Left Upper Extremity Weight Bearing: Weight Bearing As Tolerated  Position Activity Restriction  Other position/activity restrictions: s/p R TKA  and minimally displaced L 5th met fx, wrist splint PRN    SUBJECTIVE: patient cooperative, agreeable to OT. Reports she feels ready to go home today, \"I'm doing much better\"     PAIN: 5/10: R knee, placed ice at EOS     Vitals: Vitals not assessed per clinical judgement, see nursing flowsheet    COGNITION: WFL    ADL:   Grooming: Modified Independent.  hand hygiene  Toileting: Modified Independent.   during clothing mgmt transfer in and out of tub/shower with min A using grab bar and shower AE to improve indep with bathing. GOAL MET   Short term goal 4: Pt will navigate RW to/from bathroom while transporting 2 items with CGA to improve indep with sinkside grooming. GOAL MET   Short term goal 5: Pt will complete simple meal prep with CGA and min vcs for RW safety to improve indep within home. GOAL MET   Long term goals  Time Frame for Long term goals : 2 weeks  Long term goal 1: Pt will complete BADL routine with set up to improve indep with self care. GOAL MET   Long term goal 2: Pt will complete light IADL tasks with Supervision and 0-1 vcs for safety to improve indep within home. GOAL MET     Following session, patient left in safe position with all fall risk precautions in place.

## 2021-03-13 NOTE — PROGRESS NOTES
Patient discharged home with home health services with sone. Discharge instructions give to and explained to patient and sons. All questions were answered to patient and family's satisfaction.

## 2021-03-13 NOTE — PROGRESS NOTES
Patient: Dirk Lima  Unit/Bed: 7E-64/064-A  YOB: 1931  MRN: [id number] Acct: [de-identified]   Admitting Diagnosis: Closed comminuted supracondylar fracture of right femur, initial encounter [facility]) Barbara Guzman  Displaced fracture of medial condyle of right femur, sequela [S72.431S]  Admit Date:  3/3/2021  Hospital Day: 9    Assessment:     Principal Problem:    Closed comminuted supracondylar fracture of right femur ([address]  Active Problems:    Hypertension    Displaced fracture of medial condyle of right femur, sequela    Reactive depression    Closed fracture of base of fifth metacarpal bone of left hand    History of cataract  Resolved Problems:    * No resolved hospital problems. *      Plan:     She is medically stable for upcoming discharge        Subjective:     Patient has no complaint of CP or SOB. .   Medication side effects: none    Scheduled Meds:   acetaminophen  650 mg Oral Q6H    diclofenac sodium  4 g Topical BID    triamcinolone acetonide   Mouth/Throat 4x Daily PC & HS    amLODIPine  5 mg Oral Daily    ascorbic acid  500 mg Oral Q48H    enoxaparin  40 mg Subcutaneous Q24H    ferrous sulfate  325 mg Oral Q48H    metoprolol  100 mg Oral Nightly    metoprolol tartrate  50 mg Oral Daily    oyster shell calcium/vitamin D  1 tablet Oral BID    pantoprazole  40 mg Oral QAM AC    polyethylene glycol  17 g Oral Daily    sennosides-docusate sodium  1 tablet Oral BID     Continuous Infusions:  PRN Meds:oxyCODONE, oxyCODONE, busPIRone, phenol, bisacodyl    Review of Systems  Pertinent items are noted in HPI. Objective:     Patient Vitals for the past 8 hrs:   BP Temp Temp src Pulse Resp SpO2   03/12/21 1945 (!) 158/69 97.4 °F (36.3 °C) Oral 91 18 97 %     I/O last 3 completed shifts:   In: 700 [P.O.:700]  Out: -   I/O this shift:  In: 250 [P.O.:250]  Out: -     BP (!) 158/69   Pulse 91   Temp 97.4 °F (36.3 °C) (Oral)   Resp 18   Ht 5' 2\" (1.575 m)   Wt 135 lb 6.4 oz (61.4 kg) SpO2 97%   BMI 24.76 kg/m²     General appearance: alert, appears stated age and cooperative  Head: Normocephalic, without obvious abnormality, atraumatic  Lungs: clear to auscultation bilaterally  Heart: regular rate and rhythm, S1, S2 normal, no murmur, click, rub or gallop  Abdomen: soft, non-tender; bowel sounds normal; no masses,  no organomegaly  Extremities: extremities normal, atraumatic, no cyanosis or edema  Skin: Skin color, texture, turgor normal. No rashes or lesions  Neurologic: Grossly normal    Electronically signed by Bonilla Kemp MD on 3/12/2021 at 9:36 PM

## 2021-03-15 ENCOUNTER — CARE COORDINATION (OUTPATIENT)
Dept: CASE MANAGEMENT | Age: 86
End: 2021-03-15

## 2021-03-15 NOTE — DISCHARGE SUMMARY
Patient ID:  Airam Soto  [id number]  89 y.o.  3/4/1931    Admit date: 2/26/2021    Discharge date and time: 3/3/2021 11:09 AM     Admitting Physician: Wanda Candelaria MD     Discharge Physician: Wanda Candelaria MD    Admission Diagnoses: Closed displaced fracture of medial condyle of right femur, initial encounter [facility]) [S72.431A]  Closed displaced fracture of medial condyle of right femur, initial encounter [facility]) [S72.431A]    Discharge Diagnoses: Closed displaced fracture of medial condyle of right femur, initial encounter ([address] [S72.431A]  Closed displaced fracture of medial condyle of right femur, initial encounter [facility]) [address] [facility] is a 80year old female with a history of a fall sustaining a right supracondylar femur fracture. After medical review she was found to be an acceptable risk for surgery and underwent a revision right TKR without complications. Post-operatively she worked with PT/OT, her pain was well controlled, and her VS remained stable. She is ready for DC to rehab unit.      Consults:  IM, cardiology    Treatments: revision right TKR    Disposition: fair/stable    Patient Instructions:      Medication List      START taking these medications    ascorbic acid 500 MG tablet  Commonly known as: VITAMIN C  Take 1 tablet by mouth every 48 hours     ferrous sulfate 325 (65 Fe) MG tablet  Commonly known as: IRON 325  Take 1 tablet by mouth every 48 hours     polyethylene glycol 17 g packet  Commonly known as: GLYCOLAX  Take 17 g by mouth daily     sennosides-docusate sodium 8.6-50 MG tablet  Commonly known as: SENOKOT-S  Take 1 tablet by mouth 2 times daily        CONTINUE taking these medications    amLODIPine 5 MG tablet  Commonly known as: NORVASC  Take 1 tablet by mouth daily     busPIRone 5 MG tablet  Commonly known as: BUSPAR     Krill Oil 350 MG Caps     metoprolol 100 MG tablet  Commonly known as: LOPRESSOR  Take 1 tablet by mouth 2 times daily Pt takes 50 in AM and 100 in PM     NONFORMULARY     omeprazole 20 MG delayed release capsule  Commonly known as: PRILOSEC     TYLENOL 8 HOUR ARTHRITIS PAIN PO        STOP taking these medications    CALCIUM PO           Where to Get Your Medications      You can get these medications from any pharmacy    Bring a paper prescription for each of these medications  · ascorbic acid 500 MG tablet  · ferrous sulfate 325 (65 Fe) MG tablet  · polyethylene glycol 17 g packet  · sennosides-docusate sodium 8.6-50 MG tablet       Activity: WBAT  Diet: regular  Wound Care: dry dressings    Follow-up Dr. Lori Carbajal    Signed:  Bahman Rosas  3/15/2021  9:56 AM

## 2021-03-15 NOTE — CARE COORDINATION
Challenges to be reviewed by the provider   Additional needs identified to be addressed with provider No  none             Method of communication with provider : none      Advance Care Planning:   Does patient have an Advance Directive:  reviewed and current. Was this a readmission? No  Patient stated reason for admission: right femur fracture from fall  Patients top risk factors for readmission: functional physical ability    Care Transition Nurse (CTN) contacted the patient by telephone to perform post hospital discharge assessment. Verified name and  with patient as identifiers. Provided introduction to self, and explanation of the CTN role. CTN reviewed discharge instructions, medical action plan and red flags with patient who verbalized understanding. Patient given an opportunity to ask questions and does not have any further questions or concerns at this time. Were discharge instructions available to patient? Yes. Reviewed appropriate site of care based on symptoms and resources available to patient including: PCP, Specialist, Home health and When to call 911. The patient agrees to contact the PCP office for questions related to their healthcare. Medication reconciliation was performed with patient, who verbalizes understanding of administration of home medications. Advised obtaining a 90-day supply of all daily and as-needed medications. Covid Risk Education    Patient has following risk factors of: no known risk factors. Education provided regarding infection prevention, and signs and symptoms of COVID-19 and when to seek medical attention with patient who verbalized understanding. Discussed exposure protocols and quarantine From CDC: Are you at higher risk for severe illness?   and given an opportunity for questions and concerns. The patient agrees to contact the COVID-19 hotline [phone] or PCP office for questions related to COVID-19.      For more information on steps you can take to protect yourself, see CDC's How to Protect Yourself       Discussed follow-up appointments. If no appointment was previously scheduled, appointment scheduling offered: Yes. Is follow up appointment scheduled within 7 days of discharge? Ortho f/u 3/2/21      CTN spoke to Moises who stated she is doing OK, has family asst at home and home health ordered. Stated her sister lives nearby, is a retired RN and is giving her the daily lovenox injections. Pt stated she tripped on curb at [facility] causing fracture. Stated her bowels have moved since home, has Roxicodone as needed and has Voltaren gel. Pt is staying hydrated, appetite is decreased. Advised to start with small bland meals and increase as tolerated. Pt is going to try and wean off Roxicodone as soon a s possible, has Tylenol if needed. Pt stated [facility] called yesterday and she is awaiting call today regarding time to expect a visit. CTN sign off.     Jose Allen RN BSN   Care Transitions Nurse  [phone]

## 2021-05-25 ENCOUNTER — OFFICE VISIT (OUTPATIENT)
Dept: CARDIOLOGY CLINIC | Age: 86
End: 2021-05-25
Payer: MEDICARE

## 2021-05-25 VITALS
DIASTOLIC BLOOD PRESSURE: 71 MMHG | BODY MASS INDEX: 22.63 KG/M2 | SYSTOLIC BLOOD PRESSURE: 144 MMHG | HEIGHT: 62 IN | WEIGHT: 123 LBS | HEART RATE: 80 BPM

## 2021-05-25 DIAGNOSIS — I10 ESSENTIAL HYPERTENSION: Primary | ICD-10-CM

## 2021-05-25 PROCEDURE — 1090F PRES/ABSN URINE INCON ASSESS: CPT | Performed by: NUCLEAR MEDICINE

## 2021-05-25 PROCEDURE — 99213 OFFICE O/P EST LOW 20 MIN: CPT | Performed by: NUCLEAR MEDICINE

## 2021-05-25 PROCEDURE — G8420 CALC BMI NORM PARAMETERS: HCPCS | Performed by: NUCLEAR MEDICINE

## 2021-05-25 PROCEDURE — 4040F PNEUMOC VAC/ADMIN/RCVD: CPT | Performed by: NUCLEAR MEDICINE

## 2021-05-25 PROCEDURE — 1123F ACP DISCUSS/DSCN MKR DOCD: CPT | Performed by: NUCLEAR MEDICINE

## 2021-05-25 PROCEDURE — 1036F TOBACCO NON-USER: CPT | Performed by: NUCLEAR MEDICINE

## 2021-05-25 PROCEDURE — G8427 DOCREV CUR MEDS BY ELIG CLIN: HCPCS | Performed by: NUCLEAR MEDICINE

## 2021-05-25 RX ORDER — MULTIVIT-MIN/IRON/FOLIC ACID/K 18-600-40
CAPSULE ORAL
COMMUNITY

## 2021-05-25 NOTE — PROGRESS NOTES
[facility]  [address] [address]  Dept: [phone]  Dept Fax: [phone]  Loc: [phone]    Visit Date: 5/25/2021    Boom Ha is a 80 y.o. female who presents todayfor:  Chief Complaint   Patient presents with    1 Year Follow Up    Hypertension     Dane Friar and broke her leg  No chest pain  No changes in breathing  Limited patient  BP is stable  No dizziness  No syncope  3    HPI:  HPI  Past Medical History:   Diagnosis Date    Arthritis     Cataract of both eyes     GERD (gastroesophageal reflux disease)     Hyperlipidemia     Hypertension     Keratosis     benign lichemoid    Psychiatric problem       Past Surgical History:   Procedure Laterality Date    ABDOMEN SURGERY      [address]OMY  [address]   [address]    COLON SURGERY  2000    resection for diverticulitis    COLONOSCOPY      DILATATION, ESOPHAGUS      ENDOSCOPY, COLON, DIAGNOSTIC      HYSTERECTOMY  1979    JOINT REPLACEMENT Right     right knee    KNEE ARTHROPLASTY Right 02/27/2021    RIGHT TOTAL KNEE REVISION WITH OSS performed [facility]ac [facility]ckenna [facility]    ROTATOR CUFF REPAIR Left 2006     Family History   Problem Relation Age of Onset    Heart Attack Mother      Social History     Tobacco Use    Smoking status: Never Smoker    Smokeless tobacco: Never Used   Substance Use Topics    Alcohol use: Yes     Alcohol/week: 1.0 standard drinks     Types: 1 Glasses of wine per week     Comment: 2 ounce of wine once in a while.       Current Outpatient Medications   Medication Sig Dispense Refill    Cholecalciferol (VITAMIN D) 50 MCG (2000 UT) CAPS capsule Take by mouth      diclofenac sodium (VOLTAREN) 1 % GEL Apply 4 g topically 2 times daily 100 g 1    Calcium Carb-Cholecalciferol (OYSTER SHELL CALCIUM/VITAMIN D) 250-125 MG-UNIT per tablet Take 1 tablet by mouth 2 times daily 60 tablet 1    sennosides-docusate

## 2022-05-24 ENCOUNTER — OFFICE VISIT (OUTPATIENT)
Dept: CARDIOLOGY CLINIC | Age: 87
End: 2022-05-24
Payer: MEDICARE

## 2022-05-24 VITALS
BODY MASS INDEX: 22.63 KG/M2 | DIASTOLIC BLOOD PRESSURE: 71 MMHG | SYSTOLIC BLOOD PRESSURE: 144 MMHG | HEIGHT: 62 IN | HEART RATE: 66 BPM | WEIGHT: 123 LBS

## 2022-05-24 DIAGNOSIS — R06.02 SOB (SHORTNESS OF BREATH) ON EXERTION: ICD-10-CM

## 2022-05-24 DIAGNOSIS — I10 PRIMARY HYPERTENSION: Primary | ICD-10-CM

## 2022-05-24 PROCEDURE — 1036F TOBACCO NON-USER: CPT | Performed by: NUCLEAR MEDICINE

## 2022-05-24 PROCEDURE — G8427 DOCREV CUR MEDS BY ELIG CLIN: HCPCS | Performed by: NUCLEAR MEDICINE

## 2022-05-24 PROCEDURE — 1090F PRES/ABSN URINE INCON ASSESS: CPT | Performed by: NUCLEAR MEDICINE

## 2022-05-24 PROCEDURE — G8420 CALC BMI NORM PARAMETERS: HCPCS | Performed by: NUCLEAR MEDICINE

## 2022-05-24 PROCEDURE — 99214 OFFICE O/P EST MOD 30 MIN: CPT | Performed by: NUCLEAR MEDICINE

## 2022-05-24 PROCEDURE — 1123F ACP DISCUSS/DSCN MKR DOCD: CPT | Performed by: NUCLEAR MEDICINE

## 2022-05-24 PROCEDURE — 93000 ELECTROCARDIOGRAM COMPLETE: CPT | Performed by: NUCLEAR MEDICINE

## 2022-05-24 RX ORDER — AMLODIPINE BESYLATE 5 MG/1
5 TABLET ORAL DAILY
Qty: 90 TABLET | Refills: 3 | Status: SHIPPED | OUTPATIENT
Start: 2022-05-24

## 2022-05-24 RX ORDER — METOPROLOL TARTRATE 50 MG/1
50 TABLET, FILM COATED ORAL 2 TIMES DAILY
Qty: 180 TABLET | Refills: 2 | Status: SHIPPED | OUTPATIENT
Start: 2022-05-24

## 2022-05-24 RX ORDER — SERTRALINE HYDROCHLORIDE 25 MG/1
25 TABLET, FILM COATED ORAL NIGHTLY
COMMUNITY

## 2022-05-24 NOTE — PROGRESS NOTES
[facility]  [address] [address]  Dept: [phone]  Dept Fax: [phone]  Loc: [phone]    Visit Date: 5/24/2022    Candy Padilla is a 80 y.o. female who presents todayfor:  Chief Complaint   Patient presents with    Follow-up    Hypertension    Fatigue     More fatigue  Low ambition  No dizziness  No chest pain  Arthritis  No palpitation   Older lady   ? ? Depression   meds side effects     HPI:  HPI  Past Medical History:   Diagnosis Date    Arthritis     Cataract of both eyes     GERD (gastroesophageal reflux disease)     Hyperlipidemia     Hypertension     Keratosis     benign lichemoid    Psychiatric problem       Past Surgical History:   Procedure Laterality Date    ABDOMEN SURGERY      APPENDECTOMY  1934    CHOLECYSTECTOMY  1999    COLON SURGERY  2000    resection for diverticulitis    COLONOSCOPY      DILATATION, ESOPHAGUS      ENDOSCOPY, COLON, DIAGNOSTIC      HYSTERECTOMY  1979    JOINT REPLACEMENT Right     right knee    KNEE ARTHROPLASTY Right 02/27/2021    RIGHT TOTAL KNEE REVISION WITH OSS performed by Ruben Crespo MD at [address] [place] [address]  2010    ROTATOR CUFF REPAIR Left 2006     Family History   Problem Relation Age of Onset    Heart Attack Mother      Social History     Tobacco Use    Smoking status: Never Smoker    Smokeless tobacco: Never Used   Substance Use Topics    Alcohol use: Yes     Alcohol/week: 1.0 standard drink     Types: 1 Glasses of wine per week     Comment: 2 ounce of wine once in a while.       Current Outpatient Medications   Medication Sig Dispense Refill    sertraline (ZOLOFT) 25 MG tablet Take 25 mg by mouth nightly      Cholecalciferol (VITAMIN D) 50 MCG (2000 UT) CAPS capsule Take by mouth      diclofenac sodium (VOLTAREN) 1 % GEL Apply 4 g topically 2 times daily (Patient taking differently: Apply 4 g topically as needed ) 100 g 1    Calcium Carb-Cholecalciferol (OYSTER SHELL CALCIUM/VITAMIN D) 250-125 MG-UNIT per tablet Take 1 tablet by mouth 2 times daily 60 tablet 1    ascorbic acid (VITAMIN C) 500 MG tablet Take 1 tablet by mouth every 48 hours 30 tablet 0    Krill Oil 350 MG CAPS Take 1 tablet by mouth daily      Acetaminophen (TYLENOL 8 HOUR ARTHRITIS PAIN PO) Take 1 tablet by mouth as needed      metoprolol (LOPRESSOR) 100 MG tablet Take 1 tablet by mouth 2 times daily Pt takes 50 in AM and 100 in  tablet 3    busPIRone (BUSPAR) 5 MG tablet Take 5 mg by mouth 2 times daily as needed       amLODIPine (NORVASC) 5 MG tablet Take 1 tablet by mouth daily 90 tablet 1    NONFORMULARY Reliv, powder supplement      omeprazole (PRILOSEC) 20 MG capsule Take 20 mg by mouth daily. No current facility-administered medications for this visit.      Allergies   Allergen Reactions    Penicillins     Tramadol     Vicodin [Hydrocodone-Acetaminophen] Nausea And Vomiting and Anxiety     Health Maintenance   Topic Date Due    Annual Wellness Visit (AWV)  Never done    Depression Monitoring  Never done    DTaP/Tdap/Td vaccine (1 - Tdap) Never done    Shingles vaccine (1 of 2) Never done    COVID-19 Vaccine (2 - Booster for DiscoveRX series) 05/28/2021    Flu vaccine (Season Ended) 09/01/2022    Pneumococcal 65+ years Vaccine  Completed    Hepatitis A vaccine  Aged Out    Hepatitis B vaccine  Aged Out    Hib vaccine  Aged Out    Meningococcal (ACWY) vaccine  Aged Out       Subjective:  Review of Systems  General:   No fever, no chills, some fatigue or weight loss  Pulmonary:    No dyspnea, no wheezing  Cardiac:    Denies recent chest pain,   GI:     No nausea or vomiting, no abdominal pain  Neuro:     No dizziness or light headedness,   Musculoskeletal:  No recent active issues  Extremities:   No edema, no obvious claudication       Objective:  Physical Exam  BP (!) 144/71   Pulse 66   Ht 5' 2\" (1.575 m)   Wt 123 lb (55.8 kg)   BMI 22.50 kg/m²   General:   Well developed, well nourished  Lungs:    Clear to auscultation  Heart:    Normal S1 S2, Slight murmur. no rubs, no gallops  Abdomen:   Soft, non tender, no organomegalies, positive bowel sounds  Extremities:   No edema, no cyanosis, good peripheral pulses  Neurological:   Awake, alert, oriented. No obvious focal deficits  Musculoskelatal:  No obvious deformities    Assessment:      Diagnosis Orders   1. Primary hypertension  EKG 12 lead   2. SOB (shortness of breath) on exertion  EKG 12 lead   ? ? depression  ? ? meds side effects  ? ? Others  ECG in office was done today. I reviewed the ECG. No acute findings      Plan:  No follow-ups on file. Cut metoprolol dose   Monitor the BP  Get the blood work up from PCP   Check for anemia and thyroid  Continue risk factor modification and medical management  Thank you for allowing me to participate in the care of your patient. Please don't hesitate to contact me regarding any further issues related to the patient care    Orders Placed:  Orders Placed This Encounter   Procedures    EKG 12 lead     Order Specific Question:   Reason for Exam?     Answer: Other       Medications Prescribed:  No orders of the defined types were placed in this encounter. Discussed use, benefit, and side effects of prescribed medications. All patient questions answered. Pt voicedunderstanding. Instructed to continue current medications, diet and exercise. Continue risk factor modification and medical management. Patient agreed with treatment plan. Follow up as directed.     Electronically signedby Celia Ruiz MD on 5/24/2022 at 9:04 AM

## 2022-08-02 ENCOUNTER — HOSPITAL ENCOUNTER (OUTPATIENT)
Dept: GENERAL RADIOLOGY | Age: 87
Discharge: HOME | End: 2022-08-02
Payer: MEDICARE

## 2022-08-02 ENCOUNTER — HOSPITAL ENCOUNTER (OUTPATIENT)
Age: 87
Discharge: HOME | End: 2022-08-02
Payer: MEDICARE

## 2022-08-02 DIAGNOSIS — M17.0 OSTEOARTHRITIS OF BOTH KNEES, UNSPECIFIED OSTEOARTHRITIS TYPE: ICD-10-CM

## 2022-08-02 PROCEDURE — 73560 X-RAY EXAM OF KNEE 1 OR 2: CPT

## 2023-01-03 ENCOUNTER — OFFICE VISIT (OUTPATIENT)
Dept: CARDIOLOGY CLINIC | Age: 88
End: 2023-01-03
Payer: MEDICARE

## 2023-01-03 VITALS
WEIGHT: 123.4 LBS | HEART RATE: 85 BPM | BODY MASS INDEX: 22.71 KG/M2 | HEIGHT: 62 IN | SYSTOLIC BLOOD PRESSURE: 175 MMHG | DIASTOLIC BLOOD PRESSURE: 79 MMHG

## 2023-01-03 DIAGNOSIS — I10 PRIMARY HYPERTENSION: Primary | ICD-10-CM

## 2023-01-03 PROCEDURE — 1123F ACP DISCUSS/DSCN MKR DOCD: CPT | Performed by: NUCLEAR MEDICINE

## 2023-01-03 PROCEDURE — 1036F TOBACCO NON-USER: CPT | Performed by: NUCLEAR MEDICINE

## 2023-01-03 PROCEDURE — G8484 FLU IMMUNIZE NO ADMIN: HCPCS | Performed by: NUCLEAR MEDICINE

## 2023-01-03 PROCEDURE — 1090F PRES/ABSN URINE INCON ASSESS: CPT | Performed by: NUCLEAR MEDICINE

## 2023-01-03 PROCEDURE — G8427 DOCREV CUR MEDS BY ELIG CLIN: HCPCS | Performed by: NUCLEAR MEDICINE

## 2023-01-03 PROCEDURE — 99213 OFFICE O/P EST LOW 20 MIN: CPT | Performed by: NUCLEAR MEDICINE

## 2023-01-03 PROCEDURE — G8420 CALC BMI NORM PARAMETERS: HCPCS | Performed by: NUCLEAR MEDICINE

## 2023-01-03 RX ORDER — WHEAT DEXTRIN 3 G/3.8 G
POWDER (GRAM) ORAL DAILY
COMMUNITY

## 2023-01-03 NOTE — PROGRESS NOTES
[facility]  [address] [address]  Dept: [phone]  Dept Fax: [phone]  Loc: [phone]    Visit Date: 1/3/2023    Luis Enrique Bryant is a 80 y.o. female who presents todayfor:  Chief Complaint   Patient presents with    Check-Up    Hypertension   Knee issues  Limited by that   Limited ambulation   Know risk for CAD  No dizziness  No syncope  No chest pain   No changes in breathing        HPI:  HPI  Past Medical History:   Diagnosis Date    Arthritis     Cataract of both eyes     GERD (gastroesophageal reflux disease)     Hyperlipidemia     Hypertension     Keratosis     benign lichemoid    Psychiatric problem       Past Surgical History:   Procedure Laterality Date    [facility]  2000    resection for diverticulitis    COLONOSCOPY      DILATATION, ESOPHAGUS      ENDOSCOPY, COLON, DIAGNOSTIC      HYSTERECTOMY (CERVIX STATUS UNKNOWN)  [address] [facility] Right     right knee    KNEE ARTHROPLASTY Right 02/27/2021    RIGHT TOTAL KNEE REVISION WITH OSS performed by Yanet Morrison MD at [address]  2010    ROTATOR CUFF REPAIR Left 2006     Family History   Problem Relation Age of Onset    Heart Attack Mother      Social History     Tobacco Use    Smoking status: Never    Smokeless tobacco: Never   Substance Use Topics    Alcohol use: Yes     Alcohol/week: 1.0 standard drink     Types: 1 Glasses of wine per week     Comment: 2 ounce of wine once in a while.       Current Outpatient Medications   Medication Sig Dispense Refill    Wheat Dextrin (BENEFIBER) POWD Take by mouth daily      Calcium Citrate-Vitamin D (CALCIUM CITRATE + D PO) Take by mouth daily      sertraline (ZOLOFT) 25 MG tablet Take 25 mg by mouth nightly      metoprolol (LOPRESSOR) 50 MG tablet Take 1 tablet by mouth 2 times daily Take 50mg  tablet 2    amLODIPine (NORVASC) 5 MG tablet Take 1 tablet by mouth daily 90 tablet 3    Calcium Carb-Cholecalciferol (OYSTER SHELL CALCIUM/VITAMIN D) 250-125 MG-UNIT per tablet Take 1 tablet by mouth 2 times daily 60 tablet 1    Acetaminophen (TYLENOL 8 HOUR ARTHRITIS PAIN PO) Take 1 tablet by mouth as needed      busPIRone (BUSPAR) 5 MG tablet Take 5 mg by mouth 2 times daily as needed       NONFORMULARY Reliv, powder supplement      omeprazole (PRILOSEC) 20 MG capsule Take 20 mg by mouth daily. No current facility-administered medications for this visit. Allergies   Allergen Reactions    Penicillins     Tramadol     Vicodin [Hydrocodone-Acetaminophen] Nausea And Vomiting and Anxiety     Health Maintenance   Topic Date Due    Depression Monitoring  Never done    DTaP/Tdap/Td vaccine (1 - Tdap) Never done    Shingles vaccine (1 of 2) Never done    Annual Wellness Visit (AWV)  Never done    COVID-19 Vaccine (2 - Booster for Mover series) 05/28/2021    Flu vaccine (1) 08/01/2022    Pneumococcal 65+ years Vaccine  Completed    Hepatitis A vaccine  Aged Out    Hib vaccine  Aged Out    Meningococcal (ACWY) vaccine  Aged Out       Subjective:  Review of Systems  General:   No fever, no chills, No fatigue or weight loss  Pulmonary:    No dyspnea, no wheezing  Cardiac:    Denies recent chest pain,   GI:     No nausea or vomiting, no abdominal pain  Neuro:    No dizziness or light headedness,   Musculoskeletal:  No recent active issues  Extremities:   No edema, no obvious claudication     Objective:  Physical Exam  BP (!) 175/79   Pulse 85   Ht 5' 2\" (1.575 m)   Wt 123 lb 6.4 oz (56 kg)   BMI 22.57 kg/m²   General:   Well developed, well nourished  Lungs:   Clear to auscultation  Heart:    Normal S1 S2, Slight murmur. no rubs, no gallops  Abdomen:   Soft, non tender, no organomegalies, positive bowel sounds  Extremities:   No edema, no cyanosis, good peripheral pulses  Neurological:   Awake, alert, oriented.  No obvious focal deficits  Musculoskelatal:  No obvious deformities    Assessment:      Diagnosis Orders   1. Primary hypertension        As above  Cardiac fair for now     Plan:  No follow-ups on file. As above  Continue risk factor modification and medical management  Thank you for allowing me to participate in the care of your patient. Please don't hesitate to contact me regarding any further issues related to the patient care    Orders Placed:  No orders of the defined types were placed in this encounter. Medications Prescribed:  No orders of the defined types were placed in this encounter. Discussed use, benefit, and side effects of prescribed medications. All patient questions answered. Pt voicedunderstanding. Instructed to continue current medications, diet and exercise. Continue risk factor modification and medical management. Patient agreed with treatment plan. Follow up as directed.     Electronically signedby Rahel Lopez MD on 1/3/2023 at 9:59 AM

## 2023-09-05 ENCOUNTER — OFFICE VISIT (OUTPATIENT)
Dept: CARDIOLOGY CLINIC | Age: 88
End: 2023-09-05
Payer: MEDICARE

## 2023-09-05 VITALS
HEART RATE: 79 BPM | DIASTOLIC BLOOD PRESSURE: 74 MMHG | HEIGHT: 62 IN | WEIGHT: 131.8 LBS | BODY MASS INDEX: 24.25 KG/M2 | SYSTOLIC BLOOD PRESSURE: 145 MMHG

## 2023-09-05 DIAGNOSIS — R06.02 SOB (SHORTNESS OF BREATH) ON EXERTION: Primary | ICD-10-CM

## 2023-09-05 DIAGNOSIS — I10 PRIMARY HYPERTENSION: ICD-10-CM

## 2023-09-05 PROCEDURE — G8427 DOCREV CUR MEDS BY ELIG CLIN: HCPCS | Performed by: NUCLEAR MEDICINE

## 2023-09-05 PROCEDURE — 99213 OFFICE O/P EST LOW 20 MIN: CPT | Performed by: NUCLEAR MEDICINE

## 2023-09-05 PROCEDURE — 1123F ACP DISCUSS/DSCN MKR DOCD: CPT | Performed by: NUCLEAR MEDICINE

## 2023-09-05 PROCEDURE — 93000 ELECTROCARDIOGRAM COMPLETE: CPT | Performed by: NUCLEAR MEDICINE

## 2023-09-05 PROCEDURE — G8420 CALC BMI NORM PARAMETERS: HCPCS | Performed by: NUCLEAR MEDICINE

## 2023-09-05 PROCEDURE — 1090F PRES/ABSN URINE INCON ASSESS: CPT | Performed by: NUCLEAR MEDICINE

## 2023-09-05 PROCEDURE — 4004F PT TOBACCO SCREEN RCVD TLK: CPT | Performed by: NUCLEAR MEDICINE

## 2024-01-31 ENCOUNTER — NURSE ONLY (OUTPATIENT)
Dept: LAB | Age: 89
End: 2024-01-31

## 2024-01-31 LAB
ALBUMIN SERPL BCG-MCNC: 4.3 G/DL (ref 3.5–5.1)
ALP SERPL-CCNC: 119 U/L (ref 38–126)
ALT SERPL W/O P-5'-P-CCNC: 10 U/L (ref 11–66)
ANION GAP SERPL CALC-SCNC: 12 MEQ/L (ref 8–16)
AST SERPL-CCNC: 20 U/L (ref 5–40)
BASOPHILS ABSOLUTE: 0.1 THOU/MM3 (ref 0–0.1)
BASOPHILS NFR BLD AUTO: 1.4 %
BILIRUB SERPL-MCNC: 0.4 MG/DL (ref 0.3–1.2)
BUN SERPL-MCNC: 14 MG/DL (ref 7–22)
CALCIUM SERPL-MCNC: 9.3 MG/DL (ref 8.5–10.5)
CHLORIDE SERPL-SCNC: 96 MEQ/L (ref 98–111)
CO2 SERPL-SCNC: 27 MEQ/L (ref 23–33)
CREAT SERPL-MCNC: 0.7 MG/DL (ref 0.4–1.2)
DEPRECATED RDW RBC AUTO: 42.2 FL (ref 35–45)
EOSINOPHIL NFR BLD AUTO: 2.7 %
EOSINOPHILS ABSOLUTE: 0.2 THOU/MM3 (ref 0–0.4)
ERYTHROCYTE [DISTWIDTH] IN BLOOD BY AUTOMATED COUNT: 12 % (ref 11.5–14.5)
ERYTHROCYTE [SEDIMENTATION RATE] IN BLOOD BY WESTERGREN METHOD: 15 MM/HR (ref 0–20)
GFR SERPL CREATININE-BSD FRML MDRD: > 60 ML/MIN/1.73M2
GLUCOSE SERPL-MCNC: 92 MG/DL (ref 70–108)
HCT VFR BLD AUTO: 43.5 % (ref 37–47)
HGB BLD-MCNC: 13.6 GM/DL (ref 12–16)
IMM GRANULOCYTES # BLD AUTO: 0.06 THOU/MM3 (ref 0–0.07)
IMM GRANULOCYTES NFR BLD AUTO: 1 %
LYMPHOCYTES ABSOLUTE: 0.9 THOU/MM3 (ref 1–4.8)
LYMPHOCYTES NFR BLD AUTO: 16 %
MCH RBC QN AUTO: 30 PG (ref 26–33)
MCHC RBC AUTO-ENTMCNC: 31.3 GM/DL (ref 32.2–35.5)
MCV RBC AUTO: 95.8 FL (ref 81–99)
MONOCYTES ABSOLUTE: 1.1 THOU/MM3 (ref 0.4–1.3)
MONOCYTES NFR BLD AUTO: 19.4 %
NEUTROPHILS NFR BLD AUTO: 59.5 %
NRBC BLD AUTO-RTO: 0 /100 WBC
PLATELET # BLD AUTO: 254 THOU/MM3 (ref 130–400)
PMV BLD AUTO: 10.7 FL (ref 9.4–12.4)
POTASSIUM SERPL-SCNC: 4.1 MEQ/L (ref 3.5–5.2)
PROT SERPL-MCNC: 6.7 G/DL (ref 6.1–8)
RBC # BLD AUTO: 4.54 MILL/MM3 (ref 4.2–5.4)
SEGMENTED NEUTROPHILS ABSOLUTE COUNT: 3.5 THOU/MM3 (ref 1.8–7.7)
SODIUM SERPL-SCNC: 135 MEQ/L (ref 135–145)
TSH SERPL DL<=0.005 MIU/L-ACNC: 2.71 UIU/ML (ref 0.4–4.2)
WBC # BLD AUTO: 5.8 THOU/MM3 (ref 4.8–10.8)

## 2024-07-10 ENCOUNTER — APPOINTMENT (OUTPATIENT)
Dept: CT IMAGING | Age: 89
End: 2024-07-10
Payer: MEDICARE

## 2024-07-10 ENCOUNTER — HOSPITAL ENCOUNTER (INPATIENT)
Age: 89
LOS: 2 days | Discharge: NURSING, REHAB OR LONG-TERM CARE | End: 2024-07-12
Attending: EMERGENCY MEDICINE | Admitting: PHYSICIAN ASSISTANT
Payer: MEDICARE

## 2024-07-10 DIAGNOSIS — R47.1 DYSARTHRIA: Primary | ICD-10-CM

## 2024-07-10 DIAGNOSIS — I10 PRIMARY HYPERTENSION: ICD-10-CM

## 2024-07-10 LAB
ALBUMIN SERPL BCG-MCNC: 4.1 G/DL (ref 3.5–5.1)
ALP SERPL-CCNC: 105 U/L (ref 38–126)
ALT SERPL W/O P-5'-P-CCNC: 10 U/L (ref 11–66)
AMMONIA PLAS-MCNC: 27 UMOL/L (ref 11–60)
ANION GAP SERPL CALC-SCNC: 11 MEQ/L (ref 8–16)
AST SERPL-CCNC: 19 U/L (ref 5–40)
BACTERIA: ABNORMAL
BASOPHILS ABSOLUTE: 0.1 THOU/MM3 (ref 0–0.1)
BASOPHILS NFR BLD AUTO: 1.2 %
BILIRUB SERPL-MCNC: 0.5 MG/DL (ref 0.3–1.2)
BILIRUB UR QL STRIP: NEGATIVE
BUN SERPL-MCNC: 13 MG/DL (ref 7–22)
CALCIUM SERPL-MCNC: 9.3 MG/DL (ref 8.5–10.5)
CASTS #/AREA URNS LPF: ABNORMAL /LPF
CASTS #/AREA URNS LPF: ABNORMAL /LPF
CHARACTER UR: CLEAR
CHARCOAL URNS QL MICRO: ABNORMAL
CHLORIDE SERPL-SCNC: 95 MEQ/L (ref 98–111)
CK SERPL-CCNC: 46 U/L (ref 30–135)
CO2 SERPL-SCNC: 26 MEQ/L (ref 23–33)
COLOR UR: YELLOW
CREAT SERPL-MCNC: 0.7 MG/DL (ref 0.4–1.2)
CRYSTALS URNS QL MICRO: ABNORMAL
DEPRECATED RDW RBC AUTO: 43.3 FL (ref 35–45)
EOSINOPHIL NFR BLD AUTO: 2.5 %
EOSINOPHILS ABSOLUTE: 0.2 THOU/MM3 (ref 0–0.4)
EPITHELIAL CELLS, UA: ABNORMAL /HPF
ERYTHROCYTE [DISTWIDTH] IN BLOOD BY AUTOMATED COUNT: 13.1 % (ref 11.5–14.5)
GFR SERPL CREATININE-BSD FRML MDRD: 80 ML/MIN/1.73M2
GLUCOSE SERPL-MCNC: 106 MG/DL (ref 70–108)
GLUCOSE UR QL STRIP.AUTO: NEGATIVE MG/DL
HCT VFR BLD AUTO: 42.3 % (ref 37–47)
HGB BLD-MCNC: 14 GM/DL (ref 12–16)
HGB UR QL STRIP.AUTO: ABNORMAL
IMM GRANULOCYTES # BLD AUTO: 0.03 THOU/MM3 (ref 0–0.07)
IMM GRANULOCYTES NFR BLD AUTO: 0.3 %
INR PPP: 1.01 (ref 0.85–1.13)
KETONES UR QL STRIP.AUTO: NEGATIVE
LACTIC ACID, SEPSIS: 1.6 MMOL/L (ref 0.5–1.9)
LEUKOCYTE ESTERASE UR QL STRIP.AUTO: ABNORMAL
LYMPHOCYTES ABSOLUTE: 1.6 THOU/MM3 (ref 1–4.8)
LYMPHOCYTES NFR BLD AUTO: 18.2 %
MAGNESIUM SERPL-MCNC: 1.9 MG/DL (ref 1.6–2.4)
MCH RBC QN AUTO: 30.2 PG (ref 26–33)
MCHC RBC AUTO-ENTMCNC: 33.1 GM/DL (ref 32.2–35.5)
MCV RBC AUTO: 91.4 FL (ref 81–99)
MONOCYTES ABSOLUTE: 1.2 THOU/MM3 (ref 0.4–1.3)
MONOCYTES NFR BLD AUTO: 14.3 %
NEUTROPHILS ABSOLUTE: 5.5 THOU/MM3 (ref 1.8–7.7)
NEUTROPHILS NFR BLD AUTO: 63.5 %
NITRITE UR QL STRIP.AUTO: NEGATIVE
NRBC BLD AUTO-RTO: 0 /100 WBC
OSMOLALITY SERPL CALC.SUM OF ELEC: 265.1 MOSMOL/KG (ref 275–300)
PH UR STRIP.AUTO: 7.5 [PH] (ref 5–9)
PLATELET # BLD AUTO: 260 THOU/MM3 (ref 130–400)
PMV BLD AUTO: 9.7 FL (ref 9.4–12.4)
POTASSIUM SERPL-SCNC: 4.5 MEQ/L (ref 3.5–5.2)
PROT SERPL-MCNC: 6.9 G/DL (ref 6.1–8)
PROT UR STRIP.AUTO-MCNC: NEGATIVE MG/DL
RBC # BLD AUTO: 4.63 MILL/MM3 (ref 4.2–5.4)
RBC #/AREA URNS HPF: ABNORMAL /HPF
RENAL EPI CELLS #/AREA URNS HPF: ABNORMAL /[HPF]
SODIUM SERPL-SCNC: 132 MEQ/L (ref 135–145)
SPECIFIC GRAVITY UA: 1.01 (ref 1–1.03)
TROPONIN, HIGH SENSITIVITY: 14 NG/L (ref 0–12)
UROBILINOGEN, URINE: 0.2 EU/DL (ref 0–1)
WBC # BLD AUTO: 8.7 THOU/MM3 (ref 4.8–10.8)
WBC #/AREA URNS HPF: ABNORMAL /HPF
YEAST LIKE FUNGI URNS QL MICRO: ABNORMAL

## 2024-07-10 PROCEDURE — 99285 EMERGENCY DEPT VISIT HI MDM: CPT

## 2024-07-10 PROCEDURE — 84484 ASSAY OF TROPONIN QUANT: CPT

## 2024-07-10 PROCEDURE — 83735 ASSAY OF MAGNESIUM: CPT

## 2024-07-10 PROCEDURE — 93005 ELECTROCARDIOGRAM TRACING: CPT | Performed by: EMERGENCY MEDICINE

## 2024-07-10 PROCEDURE — 85610 PROTHROMBIN TIME: CPT

## 2024-07-10 PROCEDURE — 85025 COMPLETE CBC W/AUTO DIFF WBC: CPT

## 2024-07-10 PROCEDURE — 80053 COMPREHEN METABOLIC PANEL: CPT

## 2024-07-10 PROCEDURE — 81001 URINALYSIS AUTO W/SCOPE: CPT

## 2024-07-10 PROCEDURE — 82550 ASSAY OF CK (CPK): CPT

## 2024-07-10 PROCEDURE — 83605 ASSAY OF LACTIC ACID: CPT

## 2024-07-10 PROCEDURE — 72125 CT NECK SPINE W/O DYE: CPT

## 2024-07-10 PROCEDURE — 36415 COLL VENOUS BLD VENIPUNCTURE: CPT

## 2024-07-10 PROCEDURE — 1200000003 HC TELEMETRY R&B

## 2024-07-10 PROCEDURE — 70450 CT HEAD/BRAIN W/O DYE: CPT

## 2024-07-10 PROCEDURE — 82140 ASSAY OF AMMONIA: CPT

## 2024-07-10 PROCEDURE — 99223 1ST HOSP IP/OBS HIGH 75: CPT | Performed by: PHYSICIAN ASSISTANT

## 2024-07-10 RX ORDER — MAGNESIUM SULFATE IN WATER 40 MG/ML
2000 INJECTION, SOLUTION INTRAVENOUS PRN
Status: DISCONTINUED | OUTPATIENT
Start: 2024-07-10 | End: 2024-07-12 | Stop reason: HOSPADM

## 2024-07-10 RX ORDER — POTASSIUM CHLORIDE 7.45 MG/ML
10 INJECTION INTRAVENOUS PRN
Status: DISCONTINUED | OUTPATIENT
Start: 2024-07-10 | End: 2024-07-12 | Stop reason: HOSPADM

## 2024-07-10 RX ORDER — ONDANSETRON 4 MG/1
4 TABLET, ORALLY DISINTEGRATING ORAL EVERY 8 HOURS PRN
Status: DISCONTINUED | OUTPATIENT
Start: 2024-07-10 | End: 2024-07-12 | Stop reason: HOSPADM

## 2024-07-10 RX ORDER — BUSPIRONE HYDROCHLORIDE 5 MG/1
5 TABLET ORAL 2 TIMES DAILY
Status: DISCONTINUED | OUTPATIENT
Start: 2024-07-10 | End: 2024-07-12 | Stop reason: HOSPADM

## 2024-07-10 RX ORDER — POTASSIUM CHLORIDE 20 MEQ/1
40 TABLET, EXTENDED RELEASE ORAL PRN
Status: DISCONTINUED | OUTPATIENT
Start: 2024-07-10 | End: 2024-07-12 | Stop reason: HOSPADM

## 2024-07-10 RX ORDER — ACETAMINOPHEN 650 MG/1
650 SUPPOSITORY RECTAL EVERY 6 HOURS PRN
Status: DISCONTINUED | OUTPATIENT
Start: 2024-07-10 | End: 2024-07-12 | Stop reason: HOSPADM

## 2024-07-10 RX ORDER — PANTOPRAZOLE SODIUM 40 MG/1
40 TABLET, DELAYED RELEASE ORAL
Status: DISCONTINUED | OUTPATIENT
Start: 2024-07-11 | End: 2024-07-12 | Stop reason: HOSPADM

## 2024-07-10 RX ORDER — SODIUM CHLORIDE 0.9 % (FLUSH) 0.9 %
5-40 SYRINGE (ML) INJECTION EVERY 12 HOURS SCHEDULED
Status: DISCONTINUED | OUTPATIENT
Start: 2024-07-10 | End: 2024-07-12 | Stop reason: HOSPADM

## 2024-07-10 RX ORDER — SODIUM CHLORIDE 0.9 % (FLUSH) 0.9 %
5-40 SYRINGE (ML) INJECTION PRN
Status: DISCONTINUED | OUTPATIENT
Start: 2024-07-10 | End: 2024-07-12 | Stop reason: HOSPADM

## 2024-07-10 RX ORDER — CEPHALEXIN 500 MG/1
500 CAPSULE ORAL EVERY 12 HOURS SCHEDULED
Status: DISCONTINUED | OUTPATIENT
Start: 2024-07-11 | End: 2024-07-12 | Stop reason: HOSPADM

## 2024-07-10 RX ORDER — ASPIRIN 81 MG/1
81 TABLET, CHEWABLE ORAL DAILY
Status: DISCONTINUED | OUTPATIENT
Start: 2024-07-11 | End: 2024-07-12 | Stop reason: HOSPADM

## 2024-07-10 RX ORDER — AMLODIPINE BESYLATE 5 MG/1
5 TABLET ORAL DAILY
Status: DISCONTINUED | OUTPATIENT
Start: 2024-07-11 | End: 2024-07-12 | Stop reason: HOSPADM

## 2024-07-10 RX ORDER — METOPROLOL TARTRATE 50 MG/1
50 TABLET, FILM COATED ORAL 2 TIMES DAILY
Status: DISCONTINUED | OUTPATIENT
Start: 2024-07-10 | End: 2024-07-12 | Stop reason: HOSPADM

## 2024-07-10 RX ORDER — SODIUM CHLORIDE 9 MG/ML
INJECTION, SOLUTION INTRAVENOUS PRN
Status: DISCONTINUED | OUTPATIENT
Start: 2024-07-10 | End: 2024-07-12 | Stop reason: HOSPADM

## 2024-07-10 RX ORDER — ACETAMINOPHEN 325 MG/1
650 TABLET ORAL EVERY 6 HOURS PRN
Status: DISCONTINUED | OUTPATIENT
Start: 2024-07-10 | End: 2024-07-12 | Stop reason: HOSPADM

## 2024-07-10 RX ORDER — BUPROPION HYDROCHLORIDE 75 MG/1
75 TABLET ORAL 2 TIMES DAILY
Status: DISCONTINUED | OUTPATIENT
Start: 2024-07-11 | End: 2024-07-12 | Stop reason: HOSPADM

## 2024-07-10 RX ORDER — ENOXAPARIN SODIUM 100 MG/ML
40 INJECTION SUBCUTANEOUS DAILY
Status: DISCONTINUED | OUTPATIENT
Start: 2024-07-11 | End: 2024-07-12 | Stop reason: HOSPADM

## 2024-07-10 RX ORDER — ATORVASTATIN CALCIUM 40 MG/1
40 TABLET, FILM COATED ORAL NIGHTLY
Status: DISCONTINUED | OUTPATIENT
Start: 2024-07-10 | End: 2024-07-12 | Stop reason: HOSPADM

## 2024-07-10 RX ORDER — ONDANSETRON 2 MG/ML
4 INJECTION INTRAMUSCULAR; INTRAVENOUS EVERY 6 HOURS PRN
Status: DISCONTINUED | OUTPATIENT
Start: 2024-07-10 | End: 2024-07-12 | Stop reason: HOSPADM

## 2024-07-10 RX ORDER — BUPROPION HYDROCHLORIDE 75 MG/1
100 TABLET ORAL 2 TIMES DAILY
Status: ON HOLD | COMMUNITY

## 2024-07-10 RX ORDER — POLYETHYLENE GLYCOL 3350 17 G/17G
17 POWDER, FOR SOLUTION ORAL DAILY PRN
Status: DISCONTINUED | OUTPATIENT
Start: 2024-07-10 | End: 2024-07-12 | Stop reason: HOSPADM

## 2024-07-10 ASSESSMENT — PAIN - FUNCTIONAL ASSESSMENT: PAIN_FUNCTIONAL_ASSESSMENT: NONE - DENIES PAIN

## 2024-07-11 ENCOUNTER — APPOINTMENT (OUTPATIENT)
Dept: CT IMAGING | Age: 89
End: 2024-07-11
Payer: MEDICARE

## 2024-07-11 ENCOUNTER — APPOINTMENT (OUTPATIENT)
Dept: MRI IMAGING | Age: 89
End: 2024-07-11
Payer: MEDICARE

## 2024-07-11 ENCOUNTER — APPOINTMENT (OUTPATIENT)
Age: 89
End: 2024-07-11
Attending: PHYSICIAN ASSISTANT
Payer: MEDICARE

## 2024-07-11 PROBLEM — I63.9 ACUTE CVA (CEREBROVASCULAR ACCIDENT) (HCC): Status: ACTIVE | Noted: 2024-07-11

## 2024-07-11 LAB
ANION GAP SERPL CALC-SCNC: 13 MEQ/L (ref 8–16)
BASOPHILS ABSOLUTE: 0.1 THOU/MM3 (ref 0–0.1)
BASOPHILS NFR BLD AUTO: 0.9 %
BUN SERPL-MCNC: 9 MG/DL (ref 7–22)
CALCIUM SERPL-MCNC: 8.8 MG/DL (ref 8.5–10.5)
CHLORIDE SERPL-SCNC: 98 MEQ/L (ref 98–111)
CO2 SERPL-SCNC: 23 MEQ/L (ref 23–33)
CREAT SERPL-MCNC: 0.5 MG/DL (ref 0.4–1.2)
DEPRECATED RDW RBC AUTO: 42.3 FL (ref 35–45)
ECHO AO ASC DIAM: 3.3 CM
ECHO AO ASCENDING AORTA INDEX: 2.09 CM/M2
ECHO AR MAX VEL PISA: 3.7 M/S
ECHO AV CUSP MM: 1.8 CM
ECHO AV PEAK GRADIENT: 8 MMHG
ECHO AV PEAK VELOCITY: 1.5 M/S
ECHO AV REGURGITANT PHT: 583 MS
ECHO AV VELOCITY RATIO: 0.67
ECHO BSA: 1.59 M2
ECHO EST RA PRESSURE: 5 MMHG
ECHO LA AREA 2C: 17.3 CM2
ECHO LA AREA 4C: 18.6 CM2
ECHO LA DIAMETER INDEX: 2.28 CM/M2
ECHO LA DIAMETER: 3.6 CM
ECHO LA MAJOR AXIS: 5.8 CM
ECHO LA MINOR AXIS: 4.7 CM
ECHO LA VOL BP: 55 ML (ref 22–52)
ECHO LA VOL MOD A2C: 52 ML (ref 22–52)
ECHO LA VOL MOD A4C: 48 ML (ref 22–52)
ECHO LA VOL/BSA BIPLANE: 35 ML/M2 (ref 16–34)
ECHO LA VOLUME INDEX MOD A2C: 33 ML/M2 (ref 16–34)
ECHO LA VOLUME INDEX MOD A4C: 30 ML/M2 (ref 16–34)
ECHO LV E' LATERAL VELOCITY: 6 CM/S
ECHO LV E' SEPTAL VELOCITY: 4 CM/S
ECHO LV FRACTIONAL SHORTENING: 31 % (ref 28–44)
ECHO LV INTERNAL DIMENSION DIASTOLE INDEX: 2.66 CM/M2
ECHO LV INTERNAL DIMENSION DIASTOLIC: 4.2 CM (ref 3.9–5.3)
ECHO LV INTERNAL DIMENSION SYSTOLIC INDEX: 1.84 CM/M2
ECHO LV INTERNAL DIMENSION SYSTOLIC: 2.9 CM
ECHO LV ISOVOLUMETRIC RELAXATION TIME (IVRT): 81 MS
ECHO LV IVSD: 1.1 CM (ref 0.6–0.9)
ECHO LV MASS 2D: 157.1 G (ref 67–162)
ECHO LV MASS INDEX 2D: 99.4 G/M2 (ref 43–95)
ECHO LV POSTERIOR WALL DIASTOLIC: 1.1 CM (ref 0.6–0.9)
ECHO LV RELATIVE WALL THICKNESS RATIO: 0.52
ECHO LVOT PEAK GRADIENT: 4 MMHG
ECHO LVOT PEAK VELOCITY: 1 M/S
ECHO MV A VELOCITY: 1.19 M/S
ECHO MV E DECELERATION TIME (DT): 313 MS
ECHO MV E VELOCITY: 0.81 M/S
ECHO MV E/A RATIO: 0.68
ECHO MV E/E' LATERAL: 13.5
ECHO MV E/E' RATIO (AVERAGED): 16.88
ECHO MV E/E' SEPTAL: 20.25
ECHO MV REGURGITANT PEAK GRADIENT: 96 MMHG
ECHO MV REGURGITANT PEAK VELOCITY: 4.9 M/S
ECHO PULMONARY ARTERY END DIASTOLIC PRESSURE: 4 MMHG
ECHO PV MAX VELOCITY: 0.5 M/S
ECHO PV PEAK GRADIENT: 1 MMHG
ECHO PV REGURGITANT MAX VELOCITY: 1.1 M/S
ECHO RIGHT VENTRICULAR SYSTOLIC PRESSURE (RVSP): 28 MMHG
ECHO RV INTERNAL DIMENSION: 2.4 CM
ECHO RV TAPSE: 2 CM (ref 1.7–?)
ECHO TV E WAVE: 0.6 M/S
ECHO TV REGURGITANT MAX VELOCITY: 2.42 M/S
ECHO TV REGURGITANT PEAK GRADIENT: 23 MMHG
EKG ATRIAL RATE: 84 BPM
EKG P AXIS: 46 DEGREES
EKG P-R INTERVAL: 214 MS
EKG Q-T INTERVAL: 388 MS
EKG QRS DURATION: 82 MS
EKG QTC CALCULATION (BAZETT): 458 MS
EKG R AXIS: -2 DEGREES
EKG T AXIS: 20 DEGREES
EKG VENTRICULAR RATE: 84 BPM
EOSINOPHIL NFR BLD AUTO: 1.2 %
EOSINOPHILS ABSOLUTE: 0.1 THOU/MM3 (ref 0–0.4)
ERYTHROCYTE [DISTWIDTH] IN BLOOD BY AUTOMATED COUNT: 12.9 % (ref 11.5–14.5)
GFR SERPL CREATININE-BSD FRML MDRD: 87 ML/MIN/1.73M2
GLUCOSE SERPL-MCNC: 93 MG/DL (ref 70–108)
HCT VFR BLD AUTO: 41.4 % (ref 37–47)
HGB BLD-MCNC: 13.6 GM/DL (ref 12–16)
IMM GRANULOCYTES # BLD AUTO: 0.03 THOU/MM3 (ref 0–0.07)
IMM GRANULOCYTES NFR BLD AUTO: 0.3 %
LYMPHOCYTES ABSOLUTE: 1.5 THOU/MM3 (ref 1–4.8)
LYMPHOCYTES NFR BLD AUTO: 17.2 %
MCH RBC QN AUTO: 29.4 PG (ref 26–33)
MCHC RBC AUTO-ENTMCNC: 32.9 GM/DL (ref 32.2–35.5)
MCV RBC AUTO: 89.6 FL (ref 81–99)
MONOCYTES ABSOLUTE: 1.1 THOU/MM3 (ref 0.4–1.3)
MONOCYTES NFR BLD AUTO: 12.8 %
NEUTROPHILS ABSOLUTE: 5.8 THOU/MM3 (ref 1.8–7.7)
NEUTROPHILS NFR BLD AUTO: 67.6 %
NRBC BLD AUTO-RTO: 0 /100 WBC
OSMOLALITY SERPL CALC.SUM OF ELEC: 266.6 MOSMOL/KG (ref 275–300)
PLATELET # BLD AUTO: 271 THOU/MM3 (ref 130–400)
PMV BLD AUTO: 10.3 FL (ref 9.4–12.4)
POTASSIUM SERPL-SCNC: 3.9 MEQ/L (ref 3.5–5.2)
RBC # BLD AUTO: 4.62 MILL/MM3 (ref 4.2–5.4)
SODIUM SERPL-SCNC: 134 MEQ/L (ref 135–145)
WBC # BLD AUTO: 8.6 THOU/MM3 (ref 4.8–10.8)

## 2024-07-11 PROCEDURE — A9579 GAD-BASE MR CONTRAST NOS,1ML: HCPCS | Performed by: PHYSICIAN ASSISTANT

## 2024-07-11 PROCEDURE — 93010 ELECTROCARDIOGRAM REPORT: CPT | Performed by: INTERNAL MEDICINE

## 2024-07-11 PROCEDURE — 85025 COMPLETE CBC W/AUTO DIFF WBC: CPT

## 2024-07-11 PROCEDURE — 2580000003 HC RX 258: Performed by: PHYSICIAN ASSISTANT

## 2024-07-11 PROCEDURE — 6370000000 HC RX 637 (ALT 250 FOR IP)

## 2024-07-11 PROCEDURE — 93306 TTE W/DOPPLER COMPLETE: CPT | Performed by: NUCLEAR MEDICINE

## 2024-07-11 PROCEDURE — 6370000000 HC RX 637 (ALT 250 FOR IP): Performed by: PHYSICIAN ASSISTANT

## 2024-07-11 PROCEDURE — 6360000002 HC RX W HCPCS: Performed by: PHYSICIAN ASSISTANT

## 2024-07-11 PROCEDURE — 70553 MRI BRAIN STEM W/O & W/DYE: CPT

## 2024-07-11 PROCEDURE — 70498 CT ANGIOGRAPHY NECK: CPT

## 2024-07-11 PROCEDURE — 99222 1ST HOSP IP/OBS MODERATE 55: CPT | Performed by: STUDENT IN AN ORGANIZED HEALTH CARE EDUCATION/TRAINING PROGRAM

## 2024-07-11 PROCEDURE — 97530 THERAPEUTIC ACTIVITIES: CPT

## 2024-07-11 PROCEDURE — 80048 BASIC METABOLIC PNL TOTAL CA: CPT

## 2024-07-11 PROCEDURE — 6360000004 HC RX CONTRAST MEDICATION

## 2024-07-11 PROCEDURE — 92523 SPEECH SOUND LANG COMPREHEN: CPT

## 2024-07-11 PROCEDURE — 6360000004 HC RX CONTRAST MEDICATION: Performed by: PHYSICIAN ASSISTANT

## 2024-07-11 PROCEDURE — 92610 EVALUATE SWALLOWING FUNCTION: CPT

## 2024-07-11 PROCEDURE — 97129 THER IVNTJ 1ST 15 MIN: CPT

## 2024-07-11 PROCEDURE — 97166 OT EVAL MOD COMPLEX 45 MIN: CPT

## 2024-07-11 PROCEDURE — 70496 CT ANGIOGRAPHY HEAD: CPT

## 2024-07-11 PROCEDURE — 93306 TTE W/DOPPLER COMPLETE: CPT

## 2024-07-11 PROCEDURE — 97162 PT EVAL MOD COMPLEX 30 MIN: CPT

## 2024-07-11 PROCEDURE — 97110 THERAPEUTIC EXERCISES: CPT

## 2024-07-11 PROCEDURE — 97535 SELF CARE MNGMENT TRAINING: CPT

## 2024-07-11 PROCEDURE — 99223 1ST HOSP IP/OBS HIGH 75: CPT

## 2024-07-11 PROCEDURE — 99233 SBSQ HOSP IP/OBS HIGH 50: CPT | Performed by: PHYSICIAN ASSISTANT

## 2024-07-11 PROCEDURE — 36415 COLL VENOUS BLD VENIPUNCTURE: CPT

## 2024-07-11 PROCEDURE — 2060000000 HC ICU INTERMEDIATE R&B

## 2024-07-11 RX ORDER — SODIUM CHLORIDE 0.9 % (FLUSH) 0.9 %
5-40 SYRINGE (ML) INJECTION PRN
Status: DISCONTINUED | OUTPATIENT
Start: 2024-07-11 | End: 2024-07-12 | Stop reason: HOSPADM

## 2024-07-11 RX ORDER — CEPHALEXIN 500 MG/1
500 CAPSULE ORAL 2 TIMES DAILY
Status: ON HOLD | COMMUNITY

## 2024-07-11 RX ORDER — CLOPIDOGREL BISULFATE 75 MG/1
75 TABLET ORAL DAILY
Status: DISCONTINUED | OUTPATIENT
Start: 2024-07-11 | End: 2024-07-12 | Stop reason: HOSPADM

## 2024-07-11 RX ORDER — CYCLOBENZAPRINE HCL 5 MG
5 TABLET ORAL 3 TIMES DAILY PRN
Status: ON HOLD | COMMUNITY

## 2024-07-11 RX ORDER — CETIRIZINE HYDROCHLORIDE 10 MG/1
10 TABLET ORAL DAILY
Status: DISCONTINUED | OUTPATIENT
Start: 2024-07-11 | End: 2024-07-12 | Stop reason: HOSPADM

## 2024-07-11 RX ORDER — FLUTICASONE PROPIONATE 50 MCG
2 SPRAY, SUSPENSION (ML) NASAL 2 TIMES DAILY
Status: DISCONTINUED | OUTPATIENT
Start: 2024-07-11 | End: 2024-07-12 | Stop reason: HOSPADM

## 2024-07-11 RX ORDER — SENNOSIDES A AND B 8.6 MG/1
1 TABLET, FILM COATED ORAL DAILY PRN
Status: DISCONTINUED | OUTPATIENT
Start: 2024-07-11 | End: 2024-07-12 | Stop reason: HOSPADM

## 2024-07-11 RX ORDER — SODIUM CHLORIDE 9 MG/ML
INJECTION, SOLUTION INTRAVENOUS PRN
Status: DISCONTINUED | OUTPATIENT
Start: 2024-07-11 | End: 2024-07-12 | Stop reason: HOSPADM

## 2024-07-11 RX ORDER — SODIUM CHLORIDE 0.9 % (FLUSH) 0.9 %
5-40 SYRINGE (ML) INJECTION EVERY 12 HOURS SCHEDULED
Status: DISCONTINUED | OUTPATIENT
Start: 2024-07-11 | End: 2024-07-12 | Stop reason: HOSPADM

## 2024-07-11 RX ORDER — SODIUM CHLORIDE 9 MG/ML
INJECTION, SOLUTION INTRAVENOUS CONTINUOUS
Status: DISCONTINUED | OUTPATIENT
Start: 2024-07-11 | End: 2024-07-12 | Stop reason: HOSPADM

## 2024-07-11 RX ADMIN — METOPROLOL TARTRATE 50 MG: 50 TABLET, FILM COATED ORAL at 09:24

## 2024-07-11 RX ADMIN — BUPROPION HYDROCHLORIDE 75 MG: 75 TABLET, FILM COATED ORAL at 02:01

## 2024-07-11 RX ADMIN — BUPROPION HYDROCHLORIDE 75 MG: 75 TABLET, FILM COATED ORAL at 09:24

## 2024-07-11 RX ADMIN — CETIRIZINE HYDROCHLORIDE 10 MG: 10 TABLET ORAL at 09:24

## 2024-07-11 RX ADMIN — PANTOPRAZOLE SODIUM 40 MG: 40 TABLET, DELAYED RELEASE ORAL at 05:24

## 2024-07-11 RX ADMIN — ENOXAPARIN SODIUM 40 MG: 100 INJECTION SUBCUTANEOUS at 09:24

## 2024-07-11 RX ADMIN — BUSPIRONE HYDROCHLORIDE 5 MG: 5 TABLET ORAL at 10:30

## 2024-07-11 RX ADMIN — FLUTICASONE PROPIONATE 2 SPRAY: 50 SPRAY, METERED NASAL at 09:23

## 2024-07-11 RX ADMIN — AMLODIPINE BESYLATE 5 MG: 5 TABLET ORAL at 09:24

## 2024-07-11 RX ADMIN — CEPHALEXIN 500 MG: 500 CAPSULE ORAL at 21:23

## 2024-07-11 RX ADMIN — ASPIRIN 81 MG 81 MG: 81 TABLET ORAL at 09:24

## 2024-07-11 RX ADMIN — IOPAMIDOL 80 ML: 755 INJECTION, SOLUTION INTRAVENOUS at 14:58

## 2024-07-11 RX ADMIN — SODIUM CHLORIDE, PRESERVATIVE FREE 10 ML: 5 INJECTION INTRAVENOUS at 02:01

## 2024-07-11 RX ADMIN — FLUTICASONE PROPIONATE 2 SPRAY: 50 SPRAY, METERED NASAL at 21:23

## 2024-07-11 RX ADMIN — METOPROLOL TARTRATE 50 MG: 50 TABLET, FILM COATED ORAL at 21:23

## 2024-07-11 RX ADMIN — BUSPIRONE HYDROCHLORIDE 5 MG: 5 TABLET ORAL at 21:23

## 2024-07-11 RX ADMIN — METOPROLOL TARTRATE 50 MG: 50 TABLET, FILM COATED ORAL at 02:00

## 2024-07-11 RX ADMIN — ONDANSETRON 4 MG: 4 TABLET, ORALLY DISINTEGRATING ORAL at 15:29

## 2024-07-11 RX ADMIN — CEPHALEXIN 500 MG: 500 CAPSULE ORAL at 02:00

## 2024-07-11 RX ADMIN — SODIUM CHLORIDE: 9 INJECTION, SOLUTION INTRAVENOUS at 15:55

## 2024-07-11 RX ADMIN — ATORVASTATIN CALCIUM 40 MG: 40 TABLET, FILM COATED ORAL at 21:23

## 2024-07-11 RX ADMIN — BUPROPION HYDROCHLORIDE 75 MG: 75 TABLET, FILM COATED ORAL at 21:23

## 2024-07-11 RX ADMIN — GADOTERIDOL 10 ML: 279.3 INJECTION, SOLUTION INTRAVENOUS at 07:51

## 2024-07-11 RX ADMIN — CLOPIDOGREL BISULFATE 75 MG: 75 TABLET ORAL at 15:24

## 2024-07-11 RX ADMIN — SODIUM CHLORIDE, PRESERVATIVE FREE 10 ML: 5 INJECTION INTRAVENOUS at 09:24

## 2024-07-11 ASSESSMENT — ENCOUNTER SYMPTOMS
SINUS PRESSURE: 1
EYES NEGATIVE: 1
COUGH: 0
ABDOMINAL PAIN: 0
ALLERGIC/IMMUNOLOGIC NEGATIVE: 1
SORE THROAT: 0
SHORTNESS OF BREATH: 0
BACK PAIN: 1
GASTROINTESTINAL NEGATIVE: 1
RHINORRHEA: 0
ABDOMINAL DISTENTION: 0
RESPIRATORY NEGATIVE: 1
VOMITING: 0
NAUSEA: 0

## 2024-07-11 ASSESSMENT — PAIN SCALES - GENERAL
PAINLEVEL_OUTOF10: 0
PAINLEVEL_OUTOF10: 0

## 2024-07-11 NOTE — ED NOTES
Pts family to bedside. [facility] pts LKW is 7/9/24 mid morning. States pt also started two new medications yesterday. Provider notified. Pt in stable condition

## 2024-07-11 NOTE — PROGRESS NOTES
[facility]  INPATIENT PHYSICAL THERAPY  EVALUATION  [facility] 4A - 4A-18/018-A    Time In: 1106  Time Out: 1141  Timed Code Treatment Minutes: 25 Minutes  Minutes: 35          Date: 2024  Patient Name: Rahel Lopez,  Gender:  female        MRN: [id number]  : 3/4/1931  (89 y.o.)      Referring Practitioner: Soniya Alcala PA-C  Diagnosis: Dysarthria  Additional Pertinent Hx: Per HPI, \"Patient presents to the ED with a one day history of falls and problems with her speech.  The patient reports she fell twice in the house today.  The first time she was able to pull herself back up and didn't report the incident to anyone.  A little later in the day she was talking to her sister on the phone and after they hung up her sister contacted another family member to check on the patient due to confusion and slurred speech.  When the family member arrived the patient had fallen and could not get off the floor.  Patient does endorse some left sided weakness earlier in the day but that has resolved.  While in the ED the slurred speech also resolves.  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 These are immediately adjacent to one another and  extend over a length of 1.1 cm.\"     Restrictions/Precautions:  Restrictions/Precautions: Fall Risk, General Precautions    Subjective:  Chart Reviewed: Yes  Patient assessed for rehabilitation services?: Yes  Subjective: Pt resting in recliner and is to be moved to new room. RN and pt agree to therapy assisting.    General:     Vision: Impaired  Vision Exceptions: Wears glasses at all times  Hearing: Exceptions to WFL  Hearing Exceptions: Bilateral hearing aid       Pain: 5/10:  with mobility and otherwise denied    Vitals: Vitals not assessed per clinical judgement, see

## 2024-07-11 NOTE — PROGRESS NOTES
Stroke Folder given.   What is Stroke/CVA  Signs and Symptoms of stroke (BEFAST)  Treatments for Stroke  Personal Risk Factors for Stroke discussed  Education--Call 911     Stroke Folder given.   What is Stroke/CVA  Signs and Symptoms of stroke (BEFAST)  Treatments for Stroke  Personal Risk Factors for Stroke discussed  Education--Call 911      Patient/family has been educated on their personal risk factors of:  Hypertension  Previous stroke  Hyperlipidemia        They have been given hand outs on the following medications:   asa  Plavix  statins  antihypertensive    Treatment for stroke includes:  Risk factor modifications  Following the medication regime prescribed by physician      Educated on FAST-Face-Arm-Speech-Time    A stroke is a brain attack.Stroke is a brain injury. It occurs when the brain's blood supply is interrupted. Blood carries oxygen and nutrients to the brain. Without oxygen and nutrients from blood, brain tissue starts to die rapidly. This can happen in less than 10 minutes.    A stroke occurs when blood flow to the brain is blocked (called ischemic stroke). This is caused by one of the following:   Sudden decreased blood flow   Damage to a blood vessel supplying blood to the brain can occur suddenly from either:   Injury   A clot that forms and breaks off from another part of the body (such as the heart or neck)   There are certain conditions which predispose people to form blood clots, such as:   Cancer   Pregnancy   Atrial fibrillation   Certain autoimmune diseases   Local blood clot   A build-up of fatty substances ( atherosclerotic plaque ) along the inner lining of the artery causes:   Narrowing of artery   Reduced elasticity   Local inflammation   Blood protein defects leading to increased clotting tendency   Decreased blood flow in the artery   Clot in an artery supplying the brain   Inflammatory conditions in the blood vessels (vasculitis)   A stroke may also occur if a blood vessel

## 2024-07-11 NOTE — PROGRESS NOTES
Hospitalist Progress Note      Patient:  Rahel Lopez 89 y.o. female     : 3/4/1931  Unit/Bed:[address] [place] [address]-A  Date of Admission: 7/10/2024        ASSESSMENT AND PLAN    Active Problems  Acute CVA   MRI Brain with acute infarcts of right thalamus.  CTH was negative  CTA head and neck pending   PT/OT/ SLP  Neurology consulted  Loading dose aspirin initiated.  Statin therapy started  Check A1c, lipid panel  Echo pending  Falls, mechanical  PT and OT evaluations  Caution on anticoagulants   Sinusitis, POA  PCP prescribed Keflex.  No CT findings of sinusitis.  Complete course    Resolved Problems    Chronic Conditions (reviewed and stable unless otherwise stated)  Hypertension-continue home meds  Depression-continue bupropion and BuSpar  Back and shoulder pain- cyclobenzaprine on hold- possible contributor to gait instability leading to fall       LDA: []CVC / []PICC / []Midline / []Ashraf / []Drains / []Mediport / [x]None  Antibiotics: keflex   Steroids:   Labs (still needed?): [x]Yes / []No  IVF (still needed?): []Yes / [x]No    Level of care: [x]Step Down / []Med-Surg  Bed Status: [x]Inpatient / []Observation  Telemetry: [x]Yes / []No  PT/OT: [x]Yes / []No    DVT Prophylaxis: [x] Lovenox / [] Heparin / [] SCDs / [] Already on Systemic Anticoagulation / [] None     Expected discharge date:  pending courese  Disposition: SNF? Vs Home with HH      Code status: Full Code     ===================================================================    Chief Complaint: fall, speech problem       Subjective (past 24 hours):   MRI was positive for infarct in the right thalamus.  Echo deferred today.  Patient states that she is still having some slurred speech but that it does feel better than yesterday.  She states she is also having some weakness and numbness in her left upper and lower extremities.  States she is able to swallow without difficulty    Medications:    Infusion Medications    sodium chloride      sodium

## 2024-07-11 NOTE — PROGRESS NOTES
[facility]  INPATIENT OCCUPATIONAL THERAPY  [facility] 8AB  EVALUATION    Time:   Time In: 841  Time Out: 913  Timed Code Treatment Minutes: 24 Minutes  Minutes: 32          Date: 2024  Patient Name: Rahel Lopez,   Gender: female      MRN: [id number]  : 3/4/1931  (89 y.o.)  Referring Practitioner: Berkley Frazier PA-C  Diagnosis: Dysarthria  Additional Pertinent Hx: 89 yo F presents with 2 falls at home associated with confusion and slurred speech. Pt also endorses some left sided weakness earlier in the day but that has resolved.  Brain MRI revealed small acute infarcts in the posterior limb of the internal capsule on the  right and the right thalamus.    Restrictions/Precautions:  Restrictions/Precautions: Fall Risk, General Precautions    Subjective  Chart Reviewed: Yes, Progress Notes, History and Physical, Labs  Patient assessed for rehabilitation services?: Yes  Family / Caregiver Present: No    Subjective: Pt supine in bed upon arrival. Pt reports feeling worn out from all the tests she has had this AM. Pt pleasant and agreeable to OT eval/tx.    Pain: 0/10:     Vitals: Vitals not assessed per clinical judgement, see nursing flowsheet    Social/Functional History:  Lives With: Alone  Type of Home: House  Home Layout: One level  Home Access: Stairs to enter with rails  Entrance Stairs - Number of Steps: 2  Entrance Stairs - Rails: Left  Home Equipment: Rollator, Walker - Rolling, Cane   Bathroom Shower/Tub: Tub/Shower unit, Shower chair with back  Bathroom Toilet: Standard  Bathroom Equipment: Grab bars in shower, Grab bars around toilet       ADL Assistance: Independent  Homemaking Assistance: Independent  Homemaking Responsibilities: Yes  Ambulation Assistance: Independent  Transfer Assistance: Independent    Active : Yes  Occupation: Retired  Leisure & Hobbies: play games on the computer       VISION:WFL wears glasses    HEARING:  WFL    COGNITION: Slow Processing and  rehabilitation management.    Assessment:  Assessment: 89 yo F presents with c/o confusion, slurred speech with associated falling at home. Brain MRI revealed Small acute infarcts in the posterior limb of the internal capsule on the  right and the right thalamus. Pt demos decreased proprioception, strength, balance, cognition, and speech impairment impacting safety and IND with ADLs and IADLs. Further skilled OT is needed to address above barriers. Recommending IP Rehab.   Performance deficits / Impairments: Decreased functional mobility , Decreased ADL status, Decreased balance, Decreased strength, Decreased cognition, Decreased high-level IADLs  Prognosis: Good  REQUIRES OT FOLLOW-UP: Yes  Decision Making: Medium Complexity    Treatment Initiated: Treatment and education initiated within context of evaluation.  Evaluation time included review of current medical information, gathering information related to past medical, social and functional history, completion of standardized testing, formal and informal observation of tasks, assessment of data and development of plan of care and goals.  Treatment time included skilled education and facilitation of tasks to increase safety and independence with ADL's for improved functional independence and quality of life.    Discharge Recommendations:  Continue to assess pending progress, IP Rehab (Pt not safe to return home alone at this time. Pt may benefit from short IP Rehab stay for higher level ADLs and IADLs.)    Patient Education:          Patient Education  Education Given To: Patient  Education Provided: Role of Therapy;Plan of Care;Fall Prevention Strategies;ADL Adaptive Strategies  Education Method: Verbal  Barriers to Learning: None  Education Outcome: Verbalized understanding    Equipment Recommendations:  Equipment Needed: No    Plan:  Times Per Week: 5x C  Current Treatment Recommendations: Strengthening, Cognitive reorientation, Balance training, Self-Care /

## 2024-07-11 NOTE — CARE COORDINATION
Case Management Assessment Initial Evaluation    Date/Time of Evaluation: 2024 2:53 PM  Assessment Completed by: Lorraine Graham RN    If patient is discharged prior to next notation, then this note serves as note for discharge by case management.    Patient Name: Rahel Lopez                   YOB: 1931  Diagnosis: Dysarthria [R47.1]  Primary hypertension [I10]                   Date / Time: 7/10/2024  8:12 PM  Location: [address] [place] [address]     Patient Admission Status: Inpatient   Readmission Risk Low 0-14, Mod 15-19), High > 20: Readmission Risk Score: 10.4    Current PCP: Herminia Lopez MD    Additional Case Management Notes: Admit from ER with dysarthria. Neurology consulted. + CVA. Therapy evaluated.     Procedures: ECHO ordered    Imagin/11 FUNMI Brain W WO: 1. Small acute infarcts in the posterior limb of the internal capsule on the   right and the right thalamus. These are immediately adjacent to one another and   extend over a length of 1.1 cm.   2. Moderate severity chronic small vessel ischemic changes.     CTA Head and Neck ordered    Patient Goals/Plan/Treatment Preferences: From home alone. Family Supportive. Was independent at home and drove. Has a walker and cane. Open to IPR according to therapy evals. CM updated attending provider to ask for orders. Area Rehab locations discussed with patient and daughter. They would prefer to stay at [facility] if able.          [id number] [phone]   Service Assessment   Patient Orientation Alert and Oriented   Cognition Alert   History Provided By Patient   Primary Caregiver Self   Accompanied By/Relationship daughter Janett   Support Systems Children;Family Members   Patient's Healthcare Decision Maker is: Legal Next of Kin   PCP Verified by CM Yes   Last Visit to PCP Within last 3 months   Prior Functional Level Independent in ADLs/IADLs   Current Functional Level Assistance with the following:   Can patient return to prior living arrangement  Unknown at present   Ability to make needs known: Good   Family able to assist with home care needs: Yes   Would you like for me to discuss the discharge plan with any other family members/significant others, and if so, who? Yes   Financial Resources Medicare   Community Resources None   Discharge Planning   Type of Residence House   Living Arrangements Alone   Current Services Prior To Admission Durable Medical Equipment   Current DME Prior to Arrival Walker;Cane   Potential Assistance Needed Outpatient PT/OT;Other (Comment)  (IPR)   DME Ordered? No   Potential Assistance Purchasing Medications No   Type of Home Care Services None   Patient expects to be discharged to: House   One/Two Story Residence One story   Services At/After Discharge   Transition of Care Consult (CM Consult) Discharge Planning   Mode of Transport at Discharge Other (see comment)  (family)   Confirm Follow Up Transport Self   Condition of Participation: Discharge Planning   The Plan for Transition of Care is related to the following treatment goals: work with therapy

## 2024-07-11 NOTE — H&P
Hospitalist - History & Physical      Patient: Rahel Lopez    Unit/Bed:8A-04/004-A  YOB: 1931  MRN: [id number]   Acct: [id number]   PCP: Herminia Lopez MD      Assessment and Plan:        Dysarthria:   Unknown LKW, lives independently  Patient had two falls and following the second fall was confused with difficulty speaking  CT head without was negative for bleeding or mass  MRI brain with and without in the AM  Initiate ASA thearpy, statin therapy  ECHO in the AM  Essential hypertension:   Continue home medications  URI:    Patient is currently being treated for sinusitis with cephalexin by her PCP  No CT findings of sinusitis were noted  Add flonase and cetrizine for additional symptom relief   Depression:   Recently started on bupropion along with her as needed buspirone  Patient admits she wasn't taking buspirone regularly  Chronic back and shoulder pain:   One day prior patient was started on cyclobenzaprine 5 mg nightly  Patient is unsure if she took one the night prior but this could account for her gait instability and difficulty with speech  Will hold cyclobenzaprine at this time      CC:  fall, speech problem    HPI: Patient presents to the ED with a one day history of falls and problems with her speech.  The patient reports she fell twice in the house today.  The first time she was able to pull herself back up and didn't report the incident to anyone.  A little later in the day she was talking to her sister on the phone and after they hung up her sister contacted another family member to check on the patient due to confusion and slurred speech.  When the family member arrived the patient had fallen and could not get off the floor.  Patient does endorse some left sided weakness earlier in the day but that has resolved.  While in the ED the slurred speech also resolves.  Patient will be admitted for MRI of her brain and further optimization of her medical problems.    ROS: Review

## 2024-07-11 NOTE — PROGRESS NOTES
Pt daughter, robert, at bedside with this nurse going over pt medications.  Please see new updated medications.

## 2024-07-11 NOTE — CONSULTS
Neurology Consult Note    Date:7/11/2024       Room:[address] [place] [address]-  Patient Name:Rahel Lopez     YOB: 1931     Age:89 y.o.    Requesting Physician: Berkley Frazier PA-C     Reason for Consult:  Evaluate for weakness and dysarthria      Chief Complaint:   Chief Complaint   Patient presents with    Fall    Fatigue       Subjective     Rahel Lopez is a 89 y.o. female with a history of arthritis, cataracts, GERD, hyperlipidemia, hypertension who presented to [facility] ED 7/10 for the evaluation of falls. Patient states that over the last two days she has had two falls, difficulty finding words, and left sided weakness. Patient states that she lives alone and is typically able get around the house by herself without an assistive device. CTH obtained in the ED and was negative for any acute intracranial abnormalities. MRI brain revealed acute infarcts in the posterior limb of the internal capsule on the right and the right thalamus. Patient denies any known history of strokes in the past. She was not on any anticoagulation or antiplatelets prior to admission. Patient has never smoked and does not drink alcohol. She denies any known history of atrial fibrillation. On examination, patient is slightly weaker in the left upper and lower extremity. She does have a mild left sided facial droop. CTA head and neck ordered for further evaluation. She does admit to sinus pressure as she was just recently diagnosed with sinusitis.     Review of Systems   Review of Systems   Constitutional:  Negative for chills and fever.   HENT:  Positive for congestion and sinus pressure. Negative for rhinorrhea and sore throat.    Eyes:  Positive for visual disturbance (secondary to cataracts).   Respiratory:  Negative for cough and shortness of breath.    Cardiovascular:  Negative for chest pain and palpitations.   Gastrointestinal:  Negative for abdominal pain, nausea and vomiting.   Genitourinary:  Negative for dysuria.      CHEMISTRIES:  Recent Labs     07/10/24  2033 07/11/24  0535   * 134*   K 4.5 3.9   CL 95* 98   CO2 26 23   BUN 13 9   CREATININE 0.7 0.5   GLUCOSE 106 93   MG 1.9  --      COAGULATION STUDIES:  Recent Labs     07/10/24  2033   INR 1.01     LIVER PROFILE:  Recent Labs     07/10/24  2033   AST 19   ALT 10*   BILITOT 0.5   ALKPHOS 105     CHOLESTEROL AND A1C:No results for input(s): \"HDL\", \"CHOL\", \"TRIG\", \"LABA1C\" in the last 720 hours.    Invalid input(s): \"LDLCALC\"   Imaging Last 24 Hours:  CT CSpine W/O Contrast    Result Date: 7/10/2024  CT Cervical Spine WO Contrast COMPARISON: None FINDINGS: No acute fracture. Degenerative changes in the spine. Chronic appearing grade 1 spondylolistheses. Soft tissues are normal. Trace fluid in the right mastoid air cells. Scarring at the lung apices.     No acute findings. Nonemergent/incidental findings above. This document has been electronically signed by: Rickie Lawrence MD on 07/10/2024 09:27 PM All CTs at this facility use dose modulation techniques and iterative reconstructions, and/or weight-based dosing when appropriate to reduce radiation to a low as reasonably achievable.    CT Head W/O Contrast    Result Date: 7/10/2024  CT Head WO Contrast COMPARISON: CT - CT HEAD WO CONTR - 08/21/2008 12:08 AM EDT FINDINGS: No acute intracranial hemorrhage. No evidence of acute infarction. Diffuse cortical volume loss. Nonspecific white matter hypodensities, most commonly associated with chronic microangiopathic changes. No mass-effect or midline shift. No hydrocephalus. Visualized orbits are normal. Clear paranasal sinuses. Clear mastoid air cells. No acute fracture. Mild upper left parietal scalp soft tissue swelling.     No acute intracranial findings. Scalp soft tissue injury. Nonemergent/incidental findings in the report. This document has been electronically signed by: Rickie Lawrence MD on 07/10/2024 09:24 PM All CTs at this facility use dose modulation techniques and

## 2024-07-11 NOTE — ED NOTES
Pt arrived to ED via EMS with complaints of multiple falls today and increased fatigue. EKG completed and IV access obtained. Telemetry applied. Pt appears to be in stable condition with respirations even and unlabored. Pts call light in reach.

## 2024-07-11 NOTE — ED NOTES
ED to inpatient nurses report      Chief Complaint:  Chief Complaint   Patient presents with    Fall    Fatigue     Present to ED from: home    MOA:     LOC: alert and orientated to name and place  Mobility: Requires assistance * 1  Oxygen Baseline: RA    Current needs required: RA     Code Status:   Prior    What abnormal results were found and what did you give/do to treat them? Nuero s/s   Any procedures or intervention occur? na    Mental Status:       Psych Assessment:        Vitals:  Patient Vitals for the past 24 hrs:   BP Temp Temp src Pulse Resp SpO2   07/10/24 2133 (!) 156/70 -- -- 78 16 99 %   07/10/24 2023 -- 98.1 °F (36.7 °C) Oral -- -- --   07/10/24 2016 (!) 154/94 -- -- 82 15 98 %        LDAs:   Peripheral IV 07/10/24 Left Antecubital (Active)   Site Assessment Clean, dry & intact 07/10/24 2022   Phlebitis Assessment No symptoms 07/10/24 2022   Infiltration Assessment 0 07/10/24 2022       Ambulatory Status:  No data recorded    Diagnosis:  DISPOSITION Admitted 07/10/2024 10:08:56 PM   Final diagnoses:   Primary hypertension   Dysarthria        Consults:  None     Pain Score:       C-SSRS:   Risk of Suicide: No Risk    Sepsis Screening:       [place] Fall Risk:       Swallow Screening        Preferred Language:   English      ALLERGIES     Penicillins, Tramadol, and Vicodin [hydrocodone-acetaminophen]    SURGICAL HISTORY       Past Surgical History:   Procedure Laterality Date    ABDOMEN SURGERY      APPENDECTOMY  1934    CHOLECYSTECTOMY  1999    COLON SURGERY  2000    resection for diverticulitis    COLONOSCOPY      DILATATION, ESOPHAGUS      ENDOSCOPY, COLON, DIAGNOSTIC      HYSTERECTOMY (CERVIX STATUS UNKNOWN)  1979    JOINT REPLACEMENT Right     right knee    KNEE ARTHROPLASTY Right 02/27/2021    RIGHT TOTAL KNEE REVISION WITH OSS performed by Jared Burns MD at [facility] OR    KNEE SURGERY  2010    ROTATOR CUFF REPAIR Left 2006       PAST MEDICAL HISTORY       Past Medical History:   Diagnosis Date

## 2024-07-11 NOTE — PLAN OF CARE
Problem: Discharge Planning  Goal: Discharge to home or other facility with appropriate resources  Outcome: Progressing  Flowsheets (Taken 7/11/2024 0128)  Discharge to home or other facility with appropriate resources: Identify barriers to discharge with patient and caregiver     Problem: Safety - Adult  Goal: Free from fall injury  Outcome: Progressing  Flowsheets (Taken 7/11/2024 0128)  Free From Fall Injury: Instruct family/caregiver on patient safety     Problem: ABCDS Injury Assessment  Goal: Absence of physical injury  Outcome: Progressing  Flowsheets (Taken 7/11/2024 0128)  Absence of Physical Injury: Implement safety measures based on patient assessment     Problem: Skin/Tissue Integrity  Goal: Absence of new skin breakdown  Description: 1.  Monitor for areas of redness and/or skin breakdown  2.  Assess vascular access sites hourly  3.  Every 4-6 hours minimum:  Change oxygen saturation probe site  4.  Every 4-6 hours:  If on nasal continuous positive airway pressure, respiratory therapy assess nares and determine need for appliance change or resting period.  Outcome: Progressing

## 2024-07-11 NOTE — ED PROVIDER NOTES
[facility]AB  [address] [facility] [address]  Phone: [phone]      CHIEF COMPLAINT       Chief Complaint   Patient presents with    Fall    Fatigue       Nurses Notes reviewed and I agree except as noted in the HPI.      HISTORY OF PRESENT ILLNESS    Rahel Lopez is a 89 y.o. female.    Patient reportedly had at least 2 falls.  Was found on the ground.  She says that she has been having trouble with her speech for about 2 days.  At one point she felt like her left side was weak but it feels back to normal now but she is still having some trouble with speech.  Basically dysarthria.  Last known normal was believed to be 2 days ago.    REVIEW OF SYSTEMS         No chest pain, no shortness of breath    Remainder of review of systems is otherwise reviewed as negative.      PAST MEDICAL HISTORY    has a past medical history of Arthritis, Cataract of both eyes, GERD (gastroesophageal reflux disease), Hyperlipidemia, Hypertension, Keratosis, and Psychiatric problem.    SURGICAL HISTORY      has a past surgical history that includes Rotator cuff repair (Left, 2006); knee surgery (2010); Appendectomy (1934); Hysterectomy (1979); Cholecystectomy (1999); Colon surgery (2000); Abdomen surgery; Colonoscopy; Endoscopy, colon, diagnostic; Dilatation, esophagus; joint replacement (Right); and Knee Arthroplasty (Right, 02/27/2021).    CURRENT MEDICATIONS       Current Discharge Medication List        CONTINUE these medications which have NOT CHANGED    Details   buPROPion (WELLBUTRIN) 75 MG tablet Take 1 tablet by mouth 2 times daily      Wheat Dextrin (BENEFIBER) POWD Take by mouth daily      Calcium Citrate-Vitamin D (CALCIUM CITRATE + D PO) Take by mouth daily      metoprolol (LOPRESSOR) 50 MG tablet Take 1 tablet by mouth 2 times daily Take 50mg BID  Qty: 180 tablet, Refills: 2      amLODIPine (NORVASC) 5 MG tablet Take 1 tablet by mouth daily  Qty: 90 tablet, Refills: 3      Acetaminophen (TYLENOL 8 HOUR

## 2024-07-11 NOTE — CONSULTS
Physical Medicine & Rehabilitation   Consult Note      Admitting Physician: Berkley Frazier PA-C    Primary Care Provider: Herminia Lopez MD     Reason for Consult: Assess for rehab needs    History of Present Illness:  Rahel Lopez is a 89 y.o. female with PMH significant for GERD, hyperlipidemia, hypertension, and cataracts who was admitted to [facility] on 7/10/2024.  History obtained via: ED documentation, acute care documentation, and patient    Patient initially presented to [facility] ED on 7/10/2024 with concerns due to multiple falls and impaired speech.  She reported that speech impairment began approximately 2 days prior and at one point she noted some left-sided weakness, however, felt that was back to baseline.  On the day of presentation, patient reportedly fell twice in her house.  The first time she was able to get herself back up and did not report the incident to anyone.  However, later in the day she was talking to her sister on the phone after they hung up the sister contacted another family member to check on the patient due to confusion and slurred speech.  When the family member arrived, the patient was found down on the ground and unable to get up.  CT head was obtained and was reportedly negative, however, decision made to admit patient for further evaluation for CVA.    On admission, patient was started on aspirin and statin therapy.  An MRI and echo were ordered.  Of note, patient was being treated by PCP for upper respiratory infection at time of presentation.  MRI of the brain resulted and reportedly revealed an acute infarct of the right thalamus.  Neurology consulted and formal consultation is currently pending.    On day of consultation, patient is seen sitting up in bedside chair. No family is at bedside. She is reporting some trouble impaired coordination on the left. She denies any headaches or new vision changes. No reports of any CP or SOB. She is reporting  Independent, with head of bed flat, with rail    Supine to Sit: Stand By Assistance d/t decreased strength      TRANSFERS:  Sit to Stand:  Contact Guard Assistance.       ST 07/11/2024:  DIAGNOSTIC IMPRESSIONS:    Fxtyqq-Mxytfwxt-Bxkugmecm Evaluation: Patient presents with mild cognitive impairment evidenced by aforementioned deficits. Speech and voice appear to be WFL with no presence of dysarthria, dysphonia, or aphonia; patient intelligible at the conversation level with approximately 100% accuracy. Expressive and receptive language skills grossly intact with no apparent communicative breakdowns. Skilled ST services are recommended at this time in order to improve the aforementioned deficits and permit potential return to PLOF. Anticipate needs for ongoing skilled ST services via HH vs. OP vs. HEP services at discharge.        Patient presents with oral phase of swallow function that is essentially WFL with inability to fully discern potential presence of pharyngeal phase deficits without formal instrumentation. All labial/lingual structures intact and appear to be functioning appropriately at bedside. Oral phase highly unremarkable during consumption of hard/textured solids with patient demonstrating adequate mastication pattern for textural breakdown, cohesive bolus formation, and manipulation. Thin liquids consumed without overt difficulty and with suspected control/containment of fluid bolus. NO overt s/s of penetration/aspiration exhibited across all consistencies/trials consumed, certainly not able to exclude pharyngeal phase dysfunction and/or airway invasion events in its entirety at bedside alone. Patient's swallow physiology does appear appropriate to support PO intake without distress with instrumental evaluation not warranted. Recommend continuation of regular diet with thin liquids. No further ST services are warranted at this time r/t dysphagia management given baseline status of swallow function  Neutrophils Absolute 5.8 1.8 - 7.7 thou/mm3    Lymphocytes Absolute 1.5 1.0 - 4.8 thou/mm3    Monocytes Absolute 1.1 0.4 - 1.3 thou/mm3    Eosinophils Absolute 0.1 0.0 - 0.4 thou/mm3    Basophils Absolute 0.1 0.0 - 0.1 thou/mm3    Immature Grans (Abs) 0.03 0.00 - 0.07 thou/mm3    nRBC 0 /100 wbc   Anion Gap    Collection Time: 07/11/24  5:35 AM   Result Value Ref Range    Anion Gap 13.0 8.0 - 16.0 meq/L   Glomerular Filtration Rate, Estimated    Collection Time: 07/11/24  5:35 AM   Result Value Ref Range    Est, Glom Filt Rate 87 >60 ml/min/1.73m2   Osmolality    Collection Time: 07/11/24  5:35 AM   Result Value Ref Range    Osmolality Calc 266.6 (L) 275.0 - 300.0 mOsmol/kg   Echo (TTE) complete (PRN contrast/bubble/strain/3D)    Collection Time: 07/11/24  8:46 AM   Result Value Ref Range    Body Surface Area 1.59 m2    LA Minor Axis 4.7 cm    LA Major Axis 5.8 cm    LA Area 2C 17.3 cm2    LA Area 4C 18.6 cm2    LA Volume MOD A2C 52 22 - 52 mL    LA Volume MOD A4C 48 22 - 52 mL    LA Volume BP 55 (A) 22 - 52 mL    LA Diameter 3.6 cm    AR .0 ms    AR Max Velocity PISA 3.7 m/s    AV Peak Velocity 1.5 m/s    AV Peak Gradient 8 mmHg    AV Cusp Mmode 1.8 cm    Ascending Aorta 3.3 cm    LV IVRT 81.0 ms    IVSd 1.1 (A) 0.6 - 0.9 cm    LVIDd 4.2 3.9 - 5.3 cm    LVIDs 2.9 cm    LVOT Peak Velocity 1.0 m/s    LVOT Peak Gradient 4 mmHg    LVPWd 1.1 (A) 0.6 - 0.9 cm    LV E' Lateral Velocity 6 cm/s    LV E' Septal Velocity 4 cm/s    MR Peak Velocity 4.9 m/s    MR Peak Gradient 95 mmHg    MV E Wave Deceleration Time 313.0 ms    MV A Velocity 1.19 m/s    MV E Velocity 0.81 m/s    OK Max Velocity 1.1 m/s    Pulmonary Artery EDP 4 mmHg    PV Max Velocity 0.5 m/s    PV Peak Gradient 1 mmHg    Est. RA Pressure 5 mmHg    RVIDd 2.4 cm    TAPSE 2.0 1.7 cm    TR Max Velocity 2.42 m/s    TR Peak Gradient 23 mmHg    TV E Wave Velocity 0.6 m/s       Radiology Review:    MRI Brain 07/11/2024:  Impression:          1. Small acute

## 2024-07-11 NOTE — PROGRESS NOTES
This  responded to a spiritual consult to provide support and encouragement to Rahel, a 89 year old female admitted to [facility]. The purpose for the visit is to provide spiritual support for spiritual distress. Patient is seated in her recliner chair as her daughter stood next to her bedside chatting with patient.Patient's daughter said patient is a member of [employer] in [facility]. I shared with patient that we are glad that one of our priests stopped in this morning and anointed her. I also shared with patient that she will be receiving continue eucharist during the time of her hospital stay. Patient and daughter were pleased and grateful to hear and know that the opportunity is available.     Spiritual Care team will follow up with continue support as needed.

## 2024-07-12 ENCOUNTER — HOSPITAL ENCOUNTER (INPATIENT)
Age: 89
LOS: 14 days | Discharge: NURSING, REHAB OR LONG-TERM CARE | End: 2024-07-26
Attending: STUDENT IN AN ORGANIZED HEALTH CARE EDUCATION/TRAINING PROGRAM | Admitting: STUDENT IN AN ORGANIZED HEALTH CARE EDUCATION/TRAINING PROGRAM
Payer: MEDICARE

## 2024-07-12 VITALS
BODY MASS INDEX: 23.25 KG/M2 | HEART RATE: 71 BPM | WEIGHT: 126.32 LBS | SYSTOLIC BLOOD PRESSURE: 125 MMHG | RESPIRATION RATE: 16 BRPM | TEMPERATURE: 97.9 F | HEIGHT: 62 IN | OXYGEN SATURATION: 99 % | DIASTOLIC BLOOD PRESSURE: 85 MMHG

## 2024-07-12 DIAGNOSIS — I63.9 ACUTE CVA (CEREBROVASCULAR ACCIDENT) (HCC): Primary | ICD-10-CM

## 2024-07-12 DIAGNOSIS — F32.A DEPRESSION, UNSPECIFIED DEPRESSION TYPE: ICD-10-CM

## 2024-07-12 LAB
CHOLEST SERPL-MCNC: 197 MG/DL (ref 100–199)
DEPRECATED MEAN GLUCOSE BLD GHB EST-ACNC: 111 MG/DL (ref 70–126)
HBA1C MFR BLD HPLC: 5.7 % (ref 4.4–6.4)
HDLC SERPL-MCNC: 46 MG/DL
LDLC SERPL CALC-MCNC: 126 MG/DL
TRIGL SERPL-MCNC: 125 MG/DL (ref 0–199)

## 2024-07-12 PROCEDURE — 1180000000 HC REHAB R&B

## 2024-07-12 PROCEDURE — 6370000000 HC RX 637 (ALT 250 FOR IP)

## 2024-07-12 PROCEDURE — 97130 THER IVNTJ EA ADDL 15 MIN: CPT

## 2024-07-12 PROCEDURE — 83036 HEMOGLOBIN GLYCOSYLATED A1C: CPT

## 2024-07-12 PROCEDURE — 6360000002 HC RX W HCPCS: Performed by: PHYSICIAN ASSISTANT

## 2024-07-12 PROCEDURE — 97530 THERAPEUTIC ACTIVITIES: CPT

## 2024-07-12 PROCEDURE — 99232 SBSQ HOSP IP/OBS MODERATE 35: CPT

## 2024-07-12 PROCEDURE — 97110 THERAPEUTIC EXERCISES: CPT

## 2024-07-12 PROCEDURE — 99222 1ST HOSP IP/OBS MODERATE 55: CPT | Performed by: STUDENT IN AN ORGANIZED HEALTH CARE EDUCATION/TRAINING PROGRAM

## 2024-07-12 PROCEDURE — 97129 THER IVNTJ 1ST 15 MIN: CPT

## 2024-07-12 PROCEDURE — 36415 COLL VENOUS BLD VENIPUNCTURE: CPT

## 2024-07-12 PROCEDURE — 97112 NEUROMUSCULAR REEDUCATION: CPT

## 2024-07-12 PROCEDURE — 97116 GAIT TRAINING THERAPY: CPT

## 2024-07-12 PROCEDURE — 6370000000 HC RX 637 (ALT 250 FOR IP): Performed by: STUDENT IN AN ORGANIZED HEALTH CARE EDUCATION/TRAINING PROGRAM

## 2024-07-12 PROCEDURE — 80061 LIPID PANEL: CPT

## 2024-07-12 PROCEDURE — 6370000000 HC RX 637 (ALT 250 FOR IP): Performed by: PHYSICIAN ASSISTANT

## 2024-07-12 PROCEDURE — 99239 HOSP IP/OBS DSCHRG MGMT >30: CPT | Performed by: PHYSICIAN ASSISTANT

## 2024-07-12 PROCEDURE — 2580000003 HC RX 258: Performed by: PHYSICIAN ASSISTANT

## 2024-07-12 RX ORDER — SODIUM CHLORIDE 0.9 % (FLUSH) 0.9 %
5-40 SYRINGE (ML) INJECTION PRN
Status: CANCELLED | OUTPATIENT
Start: 2024-07-12

## 2024-07-12 RX ORDER — SODIUM CHLORIDE 9 MG/ML
INJECTION, SOLUTION INTRAVENOUS PRN
Status: DISCONTINUED | OUTPATIENT
Start: 2024-07-12 | End: 2024-07-19

## 2024-07-12 RX ORDER — BUPROPION HYDROCHLORIDE 75 MG/1
75 TABLET ORAL 2 TIMES DAILY
Status: CANCELLED | OUTPATIENT
Start: 2024-07-12

## 2024-07-12 RX ORDER — POLYETHYLENE GLYCOL 3350 17 G/17G
17 POWDER, FOR SOLUTION ORAL DAILY PRN
Status: CANCELLED | OUTPATIENT
Start: 2024-07-12

## 2024-07-12 RX ORDER — SODIUM CHLORIDE 9 MG/ML
INJECTION, SOLUTION INTRAVENOUS PRN
Status: CANCELLED | OUTPATIENT
Start: 2024-07-12

## 2024-07-12 RX ORDER — POTASSIUM CHLORIDE 7.45 MG/ML
10 INJECTION INTRAVENOUS PRN
Status: DISCONTINUED | OUTPATIENT
Start: 2024-07-12 | End: 2024-07-26 | Stop reason: HOSPADM

## 2024-07-12 RX ORDER — SODIUM CHLORIDE 0.9 % (FLUSH) 0.9 %
5-40 SYRINGE (ML) INJECTION PRN
Status: DISCONTINUED | OUTPATIENT
Start: 2024-07-12 | End: 2024-07-20

## 2024-07-12 RX ORDER — SENNOSIDES A AND B 8.6 MG/1
1 TABLET, FILM COATED ORAL DAILY PRN
Status: CANCELLED | OUTPATIENT
Start: 2024-07-12

## 2024-07-12 RX ORDER — CETIRIZINE HYDROCHLORIDE 10 MG/1
10 TABLET ORAL DAILY
Status: DISCONTINUED | OUTPATIENT
Start: 2024-07-13 | End: 2024-07-26 | Stop reason: HOSPADM

## 2024-07-12 RX ORDER — POLYETHYLENE GLYCOL 3350 17 G/17G
17 POWDER, FOR SOLUTION ORAL DAILY PRN
Status: DISCONTINUED | OUTPATIENT
Start: 2024-07-12 | End: 2024-07-26 | Stop reason: HOSPADM

## 2024-07-12 RX ORDER — SENNOSIDES A AND B 8.6 MG/1
1 TABLET, FILM COATED ORAL DAILY PRN
Status: DISCONTINUED | OUTPATIENT
Start: 2024-07-12 | End: 2024-07-26 | Stop reason: HOSPADM

## 2024-07-12 RX ORDER — ONDANSETRON 4 MG/1
4 TABLET, ORALLY DISINTEGRATING ORAL EVERY 8 HOURS PRN
Status: CANCELLED | OUTPATIENT
Start: 2024-07-12

## 2024-07-12 RX ORDER — AMLODIPINE BESYLATE 5 MG/1
5 TABLET ORAL DAILY
Status: DISCONTINUED | OUTPATIENT
Start: 2024-07-13 | End: 2024-07-26 | Stop reason: HOSPADM

## 2024-07-12 RX ORDER — METOPROLOL TARTRATE 50 MG/1
50 TABLET, FILM COATED ORAL 2 TIMES DAILY
Status: CANCELLED | OUTPATIENT
Start: 2024-07-12

## 2024-07-12 RX ORDER — SODIUM CHLORIDE 9 MG/ML
INJECTION, SOLUTION INTRAVENOUS PRN
Status: DISCONTINUED | OUTPATIENT
Start: 2024-07-12 | End: 2024-07-20

## 2024-07-12 RX ORDER — SODIUM CHLORIDE 0.9 % (FLUSH) 0.9 %
5-40 SYRINGE (ML) INJECTION EVERY 12 HOURS SCHEDULED
Status: CANCELLED | OUTPATIENT
Start: 2024-07-12

## 2024-07-12 RX ORDER — FLUTICASONE PROPIONATE 50 MCG
2 SPRAY, SUSPENSION (ML) NASAL 2 TIMES DAILY
Status: DISCONTINUED | OUTPATIENT
Start: 2024-07-12 | End: 2024-07-26 | Stop reason: HOSPADM

## 2024-07-12 RX ORDER — ASPIRIN 81 MG/1
81 TABLET, CHEWABLE ORAL DAILY
Status: CANCELLED | OUTPATIENT
Start: 2024-07-13

## 2024-07-12 RX ORDER — CLOPIDOGREL BISULFATE 75 MG/1
75 TABLET ORAL DAILY
Status: DISCONTINUED | OUTPATIENT
Start: 2024-07-13 | End: 2024-07-26 | Stop reason: HOSPADM

## 2024-07-12 RX ORDER — SODIUM CHLORIDE 0.9 % (FLUSH) 0.9 %
5-40 SYRINGE (ML) INJECTION EVERY 12 HOURS SCHEDULED
Status: DISCONTINUED | OUTPATIENT
Start: 2024-07-13 | End: 2024-07-12

## 2024-07-12 RX ORDER — METOPROLOL TARTRATE 50 MG/1
50 TABLET, FILM COATED ORAL 2 TIMES DAILY
Status: DISCONTINUED | OUTPATIENT
Start: 2024-07-12 | End: 2024-07-26 | Stop reason: HOSPADM

## 2024-07-12 RX ORDER — BUPROPION HYDROCHLORIDE 75 MG/1
75 TABLET ORAL 2 TIMES DAILY
Status: DISCONTINUED | OUTPATIENT
Start: 2024-07-12 | End: 2024-07-24

## 2024-07-12 RX ORDER — ONDANSETRON 4 MG/1
4 TABLET, ORALLY DISINTEGRATING ORAL EVERY 8 HOURS PRN
Status: DISCONTINUED | OUTPATIENT
Start: 2024-07-12 | End: 2024-07-26 | Stop reason: HOSPADM

## 2024-07-12 RX ORDER — POTASSIUM CHLORIDE 20 MEQ/1
40 TABLET, EXTENDED RELEASE ORAL PRN
Status: CANCELLED | OUTPATIENT
Start: 2024-07-12

## 2024-07-12 RX ORDER — ACETAMINOPHEN 325 MG/1
650 TABLET ORAL EVERY 4 HOURS PRN
Status: DISCONTINUED | OUTPATIENT
Start: 2024-07-12 | End: 2024-07-26 | Stop reason: HOSPADM

## 2024-07-12 RX ORDER — CLOPIDOGREL BISULFATE 75 MG/1
75 TABLET ORAL DAILY
Status: CANCELLED | OUTPATIENT
Start: 2024-07-13

## 2024-07-12 RX ORDER — ATORVASTATIN CALCIUM 40 MG/1
40 TABLET, FILM COATED ORAL NIGHTLY
Status: DISCONTINUED | OUTPATIENT
Start: 2024-07-12 | End: 2024-07-26 | Stop reason: HOSPADM

## 2024-07-12 RX ORDER — ACETAMINOPHEN 325 MG/1
650 TABLET ORAL EVERY 4 HOURS PRN
Status: CANCELLED | OUTPATIENT
Start: 2024-07-12

## 2024-07-12 RX ORDER — LANOLIN ALCOHOL/MO/W.PET/CERES
3 CREAM (GRAM) TOPICAL NIGHTLY PRN
Status: DISCONTINUED | OUTPATIENT
Start: 2024-07-12 | End: 2024-07-26 | Stop reason: HOSPADM

## 2024-07-12 RX ORDER — POTASSIUM CHLORIDE 20 MEQ/1
40 TABLET, EXTENDED RELEASE ORAL PRN
Status: DISCONTINUED | OUTPATIENT
Start: 2024-07-12 | End: 2024-07-26 | Stop reason: HOSPADM

## 2024-07-12 RX ORDER — CEPHALEXIN 500 MG/1
500 CAPSULE ORAL EVERY 12 HOURS SCHEDULED
Status: COMPLETED | OUTPATIENT
Start: 2024-07-12 | End: 2024-07-16

## 2024-07-12 RX ORDER — ASPIRIN 81 MG/1
81 TABLET, CHEWABLE ORAL DAILY
Status: DISCONTINUED | OUTPATIENT
Start: 2024-07-13 | End: 2024-07-26 | Stop reason: HOSPADM

## 2024-07-12 RX ORDER — AMLODIPINE BESYLATE 5 MG/1
5 TABLET ORAL DAILY
Status: CANCELLED | OUTPATIENT
Start: 2024-07-13

## 2024-07-12 RX ORDER — ENOXAPARIN SODIUM 100 MG/ML
40 INJECTION SUBCUTANEOUS DAILY
Status: DISCONTINUED | OUTPATIENT
Start: 2024-07-13 | End: 2024-07-26 | Stop reason: HOSPADM

## 2024-07-12 RX ORDER — PANTOPRAZOLE SODIUM 40 MG/1
40 TABLET, DELAYED RELEASE ORAL
Status: CANCELLED | OUTPATIENT
Start: 2024-07-13

## 2024-07-12 RX ORDER — ENOXAPARIN SODIUM 100 MG/ML
40 INJECTION SUBCUTANEOUS DAILY
Status: CANCELLED | OUTPATIENT
Start: 2024-07-13

## 2024-07-12 RX ORDER — CETIRIZINE HYDROCHLORIDE 10 MG/1
10 TABLET ORAL DAILY
Status: CANCELLED | OUTPATIENT
Start: 2024-07-13

## 2024-07-12 RX ORDER — PANTOPRAZOLE SODIUM 40 MG/1
40 TABLET, DELAYED RELEASE ORAL
Status: DISCONTINUED | OUTPATIENT
Start: 2024-07-13 | End: 2024-07-26 | Stop reason: HOSPADM

## 2024-07-12 RX ORDER — ATORVASTATIN CALCIUM 40 MG/1
40 TABLET, FILM COATED ORAL NIGHTLY
Status: CANCELLED | OUTPATIENT
Start: 2024-07-12

## 2024-07-12 RX ORDER — BUSPIRONE HYDROCHLORIDE 5 MG/1
5 TABLET ORAL 2 TIMES DAILY
Status: DISCONTINUED | OUTPATIENT
Start: 2024-07-12 | End: 2024-07-26 | Stop reason: HOSPADM

## 2024-07-12 RX ORDER — BUSPIRONE HYDROCHLORIDE 5 MG/1
5 TABLET ORAL 2 TIMES DAILY
Status: CANCELLED | OUTPATIENT
Start: 2024-07-12

## 2024-07-12 RX ORDER — MAGNESIUM SULFATE IN WATER 40 MG/ML
2000 INJECTION, SOLUTION INTRAVENOUS PRN
Status: CANCELLED | OUTPATIENT
Start: 2024-07-12

## 2024-07-12 RX ORDER — SODIUM CHLORIDE 9 MG/ML
INJECTION, SOLUTION INTRAVENOUS CONTINUOUS
Status: CANCELLED | OUTPATIENT
Start: 2024-07-12

## 2024-07-12 RX ORDER — FLUTICASONE PROPIONATE 50 MCG
2 SPRAY, SUSPENSION (ML) NASAL 2 TIMES DAILY
Status: CANCELLED | OUTPATIENT
Start: 2024-07-12

## 2024-07-12 RX ORDER — SODIUM CHLORIDE 0.9 % (FLUSH) 0.9 %
5-40 SYRINGE (ML) INJECTION EVERY 12 HOURS SCHEDULED
Status: DISCONTINUED | OUTPATIENT
Start: 2024-07-13 | End: 2024-07-20

## 2024-07-12 RX ORDER — CEPHALEXIN 500 MG/1
500 CAPSULE ORAL EVERY 12 HOURS SCHEDULED
Status: CANCELLED | OUTPATIENT
Start: 2024-07-12

## 2024-07-12 RX ORDER — SODIUM CHLORIDE 9 MG/ML
INJECTION, SOLUTION INTRAVENOUS CONTINUOUS
Status: DISCONTINUED | OUTPATIENT
Start: 2024-07-12 | End: 2024-07-12

## 2024-07-12 RX ORDER — MAGNESIUM SULFATE IN WATER 40 MG/ML
2000 INJECTION, SOLUTION INTRAVENOUS PRN
Status: DISCONTINUED | OUTPATIENT
Start: 2024-07-12 | End: 2024-07-26 | Stop reason: HOSPADM

## 2024-07-12 RX ORDER — POTASSIUM CHLORIDE 7.45 MG/ML
10 INJECTION INTRAVENOUS PRN
Status: CANCELLED | OUTPATIENT
Start: 2024-07-12

## 2024-07-12 RX ADMIN — CEPHALEXIN 500 MG: 500 CAPSULE ORAL at 20:31

## 2024-07-12 RX ADMIN — PANTOPRAZOLE SODIUM 40 MG: 40 TABLET, DELAYED RELEASE ORAL at 05:23

## 2024-07-12 RX ADMIN — SODIUM CHLORIDE: 9 INJECTION, SOLUTION INTRAVENOUS at 05:23

## 2024-07-12 RX ADMIN — BUPROPION HYDROCHLORIDE 75 MG: 75 TABLET, FILM COATED ORAL at 20:32

## 2024-07-12 RX ADMIN — ONDANSETRON 4 MG: 2 INJECTION INTRAMUSCULAR; INTRAVENOUS at 09:04

## 2024-07-12 RX ADMIN — BUPROPION HYDROCHLORIDE 75 MG: 75 TABLET, FILM COATED ORAL at 10:24

## 2024-07-12 RX ADMIN — ASPIRIN 81 MG 81 MG: 81 TABLET ORAL at 10:28

## 2024-07-12 RX ADMIN — ATORVASTATIN CALCIUM 40 MG: 40 TABLET, FILM COATED ORAL at 20:32

## 2024-07-12 RX ADMIN — BUSPIRONE HYDROCHLORIDE 5 MG: 5 TABLET ORAL at 20:32

## 2024-07-12 RX ADMIN — CEPHALEXIN 500 MG: 500 CAPSULE ORAL at 10:24

## 2024-07-12 RX ADMIN — AMLODIPINE BESYLATE 5 MG: 5 TABLET ORAL at 10:25

## 2024-07-12 RX ADMIN — FLUTICASONE PROPIONATE 2 SPRAY: 50 SPRAY, METERED NASAL at 10:25

## 2024-07-12 RX ADMIN — METOPROLOL TARTRATE 50 MG: 50 TABLET, FILM COATED ORAL at 20:31

## 2024-07-12 RX ADMIN — ENOXAPARIN SODIUM 40 MG: 100 INJECTION SUBCUTANEOUS at 10:28

## 2024-07-12 RX ADMIN — BUSPIRONE HYDROCHLORIDE 5 MG: 5 TABLET ORAL at 10:25

## 2024-07-12 RX ADMIN — CLOPIDOGREL BISULFATE 75 MG: 75 TABLET ORAL at 10:28

## 2024-07-12 RX ADMIN — FLUTICASONE PROPIONATE 2 SPRAY: 50 SPRAY, METERED NASAL at 20:31

## 2024-07-12 RX ADMIN — METOPROLOL TARTRATE 50 MG: 50 TABLET, FILM COATED ORAL at 10:25

## 2024-07-12 RX ADMIN — CETIRIZINE HYDROCHLORIDE 10 MG: 10 TABLET ORAL at 10:25

## 2024-07-12 ASSESSMENT — PAIN SCALES - GENERAL
PAINLEVEL_OUTOF10: 0
PAINLEVEL_OUTOF10: 0

## 2024-07-12 ASSESSMENT — ENCOUNTER SYMPTOMS
SHORTNESS OF BREATH: 0
VOMITING: 0
ABDOMINAL PAIN: 0
NAUSEA: 0
SINUS PRESSURE: 1
SORE THROAT: 0
RHINORRHEA: 0
COUGH: 0

## 2024-07-12 ASSESSMENT — 9 HOLE PEG TEST
TESTTIME_SECONDS: 107
TESTTIME_SECONDS: 29.38

## 2024-07-12 ASSESSMENT — LIFESTYLE VARIABLES: HOW OFTEN DO YOU HAVE A DRINK CONTAINING ALCOHOL: NEVER

## 2024-07-12 NOTE — PROGRESS NOTES
[facility]  INPATIENT SPEECH THERAPY  [facility] 4A  DAILY NOTE    TIME   SLP Individual Minutes  Time In: 1050  Time Out: 1114  Minutes: 24  Timed Code Treatment Minutes: 24 Minutes       Date: 2024  Patient Name: Rahel Lopez      CSN: [id number]   : 3/4/1931  (89 y.o.)  Gender: female   Referring Physician:  Berkley Frazier PA-C   Diagnosis: Dysarthria   Precautions: Fall Risk  Current Diet: Regular Diet with Thin Liquids  Respiratory Status: Room Air  Swallowing Strategies: Standard Universal Swallow Precautions  Date of Last MBS/FEES: Not Applicable    Pain:  No pain reported.    Subjective:  Patient seen with RN Carrie permission. Patient seen sitting upright in recliner upon ST arrival; alert and cooperative throughout interventions. No family present.    ST with provision of education re: IPR (I.e., expectations, goals/skills addressed, average LOS, etc.) with verbal receptiveness noted. Patient will benefit from completion of SCCAN upon admission to IPR.    Short-Term Goals:  SHORT TERM GOAL #1:  Goal 1: Patient will complete immediate/delayed recall tasks (5+ units) with and without use of compensatory memory strategies with 80% accuracy and moderate cuing to improve retention of novel information.  INTERVENTIONS:ST completed review of compensatory memory strategies using the acronym W.R.A.P. (i.e.,Write it down, Repeat it, Associate it, and Picture it). ST provided functional scenarios to utilize each memory strategy within ADLs/IADLs.    WRAP Recall: 3/4 independent, 1/4 with written visual aid and minimal cuing to utilize by ST    Novel Recall of ST Information (ST Kaleb, Ambreen, Two Children, Blackey)  Immediate Recall: 1/5 independent, 3/5 with logical cuing, 1/5 with minimal cuing  10 Minute Delayed Recall: 1/5 independent, 4/5 with logical cuing    SHORT TERM GOAL #2:  Goal 2: Patient will complete problem solving and executive functioning tasks (i.e.,

## 2024-07-12 NOTE — PROGRESS NOTES
Admitted to the Inpatient Rehabilitation Unit via wheelchair.  Patient was then oriented to room and unit.  Education provided on the rehabilitation routine: three hours of therapy five days per week.      Explained patients right to have family, representative or physician notified of their admission.  Patient has Declined for physician to be notified.  Patient has Declined for family/representative to be notified.    Admitting medication orders compared with acute stay medications; home medication list reviewed with patient/family.  Medication issues identified No      Transportation:   Has transportation kept you from medical appointments, meetings, work, or from getting things needed for daily living? (Check all that apply)  No.      Health Literacy:   How often do you need to have someone help you when you read instructions, pamphlets, or other written material from your doctor or pharmacy?  1. - Rarely    Social Isolation:  How often do you feel lonely or isolated from those around you?  1. Rarely    Patient Mood Interview (PHQ-2 to 9) (from [employer]©)   Say to Patient: \"Over the last 2 weeks, have you been bothered by any of the following problems?\"   If symptom is present, enter yes in column 1 (Symptom Presence)  If yes in column 1, then ask the patient: “About how often have you been bothered by this?” Indicate response in column 2, Symptom Frequency.   Symptom Presence  No    Yes   9.    No response  Symptom Frequency  Never or 1 day  2-6 days (several days)  7-11 days (half or more of the days)  12-14 days (nearly every day)    Symptom Presence Symptom Frequency   Little interest or pleasure in doing things 0. No 0. Never or 1 day   Feeling down, depressed, or hopeless 0. No 0. Never or 1 day   If either A or B above has symptom frequency coded 2 or 3, CONTINUE asking questions below.      If not, END the interview  and right click on next table to delete.               Pain:  Pain Effect on Sleep

## 2024-07-12 NOTE — PROGRESS NOTES
[facility]  Acute Inpatient Rehab Preadmission Assessment    Patient Name: Rahel Lopez        Ethnicity:Not of , /a, or Welsh origin  Race:White  MRN: [id number]    : 3/4/1931  (89 y.o.)  Gender: female     Admitted from:[facility]  Initial Assessment    Date of admission to the hospital: 7/10/2024  8:12 PM  Date patient eligible for admission:2024    Primary Diagnosis: stroke      Did patient have surgery?  no    Physicians: Berkley Frazier PA-C, Dr Herron, Dr Torres, Dr Lamas    Risk for clinical complications/co-morbidities:   Past Medical History:   Diagnosis Date    Acute CVA (cerebrovascular accident) (HCC) 2024    Arthritis     Cataract of both eyes     GERD (gastroesophageal reflux disease)     Hyperlipidemia     Hypertension     Keratosis     benign lichemoid    Psychiatric problem        Financial Information  Primary insurance: Medicare    Secondary Insurance:   [facility]    Has the patient had two or more falls in the past year or any fall with injury in the past year?   yes    Did the patient have major surgery during the 100 days prior to admission?  no    Precautions:   falls  Restrictions/Precautions: Fall Risk, General Precautions  Other position/activity restrictions: wear shoes- pt has leg length discrepancy and right built up shoe    Isolation Precautions: None       Physiatrist:  Dr Herron    Patients Occupation: Retired  Reviewed Lab and Diagnostic reports from Current Admission: Yes    Patients Prior Functional  Level: Prior Function  ADL Assistance: Independent  Homemaking Assistance: Independent  Ambulation Assistance: Independent  Transfer Assistance: Independent    Current functional status for upper extremity ADL’s: Grooming: Contact Guard Assistance.  For balance while standing at sink side to comb hair     Current functional status for lower extremity ADL’s: Footwear Management: Stand By Assistance.  D/t decreased  impairment, communication impairment.    Required therapy: Physical Therapy, Occupational Therapy and Speech Therapy 3 hours per day, 5-6 days per week.  Recreational Therapy 1 hour per week.    Expected Discharge Destination: Home    Expected Post Discharge Treatments: Home Care    Other information relevant to the care needs:   Lives With: Alone  Type of Home: House  Home Layout: One level  Home Access: Stairs to enter with rails  Entrance Stairs - Number of Steps: 2  Entrance Stairs - Rails: Left  Bathroom Shower/Tub: Tub/Shower unit, Shower chair with back  Bathroom Toilet: Standard  Bathroom Equipment: Grab bars in shower, Grab bars around toilet  Home Equipment: Rollator, Walker - Rolling, Cane  Has the patient had two or more falls in the past year or any fall with injury in the past year?: Yes  ADL Assistance: Independent  Homemaking Assistance: Independent  Homemaking Responsibilities: Yes  Ambulation Assistance: Independent  Transfer Assistance: Independent  Active : Yes  Occupation: Retired  Leisure & Hobbies: play games on the computer    Acute Inpatient Rehabilitation Disclosure Statement provided to patient.  Patient verbalized understanding.     Patient requires an intensive and coordinate interdisciplinary team approach to the delivery of rehabilitation care including PT/OT/ST and 24 hour nursing care and physician supervision to safely return to home setting with family.      I have reviewed and concur with the findings and results of the pre-admission screening assessment completed by the Inpatient Rehabilitation Admissions Coordinator.       Electronically signed by Kaitlynn Herron DO on 7/12/2024 at 12:47 PM

## 2024-07-12 NOTE — DISCHARGE SUMMARY
Hospital Medicine Discharge Summary      Patient Identification:   Rahel Lopez   : 3/4/1931  MRN: [id number]   Account: [id number]      Patient's PCP: Herminia Lopez MD    Admit Date: 7/10/2024     Discharge Date:   2024    Admitting Physician: Berkley Frazier PA-C     Discharging Physician Assistant: Berkley Frazier PA-C     Discharge Diagnoses with Assessment/Plan:    Acute CVA   MRI Brain with acute infarcts of right thalamus.  CTH was negative  CTA head and neck negative   PT/OT/ SLP-  passed swallow tests.   DC to IPR   Neurology followed while IP- follow up OP in 2-4 weeks.   Loading dose aspirin initiated.  ASA 81mg and plavix 75mg daily for 21 days then ASA 81mg indefinitely  Continue lipitor 40mg   A1c 5.7  BP goal < 130/80  Echo unremarkable  Falls, mechanical  PT and OT evaluations. DC to IPR   Caution on anticoagulants   Sinusitis, POA  PCP prescribed Keflex.  No CT findings of sinusitis.  Complete course      The patient was seen and examined on day of discharge and this discharge summary is in conjunction with any daily progress note from day of discharge.    Hospital Course:   Rahel Lopez is a 89 y.o. female admitted to [facility] on 7/10/2024 for evaluation of 1 day history of falls and dysarthria.  Patient reports she fell twice in the house yesterday.  First time she was able to pull herself back up but later in the day she was talking to her sister on the phone after and they hung up her sister contacted another family member as patient seemed confused and was having slurred speech.  When the family member arrived, the patient had fallen and could not get up off the floor.  Patient also with left-sided weakness earlier in the day    Stroke workup revealed acute infarcts of the right thalamus on MRI of the brain.  CTA of the head and neck were negative CT of the head was also negative.  Patient underwent evaluation with PT/OT/SLP and passed her swallow

## 2024-07-12 NOTE — PLAN OF CARE
Problem: Discharge Planning  Goal: Discharge to home or other facility with appropriate resources  Outcome: Progressing    Discharge to home or other facility with appropriate resources:   Identify barriers to discharge with patient and caregiver   Arrange for needed discharge resources and transportation as appropriate   Identify discharge learning needs (meds, wound care, etc)   Refer to discharge planning if patient needs post-hospital services based on physician order or complex needs related to functional status, cognitive ability or social support system     Problem: Safety - Adult  Goal: Free from fall injury  Outcome: Progressing    Free From Fall Injury:   Instruct family/caregiver on patient safety   Based on caregiver fall risk screen, instruct family/caregiver to ask for assistance with transferring infant if caregiver noted to have fall risk factors     Problem: ABCDS Injury Assessment  Goal: Absence of physical injury  Outcome: Progressing    Absence of Physical Injury: Implement safety measures based on patient assessment     Problem: Neurosensory - Adult  Goal: Achieves stable or improved neurological status  Outcome: Progressing    Achieves stable or improved neurological status:   Assess for and report changes in neurological status   Initiate measures to prevent increased intracranial pressure   Maintain blood pressure and fluid volume within ordered parameters to optimize cerebral perfusion and minimize risk of hemorrhage   Monitor temperature, glucose, and sodium. Initiate appropriate interventions as ordered     Problem: Cardiovascular - Adult  Goal: Maintains optimal cardiac output and hemodynamic stability  Outcome: Progressing    Maintains optimal cardiac output and hemodynamic stability:   Monitor blood pressure and heart rate   Monitor urine output and notify Licensed Independent Practitioner for values outside of normal range   Assess for signs of decreased cardiac output   Administer  fluid and/or volume expanders as ordered  Goal: Absence of cardiac dysrhythmias or at baseline  Outcome: Progressing    Absence of cardiac dysrhythmias or at baseline:   Monitor cardiac rate and rhythm   Assess for signs of decreased cardiac output     Problem: Skin/Tissue Integrity - Adult  Goal: Skin integrity remains intact  Outcome: Progressing    Skin Integrity Remains Intact:   Monitor for areas of redness and/or skin breakdown   Assess vascular access sites hourly     Problem: Musculoskeletal - Adult  Goal: Return mobility to safest level of function  Outcome: Progressing    Return Mobility to Safest Level of Function:   Assess patient stability and activity tolerance for standing, transferring and ambulating with or without assistive devices   Assist with transfers and ambulation using safe patient handling equipment as needed   Obtain physical therapy/occupational therapy consults as needed   Instruct patient/family in ordered activity level     Care plan reviewed with patient and family.  Patient and family verbalize understanding of the plan of care and contribute to goal setting.

## 2024-07-12 NOTE — PLAN OF CARE
Problem: Discharge Planning  Goal: Discharge to home or other facility with appropriate resources  Outcome: Progressing  Flowsheets (Taken 7/11/2024 2113)  Discharge to home or other facility with appropriate resources:   Identify barriers to discharge with patient and caregiver   Arrange for needed discharge resources and transportation as appropriate   Identify discharge learning needs (meds, wound care, etc)   Refer to discharge planning if patient needs post-hospital services based on physician order or complex needs related to functional status, cognitive ability or social support system     Problem: Safety - Adult  Goal: Free from fall injury  Outcome: Progressing  Flowsheets (Taken 7/11/2024 2113)  Free From Fall Injury:   Instruct family/caregiver on patient safety   Based on caregiver fall risk screen, instruct family/caregiver to ask for assistance with transferring infant if caregiver noted to have fall risk factors     Problem: ABCDS Injury Assessment  Goal: Absence of physical injury  Outcome: Progressing  Flowsheets (Taken 7/11/2024 2113)  Absence of Physical Injury: Implement safety measures based on patient assessment     Problem: Neurosensory - Adult  Goal: Achieves stable or improved neurological status  Outcome: Progressing  Flowsheets (Taken 7/11/2024 2113)  Achieves stable or improved neurological status:   Assess for and report changes in neurological status   Initiate measures to prevent increased intracranial pressure   Maintain blood pressure and fluid volume within ordered parameters to optimize cerebral perfusion and minimize risk of hemorrhage   Monitor temperature, glucose, and sodium. Initiate appropriate interventions as ordered     Problem: Cardiovascular - Adult  Goal: Maintains optimal cardiac output and hemodynamic stability  Outcome: Progressing  Flowsheets (Taken 7/11/2024 2113)  Maintains optimal cardiac output and hemodynamic stability:   Monitor blood pressure and heart

## 2024-07-12 NOTE — CARE COORDINATION
7/12/24, 12:18 PM EDT    Patient goals/plan/ treatment preferences discussed by  and .  Patient goals/plan/ treatment preferences reviewed with patient/ family.  Patient/ family verbalize understanding of discharge plan and are in agreement with goal/plan/treatment preferences.  Understanding was demonstrated using the teach back method.  AVS provided by RN at time of discharge, which includes all necessary medical information pertaining to the patients current course of illness, treatment, post-discharge goals of care, and treatment preferences.     Services At/After Discharge: Inpatient rehab

## 2024-07-12 NOTE — PROGRESS NOTES
Advanced Directives Consult: Pt was not ready to have it completed at the time of the visit. She was anointed.    07/12/24 1920   Encounter Summary   Encounter Overview/Reason Advance Care Planning   Service Provided For Patient   Referral/Consult From Nurse   Support System Children   Last Encounter  07/12/24  (Anointed)   Complexity of Encounter Low   Spiritual/Emotional needs   Type Spiritual Support   Rituals, Rites and Sacraments   Type Anointing   Advance Care Planning   Type ACP conversation   Assessment/Intervention/Outcome   Assessment Hopeful   Intervention Empowerment

## 2024-07-12 NOTE — H&P
Physical Medicine & Rehabilitation   History and Physical    Chief Complaint and Reason for Rehabilitation Admission: CVA    History of Present Illness:  Rahel Lopez is a 89 y.o. female with PMH significant for GERD, hyperlipidemia, hypertension, and cataracts who was admitted to [facility] on 7/10/2024.  History obtained via: ED documentation, acute care documentation, and patient     Patient initially presented to [facility] ED on 7/10/2024 with concerns due to multiple falls and impaired speech.  She reported that speech impairment began approximately 2 days prior and at one point she noted some left-sided weakness, however, felt that was back to baseline.  On the day of presentation, patient reportedly fell twice in her house.  The first time she was able to get herself back up and did not report the incident to anyone.  However, later in the day she was talking to her sister on the phone after they hung up the sister contacted another family member to check on the patient due to confusion and slurred speech.  When the family member arrived, the patient was found down on the ground and unable to get up.  CT head was obtained and was reportedly negative, however, decision made to admit patient for further evaluation for CVA.     On admission, patient was started on aspirin and statin therapy.  An MRI and echo were ordered.  Of note, patient was being treated by PCP for upper respiratory infection at time of presentation.  MRI of the brain resulted and reportedly revealed an acute infarct of the right thalamus.  Neurology consulted and recommends DAPT with aspirin 81 mg + Plavix 75 mg daily for 21 days followed by aspirin indefinitely.    Patient received therapy throughout acute care stay. PM&R was consulted and patient determined to be a good candidate for an acute inpatient rehab stay in order to maximize her functional progress and independence prior to returning home.    On admission to            LLE: 4+/5 throughout  Tone:  normal  Muscle bulk: within normal limits  Sensory:  Sensory intact to light touch  Coordination: impaired coordination on the left with finger-to-nose testing  Gait: not assessed     Heart: well perfused extremities, RRR, no m/r/g noted   Lungs: breathing comfortably on room air, no increased WOB, CTAB  Abdomen: non-distended  Skin: warm and dry, no rash or erythema on exposed skin    Diagnostics:  Recent Results (from the past 24 hour(s))   Hemoglobin A1C    Collection Time: 07/12/24 10:05 AM   Result Value Ref Range    Hemoglobin A1C 5.7 4.4 - 6.4 %    Estimated Avg Glucose 111 70 - 126 mg/dL   Lipid Panel    Collection Time: 07/12/24 10:05 AM   Result Value Ref Range    Cholesterol, Total 197 100 - 199 mg/dL    Triglycerides 125 0 - 199 mg/dL    HDL 46 mg/dL    LDL Cholesterol 126 mg/dL       Impression:  Acute CVA, posterior limb of the internal capsule on the right and right thalamus  Left hemiparesis  Sinusitis  Hypertension  Depression  Chronic back and shoulder pain  Impaired mobility and ADLs  Mild cognitive impairment     Plan:   Admit to the inpatient rehabilitation unit.  The patient demonstrates good potential to participate in an inpatient rehabilitation program involving at least 3 hours per day, 5 days per week of intensive rehabilitation.  Rehabilitation services will include PT, OT, and SLP/RT in order to improve functional status prior to discharge.  Family education and training will be completed.  Equipment evaluations and recommendations will be completed as appropriate.       Rehabilitation nursing will be involved for bowel, bladder, skin, and pain management.  Nursing will also provide education and training to patient and family.    Dr. Torres consulted for medical management   Acute CVA: acute infarcts in the posterior limb of the internal capsule on the right and right thalamus. Per Neurology continue DAPT  with ASA 81 mg + Plavix 75 mg daily x21

## 2024-07-12 NOTE — PROGRESS NOTES
[facility]  INPATIENT REHABILITATION  ADMISSIONS COORDINATOR CONSULT    Referral Type: internal    Patient Name: Rahel Lopez      MRN: [id number]    : 3/4/1931  (89 y.o.)  Gender: female   Race:White (non-)     Payor Source: Payor: MEDICARE / Plan: MEDICARE PART A AND B / Product Type: *No Product type* /   Secondary Payor Source:  [employer] [place]    Isolation Status: No active isolations    Lives With: Alone  Type of Home: House  Home Layout: One level  Home Access: Stairs to enter with rails  Entrance Stairs - Number of Steps: 2     Occupation: Retired       What is treatment plan?  Disciplines Required upon Admission to Inpatient Rehabilitation: Physical Therapy, Occupational Therapy, and Speech Therapy  Post operative: No  Fall: No  Dialysis: No  Diet: ADULT DIET; Regular; Low Fat/Low Chol/High Fiber/PRASANTH  Discussed patient with  and PM&R provider: Patient is appropriate for IPR, spoke with patient and sons Godfrey and Helder who agree to IPR when medically ready, updated LISA Bishop

## 2024-07-12 NOTE — PROGRESS NOTES
Neurology Progress Note    Date:7/12/2024       Room:[facility]18/018-A  Patient Name:Rahel Lopez     YOB: 1931     Age:89 y.o.    Requesting Physician: Berkley Frazier PA-C     Reason for Consult:  Evaluate for weakness and dysarthria      Chief Complaint:   Chief Complaint   Patient presents with    Fall    Fatigue       Subjective     Rahel Lopez is a 89 y.o. female with a history of arthritis, cataracts, GERD, hyperlipidemia, hypertension who presented to [facility] ED 7/10 for the evaluation of falls. Patient states that over the last two days she has had two falls, difficulty finding words, and left sided weakness. Patient states that she lives alone and is typically able get around the house by herself without an assistive device. CTH obtained in the ED and was negative for any acute intracranial abnormalities. MRI brain revealed acute infarcts in the posterior limb of the internal capsule on the right and the right thalamus. Patient denies any known history of strokes in the past. She was not on any anticoagulation or antiplatelets prior to admission. Patient has never smoked and does not drink alcohol. She denies any known history of atrial fibrillation. On examination, patient is slightly weaker in the left upper and lower extremity. She does have a mild left sided facial droop. CTA head and neck ordered for further evaluation. She does admit to sinus pressure as she was just recently diagnosed with sinusitis.     Interval History 7/12/2024:  No acute events overnight. Patient still with the left sided weakness. Patient to go to [facility] today. . A1C 5.7.    Review of Systems   Review of Systems   Constitutional:  Negative for chills and fever.   HENT:  Positive for congestion and sinus pressure. Negative for rhinorrhea and sore throat.    Eyes:  Positive for visual disturbance (secondary to cataracts).   Respiratory:  Negative for cough and shortness of breath.    Cardiovascular:  Negative for  Dextrin (BENEFIBER) POWD Take by mouth daily      Calcium Citrate-Vitamin D (CALCIUM CITRATE + D PO) Take by mouth daily      metoprolol (LOPRESSOR) 50 MG tablet Take 1 tablet by mouth 2 times daily Take 50mg  tablet 2    amLODIPine (NORVASC) 5 MG tablet Take 1 tablet by mouth daily 90 tablet 3    Acetaminophen (TYLENOL 8 HOUR ARTHRITIS PAIN PO) Take 1 tablet by mouth as needed      busPIRone (BUSPAR) 5 MG tablet Take 1 tablet by mouth 2 times daily as needed      NONFORMULARY Reliv, powder supplement      omeprazole (PRILOSEC) 20 MG capsule Take 1 capsule by mouth daily       Past History    Past Medical History:   has a past medical history of Acute CVA (cerebrovascular accident) (HCC), Arthritis, Cataract of both eyes, GERD (gastroesophageal reflux disease), Hyperlipidemia, Hypertension, Keratosis, and Psychiatric problem.    Social History:   reports that she has never smoked. She has never used smokeless tobacco. She reports current alcohol use of about 1.0 standard drink of alcohol per week. She reports that she does not use drugs.     Family History:   Family History   Problem Relation Age of Onset    Heart Attack Mother        Physical Examination      Vitals:  /63   Pulse 72   Temp 98.5 °F (36.9 °C) (Oral)   Resp 18   Ht 1.575 m (5' 2\")   Wt 57.3 kg (126 lb 5.2 oz)   SpO2 97%   BMI 23.10 kg/m²   Temp (24hrs), Av.9 °F (36.6 °C), Min:97.5 °F (36.4 °C), Max:98.5 °F (36.9 °C)      I/O (24Hr):    Intake/Output Summary (Last 24 hours) at 2024 0810  Last data filed at 2024 0639  Gross per 24 hour   Intake 1221.25 ml   Output 300 ml   Net 921.25 ml         Physical Exam  Vitals reviewed.   Constitutional:       General: She is not in acute distress.     Appearance: Normal appearance. She is not ill-appearing.   HENT:      Head: Normocephalic and atraumatic.      Right Ear: External ear normal.      Left Ear: External ear normal.      Nose: Nose normal.      Mouth/Throat:       Mouth: Mucous membranes are moist.      Pharynx: No oropharyngeal exudate or posterior oropharyngeal erythema.   Eyes:      Extraocular Movements: EOM normal.      Pupils: Pupils are equal, round, and reactive to light.   Cardiovascular:      Rate and Rhythm: Normal rate.      Heart sounds: Normal heart sounds.   Pulmonary:      Effort: Pulmonary effort is normal.   Abdominal:      Palpations: Abdomen is soft.      Tenderness: There is no abdominal tenderness.   Musculoskeletal:         General: Normal range of motion.      Right lower leg: No edema.      Left lower leg: No edema.   Skin:     General: Skin is warm.      Findings: No rash.   Neurological:      Mental Status: She is alert and oriented to person, place, and time.      Coordination: Finger-Nose-Finger Test abnormal and Heel to Shin Test abnormal.   Psychiatric:         Mood and Affect: Mood normal.         Speech: Speech normal.         Behavior: Behavior normal.       Neurologic Exam     Mental Status   Oriented to person, place, and time.   Attention: normal. Concentration: normal.   Speech: speech is normal   Level of consciousness: alert  Able to repeat. Normal comprehension.     Cranial Nerves     CN II   Visual fields full to confrontation.   Right visual field deficit: none  Left visual field deficit: none     CN III, IV, VI   Pupils are equal, round, and reactive to light.  Extraocular motions are normal.   Right pupil: Shape: regular. Reactivity: brisk.   Left pupil: Shape: regular. Reactivity: brisk.     CN V   Facial sensation intact.   Right facial sensation deficit: none  Left facial sensation deficit: none    CN VII   Right facial weakness: none  Left facial weakness: central    CN VIII   CN VIII normal.   Hearing: intact    CN IX, X   CN IX normal.   Palate: symmetric    CN XI   CN XI normal.   Right trapezius strength: normal  Left trapezius strength: normal    CN XII   CN XII normal.   Tongue: not atrophic  Fasciculations:

## 2024-07-12 NOTE — PROGRESS NOTES
[facility]  [facility] NEUROSCIENCES 4A  Occupational Therapy  Daily Note  Time:    Time In: 1401  Time Out: 1433  Timed Code Treatment Minutes: 32 Minutes  Minutes: 32          Date: 2024  Patient Name: Rahel Lopez,   Gender: female      Room: [facility]18/018-A  MRN: [id number]  : 3/4/1931  (89 y.o.)  Referring Practitioner: Berkley Frazier PA-C  Diagnosis: Dysarthria  Additional Pertinent Hx: 89 yo F presents with 2 falls at home associated with confusion and slurred speech. Pt also endorses some left sided weakness earlier in the day but that has resolved.  Brain MRI revealed small acute infarcts in the posterior limb of the internal capsule on the  right and the right thalamus.    Restrictions/Precautions:  Restrictions/Precautions: Fall Risk, General Precautions  Position Activity Restriction  Other position/activity restrictions: wear shoes- pt has leg length discrepancy and right built up shoe, decreased left sided awareness      Social/Functional History:  Lives With: Alone  Type of Home: House  Home Layout: One level  Home Access: Stairs to enter with rails  Entrance Stairs - Number of Steps: 2  Entrance Stairs - Rails: Left  Home Equipment: Rollator, Walker - Rolling, Cane   Bathroom Shower/Tub: Tub/Shower unit, Shower chair with back  Bathroom Toilet: Standard  Bathroom Equipment: Grab bars in shower, Grab bars around toilet       ADL Assistance: Independent  Homemaking Assistance: Independent  Homemaking Responsibilities: Yes  Ambulation Assistance: Independent  Transfer Assistance: Independent    Active : Yes  Occupation: Retired  Leisure & Hobbies: play games on the computer       SUBJECTIVE: cooperative pleasant    PAIN: chronic pain, no # given/10:      Vitals: Vitals not assessed per clinical judgement, see nursing flowsheet    COGNITION: Decreased Insight and decreased left sided awareness    ADL:   Footwear Management: Maximum Assistance.  For donning B shoes .  **Pt declined  Inpatient Rehabilitation  Equipment Recommendations: Equipment Needed: No  Plan: Times Per Week: 5x C  Current Treatment Recommendations: Strengthening, Cognitive reorientation, Balance training, Self-Care / ADL, Home management training, Safety education & training, Functional mobility training, Neuromuscular re-education, Cognitive/Perceptual training    Education:  Learners: Patient  See above    Goals  Short Term Goals  Time Frame for Short Term Goals: upon discharge  Short Term Goal 1: Pt will demo toilet t/f with SBA to increase IND with toileting.  Short Term Goal 2: Pt will improve sequencing AEB completing SMP task with SBA and 0 VCs.  Short Term Goal 3: Pt will demo good accuracy with Lt sided finger to nose proprioception test to increase safety and IND with cooking.  Short Term Goal 4: Pt will demo functional mobility HH distances with SBA to increase IND with toileting.    Following session, patient left in safe position with all fall risk precautions in place.

## 2024-07-13 LAB
ALBUMIN SERPL BCG-MCNC: 3.5 G/DL (ref 3.5–5.1)
ALP SERPL-CCNC: 88 U/L (ref 38–126)
ALT SERPL W/O P-5'-P-CCNC: 9 U/L (ref 11–66)
ANION GAP SERPL CALC-SCNC: 11 MEQ/L (ref 8–16)
AST SERPL-CCNC: 15 U/L (ref 5–40)
BASOPHILS ABSOLUTE: 0.1 THOU/MM3 (ref 0–0.1)
BASOPHILS NFR BLD AUTO: 1.1 %
BILIRUB SERPL-MCNC: 0.4 MG/DL (ref 0.3–1.2)
BUN SERPL-MCNC: 8 MG/DL (ref 7–22)
CALCIUM SERPL-MCNC: 8.5 MG/DL (ref 8.5–10.5)
CHLORIDE SERPL-SCNC: 100 MEQ/L (ref 98–111)
CO2 SERPL-SCNC: 23 MEQ/L (ref 23–33)
CREAT SERPL-MCNC: 0.5 MG/DL (ref 0.4–1.2)
DEPRECATED RDW RBC AUTO: 42.4 FL (ref 35–45)
EOSINOPHIL NFR BLD AUTO: 2.8 %
EOSINOPHILS ABSOLUTE: 0.2 THOU/MM3 (ref 0–0.4)
ERYTHROCYTE [DISTWIDTH] IN BLOOD BY AUTOMATED COUNT: 12.8 % (ref 11.5–14.5)
GFR SERPL CREATININE-BSD FRML MDRD: 87 ML/MIN/1.73M2
GLUCOSE SERPL-MCNC: 89 MG/DL (ref 70–108)
HCT VFR BLD AUTO: 37.8 % (ref 37–47)
HGB BLD-MCNC: 12.6 GM/DL (ref 12–16)
IMM GRANULOCYTES # BLD AUTO: 0.05 THOU/MM3 (ref 0–0.07)
IMM GRANULOCYTES NFR BLD AUTO: 0.6 %
LYMPHOCYTES ABSOLUTE: 1.5 THOU/MM3 (ref 1–4.8)
LYMPHOCYTES NFR BLD AUTO: 18.5 %
MCH RBC QN AUTO: 30 PG (ref 26–33)
MCHC RBC AUTO-ENTMCNC: 33.3 GM/DL (ref 32.2–35.5)
MCV RBC AUTO: 90 FL (ref 81–99)
MONOCYTES ABSOLUTE: 1 THOU/MM3 (ref 0.4–1.3)
MONOCYTES NFR BLD AUTO: 12.1 %
NEUTROPHILS ABSOLUTE: 5.2 THOU/MM3 (ref 1.8–7.7)
NEUTROPHILS NFR BLD AUTO: 64.9 %
NRBC BLD AUTO-RTO: 0 /100 WBC
PLATELET # BLD AUTO: 248 THOU/MM3 (ref 130–400)
PMV BLD AUTO: 10.3 FL (ref 9.4–12.4)
POTASSIUM SERPL-SCNC: 3.8 MEQ/L (ref 3.5–5.2)
PREALB SERPL-MCNC: 17.1 MG/DL (ref 20–40)
PROT SERPL-MCNC: 5.7 G/DL (ref 6.1–8)
RBC # BLD AUTO: 4.2 MILL/MM3 (ref 4.2–5.4)
SODIUM SERPL-SCNC: 134 MEQ/L (ref 135–145)
WBC # BLD AUTO: 8 THOU/MM3 (ref 4.8–10.8)

## 2024-07-13 PROCEDURE — 97110 THERAPEUTIC EXERCISES: CPT

## 2024-07-13 PROCEDURE — 6370000000 HC RX 637 (ALT 250 FOR IP): Performed by: STUDENT IN AN ORGANIZED HEALTH CARE EDUCATION/TRAINING PROGRAM

## 2024-07-13 PROCEDURE — 97535 SELF CARE MNGMENT TRAINING: CPT

## 2024-07-13 PROCEDURE — 97530 THERAPEUTIC ACTIVITIES: CPT

## 2024-07-13 PROCEDURE — 80053 COMPREHEN METABOLIC PANEL: CPT

## 2024-07-13 PROCEDURE — 97112 NEUROMUSCULAR REEDUCATION: CPT

## 2024-07-13 PROCEDURE — 6360000002 HC RX W HCPCS: Performed by: STUDENT IN AN ORGANIZED HEALTH CARE EDUCATION/TRAINING PROGRAM

## 2024-07-13 PROCEDURE — 84134 ASSAY OF PREALBUMIN: CPT

## 2024-07-13 PROCEDURE — 97162 PT EVAL MOD COMPLEX 30 MIN: CPT

## 2024-07-13 PROCEDURE — 2580000003 HC RX 258: Performed by: STUDENT IN AN ORGANIZED HEALTH CARE EDUCATION/TRAINING PROGRAM

## 2024-07-13 PROCEDURE — 85025 COMPLETE CBC W/AUTO DIFF WBC: CPT

## 2024-07-13 PROCEDURE — 92523 SPEECH SOUND LANG COMPREHEN: CPT

## 2024-07-13 PROCEDURE — 97129 THER IVNTJ 1ST 15 MIN: CPT

## 2024-07-13 PROCEDURE — 1180000000 HC REHAB R&B

## 2024-07-13 PROCEDURE — 36415 COLL VENOUS BLD VENIPUNCTURE: CPT

## 2024-07-13 PROCEDURE — 97166 OT EVAL MOD COMPLEX 45 MIN: CPT

## 2024-07-13 RX ADMIN — BUSPIRONE HYDROCHLORIDE 5 MG: 5 TABLET ORAL at 10:56

## 2024-07-13 RX ADMIN — CETIRIZINE HYDROCHLORIDE 10 MG: 10 TABLET ORAL at 10:43

## 2024-07-13 RX ADMIN — FLUTICASONE PROPIONATE 2 SPRAY: 50 SPRAY, METERED NASAL at 10:44

## 2024-07-13 RX ADMIN — CEPHALEXIN 500 MG: 500 CAPSULE ORAL at 21:56

## 2024-07-13 RX ADMIN — SODIUM CHLORIDE, PRESERVATIVE FREE 10 ML: 5 INJECTION INTRAVENOUS at 21:59

## 2024-07-13 RX ADMIN — CLOPIDOGREL BISULFATE 75 MG: 75 TABLET ORAL at 10:57

## 2024-07-13 RX ADMIN — BUPROPION HYDROCHLORIDE 75 MG: 75 TABLET, FILM COATED ORAL at 10:43

## 2024-07-13 RX ADMIN — ATORVASTATIN CALCIUM 40 MG: 40 TABLET, FILM COATED ORAL at 21:56

## 2024-07-13 RX ADMIN — BUPROPION HYDROCHLORIDE 75 MG: 75 TABLET, FILM COATED ORAL at 21:56

## 2024-07-13 RX ADMIN — AMLODIPINE BESYLATE 5 MG: 5 TABLET ORAL at 10:43

## 2024-07-13 RX ADMIN — PANTOPRAZOLE SODIUM 40 MG: 40 TABLET, DELAYED RELEASE ORAL at 05:11

## 2024-07-13 RX ADMIN — ENOXAPARIN SODIUM 40 MG: 100 INJECTION SUBCUTANEOUS at 10:43

## 2024-07-13 RX ADMIN — SODIUM CHLORIDE, PRESERVATIVE FREE 10 ML: 5 INJECTION INTRAVENOUS at 10:44

## 2024-07-13 RX ADMIN — METOPROLOL TARTRATE 50 MG: 50 TABLET, FILM COATED ORAL at 21:56

## 2024-07-13 RX ADMIN — METOPROLOL TARTRATE 50 MG: 50 TABLET, FILM COATED ORAL at 10:43

## 2024-07-13 RX ADMIN — CEPHALEXIN 500 MG: 500 CAPSULE ORAL at 10:43

## 2024-07-13 RX ADMIN — BUSPIRONE HYDROCHLORIDE 5 MG: 5 TABLET ORAL at 21:58

## 2024-07-13 RX ADMIN — FLUTICASONE PROPIONATE 2 SPRAY: 50 SPRAY, METERED NASAL at 21:59

## 2024-07-13 RX ADMIN — ASPIRIN 81 MG 81 MG: 81 TABLET ORAL at 10:43

## 2024-07-13 ASSESSMENT — 9 HOLE PEG TEST
TESTTIME_SECONDS: 87.5
TESTTIME_SECONDS: 30.1

## 2024-07-13 ASSESSMENT — PAIN SCALES - GENERAL: PAINLEVEL_OUTOF10: 0

## 2024-07-13 NOTE — PROGRESS NOTES
[facility]  SPEECH THERAPY  [facility]  Speech - Language - Cognitive Evaluation + Cognitive tx    SLP Individual Minutes  Time In: 1000  Time Out: 1030  Minutes: 30  Timed Code Treatment Minutes: 10 Minutes       Ghtjpz-Gdevimxx-Mspcuxczc Eval: 20 minutes   Cognitive tx: 10 minutes     Date: 2024  Patient Name: Rahel Lopez      CSN: [id number]   : 3/4/1931  (89 y.o.)  Gender: female   Referring Physician:  Kaitlynn Herron DO  Diagnosis: Stroke  Precautions: Fall Risk  History of Present Illness/Injury: Patient admitted with above diagnosis. Per chart review, \"Rahel Lopez is a 89 y.o. female with PMH significant for GERD, hyperlipidemia, hypertension, and cataracts who was admitted to [facility] on 7/10/2024.  History obtained via: ED documentation, acute care documentation, and patient     Patient initially presented to [facility] ED on 7/10/2024 with concerns due to multiple falls and impaired speech.  She reported that speech impairment began approximately 2 days prior and at one point she noted some left-sided weakness, however, felt that was back to baseline.  On the day of presentation, patient reportedly fell twice in her house.  The first time she was able to get herself back up and did not report the incident to anyone.  However, later in the day she was talking to her sister on the phone after they hung up the sister contacted another family member to check on the patient due to confusion and slurred speech.  When the family member arrived, the patient was found down on the ground and unable to get up.  CT head was obtained and was reportedly negative, however, decision made to admit patient for further evaluation for CVA.     On admission, patient was started on aspirin and statin therapy.  An MRI and echo were ordered.  Of note, patient was being treated by PCP for upper respiratory infection at time of presentation.  MRI of the brain    Memory: 3 - Patient remembers 50%-74% of the time    RECOMMENDATIONS/ASSESSMENT:  DIAGNOSTIC IMPRESSIONS:  Patient presents with evidence of a moderate cognitive-linguistic impairment characterized by a score of 16/30 on the MOCA with deficits noted within the domains listed above. Expressive and receptive language appear WFL. Mild dysarthria characterized by decreased articulatory precision and blended word boundaries results in speech intelligibility approximating 85% at the conversational level. Patient would greatly benefit from continued skilled ST services to address aformentioned deficits in order to make successful return to [facility].      Rehabilitation Potential: good  Discharge Recommendations: Continue to Assess Pending Progress    EDUCATION:  Learner: Patient  Education:  Reviewed results and recommendations of this evaluation, Reviewed diet and strategies, Reviewed ST goals and Plan of Care, Education Related to Potential Risks and Complications Due to Impairment/Illness/Injury, Education Related to Prevention of Recurrence of Impairment/Illness/Injury, Education Related to Health Promotion and Wellness, and Education Related to Stroke Diagnosis  Evaluation of Education: Verbalizes understanding and Needs further instruction    PLAN:  Skilled SLP intervention on IP Rehab 30 minutes per day 5 days per week.  Specific interventions for next session may include: recall, reasoning, thought organization     PATIENT GOAL:    Did not state.  Will further assess during treatment.    SHORT TERM GOALS:  Short Term Goals  Time Frame for Short Term Goals: 1 week  Goal 1: Patient will complete immediate/delayed recall tasks with and without use of compensatory memory strategies with 75% accuracy and moderate cuing to improve retention of novel information.  INTERVENTIONS: ST completed review of STM strategies using the acronym WRAP--Write it down, Repeat it, Associate it, and Pictures it. ST then reviewed functional

## 2024-07-13 NOTE — PROGRESS NOTES
[facility]  INPATIENT OCCUPATIONAL THERAPY  [facility]  EVALUATION    Time:   Time In: 727  Time Out: 857  Timed Code Treatment Minutes: 75 Minutes  Minutes: 90          Date: 2024  Patient Name: Rahel Lopez,   Gender: female      MRN: [id number]  : 3/4/1931  (89 y.o.)  Referring Practitioner: Attending and Ordering: Kaitlynn Herron DO  Diagnosis: Stroke  Additional Pertinent Hx: Per EMR, \"Rahel Lopez is a 89 y.o. female with PMH significant for GERD, hyperlipidemia, hypertension, and cataracts who was admitted to [facility] on 7/10/2024.  History obtained via: ED documentation, acute care documentation, and patient     Patient initially presented to [facility] ED on 7/10/2024 with concerns due to multiple falls and impaired speech.  She reported that speech impairment began approximately 2 days prior and at one point she noted some left-sided weakness, however, felt that was back to baseline.  On the day of presentation, patient reportedly fell twice in her house.  The first time she was able to get herself back up and did not report the incident to anyone.  However, later in the day she was talking to her sister on the phone after they hung up the sister contacted another family member to check on the patient due to confusion and slurred speech.  When the family member arrived, the patient was found down on the ground and unable to get up.  CT head was obtained and was reportedly negative, however, decision made to admit patient for further evaluation for CVA.     On admission, patient was started on aspirin and statin therapy.  An MRI and echo were ordered.  Of note, patient was being treated by PCP for upper respiratory infection at time of presentation.  MRI of the brain resulted and reportedly revealed an acute infarct of the right thalamus.  Neurology consulted and recommends DAPT with aspirin 81 mg + Plavix 75 mg daily for 21 days followed

## 2024-07-13 NOTE — PLAN OF CARE
Problem: Safety - Adult  Goal: Free from fall injury  7/13/2024 1415 by Saundra Alston RN  Outcome: Progressing  Flowsheets (Taken 7/13/2024 1415)  Free From Fall Injury:   Instruct family/caregiver on patient safety   Based on caregiver fall risk screen, instruct family/caregiver to ask for assistance with transferring infant if caregiver noted to have fall risk factors  Note: Patient remains free from falls at this time. Patient requires front wheeled walker, gait belt and 1 assist for safe transfers  7/13/2024 0117 by Lanny Orellana RN  Outcome: Progressing     Problem: ABCDS Injury Assessment  Goal: Absence of physical injury  7/13/2024 1415 by Saundra Alston RN  Outcome: Progressing  Flowsheets (Taken 7/13/2024 1415)  Absence of Physical Injury: Implement safety measures based on patient assessment  7/13/2024 0117 by Lanny Orellana RN  Outcome: Progressing  Flowsheets (Taken 7/12/2024 1603 by Deb Veloz RN)  Absence of Physical Injury: Implement safety measures based on patient assessment     Problem: Skin/Tissue Integrity  Goal: Absence of new skin breakdown  Description: 1.  Monitor for areas of redness and/or skin breakdown  2.  Assess vascular access sites hourly  3.  Every 4-6 hours minimum:  Change oxygen saturation probe site  4.  Every 4-6 hours:  If on nasal continuous positive airway pressure, respiratory therapy assess nares and determine need for appliance change or resting period.  7/13/2024 1415 by Saundra Alston RN  Outcome: Progressing  Note: No new skin breakdown noted at this time.  7/13/2024 0117 by Lanny Orellana RN  Outcome: Progressing     Problem: Discharge Planning  Goal: Discharge to home or other facility with appropriate resources  7/13/2024 1415 by Saundra Alston RN  Outcome: Not Progressing  Flowsheets (Taken 7/13/2024 1415)  Discharge to home or other facility with appropriate resources:   Identify barriers to discharge with patient and caregiver    Identify discharge learning needs (meds, wound care, etc)   Refer to discharge planning if patient needs post-hospital services based on physician order or complex needs related to functional status, cognitive ability or social support system   Arrange for needed discharge resources and transportation as appropriate  Note: Patients discharge date not been determined due to her short length of stay on rehab. Care conference is scheduled for next Wednesday.  7/13/2024 0117 by Lanny Orellana, RN  Outcome: Progressing  Flowsheets  Taken 7/12/2024 1956 by Lanny Orellana, RN  Discharge to home or other facility with appropriate resources: Identify barriers to discharge with patient and caregiver  Taken 7/12/2024 1610 by Deb Veloz, RN  Discharge to home or other facility with appropriate resources: Identify barriers to discharge with patient and caregiver

## 2024-07-13 NOTE — PLAN OF CARE
Problem: Discharge Planning  Goal: Discharge to home or other facility with appropriate resources  Outcome: Progressing  Flowsheets  Taken 7/12/2024 1956 by Lanny Orellana, RN  Discharge to home or other facility with appropriate resources: Identify barriers to discharge with patient and caregiver  Taken 7/12/2024 1610 by Deb Veloz, RN  Discharge to home or other facility with appropriate resources: Identify barriers to discharge with patient and caregiver     Problem: Safety - Adult  Goal: Free from fall injury  Outcome: Progressing     Problem: ABCDS Injury Assessment  Goal: Absence of physical injury  Outcome: Progressing  Flowsheets (Taken 7/12/2024 1603 by Deb Veloz, RN)  Absence of Physical Injury: Implement safety measures based on patient assessment     Problem: Skin/Tissue Integrity  Goal: Absence of new skin breakdown  Description: 1.  Monitor for areas of redness and/or skin breakdown  2.  Assess vascular access sites hourly  3.  Every 4-6 hours minimum:  Change oxygen saturation probe site  4.  Every 4-6 hours:  If on nasal continuous positive airway pressure, respiratory therapy assess nares and determine need for appliance change or resting period.  Outcome: Progressing

## 2024-07-13 NOTE — CARE COORDINATION
Yenny heard earlier in shift, MD Herron notified and assessed.  On ATB Keflex for sinus infection with no adverse effects or reported adverse effects noted.  Reorientation due to confusion to where she was at.  Was effective.  Ed on how to use her call light and cueing and ed given t/o the night and was effective.   Ambulates with 1 assist, contact guard, with gait belt and walker.  Denies having pain

## 2024-07-13 NOTE — PROGRESS NOTES
Patient made a stay with me due to getting self out of the bathroom and not using the call light. Patient had no other changes in status this shift.

## 2024-07-13 NOTE — PROGRESS NOTES
[facility]  INPATIENT PHYSICAL THERAPY  EVALUATION  [facility] - [id number]-20/[id number]    Time In: 1100  Time Out: 1230  Timed Code Treatment Minutes: 72 Minutes  Minutes: 90          Date: 2024  Patient Name: Rahel Lopez,  Gender:  female        MRN: [id number]  : 3/4/1931  (89 y.o.)  Referral Date : 24   Referring Practitioner: Kaitlynn Herron DO  Diagnosis: Acute cerebrovascular accident  Additional Pertinent Hx: Per H&P: Rahel Lopez is a 89 y.o. female with PMH significant for GERD, hyperlipidemia, hypertension, and cataracts who was admitted to [facility] on 7/10/2024.Patient initially presented to [facility] ED on 7/10/2024 with concerns due to multiple falls and impaired speech.  She reported that speech impairment began approximately 2 days prior and at one point she noted some left-sided weakness, however, felt that was back to baseline.  On the day of presentation, patient reportedly fell twice in her house.  The first time she was able to get herself back up and did not report the incident to anyone.  However, later in the day she was talking to her sister on the phone after they hung up the sister contacted another family member to check on the patient due to confusion and slurred speech.  When the family member arrived, the patient was found down on the ground and unable to get up.  CT head was obtained and was reportedly negative, however, decision made to admit patient for further evaluation for CVA.MRI of the brain resulted and reportedly revealed an acute infarct of the right thalamus.     Restrictions/Precautions:  Restrictions/Precautions: Fall Risk, General Precautions  Position Activity Restriction  Other position/activity restrictions: wear shoes- pt has leg length discrepancy and right built up shoe    Subjective:  Chart Reviewed: Yes  Patient assessed for rehabilitation services?: Yes  Family / Caregiver Present: No  Subjective:  89 year old s/p CVA who presents with decline in functional mobility.  At PLOF pt was independent with use of a walker or cane.  At this time, pt requires CGA to min assist to complete sit < > stand transfers, CGA to min assist to ambulate short distances and scored 9/28 on the tinetti indicating high risk for falls.  Pt presents with weakness in bilateral LE's and decreased left step height with ambulation.  Rahel will benefit from skilled PT services to improve her ability to complete functional mobility, reduce her risk for falls and allow patient to return to home safely.  Therapy Prognosis: Good  Co-morbidities: CVA, arthritis     Discharge Recommendations:  Discharge Recommendations: Continue to assess pending progress, Patient would benefit from continued therapy after discharge    Patient Education:  Education  Education Given To: Patient  Education Provided: Role of Therapy, Plan of Care, Mobility Training  Education Method: Verbal  Barriers to Learning: None, Hearing  Education Outcome: Verbalized understanding, Demonstrated understanding, Continued education needed   .            Equipment Recommendations:  Equipment Needed:  (monitor for need for wheelchair)    Plan:  Current Treatment Recommendations: Strengthening, Balance training, Functional mobility training, Transfer training, Gait training, Endurance training, Neuromuscular re-education, Wheelchair mobility training, Home exercise program, Pain management, Safety education & training, Patient/Caregiver education & training, Therapeutic activities  General Plan:  (5x/wk 90min)    Goals:  Patient Goals : be able to get around on own  Short Term Goals  Time Frame for Short Term Goals: 1 week  Short Term Goal 1: Patient will complete supine < > sit and rolling with head of bed flat and modified independence to transfer in and out of bed safely.  Short Term Goal 2: Patient will complete sit < > stand with CGA consistently to/from various surfaces to

## 2024-07-14 PROCEDURE — 1180000000 HC REHAB R&B

## 2024-07-14 PROCEDURE — 6370000000 HC RX 637 (ALT 250 FOR IP): Performed by: STUDENT IN AN ORGANIZED HEALTH CARE EDUCATION/TRAINING PROGRAM

## 2024-07-14 PROCEDURE — 6360000002 HC RX W HCPCS: Performed by: STUDENT IN AN ORGANIZED HEALTH CARE EDUCATION/TRAINING PROGRAM

## 2024-07-14 PROCEDURE — 2580000003 HC RX 258: Performed by: STUDENT IN AN ORGANIZED HEALTH CARE EDUCATION/TRAINING PROGRAM

## 2024-07-14 RX ADMIN — ASPIRIN 81 MG 81 MG: 81 TABLET ORAL at 08:45

## 2024-07-14 RX ADMIN — CLOPIDOGREL BISULFATE 75 MG: 75 TABLET ORAL at 08:44

## 2024-07-14 RX ADMIN — CEPHALEXIN 500 MG: 500 CAPSULE ORAL at 08:45

## 2024-07-14 RX ADMIN — CETIRIZINE HYDROCHLORIDE 10 MG: 10 TABLET ORAL at 08:46

## 2024-07-14 RX ADMIN — METOPROLOL TARTRATE 50 MG: 50 TABLET, FILM COATED ORAL at 20:57

## 2024-07-14 RX ADMIN — FLUTICASONE PROPIONATE 2 SPRAY: 50 SPRAY, METERED NASAL at 08:45

## 2024-07-14 RX ADMIN — BUSPIRONE HYDROCHLORIDE 5 MG: 5 TABLET ORAL at 20:57

## 2024-07-14 RX ADMIN — AMLODIPINE BESYLATE 5 MG: 5 TABLET ORAL at 08:45

## 2024-07-14 RX ADMIN — ENOXAPARIN SODIUM 40 MG: 100 INJECTION SUBCUTANEOUS at 08:45

## 2024-07-14 RX ADMIN — BUPROPION HYDROCHLORIDE 75 MG: 75 TABLET, FILM COATED ORAL at 08:44

## 2024-07-14 RX ADMIN — BUPROPION HYDROCHLORIDE 75 MG: 75 TABLET, FILM COATED ORAL at 20:57

## 2024-07-14 RX ADMIN — SODIUM CHLORIDE, PRESERVATIVE FREE 10 ML: 5 INJECTION INTRAVENOUS at 20:58

## 2024-07-14 RX ADMIN — PANTOPRAZOLE SODIUM 40 MG: 40 TABLET, DELAYED RELEASE ORAL at 05:56

## 2024-07-14 RX ADMIN — BUSPIRONE HYDROCHLORIDE 5 MG: 5 TABLET ORAL at 08:44

## 2024-07-14 RX ADMIN — CEPHALEXIN 500 MG: 500 CAPSULE ORAL at 20:58

## 2024-07-14 RX ADMIN — ATORVASTATIN CALCIUM 40 MG: 40 TABLET, FILM COATED ORAL at 20:57

## 2024-07-14 RX ADMIN — SODIUM CHLORIDE, PRESERVATIVE FREE 10 ML: 5 INJECTION INTRAVENOUS at 08:45

## 2024-07-14 RX ADMIN — FLUTICASONE PROPIONATE 2 SPRAY: 50 SPRAY, METERED NASAL at 20:58

## 2024-07-14 RX ADMIN — METOPROLOL TARTRATE 50 MG: 50 TABLET, FILM COATED ORAL at 08:45

## 2024-07-14 NOTE — PLAN OF CARE
Problem: Discharge Planning  Goal: Discharge to home or other facility with appropriate resources  7/14/2024 0246 by Lanny Orellana RN  Outcome: Progressing  Flowsheets (Taken 7/13/2024 2153)  Discharge to home or other facility with appropriate resources: Identify barriers to discharge with patient and caregiver     Problem: Safety - Adult  Goal: Free from fall injury  7/14/2024 0246 by Lanny Orellana RN  Outcome: Progressing     Problem: ABCDS Injury Assessment  Goal: Absence of physical injury  7/14/2024 0246 by Lanny Orellana RN  Outcome: Progressing     Problem: Skin/Tissue Integrity  Goal: Absence of new skin breakdown  Description: 1.  Monitor for areas of redness and/or skin breakdown  2.  Assess vascular access sites hourly  3.  Every 4-6 hours minimum:  Change oxygen saturation probe site  4.  Every 4-6 hours:  If on nasal continuous positive airway pressure, respiratory therapy assess nares and determine need for appliance change or resting period.  7/14/2024 0246 by Lanny Orellana RN  Outcome: Progressing     Problem: Discharge Planning  Goal: Discharge to home or other facility with appropriate resources  7/14/2024 0246 by Lanny Orellana RN  Outcome: Progressing  Flowsheets (Taken 7/13/2024 2153)  Discharge to home or other facility with appropriate resources: Identify barriers to discharge with patient and caregiver  7/13/2024 1415 by Saundra Alston RN  Outcome: Not Progressing  Flowsheets (Taken 7/13/2024 1415)  Discharge to home or other facility with appropriate resources:   Identify barriers to discharge with patient and caregiver   Identify discharge learning needs (meds, wound care, etc)   Refer to discharge planning if patient needs post-hospital services based on physician order or complex needs related to functional status, cognitive ability or social support system   Arrange for needed discharge resources and transportation as appropriate  Note: Patients discharge date not been

## 2024-07-14 NOTE — PROGRESS NOTES
This Pre Admission Screen is a refiled document from the acute stay. The Pre Admission was completed and signed on 24 at 1247 by Dr Herron.      [facility]  Acute Inpatient Rehab Preadmission Assessment     Patient Name: Rahel Lopez        Ethnicity:Not of , /a, or Hungarian origin  Race:White  MRN:   [id number]    : 3/4/1931  (89 y.o.)  Gender: female      Admitted from:[facility]  Initial Assessment     Date of admission to the hospital: 7/10/2024  8:12 PM  Date patient eligible for admission:2024     Primary Diagnosis: stroke      Did patient have surgery?  no     Physicians: Berkley Frazier PA-C, Dr Herron, Dr Torres, Dr Lamas     Risk for clinical complications/co-morbidities:   Past Medical History        Past Medical History:   Diagnosis Date    Acute CVA (cerebrovascular accident) (HCC) 2024    Arthritis      Cataract of both eyes      GERD (gastroesophageal reflux disease)      Hyperlipidemia      Hypertension      Keratosis       benign lichemoid    Psychiatric problem              Financial Information  Primary insurance: Medicare     Secondary Insurance:   [facility]     Has the patient had two or more falls in the past year or any fall with injury in the past year?   yes     Did the patient have major surgery during the 100 days prior to admission?  no     Precautions:   falls  Restrictions/Precautions: Fall Risk, General Precautions  Other position/activity restrictions: wear shoes- pt has leg length discrepancy and right built up shoe    Isolation Precautions: None                  Physiatrist:  Dr Herron     Patients Occupation: Retired  Reviewed Lab and Diagnostic reports from Current Admission: Yes     Patients Prior Functional  Level: Prior Function  ADL Assistance: Independent  Homemaking Assistance: Independent  Ambulation Assistance: Independent  Transfer Assistance: Independent     Current functional status for upper  gait/mobility dysfunction, medication management, nutrition and hydration management,Ongoing assessment of safety, Pain management, Patient and family education, Prevention of secondary complications, Skin Integrity, NWB,TTWB, PWB,cognitive impairment, communication impairment.     Required therapy: Physical Therapy, Occupational Therapy and Speech Therapy 3 hours per day, 5-6 days per week.  Recreational Therapy 1 hour per week.     Expected Discharge Destination: Home     Expected Post Discharge Treatments: Home Care     Other information relevant to the care needs:   Lives With: Alone  Type of Home: House  Home Layout: One level  Home Access: Stairs to enter with rails  Entrance Stairs - Number of Steps: 2  Entrance Stairs - Rails: Left  Bathroom Shower/Tub: Tub/Shower unit, Shower chair with back  Bathroom Toilet: Standard  Bathroom Equipment: Grab bars in shower, Grab bars around toilet  Home Equipment: Rollator, Walker - Rolling, Cane  Has the patient had two or more falls in the past year or any fall with injury in the past year?: Yes  ADL Assistance: Independent  Homemaking Assistance: Independent  Homemaking Responsibilities: Yes  Ambulation Assistance: Independent  Transfer Assistance: Independent  Active : Yes  Occupation: Retired  Leisure & Hobbies: play games on the computer     Acute Inpatient Rehabilitation Disclosure Statement provided to patient.  Patient verbalized understanding.      Patient requires an intensive and coordinate interdisciplinary team approach to the delivery of rehabilitation care including PT/OT/ST and 24 hour nursing care and physician supervision to safely return to home setting with family.       I have reviewed and concur with the findings and results of the pre-admission screening assessment completed by the Inpatient Rehabilitation Admissions Coordinator.         Electronically signed by Kaitlynn Herron DO on 7/12/2024 at 12:47 PM                 Revision

## 2024-07-14 NOTE — PROGRESS NOTES
[facility]  Individualized Disclosure Statement      Patient: Rahel Lopez      Scope of Service  [facility] provides 24 hour individualized service to patients with functional limitations due to, but not limited to: stroke, brain injury, spinal cord injury, major multiple trauma, fractures, amputation, and neurological disorders. The Rehabilitation Center provides rehabilitative nursing and medical services as well as physical, occupational, speech, and recreation therapies.  [facility] is fully accredited by the Commission on Accreditation of Rehabilitation Facilities (CARF) as a comprehensive provider of rehabilitation services.  Patients admitted to the [facility] receive a minimum of three hours of therapy per day, at least five days per week, with a revised therapy schedule on weekends and holidays.  Physical therapy, occupational therapy, and speech therapy are provided seven days per week including holidays.  Other therapeutic services are available on weekends and evenings as needed or scheduled.  Intensity of Treatment  Your treatment program will consist of Nursing Care and:  1.5 hours of Physical Therapy, per day  1.5 hours of Occupational Therapy, per day   30-60 minutes of Speech Therapy, per day  1 hour of Recreational Therapy, per week  Depending on your needs, the exact time spent with each of the above disciplines may fluctuate, however you will receive at least 3 hours of therapy at least 5 days per week.    [facility] maintains contracts with most insurance plans.  Depending on the type of coverage, the insurance may impose limits on the coverage for rehabilitation care.  Coverage is based on the premise that you are able to fully participate in the rehabilitation program and show continued progress. Please verify your own insurance information. A

## 2024-07-14 NOTE — PLAN OF CARE
Problem: Discharge Planning  Goal: Discharge to home or other facility with appropriate resources  7/14/2024 1446 by Saundra Alston RN  Outcome: Progressing  Flowsheets (Taken 7/14/2024 1446)  Discharge to home or other facility with appropriate resources:   Identify barriers to discharge with patient and caregiver   Identify discharge learning needs (meds, wound care, etc)   Refer to discharge planning if patient needs post-hospital services based on physician order or complex needs related to functional status, cognitive ability or social support system   Arrange for needed discharge resources and transportation as appropriate  7/14/2024 0246 by Lanny Orellana RN  Outcome: Progressing  Flowsheets (Taken 7/13/2024 2153)  Discharge to home or other facility with appropriate resources: Identify barriers to discharge with patient and caregiver     Problem: Safety - Adult  Goal: Free from fall injury  7/14/2024 1446 by Saundra Alston RN  Outcome: Progressing  Flowsheets (Taken 7/14/2024 1446)  Free From Fall Injury:   Instruct family/caregiver on patient safety   Based on caregiver fall risk screen, instruct family/caregiver to ask for assistance with transferring infant if caregiver noted to have fall risk factors  7/14/2024 0246 by Lanny Orellana RN  Outcome: Progressing     Problem: ABCDS Injury Assessment  Goal: Absence of physical injury  7/14/2024 1446 by Saundra Alston RN  Outcome: Progressing  Flowsheets (Taken 7/14/2024 1446)  Absence of Physical Injury: Implement safety measures based on patient assessment  7/14/2024 0246 by Lanny Orellana RN  Outcome: Progressing     Problem: Skin/Tissue Integrity  Goal: Absence of new skin breakdown  Description: 1.  Monitor for areas of redness and/or skin breakdown  2.  Assess vascular access sites hourly  3.  Every 4-6 hours minimum:  Change oxygen saturation probe site  4.  Every 4-6 hours:  If on nasal continuous positive airway pressure,

## 2024-07-15 PROCEDURE — 1180000000 HC REHAB R&B

## 2024-07-15 PROCEDURE — 97130 THER IVNTJ EA ADDL 15 MIN: CPT

## 2024-07-15 PROCEDURE — 99232 SBSQ HOSP IP/OBS MODERATE 35: CPT | Performed by: STUDENT IN AN ORGANIZED HEALTH CARE EDUCATION/TRAINING PROGRAM

## 2024-07-15 PROCEDURE — 97112 NEUROMUSCULAR REEDUCATION: CPT

## 2024-07-15 PROCEDURE — 97129 THER IVNTJ 1ST 15 MIN: CPT

## 2024-07-15 PROCEDURE — 6360000002 HC RX W HCPCS: Performed by: STUDENT IN AN ORGANIZED HEALTH CARE EDUCATION/TRAINING PROGRAM

## 2024-07-15 PROCEDURE — 97530 THERAPEUTIC ACTIVITIES: CPT

## 2024-07-15 PROCEDURE — 97116 GAIT TRAINING THERAPY: CPT

## 2024-07-15 PROCEDURE — 97110 THERAPEUTIC EXERCISES: CPT

## 2024-07-15 PROCEDURE — 97535 SELF CARE MNGMENT TRAINING: CPT

## 2024-07-15 PROCEDURE — 6370000000 HC RX 637 (ALT 250 FOR IP): Performed by: STUDENT IN AN ORGANIZED HEALTH CARE EDUCATION/TRAINING PROGRAM

## 2024-07-15 PROCEDURE — 92610 EVALUATE SWALLOWING FUNCTION: CPT

## 2024-07-15 PROCEDURE — 2580000003 HC RX 258: Performed by: STUDENT IN AN ORGANIZED HEALTH CARE EDUCATION/TRAINING PROGRAM

## 2024-07-15 RX ORDER — GUAIFENESIN 600 MG/1
600 TABLET, EXTENDED RELEASE ORAL 2 TIMES DAILY
Status: DISCONTINUED | OUTPATIENT
Start: 2024-07-15 | End: 2024-07-19

## 2024-07-15 RX ADMIN — AMLODIPINE BESYLATE 5 MG: 5 TABLET ORAL at 08:48

## 2024-07-15 RX ADMIN — FLUTICASONE PROPIONATE 2 SPRAY: 50 SPRAY, METERED NASAL at 21:23

## 2024-07-15 RX ADMIN — CETIRIZINE HYDROCHLORIDE 10 MG: 10 TABLET ORAL at 08:48

## 2024-07-15 RX ADMIN — GUAIFENESIN 600 MG: 600 TABLET, EXTENDED RELEASE ORAL at 21:23

## 2024-07-15 RX ADMIN — ENOXAPARIN SODIUM 40 MG: 100 INJECTION SUBCUTANEOUS at 08:44

## 2024-07-15 RX ADMIN — CEPHALEXIN 500 MG: 500 CAPSULE ORAL at 21:23

## 2024-07-15 RX ADMIN — PANTOPRAZOLE SODIUM 40 MG: 40 TABLET, DELAYED RELEASE ORAL at 05:24

## 2024-07-15 RX ADMIN — METOPROLOL TARTRATE 50 MG: 50 TABLET, FILM COATED ORAL at 21:23

## 2024-07-15 RX ADMIN — BUPROPION HYDROCHLORIDE 75 MG: 75 TABLET, FILM COATED ORAL at 21:22

## 2024-07-15 RX ADMIN — ASPIRIN 81 MG 81 MG: 81 TABLET ORAL at 08:48

## 2024-07-15 RX ADMIN — BUPROPION HYDROCHLORIDE 75 MG: 75 TABLET, FILM COATED ORAL at 08:47

## 2024-07-15 RX ADMIN — SODIUM CHLORIDE, PRESERVATIVE FREE 10 ML: 5 INJECTION INTRAVENOUS at 08:47

## 2024-07-15 RX ADMIN — FLUTICASONE PROPIONATE 2 SPRAY: 50 SPRAY, METERED NASAL at 08:44

## 2024-07-15 RX ADMIN — METOPROLOL TARTRATE 50 MG: 50 TABLET, FILM COATED ORAL at 08:47

## 2024-07-15 RX ADMIN — CLOPIDOGREL BISULFATE 75 MG: 75 TABLET ORAL at 08:47

## 2024-07-15 RX ADMIN — SODIUM CHLORIDE, PRESERVATIVE FREE 10 ML: 5 INJECTION INTRAVENOUS at 21:23

## 2024-07-15 RX ADMIN — ATORVASTATIN CALCIUM 40 MG: 40 TABLET, FILM COATED ORAL at 21:23

## 2024-07-15 RX ADMIN — BUSPIRONE HYDROCHLORIDE 5 MG: 5 TABLET ORAL at 21:23

## 2024-07-15 RX ADMIN — CEPHALEXIN 500 MG: 500 CAPSULE ORAL at 08:47

## 2024-07-15 RX ADMIN — BUSPIRONE HYDROCHLORIDE 5 MG: 5 TABLET ORAL at 08:47

## 2024-07-15 ASSESSMENT — PAIN SCALES - GENERAL: PAINLEVEL_OUTOF10: 3

## 2024-07-15 NOTE — PROGRESS NOTES
[facility]  INPATIENT REHABILITATION  TEAM CONFERENCE NOTE    Conference Date: 2024  Admit Date:  2024  3:54 PM  Patient Name: Rahel Lopez    MRN: [id number]    : 3/4/1931  (89 y.o.)  Rehabilitation Admitting Diagnosis:  Stroke (HCC) [I63.9]  Acute cerebrovascular accident (CVA) ([facility]) [I63.9]  Referring Practitioner: Kaitlynn Herron DO      CASE MANAGEMENT  Current issues/needs regarding patient and family discharge status: Prior to admission, patient was living alone. Patient reported being independent at home. Patient was completed her ADLs, housekeeping, meal prep, errands, finances and driving. Patient reports using assistive devices like canes, rollator and rolling walker prior to admission. Patient is retired. Patient reports having 7 children who are supportive. Bessy, Godfrey and Helder live locally. Patient's family doctor is Herminia Lopez MD. Patient prefers [facility]. Patient is motivated to participate in therapy.     PHYSICAL THERAPY  Rahel has made steady progress with physical therapy despite her short length of stay. She has met 2/5 STG and 0/6 LTG. However, she is highly motivated to return home with independence with her mobility. She completes bed mobility with supervision for rolling and SBA for sup<>sit. She requires increase time to complete however does not require physical assist. She is CGA to minimal assist with transfers, requiring cues for foot placement and scooting for improved technique. She can complete the car transfer with minimal assist, requiring assist to scoot to edge of seat for exiting and cues for safe technique. Pt can ambulate up to 50' with RW, CGA, and cues for LLE foot clearance.  She navigates 2 six inch platform stairs in the parallel bars with riki HR and CGA. She improved her score on the Tinetti to 15/28, however still indicating a high fall risk. She completes the 5xSTS in 25.8 seconds indicating an increased risk for falls  No   Patient/Family Education Focus: focus on memory    Barriers to Education: patient has bilateral hearing aides.    No results for input(s): \"POCGLU\" in the last 72 hours.    No results found for: \"LDLDIRECT\"      Vitals:    07/15/24 2120 07/16/24 0756 07/16/24 0909 07/16/24 1951   BP: (!) 143/69 134/67  139/67   Pulse: 78 66 71 77   Resp: 18 18 18 17   Temp: 98 °F (36.7 °C) 97.7 °F (36.5 °C)  98 °F (36.7 °C)   TempSrc: Oral Oral  Oral   SpO2: 100% 97% 97% 98%   Weight:       Height:              Family Education: Need to make contact with family to initiate education  Fall Risk:  Falling star program initiated  Is the patient appropriate for a stay in the functional apartment? yes, prior to DC if agreeable     Discharge Plan   Estimated Discharge Date:  7/26/2024    Destination: discharge home with supervision  Services at Discharge: Home Health  Physical Therapy, Occupational Therapy, Speech Therapy, Nursing, and home health aide 2-3x week  Is patient appropriate for an outpatient driving evaluation? yes, prior to return to driving  Equipment at Discharge: TBD  Factors facilitating achievement of predicted outcomes: Family support, Cooperative, and Pleasant  Barriers to the achievement of predicted outcomes: Cognitive deficit and weakness  Follow up with physiatrist? yes, 6-8 weeks following discharge (okay to schedule with Molly)    Team Members Present at Conference:  :BIBI Bone  Occupational Therapist:Sumanth DECKER, OTR/L [id number]  Physical Therapist:Janet Chatamn, [place] [id number]  Speech Therapist:Kaleb Balderas MS, CCC-SLP [id number]  Nurse:Kristi Lane RN  Psychologist: Davi Kumar Psy.D     I approve the established interdisciplinary plan of care as documented within the medical record of Rahel Lopez. I was present and led the interdisciplinary care conference.    Kaitlynn Herron DO  Electronically signed by Kaitlynn Herron DO on 7/17/2024 at 9:52 AM

## 2024-07-15 NOTE — PROGRESS NOTES
Physical Medicine & Rehabilitation   Progress Note    Chief Complaint:  CVA    Subjective:  Patient seen and examined. She reports good sleep overnight. She is working with hand putty at time of exam. She states that congestion might be a little better this morning. She reports some chronic arthritic pain worse in her right shoulder- but this is all at baseline.  She reports some frustration with her deficits and states \"I suppose it will just take some time\". She denies any CP or SOB. No reports of any significant changes to bowel or bladder. She continues to tolerate and progress with therapies.     Rehabilitation:  PT 07/15/2024:  Bed Mobility:  Supine to Sit: Minimal Assistance, X 1, with head of bed flat, with rail     Transfers:  Sit to Stand: Minimal Assistance, X 1, cues for hand placement, with verbal cues, from EOB; Mod. A from lower toilet seat.   Stand to Sit:Contact Guard Assistance, Minimal Assistance, X 1, cues for hand placement, with verbal cues     Ambulation:  Contact Guard Assistance, Minimal Assistance, X 1, with verbal cues   Distance: 10'x1; 15'x1   Surface: Level Tile  Device: Rolling Walker  Gait Deviations:  Forward Flexed Posture, Slow Yessenia, Decreased Step Length Bilaterally, Decreased Gait Speed, Decreased Heel Strike Bilaterally, Decreased Foot Clearance Left, Narrow Base of Support, Mild Path Deviations, Unsteady Gait, Decreased Terminal Knee Extension, and Increased reliance on assistive device     Balance:  Static Standing Balance: Contact Guard Assistance  Dynamic Standing Balance: Contact Guard Assistance, Minimal Assistance     Exercise:  Patient was guided in 1 set(s) 10-15 reps of exercise to both lower extremities.  Glut sets, Seated marches, Seated heel/toe raises, Long arc quads, Seated isometric hip adduction, Seated abduction/adduction, and Scapular retraction.  Exercises were completed for increased independence with functional mobility.     Functional Outcome

## 2024-07-15 NOTE — PROGRESS NOTES
[facility]  Occupational Therapy  Daily Note  Time:    Time In: 1332  Time Out: 1502  Timed Code Treatment Minutes: 90 Minutes  Minutes: 90          Date: 7/15/2024  Patient Name: Rahel Lopez,   Gender: female      Room: [facility]20/020-A  MRN: [id number]  : 3/4/1931  (89 y.o.)  Referring Practitioner: Attending and Ordering: Kaitlynn Herron DO  Diagnosis: Stroke  Additional Pertinent Hx: Per EMR, \"Rahel Lopez is a 89 y.o. female with PMH significant for GERD, hyperlipidemia, hypertension, and cataracts who was admitted to [facility] on 7/10/2024.  History obtained via: ED documentation, acute care documentation, and patient     Patient [facility]24 with concerns due to multiple falls and impaired speech.  She reported that speech impairment began approximately 2 days prior and at one point she noted some left-sided weakness, however, felt that was back to baseline.  On the day of presentation, patient reportedly fell twice in her house.  The first time she was able to get herself back up and did not report the incident to anyone.  However, later in the day she was talking to her sister on the phone after they hung up the sister contacted another family member to check on the patient due to confusion and slurred speech.  When the family member arrived, the patient was found down on the ground and unable to get up.  CT head was obtained and was reportedly negative, however, decision made to admit patient for further evaluation for CVA.     On admission, patient was started on aspirin and statin therapy.  An MRI and echo were ordered.  Of note, patient was being treated by PCP for upper respiratory infection at time of presentation.  MRI of the brain resulted and reportedly revealed an acute infarct of the right thalamus.  Neurology consulted and recommends DAPT with aspirin 81 mg + Plavix 75 mg daily for 21 days  Endurance training, Equipment evaluation, education, & procurement    Education:  Learners: Patient  Role of OT, ADL's, Home Exercise Program, Hemibody Awareness/Attention, Assistive Device Safety, and Education Related to Stroke Diagnosis    Goals  Short Term Goals  Time Frame for Short Term Goals: 2 weeks from admission on IP rehab  Short Term Goal 1: Pt will increased L UE 9 hole peg score by 15 seconds to increase indep with ADL/IADL routine.  Short Term Goal 2: Pt will complete dynamic standing task x 5 minutes with 2 UE release and SBA to increase indep with all sinkside grooming.  Short Term Goal 3: Pt will complete LB dressing with SBA to increase indep and endurance in home environment.  Short Term Goal 4: Pt will demo functional mobility HH distances with SBA to increase IND with toileting.  Long Term Goals  Time Frame for Long Term Goals : 3 weeks from admission on IP rehab  Long Term Goal 1: Pt will complete full ADL routine including shower in Tub/shower Mod Indep to increase occupational preformance in home environment.  Long Term Goal 2: Pt will complete simple IADL with Mod indep to increase indep with cooking simple meals in home.    Following session, patient left in safe position with all fall risk precautions in place.

## 2024-07-15 NOTE — PLAN OF CARE
Problem: Discharge Planning  Goal: Discharge to home or other facility with appropriate resources  7/15/2024 1434 by Savanah Mccartney LISW  Note:   [facility]  Physical Medicine Case Management Assessment    [x] Inpatient Rehabilitation Unit    Patient Name: Rahel Lopez        MRN: [id number]    : 3/4/1931  (89 y.o.)  Gender: female   Date of Admission: 2024  3:54 PM    Family/Social/Home Environment:   Prior to admission, patient was living alone. Patient reported being independent at home. Patient was completed her ADLs, housekeeping, meal prep, errands, finances and driving. Patient reports using assistive devices like canes, rollator and rolling walker prior to admission. Patient is retired. Patient reports having 7 children who are supportive. Godfrey Doherty and Helder live locally. Patient's family doctor is Herminia Lopez MD. Patient prefers [facility]. Patient is motivated to participate in therapy.    Social/Functional History  Lives With: Alone  Type of Home: House  Home Layout: One level  Home Access: Stairs to enter with rails  Entrance Stairs - Number of Steps: 2  Entrance Stairs - Rails: Left  Bathroom Shower/Tub: Tub/Shower unit  Bathroom Toilet: Standard  Bathroom Equipment: Grab bars in shower, Grab bars around toilet, Tub transfer bench  Bathroom Accessibility: Accessible  Home Equipment: Rollator, Walker - Rolling, Cane  Has the patient had two or more falls in the past year or any fall with injury in the past year?: Yes  ADL Assistance: Independent  Homemaking Assistance: Independent  Homemaking Responsibilities: Yes  Ambulation Assistance: Independent (uses rollator or cane in the house, 2WW in community)  Transfer Assistance: Independent  Active : Yes  Occupation: Retired  Leisure & Hobbies: play games on the computer  Additional Comments: Patient reports using rollator or cane in the house if she feels good, leaves RW in garage and uses that in

## 2024-07-15 NOTE — PROGRESS NOTES
Comprehensive Nutrition Assessment    Type and Reason for Visit:  Initial, Positive Nutrition Screen, Consult (unintentional weight loss,  Oral Nutrition Supplements)    Nutrition Recommendations/Plan:   Recommend diet as per SLP - trial chopped meats w/ side of gravy per pt. Request.  Continue Ensure Plus TID.  Recommend MVI.  Encouraged po, good nutrition at best efforts for healing.     Malnutrition Assessment:  Malnutrition Status:  At risk for malnutrition (Comment) (07/15/24 1528)    Context:  Acute Illness     Findings of the 6 clinical characteristics of malnutrition:  Energy Intake:  Mild decrease in energy intake (Comment)  Weight Loss:  No significant weight loss     Body Fat Loss:  No significant body fat loss (although difficult to evaluate w/ advanced age)     Muscle Mass Loss:  No significant muscle mass loss (although difficult to evaluate w/ advanced age)    Fluid Accumulation:  Unable to assess     Strength:  Not Performed    Nutrition Assessment:     Pt. nutritionally compromised AEB inadequate oral intake.  At risk for further nutrition compromise r/t admit s/p CVA, advanced age and underlying medical condition (hx CVA, GERD, HLD, HTN).       Nutrition Related Findings:    Pt. Report/Treatments/Miscellaneous:   7/15: pt seen - reports appetite is \"not the best\"; states just doesn't feel like eating; reports acceptance of Ensures (other than chocolate flavor); ; states appetite/intake was \"so-so\" pta - consumes 2 meals/day and snacks at lunch; denies any trouble w/ chewing/swallowing other than \"meats are dry, hard to chew\"  GI Status: BM 7/14  Pertinent Labs: 7/13: Glucose 89, BUN 8, Cr 0.5, 7/12: HgbA1c 5.7%, Cholesterol 197, HDL 46, , Triglycerides 125  Pertinent Meds: Glycolax, Senokot, Zofran      Wound Type:  (traumatic, abrasion - pre tibial/shin)       Current Nutrition Intake & Therapies:    Average Meal Intake: 1-25%, 51-75%, %  Average Supplements Intake:  (states

## 2024-07-15 NOTE — PROGRESS NOTES
[facility]  INPATIENT PHYSICAL THERAPY  DAILY NOTE  [facility] - 8K-20/020-A    Time In: 1100  Time Out: 1200  Timed Code Treatment Minutes: 60 Minutes  Minutes: 60          Date: 7/15/2024  Patient Name: Rahel Lopez,  Gender:  female        MRN: [id number]  : 3/4/1931  (89 y.o.)  Referral Date : 24  Referring Practitioner: Kaitlynn Herron DO  Diagnosis: Acute cerebrovascular accident  Additional Pertinent Hx: Per H&P: Rahel Lopez is a 89 y.o. female with PMH significant for GERD, hyperlipidemia, hypertension, and cataracts who was admitted to [facility] on 7/10/2024.Patient initially presented to [facility] ED on 7/10/2024 with concerns due to multiple falls and impaired speech.  She reported that speech impairment began approximately 2 days prior and at one point she noted some left-sided weakness, however, felt that was back to baseline.  On the day of presentation, patient reportedly fell twice in her house.  The first time she was able to get herself back up and did not report the incident to anyone.  However, later in the day she was talking to her sister on the phone after they hung up the sister contacted another family member to check on the patient due to confusion and slurred speech.  When the family member arrived, the patient was found down on the ground and unable to get up.  CT head was obtained and was reportedly negative, however, decision made to admit patient for further evaluation for CVA.MRI of the brain resulted and reportedly revealed an acute infarct of the right thalamus.     Prior Level of Function:  Lives With: Alone  Type of Home: House  Home Layout: One level  Home Access: Stairs to enter with rails  Entrance Stairs - Number of Steps: 2  Entrance Stairs - Rails: Left  Home Equipment: Rollator, Walker - Rolling, Cane   Bathroom Shower/Tub: Tub/Shower unit  Bathroom Toilet: Standard  Bathroom Equipment: Grab bars in

## 2024-07-15 NOTE — PLAN OF CARE
Individualized Plan of Care  [facility] Inpatient Rehabilitation Unit    Rehabilitation physician: Dr. Herron    Admit Date: 7/12/2024     Rehabilitation Diagnosis: Stroke (Formerly [facility]) [I63.9]  Acute cerebrovascular accident (CVA) (Formerly [facility]) [I63.9]      Rehabilitation impairments: self care, mobility, motor dysfunction, bowel/bladder management, pain management, safety, and cognitive function    Factors facilitating achievement of predicted outcomes: Family support, Cooperative, and Pleasant  Barriers to the achievement of predicted outcomes: Cognitive deficit, Decreased endurance, Upper extremity weakness, and Lower extremity weakness    Patient Goals: Improve independence with mobility, Improvement of mobility at a wheelchair level, Increase overall strength and endurance, Increase balance, Increase endurance, Increase independence with activities of daily living, Improve cognition, Increase self-awareness, Increase safety awareness, Increase community integration, Increase socialization, Functional communication with caregivers, Integrate appropriate pain management plan, Assure adequate nutritional option for discharge, Continence of bowel and bladder, and Provide appropriate patient and family education      NURSING:  Nursing goals for Rahel Lopez while on the rehabilitation unit will include:  Continence of bowel and bladder, Adequate number of bowel movements, Urinate with no urinary retention >300ml in bladder, Maintain O2 SATs at an acceptable level during stay, Effective pain management while on the rehabilitation unit, Establish adequate pain control plan for discharge, Absence of skin breakdown while on the rehabilitation unit, Improved skin integrity via assessments including wound measurements, Avoidance of any hospital acquired infections, No signs/symptoms of infection at the wound site, Freedom from injury during hospitalization, and Complete education with patient/family with understanding  needs                                           Physician anticipated functional outcomes: Improved independence with functional measures   Estimated length of stay for this admission 2 weeks  Medical Prognosis: Good  Anticipated disposition: Home.      The potential to achieve the above medical and rehabilitative goals is good.    This plan of care has been developed with the assistance and input of the multidisciplinary rehabilitation team.  The plan was reviewed with the patient.  The patient has had the opportunity to provide input to the therapy team.    I have reviewed this Individualized Plan of Care and agree with its contents.  Above documentation has been expanded, modified, adjusted to reflect the findings of my evaluations and goals for the patient.    Physician:  Kaitlynn Herron DO      Electronically signed by Kaitlynn Herron DO on 7/14/2024 at 9:45 PM

## 2024-07-15 NOTE — PROGRESS NOTES
[facility]  INPATIENT PHYSICAL THERAPY  DAILY NOTE  [facility] - 8K-20/020-A    Time In: 0730  Time Out: 0800  Timed Code Treatment Minutes: 30 Minutes  Minutes: 30          Date: 7/15/2024  Patient Name: Rahel Lopze,  Gender:  female        MRN: [id number]  : 3/4/1931  (89 y.o.)  Referral Date : 24  Referring Practitioner: Kaitlynn Herron DO  Diagnosis: Acute cerebrovascular accident  Additional Pertinent Hx: Per H&P: Rahel Lopez is a 89 y.o. female with PMH significant for GERD, hyperlipidemia, hypertension, and cataracts who was admitted to [facility] on 7/10/2024.Patient initially presented to [facility] ED on 7/10/2024 with concerns due to multiple falls and impaired speech.  She reported that speech impairment began approximately 2 days prior and at one point she noted some left-sided weakness, however, felt that was back to baseline.  On the day of presentation, patient reportedly fell twice in her house.  The first time she was able to get herself back up and did not report the incident to anyone.  However, later in the day she was talking to her sister on the phone after they hung up the sister contacted another family member to check on the patient due to confusion and slurred speech.  When the family member arrived, the patient was found down on the ground and unable to get up.  CT head was obtained and was reportedly negative, however, decision made to admit patient for further evaluation for CVA.MRI of the brain resulted and reportedly revealed an acute infarct of the right thalamus.     Prior Level of Function:  Lives With: Alone  Type of Home: House  Home Layout: One level  Home Access: Stairs to enter with rails  Entrance Stairs - Number of Steps: 2  Entrance Stairs - Rails: Left  Home Equipment: Rollator, Walker - Rolling, Cane   Bathroom Shower/Tub: Tub/Shower unit  Bathroom Toilet: Standard  Bathroom Equipment: Grab bars in

## 2024-07-15 NOTE — PROGRESS NOTES
[facility]  SPEECH THERAPY  [facility]  Clinical Swallow Evaluation + Cognitive tx      SLP Individual Minutes  Time In: 1201  Time Out: 1239  Minutes: 38  Timed Code Treatment Minutes: 25 Minutes       Clinical swallow evaluation: 13 minutes  Cognitive tx: 25 minutes     DIET ORDER RECOMMENDATIONS AFTER EVALUATION: Regular diet with thin liquids    Date: 7/15/2024  Patient Name: Rahel Lopez      CSN: [id number]   : 3/4/1931  (89 y.o.)  Gender: female   Referring Physician:  Kaitlynn Herron DO     Diagnosis: Acute cerebrovascular accident (CVA) (HCC)    History of Present Illness/Injury:  Patient admitted with above diagnosis. Per chart review, \"Rahel Lopez is a 89 y.o. female with PMH significant for GERD, hyperlipidemia, hypertension, and cataracts who was admitted to [facility] on 7/10/2024.  History obtained via: ED documentation, acute care documentation, and patient     Patient initially presented to [facility] ED on 7/10/2024 with concerns due to multiple falls and impaired speech.  She reported that speech impairment began approximately 2 days prior and at one point she noted some left-sided weakness, however, felt that was back to baseline.  On the day of presentation, patient reportedly fell twice in her house.  The first time she was able to get herself back up and did not report the incident to anyone.  However, later in the day she was talking to her sister on the phone after they hung up the sister contacted another family member to check on the patient due to confusion and slurred speech.  When the family member arrived, the patient was found down on the ground and unable to get up.  CT head was obtained and was reportedly negative, however, decision made to admit patient for further evaluation for CVA.     On admission, patient was started on aspirin and statin therapy.  An MRI and echo were ordered.  Of note, patient was being treated by

## 2024-07-15 NOTE — PROGRESS NOTES
[facility]  Recreational Therapy  Inpatient Rehabilitation Evaluation        Time Spent with Patient: 25 minutes    Date:  7/15/2024       Patient Name: Rahel Lopez      MRN: [id number]       YOB: 1931 (89 y.o.)       Gender: female  Diagnosis: Stroke  Referring Practitioner: Attending and Ordering: Kaitlynn Herron DO    RESTRICTIONS/PRECAUTIONS:  Restrictions/Precautions: Fall Risk, General Precautions     Hearing: Exceptions to WFL  Hearing Exceptions: Bilateral hearing aid, Hard of hearing/hearing concerns    PAIN: 0-sitting in chair but pain goes up significantly when up     SUBJECTIVE:  pt lives alone-she has 7 children with 6 living close by and 1 in [place]-21 grandchildren, 30 plus great grandchildren and 1 great great grandchild     VISION:  Glasses    HEARING: Hearing Aid - Left & Right    LEISURE INTERESTS:   Pt likes to work word search puzzles and has a few books in her room to use, she likes to play games on her computer at home, she reads her bible and devotions, she has a deck of cards and will play [employer] in her free time-her son takes care of her yard, she prefers to eat at home and enjoys staying inside for the most part-very pleasant and social-states her L side is weaker-states her arthritis has kept her from doing cross stitch and has slowed her down this past year     BARRIERS TO LEISURE INTERESTS:    Decreased endurance, Pain, and Upper extremity weakness           Patient Education  New Education Provided: Importance of Leisure, RT Plan of Care    Plan:  Continue to follow patient through this admission  Include patient in appropriate groups  See patient individually    Electronically signed by: Alida Jacques CTRS  Date: 7/15/2024

## 2024-07-16 ENCOUNTER — APPOINTMENT (OUTPATIENT)
Dept: CT IMAGING | Age: 89
End: 2024-07-16
Attending: STUDENT IN AN ORGANIZED HEALTH CARE EDUCATION/TRAINING PROGRAM
Payer: MEDICARE

## 2024-07-16 PROCEDURE — 1180000000 HC REHAB R&B

## 2024-07-16 PROCEDURE — 97112 NEUROMUSCULAR REEDUCATION: CPT

## 2024-07-16 PROCEDURE — 97110 THERAPEUTIC EXERCISES: CPT

## 2024-07-16 PROCEDURE — 99232 SBSQ HOSP IP/OBS MODERATE 35: CPT | Performed by: STUDENT IN AN ORGANIZED HEALTH CARE EDUCATION/TRAINING PROGRAM

## 2024-07-16 PROCEDURE — 70450 CT HEAD/BRAIN W/O DYE: CPT

## 2024-07-16 PROCEDURE — 97116 GAIT TRAINING THERAPY: CPT

## 2024-07-16 PROCEDURE — 6360000002 HC RX W HCPCS: Performed by: STUDENT IN AN ORGANIZED HEALTH CARE EDUCATION/TRAINING PROGRAM

## 2024-07-16 PROCEDURE — 6370000000 HC RX 637 (ALT 250 FOR IP): Performed by: STUDENT IN AN ORGANIZED HEALTH CARE EDUCATION/TRAINING PROGRAM

## 2024-07-16 PROCEDURE — 97129 THER IVNTJ 1ST 15 MIN: CPT

## 2024-07-16 PROCEDURE — 97130 THER IVNTJ EA ADDL 15 MIN: CPT

## 2024-07-16 PROCEDURE — 97530 THERAPEUTIC ACTIVITIES: CPT

## 2024-07-16 PROCEDURE — 97535 SELF CARE MNGMENT TRAINING: CPT

## 2024-07-16 PROCEDURE — 2580000003 HC RX 258: Performed by: STUDENT IN AN ORGANIZED HEALTH CARE EDUCATION/TRAINING PROGRAM

## 2024-07-16 RX ADMIN — CETIRIZINE HYDROCHLORIDE 10 MG: 10 TABLET ORAL at 08:00

## 2024-07-16 RX ADMIN — BUSPIRONE HYDROCHLORIDE 5 MG: 5 TABLET ORAL at 08:01

## 2024-07-16 RX ADMIN — METOPROLOL TARTRATE 50 MG: 50 TABLET, FILM COATED ORAL at 08:01

## 2024-07-16 RX ADMIN — BUSPIRONE HYDROCHLORIDE 5 MG: 5 TABLET ORAL at 19:54

## 2024-07-16 RX ADMIN — GUAIFENESIN 600 MG: 600 TABLET, EXTENDED RELEASE ORAL at 08:00

## 2024-07-16 RX ADMIN — GUAIFENESIN 600 MG: 600 TABLET, EXTENDED RELEASE ORAL at 19:54

## 2024-07-16 RX ADMIN — BUPROPION HYDROCHLORIDE 75 MG: 75 TABLET, FILM COATED ORAL at 08:01

## 2024-07-16 RX ADMIN — ATORVASTATIN CALCIUM 40 MG: 40 TABLET, FILM COATED ORAL at 19:54

## 2024-07-16 RX ADMIN — SENNOSIDES 8.6 MG: 8.6 TABLET, FILM COATED ORAL at 19:57

## 2024-07-16 RX ADMIN — DICLOFENAC SODIUM 2 G: 10 GEL TOPICAL at 15:37

## 2024-07-16 RX ADMIN — PANTOPRAZOLE SODIUM 40 MG: 40 TABLET, DELAYED RELEASE ORAL at 06:21

## 2024-07-16 RX ADMIN — ASPIRIN 81 MG 81 MG: 81 TABLET ORAL at 08:00

## 2024-07-16 RX ADMIN — SODIUM CHLORIDE, PRESERVATIVE FREE 10 ML: 5 INJECTION INTRAVENOUS at 19:55

## 2024-07-16 RX ADMIN — SODIUM CHLORIDE, PRESERVATIVE FREE 10 ML: 5 INJECTION INTRAVENOUS at 08:04

## 2024-07-16 RX ADMIN — ENOXAPARIN SODIUM 40 MG: 100 INJECTION SUBCUTANEOUS at 08:02

## 2024-07-16 RX ADMIN — AMLODIPINE BESYLATE 5 MG: 5 TABLET ORAL at 08:01

## 2024-07-16 RX ADMIN — FLUTICASONE PROPIONATE 2 SPRAY: 50 SPRAY, METERED NASAL at 08:02

## 2024-07-16 RX ADMIN — CEPHALEXIN 500 MG: 500 CAPSULE ORAL at 19:54

## 2024-07-16 RX ADMIN — METOPROLOL TARTRATE 50 MG: 50 TABLET, FILM COATED ORAL at 19:54

## 2024-07-16 RX ADMIN — CLOPIDOGREL BISULFATE 75 MG: 75 TABLET ORAL at 08:00

## 2024-07-16 RX ADMIN — CEPHALEXIN 500 MG: 500 CAPSULE ORAL at 08:00

## 2024-07-16 RX ADMIN — FLUTICASONE PROPIONATE 2 SPRAY: 50 SPRAY, METERED NASAL at 19:54

## 2024-07-16 RX ADMIN — BUPROPION HYDROCHLORIDE 75 MG: 75 TABLET, FILM COATED ORAL at 19:54

## 2024-07-16 ASSESSMENT — 9 HOLE PEG TEST: TESTTIME_SECONDS: 76

## 2024-07-16 ASSESSMENT — PAIN SCALES - GENERAL: PAINLEVEL_OUTOF10: 0

## 2024-07-16 NOTE — PLAN OF CARE
Problem: Discharge Planning  Goal: Discharge to home or other facility with appropriate resources  Outcome: Progressing  Flowsheets (Taken 7/16/2024 1216)  Discharge to home or other facility with appropriate resources:   Arrange for needed discharge resources and transportation as appropriate   Identify barriers to discharge with patient and caregiver   Identify discharge learning needs (meds, wound care, etc)   Refer to discharge planning if patient needs post-hospital services based on physician order or complex needs related to functional status, cognitive ability or social support system     Problem: Safety - Adult  Goal: Free from fall injury  7/16/2024 1216 by Sirisha Parker RN  Outcome: Progressing  Flowsheets (Taken 7/16/2024 1216)  Free From Fall Injury: Instruct family/caregiver on patient safety     Problem: ABCDS Injury Assessment  Goal: Absence of physical injury  7/16/2024 1216 by Sirisha Parker, RN  Outcome: Progressing  Flowsheets (Taken 7/16/2024 1216)  Absence of Physical Injury: Implement safety measures based on patient assessment     Problem: Skin/Tissue Integrity  Goal: Absence of new skin breakdown  Description: 1.  Monitor for areas of redness and/or skin breakdown  2.  Assess vascular access sites hourly  3.  Every 4-6 hours minimum:  Change oxygen saturation probe site  4.  Every 4-6 hours:  If on nasal continuous positive airway pressure, respiratory therapy assess nares and determine need for appliance change or resting period.  Outcome: Progressing     Problem: Chronic Conditions and Co-morbidities  Goal: Patient's chronic conditions and co-morbidity symptoms are monitored and maintained or improved  Outcome: Progressing  Flowsheets (Taken 7/16/2024 1216)  Care Plan - Patient's Chronic Conditions and Co-Morbidity Symptoms are Monitored and Maintained or Improved:   Monitor and assess patient's chronic conditions and comorbid symptoms for stability, deterioration, or improvement

## 2024-07-16 NOTE — PROGRESS NOTES
[facility]  Occupational Therapy  Daily Note  Time:   Time In: 1503  Time Out: 1536  Timed Code Treatment Minutes: 33 Minutes  Minutes: 33          Date: 2024  Patient Name: Rahel Lopez,   Gender: female      Room: [facility]20/020-A  MRN: [id number]  : 3/4/1931  (89 y.o.)  Referring Practitioner: Attending and Ordering: Kaitlynn Herron DO  Diagnosis: Stroke  Additional Pertinent Hx: Per EMR, \"Rahel Lopez is a 89 y.o. female with PMH significant for GERD, hyperlipidemia, hypertension, and cataracts who was admitted to [facility] on 7/10/2024.  History obtained via: ED documentation, acute care documentation, and patient     Patient initially presented to [facility] ED on 7/10/2024 with concerns due to multiple falls and impaired speech.  She reported that speech impairment began approximately 2 days prior and at one point she noted some left-sided weakness, however, felt that was back to baseline.  On the day of presentation, patient reportedly fell twice in her house.  The first time she was able to get herself back up and did not report the incident to anyone.  However, later in the day she was talking to her sister on the phone after they hung up the sister contacted another family member to check on the patient due to confusion and slurred speech.  When the family member arrived, the patient was found down on the ground and unable to get up.  CT head was obtained and was reportedly negative, however, decision made to admit patient for further evaluation for CVA.     On admission, patient was started on aspirin and statin therapy.  An MRI and echo were ordered.  Of note, patient was being treated by PCP for upper respiratory infection at time of presentation.  MRI of the brain resulted and reportedly revealed an acute infarct of the right thalamus.  Neurology consulted and recommends DAPT with aspirin 81 mg + Plavix 75 mg daily for 21 days

## 2024-07-16 NOTE — PROGRESS NOTES
Physical Therapy   [facility]  INPATIENT PHYSICAL THERAPY  Progress Note  [facility] - 8K-20/020-A  Time In: 1330  Time Out: 1430  Timed Code Treatment Minutes: 60 Minutes  Minutes: 60          Date: 2024  Patient Name: Rahel Lopez,  Gender:  female        MRN: [id number]  : 3/4/1931  (89 y.o.)  Referral Date : 24  Referring Practitioner: Kaitlynn Herron DO  Diagnosis: Acute cerebrovascular accident  Additional Pertinent Hx: Per H&P: Rahel Lopez is a 89 y.o. female with PMH significant for GERD, hyperlipidemia, hypertension, and cataracts who was admitted to [facility] on 7/10/2024.Patient initially presented to [facility] ED on 7/10/2024 with concerns due to multiple falls and impaired speech.  She reported that speech impairment began approximately 2 days prior and at one point she noted some left-sided weakness, however, felt that was back to baseline.  On the day of presentation, patient reportedly fell twice in her house.  The first time she was able to get herself back up and did not report the incident to anyone.  However, later in the day she was talking to her sister on the phone after they hung up the sister contacted another family member to check on the patient due to confusion and slurred speech.  When the family member arrived, the patient was found down on the ground and unable to get up.  CT head was obtained and was reportedly negative, however, decision made to admit patient for further evaluation for CVA.MRI of the brain resulted and reportedly revealed an acute infarct of the right thalamus.     Prior Level of Function:  Lives With: Alone  Type of Home: House  Home Layout: One level  Home Access: Stairs to enter with rails  Entrance Stairs - Number of Steps: 2  Entrance Stairs - Rails: Left  Home Equipment: Rollator, Walker - Rolling, Cane    Vitals: Vitals not assessed per clinical judgement, see nursing  transfer in and out of vehicle safely. GOAL NOT MET  Long term goal 6: Patient will improve 5x sit < > stand with use of UE's to less than or equal to 15 seconds to improve LE strength for increased ease with transfers GOAL NOT MET      Following session, patient left in safe position with all fall risk precautions in place.  Janet Chatman , licensed Therapist was present and directly responsible for all treatments provided by, KRIS Machuca.

## 2024-07-16 NOTE — PROGRESS NOTES
[facility]  INPATIENT SPEECH THERAPY  [facility]  PROGRESS NOTE    TIME   SLP Individual Minutes  Time In: 1130  Time Out: 1200  Minutes: 30  Timed Code Treatment Minutes: 30 Minutes       Date: 2024  Patient Name: Rahel Lopez      CSN: [id number]   : 3/4/1931  (89 y.o.)  Gender: female   Referring Physician:  Kaitlynn Herron DO   Diagnosis:  Acute cerebrovascular accident (CVA) (HCC)   Precautions: Fall risk  Current Diet: Regular diet with thin liquids   Respiratory Status: Room Air  Swallowing Strategies:  Full Upright Position, Small Bite/Sip, Medications Whole with Puree, and Alternate Solids and Liquids    Date of Last MBS/FEES: Not Applicable    Pain:  No pain reported.    Subjective:  Patient seen awake in bed. Cooperative and agreeable during ST intervention. No family present.    Short-Term Goals:  SHORT TERM GOAL #1:  Goal 1: Patient will complete immediate/delayed recall tasks with and without use of compensatory memory strategies with 75% accuracy and moderate cuing to improve retention of novel information.-GOAL MET; REVISED GOAL  REVISED GOAL 1: Patient will complete immediate/delayed recall tasks with and without use of compensatory memory strategies with 85% accuracy and moderate cuing to improve retention of novel information.  INTERVENTIONS:  Recall information relevant to ST (Saritha- speech therapist, student at [employer], and 3 languages):  ~24 hours delayed: 1/4 independently, 2/4 given mod cues, 1/4 max cue    SHORT TERM GOAL #2:  Goal 2: Patient will complete problem solving and executive functioning tasks (i.e., medication management, finance management, etc.) with 75% accuracy and moderate cuing to improve completion of IADLs.-GOAL MET; GOAL REVISED  GOAL REVISED:  Patient will complete problem solving and executive functioning tasks (i.e., medication management, finance management, etc.) with 85% accuracy and moderate cuing to improve  completion of IADLs.  INTERVENTIONS:  Problem solving- ADLs-safety:  5/5 independently    Verbal Sequencing- ADLs (laundry, make a bed, washing hair, and making gravy): 4/4 independently  *provided good reasoning, problem solving with sufficient information    Money Management- making change: 5/9 independently. 3/9 given mod cues, 1/9 max cues  *Good retention information and working memory    SHORT TERM GOAL #3:  Goal 3: Patient will complete thought organization tasks with 80% accuracy and moderate cuing to improve completion of ADLs/IADLs. GOAL NOT MET; CONTINUE  INTERVENTIONS:  Prescription Labels: 2/7 independently, 2/7 given mod cues, 3/7 given max cues  *poor scanning and processing speed  *required extra time for completion of task    SHORT TERM GOAL #4:  Goal 4: Patient will complete sustained, selective, alternating, and divided attention tasks with no more than three errors/redirections in one task to allow for safe return to [facility] and potential driving.GOAL NOT ADDRESSED; CONTINUE  INTERVENTIONS:DNT d/t focus on other goals    SHORT TERM GOAL #5:  Goal 5: Patient will recall and implete SOS speech strategies with min assist within structured speech tasks to improve overall overall speech intelligibility to 90% at the conversational level.GOAL NOT ADDRESSED; CONTINUE  INTERVENTIONS:DNT d/t focus on other goals    SHORT TERM GOAL #6:  Goal 6: Patient will consume a regular texture diet with thin liquids and po medications while implementing recommended swallow strategies with stable pulmonary status across 1-3 dietary analysis to meet nutrition/hydration needs.GOAL NOT ADDRESSED; CONTINUE  INTERVENTIONS:DNT d/t focus on other goals    Long-Term Goals:  Time Frame for Long Term Goals: 3 weeks    LONG TERM GOAL #1:  Goal 1: Patient will improve cognitive-linguistic skills to a supervision level (FIMS of 5) in order to resume ADL/IADL completion with least amount of supervision/assistance upon discharge.

## 2024-07-16 NOTE — SIGNIFICANT EVENT
Notified by nursing that during therapy, patient complained of increased fullness in her head, dizziness, and visual disturbance.    I saw patient at bedside.  She is complaining of some intermittent blurred vision.  She initially states that this is new this morning.  Stating that it is improving some.  When asked further, she states that it comes and goes.  She reports having some of this even at home prior to hospitalization.  She also complains of fullness in her sinuses and \"not feeling right\".  Neurologic exam is unchanged from earlier this morning (see progress note).  She is alert and oriented.    Due to reported vision change, CT head ordered.    Electronically signed by Kaitlynn Herron DO on 7/16/2024 at 9:51 AM        CT results reviewed:    Impression:          1. Stable CT scan of the brain, no interval change since previous study dated  7/10/2024.     Checked in on patient this after scan. She reports feeling a little better and that her vision seems to be at baseline.     Electronically signed by Kaitlynn Herron DO on 7/16/2024 at 10:56 AM

## 2024-07-16 NOTE — PROGRESS NOTES
[facility]  Diagnosis List for Inpatient Rehab facility (IRF) - Patient Assessment Instrument (PADMA)    Patient Name: Rahel Lopez        MRN: [id number]    : 3/4/1931  (89 y.o.)  Gender: female     Primary impairment requiring rehabilitation: 1.1 CVA, Left body involvement      Etiologic Diagnosis that led to the condition: Acute CVA, posterior limb of the internal capsule on the right and right thalamus    Comorbid conditions affecting rehabilitation:  Acute CVA, posterior limb of the internal capsule on the right and right thalamus  Left hemiparesis  Sinusitis  Hypertension  Depression  Chronic back and shoulder pain  Impaired mobility and ADLs  Mild cognitive impairment    Electronically signed by Kaitlynn Herron DO on 2024 at 10:06 AM

## 2024-07-16 NOTE — PROGRESS NOTES
[facility]  Inpatient Rehabilitation  Occupational Therapy  Progress Note  Time:  Time In: 0900  Time Out: 09  Timed Code Treatment Minutes: 57 Minutes  Minutes: 57          Date: 2024  Patient Name: Rahel Lopez,   Gender: female      Room: Replaced by [id number]  MRN: [id number]  : 3/4/1931  (89 y.o.)  Referring Practitioner: Attending and Ordering: Kaitlynn Herron DO  Diagnosis: Stroke  Additional Pertinent Hx: Per EMR, \"Rahel Lopez is a 89 y.o. female with PMH significant for GERD, hyperlipidemia, hypertension, and cataracts who was admitted to [facility] on 7/10/2024.  History obtained via: ED documentation, acute care documentation, and patient     Patient initially presented to [facility] ED on 7/10/2024 with concerns due to multiple falls and impaired speech.  She reported that speech impairment began approximately 2 days prior and at one point she noted some left-sided weakness, however, felt that was back to baseline.  On the day of presentation, patient reportedly fell twice in her house.  The first time she was able to get herself back up and did not report the incident to anyone.  However, later in the day she was talking to her sister on the phone after they hung up the sister contacted another family member to check on the patient due to confusion and slurred speech.  When the family member arrived, the patient was found down on the ground and unable to get up.  CT head was obtained and was reportedly negative, however, decision made to admit patient for further evaluation for CVA.     On admission, patient was started on aspirin and statin therapy.  An MRI and echo were ordered.  Of note, patient was being treated by PCP for upper respiratory infection at time of presentation.  MRI of the brain resulted and reportedly revealed an acute infarct of the right thalamus.  Neurology consulted and recommends DAPT with aspirin 81 mg + Plavix 75 mg daily for 21 days followed by

## 2024-07-16 NOTE — PROGRESS NOTES
Physical Medicine & Rehabilitation   Progress Note    Chief Complaint:  CVA    Subjective:  Patient seen and examined this morning on rounds with CARIDAD Bone, MSW, following patient's inpatient Rehab Team Conference. She reports that she was constipated, however, bowels have started moving this morning. She does not want anything for more for bowels as she is worried this will give her loose stools. Sinus still feel full. Vision is back to baseline. Voltaren started yesterday for knee pain- discussed trial on shoulder for chronic pain. No reports of CP or SOB.       Rehabilitation: PT, OT, and SLP updates reviewed during team rounds. See separate note.       Review of Systems:  CONSTITUTIONAL:  negative for  fevers and chills  EYES:  positive for  glasses; negative for diplopia  HEENT:  negative for  sore throat; +for nasal congestion  RESPIRATORY:  negative for  dyspnea and wheezing  CARDIOVASCULAR:  negative for  chest pain, palpitations  GASTROINTESTINAL:  negative f for nausea, vomiting, diarrhea  GENITOURINARY:  negative for urinary symptoms  SKIN:  negative for rash  MUSCULOSKELETAL:  positive for  arthralgias, stiff joints, and decreased range of motion  NEUROLOGICAL:  positive for coordination problems, gait problems, and weakness; negative for numbness and tingling  BEHAVIOR/PSYCH:  negative  System review otherwise negative      Objective:  /67   Pulse 77   Temp 98 °F (36.7 °C) (Oral)   Resp 17   Ht 1.575 m (5' 2\")   Wt 57.8 kg (127 lb 6.8 oz)   SpO2 98%   BMI 23.31 kg/m²   Patient is awake and alert. Walking with OT in funcitonal apartment.   Orientation: She is oriented within conversation  Mood: within normal limits  Affect: calm  General appearance: Patient is well nourished, well developed, well groomed and in no acute distress     Memory: not formally assessed, however, she is able to provide basic history  Attention/Concentration: easily follows content of

## 2024-07-16 NOTE — PROGRESS NOTES
Physical Therapy   [facility]  INPATIENT PHYSICAL THERAPY  DAILY NOTE  [facility] - [id number]  Time In: 0700  Time Out: 0730  Timed Code Treatment Minutes: 30 Minutes  Minutes: 30          Date: 2024  Patient Name: Rahel Lopez,  Gender:  female        MRN: [id number]  : 3/4/1931  (89 y.o.)  Referral Date : 24  Referring Practitioner: Kaitlynn Herron DO  Diagnosis: Acute cerebrovascular accident  Additional Pertinent Hx: Per H&P: Rahel Lopez is a 89 y.o. female with PMH significant for GERD, hyperlipidemia, hypertension, and cataracts who was admitted to [facility] on 7/10/2024.Patient initially presented to [facility] ED on 7/10/2024 with concerns due to multiple falls and impaired speech.  She reported that speech impairment began approximately 2 days prior and at one point she noted some left-sided weakness, however, felt that was back to baseline.  On the day of presentation, patient reportedly fell twice in her house.  The first time she was able to get herself back up and did not report the incident to anyone.  However, later in the day she was talking to her sister on the phone after they hung up the sister contacted another family member to check on the patient due to confusion and slurred speech.  When the family member arrived, the patient was found down on the ground and unable to get up.  CT head was obtained and was reportedly negative, however, decision made to admit patient for further evaluation for CVA.MRI of the brain resulted and reportedly revealed an acute infarct of the right thalamus.     Prior Level of Function:  Lives With: Alone  Type of Home: House  Home Layout: One level  Home Access: Stairs to enter with rails  Entrance Stairs - Number of Steps: 2  Entrance Stairs - Rails: Left  Home Equipment: Rollator, Walker - Rolling, Cane   Bathroom Shower/Tub: Tub/Shower unit  Bathroom Toilet: Standard  Bathroom Equipment:  training, Patient/Caregiver education & training, Therapeutic activities  General Plan:  (5x/wk 90min)    Education  Education:  Learners: Patient  Patient Education: Plan of Care, Precautions/Restrictions, Transfers, Reviewed Prior Education, Gait, Verbal Exercise Instruction, Activity Pacing, Energy Conservation    Goals:  Patient Goals : be able to get around on own  Short Term Goals  Time Frame for Short Term Goals: 1 week  Short Term Goal 1: Patient will complete supine < > sit and rolling with head of bed flat and modified independence to transfer in and out of bed safely.  Short Term Goal 2: Patient will complete sit < > stand with CGA consistently to/from various surfaces to stand to ambulate safely.  Short Term Goal 3: Patient will ambulate 30' with a RW with CGA with left foot clearing from the floor at least 75% of the time to progress towards navigating home safely.  Short Term Goal 4: Patient will ascend/descend 2, 6\" steps with bilateral hands and CGA to prepare for safe home entry.  Short Term Goal 5: Patient will improve tinetti to at least 13/28 to achieve MDC and improve gait and balance.  Long Term Goals  Time Frame for Long Term Goals : 3 weeks from initial evaluation  Long Term Goal 1: Patient will complete sit < > stand with a RW with modified independence to stand to ambulate safely.  Long Term Goal 2: Patient will completed bed < > chair transfer with modified independence to transfer surface to surface safely.  Long Term Goal 3: Patient will ambulate 150' with a rollator with modified independence to navigate home safely.  Long Term Goal 4: Patient will ascend/descend 2, 6\" steps with one hand rail to access/leave home safely.  Long Term Goal 5: Patient will complete car transfer with modified independence to transfer in and out of vehicle safely.  Additional Goals?: Yes  Long term goal 6: Patient will improve 5x sit < > stand with use of UE's to less than or equal to 15 seconds to improve

## 2024-07-16 NOTE — PLAN OF CARE
Problem: Safety - Adult  Goal: Free from fall injury  Outcome: Progressing  Flowsheets (Taken 7/16/2024 0054)  Free From Fall Injury: Instruct family/caregiver on patient safety     Problem: ABCDS Injury Assessment  Goal: Absence of physical injury  Outcome: Progressing  Flowsheets (Taken 7/16/2024 0054)  Absence of Physical Injury: Implement safety measures based on patient assessment     Problem: Pain  Goal: Verbalizes/displays adequate comfort level or baseline comfort level  Outcome: Progressing  Flowsheets (Taken 7/16/2024 0054)  Verbalizes/displays adequate comfort level or baseline comfort level:   Assess pain using appropriate pain scale   Encourage patient to monitor pain and request assistance

## 2024-07-17 LAB
25(OH)D3 SERPL-MCNC: 24 NG/ML (ref 30–100)
ANION GAP SERPL CALC-SCNC: 13 MEQ/L (ref 8–16)
BASOPHILS ABSOLUTE: 0.1 THOU/MM3 (ref 0–0.1)
BASOPHILS NFR BLD AUTO: 1.1 %
BUN SERPL-MCNC: 16 MG/DL (ref 7–22)
CALCIUM SERPL-MCNC: 8.9 MG/DL (ref 8.5–10.5)
CHLORIDE SERPL-SCNC: 96 MEQ/L (ref 98–111)
CO2 SERPL-SCNC: 21 MEQ/L (ref 23–33)
CREAT SERPL-MCNC: 0.5 MG/DL (ref 0.4–1.2)
DEPRECATED RDW RBC AUTO: 45.5 FL (ref 35–45)
EOSINOPHIL NFR BLD AUTO: 2.4 %
EOSINOPHILS ABSOLUTE: 0.2 THOU/MM3 (ref 0–0.4)
ERYTHROCYTE [DISTWIDTH] IN BLOOD BY AUTOMATED COUNT: 13.2 % (ref 11.5–14.5)
GFR SERPL CREATININE-BSD FRML MDRD: 87 ML/MIN/1.73M2
GLUCOSE SERPL-MCNC: 92 MG/DL (ref 70–108)
HCT VFR BLD AUTO: 44.4 % (ref 37–47)
HGB BLD-MCNC: 14 GM/DL (ref 12–16)
IMM GRANULOCYTES # BLD AUTO: 0.07 THOU/MM3 (ref 0–0.07)
IMM GRANULOCYTES NFR BLD AUTO: 0.7 %
LYMPHOCYTES ABSOLUTE: 1.4 THOU/MM3 (ref 1–4.8)
LYMPHOCYTES NFR BLD AUTO: 13.5 %
MCH RBC QN AUTO: 29.9 PG (ref 26–33)
MCHC RBC AUTO-ENTMCNC: 31.5 GM/DL (ref 32.2–35.5)
MCV RBC AUTO: 94.7 FL (ref 81–99)
MONOCYTES ABSOLUTE: 1.4 THOU/MM3 (ref 0.4–1.3)
MONOCYTES NFR BLD AUTO: 13.7 %
NEUTROPHILS ABSOLUTE: 7.1 THOU/MM3 (ref 1.8–7.7)
NEUTROPHILS NFR BLD AUTO: 68.6 %
NRBC BLD AUTO-RTO: 0 /100 WBC
PLATELET # BLD AUTO: 222 THOU/MM3 (ref 130–400)
PMV BLD AUTO: 10.2 FL (ref 9.4–12.4)
POTASSIUM SERPL-SCNC: 4.2 MEQ/L (ref 3.5–5.2)
RBC # BLD AUTO: 4.69 MILL/MM3 (ref 4.2–5.4)
SODIUM SERPL-SCNC: 130 MEQ/L (ref 135–145)
WBC # BLD AUTO: 10.3 THOU/MM3 (ref 4.8–10.8)

## 2024-07-17 PROCEDURE — 92507 TX SP LANG VOICE COMM INDIV: CPT

## 2024-07-17 PROCEDURE — 97129 THER IVNTJ 1ST 15 MIN: CPT

## 2024-07-17 PROCEDURE — 6360000002 HC RX W HCPCS: Performed by: STUDENT IN AN ORGANIZED HEALTH CARE EDUCATION/TRAINING PROGRAM

## 2024-07-17 PROCEDURE — 97530 THERAPEUTIC ACTIVITIES: CPT

## 2024-07-17 PROCEDURE — 36415 COLL VENOUS BLD VENIPUNCTURE: CPT

## 2024-07-17 PROCEDURE — 97116 GAIT TRAINING THERAPY: CPT

## 2024-07-17 PROCEDURE — 82306 VITAMIN D 25 HYDROXY: CPT

## 2024-07-17 PROCEDURE — 85025 COMPLETE CBC W/AUTO DIFF WBC: CPT

## 2024-07-17 PROCEDURE — 97130 THER IVNTJ EA ADDL 15 MIN: CPT

## 2024-07-17 PROCEDURE — 97535 SELF CARE MNGMENT TRAINING: CPT

## 2024-07-17 PROCEDURE — 97110 THERAPEUTIC EXERCISES: CPT

## 2024-07-17 PROCEDURE — 6370000000 HC RX 637 (ALT 250 FOR IP): Performed by: STUDENT IN AN ORGANIZED HEALTH CARE EDUCATION/TRAINING PROGRAM

## 2024-07-17 PROCEDURE — 6370000000 HC RX 637 (ALT 250 FOR IP): Performed by: FAMILY MEDICINE

## 2024-07-17 PROCEDURE — 80048 BASIC METABOLIC PNL TOTAL CA: CPT

## 2024-07-17 PROCEDURE — 99232 SBSQ HOSP IP/OBS MODERATE 35: CPT | Performed by: STUDENT IN AN ORGANIZED HEALTH CARE EDUCATION/TRAINING PROGRAM

## 2024-07-17 PROCEDURE — 1180000000 HC REHAB R&B

## 2024-07-17 RX ADMIN — BUSPIRONE HYDROCHLORIDE 5 MG: 5 TABLET ORAL at 10:23

## 2024-07-17 RX ADMIN — POLYETHYLENE GLYCOL 3350 17 G: 17 POWDER, FOR SOLUTION ORAL at 06:23

## 2024-07-17 RX ADMIN — METOPROLOL TARTRATE 50 MG: 50 TABLET, FILM COATED ORAL at 10:23

## 2024-07-17 RX ADMIN — ENOXAPARIN SODIUM 40 MG: 100 INJECTION SUBCUTANEOUS at 10:27

## 2024-07-17 RX ADMIN — FLUTICASONE PROPIONATE 2 SPRAY: 50 SPRAY, METERED NASAL at 10:24

## 2024-07-17 RX ADMIN — CETIRIZINE HYDROCHLORIDE 10 MG: 10 TABLET ORAL at 10:23

## 2024-07-17 RX ADMIN — GUAIFENESIN 600 MG: 600 TABLET, EXTENDED RELEASE ORAL at 21:21

## 2024-07-17 RX ADMIN — GUAIFENESIN 600 MG: 600 TABLET, EXTENDED RELEASE ORAL at 10:22

## 2024-07-17 RX ADMIN — PANTOPRAZOLE SODIUM 40 MG: 40 TABLET, DELAYED RELEASE ORAL at 06:07

## 2024-07-17 RX ADMIN — ASPIRIN 81 MG 81 MG: 81 TABLET ORAL at 10:22

## 2024-07-17 RX ADMIN — ATORVASTATIN CALCIUM 40 MG: 40 TABLET, FILM COATED ORAL at 21:21

## 2024-07-17 RX ADMIN — DICLOFENAC SODIUM 2 G: 10 GEL TOPICAL at 21:22

## 2024-07-17 RX ADMIN — BUPROPION HYDROCHLORIDE 75 MG: 75 TABLET, FILM COATED ORAL at 10:23

## 2024-07-17 RX ADMIN — FLUTICASONE PROPIONATE 2 SPRAY: 50 SPRAY, METERED NASAL at 21:22

## 2024-07-17 RX ADMIN — METOPROLOL TARTRATE 50 MG: 50 TABLET, FILM COATED ORAL at 21:21

## 2024-07-17 RX ADMIN — CLOPIDOGREL BISULFATE 75 MG: 75 TABLET ORAL at 10:22

## 2024-07-17 RX ADMIN — BUPROPION HYDROCHLORIDE 75 MG: 75 TABLET, FILM COATED ORAL at 21:21

## 2024-07-17 RX ADMIN — DICLOFENAC SODIUM 2 G: 10 GEL TOPICAL at 10:30

## 2024-07-17 RX ADMIN — AMLODIPINE BESYLATE 5 MG: 5 TABLET ORAL at 10:23

## 2024-07-17 RX ADMIN — BUSPIRONE HYDROCHLORIDE 5 MG: 5 TABLET ORAL at 21:21

## 2024-07-17 ASSESSMENT — PAIN SCALES - GENERAL
PAINLEVEL_OUTOF10: 0
PAINLEVEL_OUTOF10: 0

## 2024-07-17 NOTE — PLAN OF CARE
Problem: Discharge Planning  Goal: Discharge to home or other facility with appropriate resources  7/17/2024 1524 by Savanah Mccartney LISW  Note: Team conference held Wednesday, 7/17. Recommendations of the team were explained to the patient by Dr Herron and ADRIAN. Team is recommending that patient continue on acute inpatient rehab for PT, OT and ST, with expected discharge date of Friday, 6/26. Following discharge, team is recommending home health services for RN, PT, OT, ST and HHA. Care plan reviewed with patient. Patient verbalized understanding of the plan of care and contributed to goal setting.     SW met with patient, Mary Pendleton and her sister, Anayeli. SW updated them on team conference, and the recommendation for home health services with supervision at discharge. Patient and family reported understanding with this recommendation. SW offered a family meeting to discuss options and make discharge plans. Family to discuss and follow up with SW.    SW to follow and maintain involvement in discharge planning.

## 2024-07-17 NOTE — PLAN OF CARE
Collaborate with multidisciplinary team to address chronic and comorbid conditions and prevent exacerbation or deterioration   Update acute care plan with appropriate goals if chronic or comorbid symptoms are exacerbated and prevent overall improvement and discharge     Problem: Pain  Goal: Verbalizes/displays adequate comfort level or baseline comfort level  7/17/2024 1250 by Sirisha Parker RN  Outcome: Progressing  Flowsheets (Taken 7/17/2024 1250)  Verbalizes/displays adequate comfort level or baseline comfort level:   Encourage patient to monitor pain and request assistance   Assess pain using appropriate pain scale   Administer analgesics based on type and severity of pain and evaluate response   Implement non-pharmacological measures as appropriate and evaluate response   Notify Licensed Independent Practitioner if interventions unsuccessful or patient reports new pain     Problem: Nutrition Deficit:  Goal: Optimize nutritional status  Outcome: Progressing   Care plan reviewed with patient.  Patient verbalizes understanding of the care plan and contributed to goal setting.

## 2024-07-17 NOTE — CONSULTS
Department of Family Practice  Consult Note        Reason for Consult:  Medical management while on the Inpatient Rehab unit.    Requesting Physician:  Dr Herron    CHIEF COMPLAINT:   The need to continue the intensive time with therapies following the acute hospital stay.    History Obtained From:  patient, EMR    HISTORY OF PRESENT ILLNESS:              The patient is a 89 y.o. female with significant past medical history of       Diagnosis Date    Acute CVA (cerebrovascular accident) ([facility]) 7/11/2024    Arthritis     Cataract of both eyes     GERD (gastroesophageal reflux disease)     Hyperlipidemia     Hypertension     Keratosis     benign lichemoid    Psychiatric problem       who presents with having a series of falls and speech troubles at home. She presented to the ED and was admitted. She was found to have had an acute CVA. She did not require any neurological intervention. Now that she is more stable she has come to the Inpatient Rehab Unit to continue the time with therapies prior to a discharge disposition being made.      Past Medical History:        Diagnosis Date    Acute CVA (cerebrovascular accident) ([facility]) 7/11/2024    Arthritis     Cataract of both eyes     GERD (gastroesophageal reflux disease)     Hyperlipidemia     Hypertension     Keratosis     benign lichemoid    Psychiatric problem      Past Surgical History:        Procedure Laterality Date    ABDOMEN SURGERY      APPENDECTOMY  1934    CHOLECYSTECTOMY  1999    COLON SURGERY  2000    resection for diverticulitis    COLONOSCOPY      DILATATION, ESOPHAGUS      ENDOSCOPY, COLON, DIAGNOSTIC      HYSTERECTOMY (CERVIX STATUS UNKNOWN)  1979    JOINT REPLACEMENT Right     right knee    KNEE ARTHROPLASTY Right 02/27/2021    RIGHT TOTAL KNEE REVISION WITH OSS performed by Jared Burns MD at [facility] OR    KNEE SURGERY  2010    ROTATOR CUFF REPAIR Left 2006     Current Medications:   Current Facility-Administered Medications: diclofenac sodium (VOLTAREN)  1 % gel 2 g, 2 g, Topical, BID  guaiFENesin (MUCINEX) extended release tablet 600 mg, 600 mg, Oral, BID  amLODIPine (NORVASC) tablet 5 mg, 5 mg, Oral, Daily  busPIRone (BUSPAR) tablet 5 mg, 5 mg, Oral, BID  metoprolol tartrate (LOPRESSOR) tablet 50 mg, 50 mg, Oral, BID  pantoprazole (PROTONIX) tablet 40 mg, 40 mg, Oral, QAM AC  sodium chloride flush 0.9 % injection 5-40 mL, 5-40 mL, IntraVENous, 2 times per day  sodium chloride flush 0.9 % injection 5-40 mL, 5-40 mL, IntraVENous, PRN  0.9 % sodium chloride infusion, , IntraVENous, PRN  potassium chloride (KLOR-CON M) extended release tablet 40 mEq, 40 mEq, Oral, PRN **OR** potassium bicarb-citric acid (EFFER-K) effervescent tablet 40 mEq, 40 mEq, Oral, PRN **OR** potassium chloride 10 mEq/100 mL IVPB (Peripheral Line), 10 mEq, IntraVENous, PRN  magnesium sulfate 2000 mg in 50 mL IVPB premix, 2,000 mg, IntraVENous, PRN  enoxaparin (LOVENOX) injection 40 mg, 40 mg, SubCUTAneous, Daily  polyethylene glycol (GLYCOLAX) packet 17 g, 17 g, Oral, Daily PRN  aspirin chewable tablet 81 mg, 81 mg, Oral, Daily  atorvastatin (LIPITOR) tablet 40 mg, 40 mg, Oral, Nightly  buPROPion (WELLBUTRIN) tablet 75 mg, 75 mg, Oral, BID  fluticasone (FLONASE) 50 MCG/ACT nasal spray 2 spray, 2 spray, Each Nostril, BID  cetirizine (ZYRTEC) tablet 10 mg, 10 mg, Oral, Daily  sodium chloride flush 0.9 % injection 5-40 mL, 5-40 mL, IntraVENous, PRN  0.9 % sodium chloride infusion, , IntraVENous, PRN  senna (SENOKOT) tablet 8.6 mg, 1 tablet, Oral, Daily PRN  clopidogrel (PLAVIX) tablet 75 mg, 75 mg, Oral, Daily  acetaminophen (TYLENOL) tablet 650 mg, 650 mg, Oral, Q4H PRN  ondansetron (ZOFRAN-ODT) disintegrating tablet 4 mg, 4 mg, Oral, Q8H PRN  melatonin tablet 3 mg, 3 mg, Oral, Nightly PRN  Allergies:  Penicillins, Tramadol, and Vicodin [hydrocodone-acetaminophen]    Social History:   MARITAL STATUS:      Family History:       Problem Relation Age of Onset    Heart Attack Mother      REVIEW

## 2024-07-17 NOTE — PROGRESS NOTES
[facility]  Occupational Therapy  Daily Note  Time:   Time In: 0730  Time Out: 0900  Timed Code Treatment Minutes: 90 Minutes  Minutes: 90          Date: 2024  Patient Name: Rahel Lopez,   Gender: female      [facility]  MRN: [id number]  : 3/4/1931  (89 y.o.)  Referring Practitioner: Attending and Ordering: Kaitlynn Herron DO  Diagnosis: Stroke  Additional Pertinent Hx: Per EMR, \"Rahel Lopez is a 89 y.o. female with PMH significant for GERD, hyperlipidemia, hypertension, and cataracts who was admitted to [facility] on 7/10/2024.  History obtained via: ED documentation, acute care documentation, and patient     Patient initially presented to [facility] ED on 7/10/2024 with concerns due to multiple falls and impaired speech.  She reported that speech impairment began approximately 2 days prior and at one point she noted some left-sided weakness, however, felt that was back to baseline.  On the day of presentation, patient reportedly fell twice in her house.  The first time she was able to get herself back up and did not report the incident to anyone.  However, later in the day she was talking to her sister on the phone after they hung up the sister contacted another family member to check on the patient due to confusion and slurred speech.  When the family member arrived, the patient was found down on the ground and unable to get up.  CT head was obtained and was reportedly negative, however, decision made to admit patient for further evaluation for CVA.     On admission, patient was started on aspirin and statin therapy.  An MRI and echo were ordered.  Of note, patient was being treated by PCP for upper respiratory infection at time of presentation.  MRI of the brain resulted and reportedly revealed an acute infarct of the right thalamus.  Neurology consulted and recommends DAPT with aspirin 81 mg + Plavix 75 mg daily for 21 days  continues activity in standing position in order to fold towels, tolerating ~12min activity prior to requiring seated rest break.    BALANCE:  Sitting Balance:  Stand By Assistance.    Standing Balance: Contact Guard Assistance, X 1, with cues for safety, with verbal cues , with increased time for completion. With RW, see activities above    BED MOBILITY:  Not Tested    TRANSFERS:  Sit to Stand:  Contact Guard Assistance, X 1, with increased time for completion, cues for hand placement, with verbal cues, to/from chair with arms. To/from chair without arms  Stand to Sit: Contact Guard Assistance, X 1, with increased time for completion, cues for hand placement, with verbal cues, to/from chair with arms, to/from chair without arms.      FUNCTIONAL MOBILITY:  Assistive Device: Rolling Walker  Assist Level:  Contact Guard Assistance, X 1, with verbal cues , and with increased time for completion.   Distance: To and from bathroom, Within room, and within therapy apartment room.  Patient depended to and from therapy apartment in w/c.     ADDITIONAL ACTIVITIES:  Increased time spent with patient regarding emotional support and active listening regarding concerns with decreased motivation with daily activities, patient appreciative.    Modified Cain Scale:  +3 - Moderate disability; requiring some help, but able to walk without physical assistance (SBA/CGA).   Patient could not live alone but could walk from one room to another without physical help from another person.  Education provided regarding stroke rehabilitation management.    ASSESSMENT:     Activity Tolerance:  Patient tolerance of  treatment: Good treatment tolerance       Discharge Recommendations: Continue to assess pending progress  Equipment Recommendations: Equipment Needed: No  Plan: Times Per Week: 5x/week x 90 minutes  Times Per Day: Once a day  Current Treatment Recommendations: Strengthening, Cognitive reorientation, Balance training, Self-Care /

## 2024-07-17 NOTE — PLAN OF CARE
Problem: Safety - Adult  Goal: Free from fall injury  7/16/2024 2354 by Rosalinda Barragan RN  Outcome: Progressing  Flowsheets (Taken 7/16/2024 2354)  Free From Fall Injury: Instruct family/caregiver on patient safety     Problem: ABCDS Injury Assessment  Goal: Absence of physical injury  7/16/2024 2354 by Rosalinda Barragan RN  Outcome: Progressing  Flowsheets (Taken 7/16/2024 2354)  Absence of Physical Injury: Implement safety measures based on patient assessment     Problem: Pain  Goal: Verbalizes/displays adequate comfort level or baseline comfort level  7/16/2024 2354 by Rosalinda Barragan RN  Outcome: Progressing  Flowsheets (Taken 7/16/2024 2354)  Verbalizes/displays adequate comfort level or baseline comfort level:   Assess pain using appropriate pain scale   Encourage patient to monitor pain and request assistance

## 2024-07-17 NOTE — PROGRESS NOTES
[facility]  INPATIENT SPEECH THERAPY  [facility]  DAILY NOTE    TIME   SLP Individual Minutes  Time In: 1400  Time Out: 1430  Minutes: 30  Timed Code Treatment Minutes: 30 Minutes       Date: 2024  Patient Name: Rahel Lopez      CSN: [id number]   : 3/4/1931  (89 y.o.)  Gender: female   Referring Physician:  Kaitlynn Herron DO   Diagnosis:  Acute cerebrovascular accident (CVA) (HCC)   Precautions: Fall risk  Current Diet: Regular diet with thin liquids   Respiratory Status: Room Air  Swallowing Strategies:  Full Upright Position, Small Bite/Sip, Medications Whole with Puree, and Alternate Solids and Liquids    Date of Last MBS/FEES: Not Applicable    Pain:  No pain reported.    Subjective: Patient seen awake in armchair. Cooperative and agreeable during ST intervention. Her daughter, sister, and son present in the session. Family education provided.  FAMILY EDUCATION RELATED TO COGNITIVE FUNCTIONING:   *Reviewed rationale for speech therapy services discussing patient's specific cognitive impairments and how they relate to PLOF as well as performance during specific contextualized treatment, progress towards goals, and areas for continued difficulty.  *Discussed the following areas reviewing progress, suggestions for home and further recommendations  -Memory- Provided education on the various types of memory, compensatory memory strategies (write it, repeat it, associate it, picture it), and specific examples related to implementing strategies at home (ie: use of calendar for events/appointments, notepad for making lists and keeping notes). Encouraged patient to stick with one method of organization (ie: writing information on a single calendar) to aid in organization (patient reporting that she sometimes will also input information into her phone).   -Organization-  --Medications management- Reviewed recommendations for use of a pill box to improve organization,  highlighting importance of maintaining a consistent routine (filling box/cup at the same time, and taking medication at the same time each day). Discussed having spouse review/supervise organization process to ensure accuracy.  --Finance management- Reviewed errors noted with organization of information and transcribing numbers, highlighting the importance of double checking work for accuracy and working through tasks slowly.  -Reviewed recommendations for refraining from driving until medically cleared by a physician, and/or completion of driving simulator eval  -Also discussed activities to complete on a daily basis to continue to facilitate cognitive functioning (reading the newspaper, card games, cross word puzzles, etc).  -Discussed the following areas of concern memory, medication management, financial management, attention, and speech intelligible.   Patient, patient's daughter, patient's son, and patient's sister expressed understanding.     Short-Term Goals:  SHORT TERM GOAL #1:  Goal 1: Patient will complete immediate/delayed recall tasks with and without use of compensatory memory strategies with 85% accuracy and moderate cuing to improve retention of novel information.  INTERVENTIONS:DNT d/t focus on other goals    SHORT TERM GOAL #2:  Goal 2: Patient will complete problem solving and executive functioning tasks (i.e., medication management, finance management, etc.) with 85% accuracy and moderate cuing to improve completion of IADLs.  INTERVENTIONS:  Check Writing -real check: 3/5 independently ( Date, signature, cost), 2/5 given visual assist ( For, payable to the order of)  *Patient's daughter presented in the session, and had patient's check that need to pay for insurance. Patient practice Check writing with real check  *Able to add all information in correct order, required visual assist to read words with small fonts  *Good processing speed and timing.    SHORT TERM GOAL #3:  Goal 3: Patient will

## 2024-07-17 NOTE — PROGRESS NOTES
shower, Grab bars around toilet, Tub transfer bench  Bathroom Accessibility: Accessible    ADL Assistance: Independent  Homemaking Assistance: Independent  Homemaking Responsibilities: Yes  Ambulation Assistance: Independent (uses rollator or cane in the house, 2WW in community)  Transfer Assistance: Independent  Active : Yes  Additional Comments: Patient reports using rollator or cane in the house if she feels good, leaves RW in garage and uses that in community. Pt has 2 daughters that live close, both work part time.  Pt has a sister that lives close.    Restrictions/Precautions:  Restrictions/Precautions: Fall Risk, General Precautions  Position Activity Restriction  Other position/activity restrictions: wear shoes- pt has leg length discrepancy and right built up shoe     SUBJECTIVE: Patient in bedside chair upon arrival and agreeable to therapy.     PAIN: 7/10: R shoulder pain    Vitals: Vitals not assessed per clinical judgement, see nursing flowsheet    OBJECTIVE:  Bed Mobility:  Not Tested    Transfers:  Sit to Stand: Stand By Assistance, with increased time for completion, cues for hand placement, with verbal cues  Stand to Sit:Stand By Assistance, with increased time for completion, cues for hand placement, with verbal cues  **cues to lock./unlock 4WW brakes prior to transfers.     Ambulation:  Stand By Assistance, Contact Guard Assistance, X 1  Distance: ~90ft, 20ft  Surface: Level Tile  Device: Rolling Walker  Gait Deviations:  Forward Flexed Posture, Slow Yessenia, Decreased Step Length Bilaterally, Decreased Gait Speed, Decreased Heel Strike on Left, Decreased Foot Clearance Left, and fairly steady  Ambulation 2:  Stand By Assistance, Contact Guard Assistance, X 1  Distance: 60ft  Surface: Level Tile  Device: 4 Wheeled Walker  Gait Deviations:  Forward Flexed Posture, Slow Yessenia, Decreased Step Length Bilaterally, Decreased Heel Strike on Left, Decreased Foot Clearance Left, and fairly  and modified independence to transfer in and out of bed safely.  Short Term Goal 2: Patient will complete sit < > stand with CGA consistently to/from various surfaces to stand to ambulate safely.  Short Term Goal 3: Patient will ambulate 30' with a RW with CGA with left foot clearing from the floor at least 75% of the time to progress towards navigating home safely.  Short Term Goal 4: Patient will ascend/descend 2, 6\" steps with bilateral hands and CGA to prepare for safe home entry.  Short Term Goal 5: Patient will improve tinetti to at least 13/28 to achieve MDC and improve gait and balance.  Long Term Goals  Time Frame for Long Term Goals : 3 weeks from initial evaluation  Long Term Goal 1: Patient will complete sit < > stand with a RW with modified independence to stand to ambulate safely.  Long Term Goal 2: Patient will completed bed < > chair transfer with modified independence to transfer surface to surface safely.  Long Term Goal 3: Patient will ambulate 150' with a rollator with modified independence to navigate home safely.  Long Term Goal 4: Patient will ascend/descend 2, 6\" steps with one hand rail to access/leave home safely.  Long Term Goal 5: Patient will complete car transfer with modified independence to transfer in and out of vehicle safely.  Additional Goals?: Yes  Long term goal 6: Patient will improve 5x sit < > stand with use of UE's to less than or equal to 15 seconds to improve LE strength for increased ease with transfers    Following session, patient left in safe position with all fall risk precautions in place.

## 2024-07-17 NOTE — PROGRESS NOTES
[facility]  INPATIENT PHYSICAL THERAPY  DAILY NOTE  [facility] - 8K-20/020-A    Time In: 0930  Time Out: 1000  Timed Code Treatment Minutes: 30 Minutes  Minutes: 30          Date: 2024  Patient Name: Rahel Lopez,  Gender:  female        MRN: [id number]  : 3/4/1931  (89 y.o.)  Referral Date : 24  Referring Practitioner: Kaitlynn Herron DO  Diagnosis: Acute cerebrovascular accident  Additional Pertinent Hx: Per H&P: Rahel Lopez is a 89 y.o. female with PMH significant for GERD, hyperlipidemia, hypertension, and cataracts who was admitted to [facility] on 7/10/2024.Patient initially presented to [facility] ED on 7/10/2024 with concerns due to multiple falls and impaired speech.  She reported that speech impairment began approximately 2 days prior and at one point she noted some left-sided weakness, however, felt that was back to baseline.  On the day of presentation, patient reportedly fell twice in her house.  The first time she was able to get herself back up and did not report the incident to anyone.  However, later in the day she was talking to her sister on the phone after they hung up the sister contacted another family member to check on the patient due to confusion and slurred speech.  When the family member arrived, the patient was found down on the ground and unable to get up.  CT head was obtained and was reportedly negative, however, decision made to admit patient for further evaluation for CVA.MRI of the brain resulted and reportedly revealed an acute infarct of the right thalamus.     Prior Level of Function:  Lives With: Alone  Type of Home: House  Home Layout: One level  Home Access: Stairs to enter with rails  Entrance Stairs - Number of Steps: 2  Entrance Stairs - Rails: Left  Home Equipment: Rollator, Walker - Rolling, Cane   Bathroom Shower/Tub: Tub/Shower unit  Bathroom Toilet: Standard  Bathroom Equipment: Grab bars in  transfer in and out of bed safely.  Short Term Goal 2: Patient will complete sit < > stand with CGA consistently to/from various surfaces to stand to ambulate safely.  Short Term Goal 3: Patient will ambulate 30' with a RW with CGA with left foot clearing from the floor at least 75% of the time to progress towards navigating home safely.  Short Term Goal 4: Patient will ascend/descend 2, 6\" steps with bilateral hands and CGA to prepare for safe home entry.  Short Term Goal 5: Patient will improve tinetti to at least 13/28 to achieve MDC and improve gait and balance.  Long Term Goals  Time Frame for Long Term Goals : 3 weeks from initial evaluation  Long Term Goal 1: Patient will complete sit < > stand with a RW with modified independence to stand to ambulate safely.  Long Term Goal 2: Patient will completed bed < > chair transfer with modified independence to transfer surface to surface safely.  Long Term Goal 3: Patient will ambulate 150' with a rollator with modified independence to navigate home safely.  Long Term Goal 4: Patient will ascend/descend 2, 6\" steps with one hand rail to access/leave home safely.  Long Term Goal 5: Patient will complete car transfer with modified independence to transfer in and out of vehicle safely.  Additional Goals?: Yes  Long term goal 6: Patient will improve 5x sit < > stand with use of UE's to less than or equal to 15 seconds to improve LE strength for increased ease with transfers    Following session, patient left in safe position with all fall risk precautions in place.

## 2024-07-17 NOTE — PROGRESS NOTES
Patient: Rahel Lopez  Unit/Bed: 8K-20/020-A  YOB: 1931  MRN: [id number] Acct: [id number]   Admitting Diagnosis: Stroke (HCC) [I63.9]  Acute cerebrovascular accident (CVA) (HCC) [I63.9]  Admit Date:  7/12/2024  Hospital Day: 5    Assessment:     Principal Problem:    Acute cerebrovascular accident (CVA) (HCC)  Active Problems:    Hypertension  Resolved Problems:    * No resolved hospital problems. *      Plan:     Continue to follow        Subjective:     Patient has no complaint of CP or SOB..   Medication side effects: none    Scheduled Meds:   diclofenac sodium  2 g Topical BID    guaiFENesin  600 mg Oral BID    amLODIPine  5 mg Oral Daily    busPIRone  5 mg Oral BID    metoprolol tartrate  50 mg Oral BID    pantoprazole  40 mg Oral QAM AC    sodium chloride flush  5-40 mL IntraVENous 2 times per day    enoxaparin  40 mg SubCUTAneous Daily    aspirin  81 mg Oral Daily    atorvastatin  40 mg Oral Nightly    buPROPion  75 mg Oral BID    fluticasone  2 spray Each Nostril BID    cetirizine  10 mg Oral Daily    clopidogrel  75 mg Oral Daily     Continuous Infusions:   sodium chloride      sodium chloride       PRN Meds:sodium chloride flush, sodium chloride, potassium chloride **OR** potassium alternative oral replacement **OR** potassium chloride, magnesium sulfate, polyethylene glycol, sodium chloride flush, sodium chloride, senna, acetaminophen, ondansetron, melatonin    Review of Systems  Pertinent items are noted in HPI.    Objective:     Patient Vitals for the past 8 hrs:   BP Temp Temp src Pulse Resp SpO2   07/17/24 1018 122/60 97.9 °F (36.6 °C) Oral 73 16 100 %     I/O last 3 completed shifts:  In: 835 [P.O.:825; I.V.:10]  Out: -   I/O this shift:  In: 210 [P.O.:210]  Out: -     /60   Pulse 73   Temp 97.9 °F (36.6 °C) (Oral)   Resp 16   Ht 1.575 m (5' 2\")   Wt 57.8 kg (127 lb 6.8 oz)   SpO2 100%   BMI 23.31 kg/m²     General appearance: alert, appears stated age, and

## 2024-07-17 NOTE — PROGRESS NOTES
[facility]  Recreational Therapy  Daily Note  [facility]    Time Spent with Patient: 10 minutes    Date:  7/17/2024       Patient Name: Rahel Lopez      MRN: [id number]      YOB: 1931 (89 y.o.)       Gender: female  Diagnosis: Stroke  Referring Practitioner: Attending and Ordering: Kaitlynn Herron DO    RESTRICTIONS/PRECAUTIONS:  Restrictions/Precautions: Fall Risk, General Precautions     Hearing: Exceptions to WFL  Hearing Exceptions: Bilateral hearing aid, Hard of hearing/hearing concerns    PAIN: 0    SUBJECTIVE:  I would like that     OBJECTIVE:  Pt would like to get her hair washed and styled by our beautician tomorrow-states she will get some money from her family and RT left note in room for a [id number]          Patient Education  New Education Provided: Importance of Leisure,     Electronically signed by: Alida Jacques, CTRS  Date: 7/17/2024

## 2024-07-18 LAB
ANION GAP SERPL CALC-SCNC: 15 MEQ/L (ref 8–16)
BUN SERPL-MCNC: 11 MG/DL (ref 7–22)
CALCIUM SERPL-MCNC: 8.8 MG/DL (ref 8.5–10.5)
CHLORIDE SERPL-SCNC: 97 MEQ/L (ref 98–111)
CO2 SERPL-SCNC: 20 MEQ/L (ref 23–33)
CREAT SERPL-MCNC: 0.4 MG/DL (ref 0.4–1.2)
GFR SERPL CREATININE-BSD FRML MDRD: > 90 ML/MIN/1.73M2
GLUCOSE SERPL-MCNC: 95 MG/DL (ref 70–108)
POTASSIUM SERPL-SCNC: 4.3 MEQ/L (ref 3.5–5.2)
SODIUM SERPL-SCNC: 132 MEQ/L (ref 135–145)

## 2024-07-18 PROCEDURE — 80048 BASIC METABOLIC PNL TOTAL CA: CPT

## 2024-07-18 PROCEDURE — 99232 SBSQ HOSP IP/OBS MODERATE 35: CPT | Performed by: STUDENT IN AN ORGANIZED HEALTH CARE EDUCATION/TRAINING PROGRAM

## 2024-07-18 PROCEDURE — 97129 THER IVNTJ 1ST 15 MIN: CPT

## 2024-07-18 PROCEDURE — 1180000000 HC REHAB R&B

## 2024-07-18 PROCEDURE — 6370000000 HC RX 637 (ALT 250 FOR IP): Performed by: STUDENT IN AN ORGANIZED HEALTH CARE EDUCATION/TRAINING PROGRAM

## 2024-07-18 PROCEDURE — 6370000000 HC RX 637 (ALT 250 FOR IP): Performed by: FAMILY MEDICINE

## 2024-07-18 PROCEDURE — 36415 COLL VENOUS BLD VENIPUNCTURE: CPT

## 2024-07-18 PROCEDURE — 97112 NEUROMUSCULAR REEDUCATION: CPT

## 2024-07-18 PROCEDURE — 97130 THER IVNTJ EA ADDL 15 MIN: CPT

## 2024-07-18 PROCEDURE — 97110 THERAPEUTIC EXERCISES: CPT

## 2024-07-18 PROCEDURE — 6360000002 HC RX W HCPCS: Performed by: STUDENT IN AN ORGANIZED HEALTH CARE EDUCATION/TRAINING PROGRAM

## 2024-07-18 PROCEDURE — 97535 SELF CARE MNGMENT TRAINING: CPT

## 2024-07-18 PROCEDURE — 97116 GAIT TRAINING THERAPY: CPT

## 2024-07-18 PROCEDURE — 97530 THERAPEUTIC ACTIVITIES: CPT

## 2024-07-18 RX ORDER — VITAMIN B COMPLEX
5000 TABLET ORAL DAILY
Status: DISCONTINUED | OUTPATIENT
Start: 2024-07-18 | End: 2024-07-26 | Stop reason: HOSPADM

## 2024-07-18 RX ADMIN — FLUTICASONE PROPIONATE 2 SPRAY: 50 SPRAY, METERED NASAL at 22:43

## 2024-07-18 RX ADMIN — FLUTICASONE PROPIONATE 2 SPRAY: 50 SPRAY, METERED NASAL at 10:04

## 2024-07-18 RX ADMIN — BUPROPION HYDROCHLORIDE 75 MG: 75 TABLET, FILM COATED ORAL at 10:04

## 2024-07-18 RX ADMIN — METOPROLOL TARTRATE 50 MG: 50 TABLET, FILM COATED ORAL at 10:03

## 2024-07-18 RX ADMIN — METOPROLOL TARTRATE 50 MG: 50 TABLET, FILM COATED ORAL at 22:44

## 2024-07-18 RX ADMIN — BUPROPION HYDROCHLORIDE 75 MG: 75 TABLET, FILM COATED ORAL at 22:44

## 2024-07-18 RX ADMIN — ENOXAPARIN SODIUM 40 MG: 100 INJECTION SUBCUTANEOUS at 10:05

## 2024-07-18 RX ADMIN — BUSPIRONE HYDROCHLORIDE 5 MG: 5 TABLET ORAL at 22:44

## 2024-07-18 RX ADMIN — DICLOFENAC SODIUM 2 G: 10 GEL TOPICAL at 22:44

## 2024-07-18 RX ADMIN — PANTOPRAZOLE SODIUM 40 MG: 40 TABLET, DELAYED RELEASE ORAL at 05:55

## 2024-07-18 RX ADMIN — AMLODIPINE BESYLATE 5 MG: 5 TABLET ORAL at 10:04

## 2024-07-18 RX ADMIN — ATORVASTATIN CALCIUM 40 MG: 40 TABLET, FILM COATED ORAL at 22:44

## 2024-07-18 RX ADMIN — GUAIFENESIN 600 MG: 600 TABLET, EXTENDED RELEASE ORAL at 10:04

## 2024-07-18 RX ADMIN — Medication 5000 UNITS: at 10:02

## 2024-07-18 RX ADMIN — CETIRIZINE HYDROCHLORIDE 10 MG: 10 TABLET ORAL at 10:03

## 2024-07-18 RX ADMIN — DICLOFENAC SODIUM 2 G: 10 GEL TOPICAL at 10:06

## 2024-07-18 RX ADMIN — BUSPIRONE HYDROCHLORIDE 5 MG: 5 TABLET ORAL at 10:03

## 2024-07-18 RX ADMIN — CLOPIDOGREL BISULFATE 75 MG: 75 TABLET ORAL at 10:03

## 2024-07-18 RX ADMIN — ASPIRIN 81 MG 81 MG: 81 TABLET ORAL at 10:04

## 2024-07-18 RX ADMIN — GUAIFENESIN 600 MG: 600 TABLET, EXTENDED RELEASE ORAL at 22:44

## 2024-07-18 ASSESSMENT — PAIN DESCRIPTION - LOCATION: LOCATION: KNEE

## 2024-07-18 ASSESSMENT — PAIN SCALES - GENERAL
PAINLEVEL_OUTOF10: 1
PAINLEVEL_OUTOF10: 0

## 2024-07-18 ASSESSMENT — PAIN - FUNCTIONAL ASSESSMENT: PAIN_FUNCTIONAL_ASSESSMENT: ACTIVITIES ARE NOT PREVENTED

## 2024-07-18 ASSESSMENT — PAIN DESCRIPTION - DESCRIPTORS: DESCRIPTORS: ACHING

## 2024-07-18 ASSESSMENT — PAIN DESCRIPTION - ORIENTATION: ORIENTATION: RIGHT;LEFT

## 2024-07-18 NOTE — CARE COORDINATION
Ambulating fairly well with stay with me protocol.  1 assist with gait belt and walker.  Noted blood in commode.  Noted to have a hx of rectal fissures.  MD Herron is aware per shift report.  Denies pain, turns self in bed and will also turn with staff encouragement

## 2024-07-18 NOTE — PROGRESS NOTES
[facility]  INPATIENT SPEECH THERAPY  [facility]  DAILY NOTE    TIME   SLP Individual Minutes  Time In: 1200  Time Out: 1230  Minutes: 30  Timed Code Treatment Minutes: 30 Minutes       Date: 2024  Patient Name: Rahel Lopez      CSN: [id number]   : 3/4/1931  (89 y.o.)  Gender: female   Referring Physician:  Kaitlynn Herron DO   Diagnosis:  Acute cerebrovascular accident (CVA) (HCC)   Precautions: Fall risk  Current Diet: Regular diet with thin liquids   Respiratory Status: Room Air  Swallowing Strategies:  Full Upright Position, Small Bite/Sip, Medications Whole with Puree, and Alternate Solids and Liquids    Date of Last MBS/FEES: Not Applicable    Pain:  No pain reported.    Subjective: Patient seen awake in armchair. Cooperative and agreeable during ST intervention. No family present    Short-Term Goals:  SHORT TERM GOAL #1:  Goal 1: Patient will complete immediate/delayed recall tasks with and without use of compensatory memory strategies with 85% accuracy and moderate cuing to improve retention of novel information.  INTERVENTIONS:  Recall functional information- Appointment- ( 09/10/ 2024- @9:30 am, Dr. Abad, cardiologist, [facility])  Immediate recall: 2/5 independently, 3/5 given min verbal cues  ~15 minutes delayed recall: 4/5 min cues, 1/5 unable to recall  *poor recall and retention information  *very confused this date, for delayed recall unable to recall she wrote information on notepad, which she was able to do in previous sessions.   *Patient demonstrating self awareness of breakdowns in memory    SHORT TERM GOAL #2:  Goal 2: Patient will complete problem solving and executive functioning tasks (i.e., medication management, finance management, etc.) with 85% accuracy and moderate cuing to improve completion of IADLs.  INTERVENTIONS:  Evaluating pill box with errors- simple: 1/2 given mod cue, 1/2 given max cue  *Decreased mental flexibly for completion  this task  *Decreased direction following, reasoning, and processing speed  *Required extra time to comprehend  *Initially ST attempted similar task of increased complexity,  however modifications needed given poor success.      SHORT TERM GOAL #3:  Goal 3: Patient will complete thought organization tasks with 80% accuracy and moderate cuing to improve completion of ADLs/IADLs.   INTERVENTIONS:DNT d/t focus on other goals    SHORT TERM GOAL #4:  Goal 4: Patient will complete sustained, selective, alternating, and divided attention tasks with no more than three errors/redirections in one task to allow for safe return to [facility] and potential driving.  INTERVENTIONS:  Divided attention- sorting black and red cards: x0 errors ~2 minutes, 10 seconds  Patient asked to sort black, and red cards while saying \"halloween\" and \"ajith\" for black and red card.     Sustained attention- sort odd and even numbers: x4 erros  *Initially ST attempted to complete complex divided attention by sorting odd, even and face numbers while saying words, but patient unable to follow direction  *Decreased attention and working memory  *Decrease mental flexibility, patient unable to follow the new task instructor, she repeated instructions from the first task for second task   *ST modified tasks to simpler tasks in this date   *Attempted written aides to help with working memory, however not successful.     SHORT TERM GOAL #5:  Goal 5: Patient will recall and implete SOS speech strategies with min assist within structured speech tasks to improve overall overall speech intelligibility to 90% at the conversational level.  INTERVENTIONS:DNT d/t focus on other goals    SHORT TERM GOAL #6:  Goal 6: Patient will consume a regular texture diet with thin liquids and po medications while implementing recommended swallow strategies with stable pulmonary status across 1-3 dietary analysis to meet nutrition/hydration needs.  INTERVENTIONS:DNT d/t focus on

## 2024-07-18 NOTE — PROGRESS NOTES
Physical Medicine & Rehabilitation   Progress Note    Chief Complaint:  CVA    Subjective:  Patient seen and examined. She still endorses depression/low mood. No reports of any CP or SOB. Continue to endorse \"sinus pressure\" which she states has been going on for months. Recommends trial of her PRN tylenol. Mucinex discontinued as she has been on most of the week and no improvement. Completed course of abx. Continue Flonase and zyrtec. Psychology saw today. Earlier in the weekend noted to have some blood on stool which she attributed to history of anal fissures. No reports today.     Rehabilitation:  PT 07/18/2024:  Bed Mobility:  Supine to Sit: Stand By Assistance  Sit to Supine: Stand By Assistance   HOB flat and 1 HR.     Transfers:  Sit to Stand: Stand By Assistance, Contact Guard Assistance  Stand to Sit:Stand By Assistance, Contact Guard Assistance  Car:Stand By Assistance        Poor eccentric control for stand to sit. Pt variable SBA to CGA with transfers. She requires CGA for transfers from lower surfaces such as the bed and toilet. She demonstrates safe hand placement with transfers.     Pt completed car transfer with SBA. She demonstrates good carryover of previous education with safe hand placement for entry and exit.     Ambulation:  Stand By Assistance  Distance: 12' x 2, 50', 10' x 2, ~50' with neurofacilitation task  Surface: Level Tile  Device: Rolling Walker  Gait Deviations:  Forward Flexed Posture, Slow Yessenia, Decreased Step Length on Left, Decreased Gait Speed, Decreased Heel Strike on Left, Decreased Foot Clearance Left, Decreased Terminal Knee Extension, and Increased reliance on assistive device      Pt ambulates with shortened step length and decrease LLE foot clearance decreasing fluidity of gait. Cues to take larger steps  and picking up her LLE improved foot clearance with fair carryover.  Pt notes she may have been ambulating with LLE shuffling prior to CVA however no one had  non-distended  Skin: warm and dry, no rash or erythema on exposed skin      Diagnostics:   Recent Results (from the past 24 hour(s))   CBC with Auto Differential    Collection Time: 07/19/24  6:50 AM   Result Value Ref Range    WBC 9.1 4.8 - 10.8 thou/mm3    RBC 4.25 4.20 - 5.40 mill/mm3    Hemoglobin 12.4 12.0 - 16.0 gm/dl    Hematocrit 38.0 37.0 - 47.0 %    MCV 89.4 81.0 - 99.0 fL    MCH 29.2 26.0 - 33.0 pg    MCHC 32.6 32.2 - 35.5 gm/dl    RDW-CV 13.1 11.5 - 14.5 %    RDW-SD 43.0 35.0 - 45.0 fL    Platelets 230 130 - 400 thou/mm3    MPV 10.5 9.4 - 12.4 fL    Seg Neutrophils 63.0 %    Lymphocytes 18.1 %    Monocytes % 12.6 %    Eosinophils 4.5 %    Basophils 1.1 %    Immature Granulocytes % 0.7 %    Neutrophils Absolute 5.7 1.8 - 7.7 thou/mm3    Lymphocytes Absolute 1.6 1.0 - 4.8 thou/mm3    Monocytes Absolute 1.1 0.4 - 1.3 thou/mm3    Eosinophils Absolute 0.4 0.0 - 0.4 thou/mm3    Basophils Absolute 0.1 0.0 - 0.1 thou/mm3    Immature Grans (Abs) 0.06 0.00 - 0.07 thou/mm3    nRBC 0 /100 wbc   Basic Metabolic Panel    Collection Time: 07/19/24  6:50 AM   Result Value Ref Range    Sodium 127 (L) 135 - 145 meq/L    Potassium 4.4 3.5 - 5.2 meq/L    Chloride 92 (L) 98 - 111 meq/L    CO2 23 23 - 33 meq/L    Glucose 92 70 - 108 mg/dL    BUN 14 7 - 22 mg/dL    Creatinine 0.5 0.4 - 1.2 mg/dL    Calcium 8.6 8.5 - 10.5 mg/dL   Anion Gap    Collection Time: 07/19/24  6:50 AM   Result Value Ref Range    Anion Gap 12.0 8.0 - 16.0 meq/L   Glomerular Filtration Rate, Estimated    Collection Time: 07/19/24  6:50 AM   Result Value Ref Range    Est, Glom Filt Rate 87 >60 ml/min/1.73m2       Impression:  Acute CVA, posterior limb of the internal capsule on the right and right thalamus  Left hemiparesis  Sinusitis  Hypertension  Depression  Chronic back and shoulder pain  Impaired mobility and ADLs  Mild cognitive impairment     Plan:  Continue inpatient rehabilitation program involving at least 3 hours per day, 5 days per week of

## 2024-07-18 NOTE — PLAN OF CARE
Problem: Discharge Planning  Goal: Discharge to home or other facility with appropriate resources  7/18/2024 1332 by Saundra Alston RN  Outcome: Progressing  Flowsheets (Taken 7/18/2024 1332)  Discharge to home or other facility with appropriate resources:   Identify barriers to discharge with patient and caregiver   Arrange for needed discharge resources and transportation as appropriate   Identify discharge learning needs (meds, wound care, etc)   Refer to discharge planning if patient needs post-hospital services based on physician order or complex needs related to functional status, cognitive ability or social support system  Note: Patients discharge is planned for 6/26 home with home health.  7/18/2024 1151 by Savanah Mccartney LISW  Note: SW received a message from patient's daughter, Mary, wanting to schedule a family meeting.    SW spoke with Mary on this date. Family meeting was scheduled for Monday, 7/22 at 2 PM. Mary reported concerns regarding patient's memory and depression. Mary reports that patient has been dealing with depression for the past 2 years. Family is supportive and encourages positive self talk, but has not seen improvement. Mary is hopeful a consult with psychology will help. Mary had no outstanding needs or concerns at this time.    SW will follow and maintain involvement in discharge planning.   7/18/2024 0220 by Lanny Orellana RN  Outcome: Progressing  Flowsheets (Taken 7/17/2024 2059)  Discharge to home or other facility with appropriate resources: Identify barriers to discharge with patient and caregiver     Problem: Safety - Adult  Goal: Free from fall injury  7/18/2024 1332 by Saundra Alston RN  Outcome: Progressing  Flowsheets (Taken 7/18/2024 1332)  Free From Fall Injury:   Instruct family/caregiver on patient safety   Based on caregiver fall risk screen, instruct family/caregiver to ask for assistance with transferring infant if caregiver noted to have fall

## 2024-07-18 NOTE — PLAN OF CARE
Problem: Discharge Planning  Goal: Discharge to home or other facility with appropriate resources  7/18/2024 1151 by Savanah Mccartney LISW  Note: SW received a message from patient's daughter, Mary, wanting to schedule a family meeting.    SW spoke with Mary on this date. Family meeting was scheduled for Monday, 7/22 at 2 PM. Mary reported concerns regarding patient's memory and depression. Mary reports that patient has been dealing with depression for the past 2 years. Family is supportive and encourages positive self talk, but has not seen improvement. Mary is hopeful a consult with psychology will help. Mary had no outstanding needs or concerns at this time.    SW will follow and maintain involvement in discharge planning.

## 2024-07-18 NOTE — PROGRESS NOTES
Physical Therapy   [facility]  INPATIENT PHYSICAL THERAPY  DAILY NOTE  [facility] - 8K-20/020-A  Time In: 0730  Time Out: 0900  Timed Code Treatment Minutes: 90 Minutes  Minutes: 90          Date: 2024  Patient Name: Rahel Lopez,  Gender:  female        MRN: [id number]  : 3/4/1931  (89 y.o.)  Referral Date : 24  Referring Practitioner: Kaitlynn Herron DO  Diagnosis: Acute cerebrovascular accident  Additional Pertinent Hx: Per H&P: Rahel Lopez is a 89 y.o. female with PMH significant for GERD, hyperlipidemia, hypertension, and cataracts who was admitted to [facility] on 7/10/2024.Patient initially presented to [facility] ED on 7/10/2024 with concerns due to multiple falls and impaired speech.  She reported that speech impairment began approximately 2 days prior and at one point she noted some left-sided weakness, however, felt that was back to baseline.  On the day of presentation, patient reportedly fell twice in her house.  The first time she was able to get herself back up and did not report the incident to anyone.  However, later in the day she was talking to her sister on the phone after they hung up the sister contacted another family member to check on the patient due to confusion and slurred speech.  When the family member arrived, the patient was found down on the ground and unable to get up.  CT head was obtained and was reportedly negative, however, decision made to admit patient for further evaluation for CVA.MRI of the brain resulted and reportedly revealed an acute infarct of the right thalamus.     Prior Level of Function:  Lives With: Alone  Type of Home: House  Home Layout: One level  Home Access: Stairs to enter with rails  Entrance Stairs - Number of Steps: 2  Entrance Stairs - Rails: Left  Home Equipment: Rollator, Walker - Rolling, Cane   Bathroom Shower/Tub: Tub/Shower unit  Bathroom Toilet: Standard  Bathroom Equipment:

## 2024-07-18 NOTE — PROGRESS NOTES
[facility]  Recreational Therapy  Daily Note  [facility]    Time Spent with Patient: 25 minutes    Date:  7/18/2024       Patient Name: Rahel Lopez      MRN: [id number]      YOB: 1931 (89 y.o.)       Gender: female  Diagnosis: Stroke  Referring Practitioner: Attending and Ordering: Kaitlynn Herron DO    RESTRICTIONS/PRECAUTIONS:  Restrictions/Precautions: Fall Risk, General Precautions     Hearing: Exceptions to WFL  Hearing Exceptions: Bilateral hearing aid, Hard of hearing/hearing concerns    PAIN: 0    SUBJECTIVE:  I don't know if I need another perm yet or not    OBJECTIVE:  Pt enjoyed getting her hair washed and styled by our beautician-some confusion noted but very pleasant-daughter also present-both appreciative         Patient Education  New Education Provided: Importance of Leisure,     Electronically signed by: Alida Jacques, CTRS  Date: 7/18/2024

## 2024-07-18 NOTE — PROGRESS NOTES
Physical Medicine & Rehabilitation   Progress Note    Chief Complaint:  CVA    Subjective:  Patient seen and examined this morning. She is laying in bed. She complains of her rigth heel hurting. No signs of skin breakdown or redness. No tenderness on palpation. Patient is about to get up with PT. No reports of CP or SOB. Has 2 BM yesterday. She asked \"what can you do to fix my appetite\". Discussed that her decreased appetite may be related to low mood. Discussed the addition of Remeron which could help with both mood and appetite but patient declined at this time. Psychology was consulted and will likely see her tomorrow.     Rehabilitation:  PT 07/17/2024:  Transfers:  Sit to Stand: Contact Guard Assistance, with increased time for completion, cues for hand placement  Stand to Sit:Contact Guard Assistance, cues for hand placement, with verbal cues     Ambulation:  Contact Guard Assistance, with verbal cues   Distance: ~100'x1 ; 15' x1   Surface: Level Tile  Device: Rolling Walker  Gait Deviations:  Forward Flexed Posture, Slow Yessenia, Decreased Step Length Bilaterally, Decreased Weight Shift Left, Decreased Gait Speed, Decreased Heel Strike Bilaterally, Decreased Foot Clearance Left, Mild Path Deviations, Unsteady Gait, and Increased reliance on assistive device     Balance:  Static Standing Balance: Stand By Assistance, Contact Guard Assistance  Dynamic Standing Balance: Contact Guard Assistance  **Standing in BR with intermittent UE support on RW while completing clothing management. Pt able to bend down to ankles and pull pants up with CGA.      Exercise:  Patient was guided in 1 set(s) 10-15 reps of exercise to both lower extremities.  Upper trunk rotations, Seated marches, Seated hamstring curls, Seated heel/toe raises, Long arc quads, Seated abduction/adduction, and Scapular retraction.  Exercises were completed for increased independence with functional mobility.      OT 07/17/2024:  ADL:   Feeding:

## 2024-07-18 NOTE — PLAN OF CARE
Problem: Discharge Planning  Goal: Discharge to home or other facility with appropriate resources  7/18/2024 0220 by Lanny Orellana RN  Outcome: Progressing  Flowsheets (Taken 7/17/2024 2059)  Discharge to home or other facility with appropriate resources: Identify barriers to discharge with patient and caregiver     Problem: Safety - Adult  Goal: Free from fall injury  7/18/2024 0220 by Lanny Orellana RN  Outcome: Progressing     Problem: ABCDS Injury Assessment  Goal: Absence of physical injury  7/18/2024 0220 by Lanny Orellana RN  Outcome: Progressing     Problem: Skin/Tissue Integrity  Goal: Absence of new skin breakdown  Description: 1.  Monitor for areas of redness and/or skin breakdown  2.  Assess vascular access sites hourly  3.  Every 4-6 hours minimum:  Change oxygen saturation probe site  4.  Every 4-6 hours:  If on nasal continuous positive airway pressure, respiratory therapy assess nares and determine need for appliance change or resting period.  7/18/2024 0220 by Lanny Orellana RN  Outcome: Progressing     Problem: Chronic Conditions and Co-morbidities  Goal: Patient's chronic conditions and co-morbidity symptoms are monitored and maintained or improved  7/18/2024 0220 by Lanny Orellana RN  Outcome: Progressing  Flowsheets (Taken 7/17/2024 2059)  Care Plan - Patient's Chronic Conditions and Co-Morbidity Symptoms are Monitored and Maintained or Improved: Monitor and assess patient's chronic conditions and comorbid symptoms for stability, deterioration, or improvement     Problem: Pain  Goal: Verbalizes/displays adequate comfort level or baseline comfort level  7/18/2024 0220 by Lanny Orellana RN  Outcome: Progressing  Flowsheets (Taken 7/17/2024 2104)  Verbalizes/displays adequate comfort level or baseline comfort level:   Encourage patient to monitor pain and request assistance   Assess pain using appropriate pain scale   Administer analgesics based on type and severity of pain and evaluate

## 2024-07-18 NOTE — PROGRESS NOTES
[facility]  Occupational Therapy  Daily Note  Time:    Time In: 1030  Time Out: 1200  Timed Code Treatment Minutes: 90 Minutes  Minutes: 90          Date: 2024  Patient Name: Rahel Lopez,   Gender: female      Room: [facility]20/020-A  MRN: [id number]  : 3/4/1931  (89 y.o.)  Referring Practitioner: Attending and Ordering: Kaitlynn Herron DO  Diagnosis: Stroke  Additional Pertinent Hx: Per EMR, \"Rahel Lopez is a 89 y.o. female with PMH significant for GERD, hyperlipidemia, hypertension, and cataracts who was admitted to [facility] on 7/10/2024.  History obtained via: ED documentation, acute care documentation, and patient     Patient initially presented to [facility] ED on 7/10/2024 with concerns due to multiple falls and impaired speech.  She reported that speech impairment began approximately 2 days prior and at one point she noted some left-sided weakness, however, felt that was back to baseline.  On the day of presentation, patient reportedly fell twice in her house.  The first time she was able to get herself back up and did not report the incident to anyone.  However, later in the day she was talking to her sister on the phone after they hung up the sister contacted another family member to check on the patient due to confusion and slurred speech.  When the family member arrived, the patient was found down on the ground and unable to get up.  CT head was obtained and was reportedly negative, however, decision made to admit patient for further evaluation for CVA.     On admission, patient was started on aspirin and statin therapy.  An MRI and echo were ordered.  Of note, patient was being treated by PCP for upper respiratory infection at time of presentation.  MRI of the brain resulted and reportedly revealed an acute infarct of the right thalamus.  Neurology consulted and recommends DAPT with aspirin 81 mg + Plavix 75 mg daily for 21 days  treatment tolerance       Discharge Recommendations: Home with assistance of aide, Home with Home Health OT, and and increased support to safely transition home   Equipment Recommendations: Equipment Needed: No  Plan: Times Per Week: 5x/week x 90 minutes  Times Per Day: Once a day  Current Treatment Recommendations: Strengthening, Cognitive reorientation, Balance training, Self-Care / ADL, Home management training, Safety education & training, Functional mobility training, Neuromuscular re-education, Cognitive/Perceptual training, Patient/Caregiver education & training, Endurance training, Equipment evaluation, education, & procurement    Education:  Learners: Patient  Role of OT, Plan of Care, ADL's, Equipment Education, Home Safety, Assistive Device Safety, and Education Related to Stroke Diagnosis    Goals  Short Term Goals  Time Frame for Short Term Goals: 2 weeks from admission on IP rehab  Short Term Goal 1: Pt will increased L UE 9 hole peg score by 15 seconds to increase indep with ADL/IADL routine.  Short Term Goal 2: Pt will complete dynamic standing task x 5 minutes with 2 UE release and SBA to increase indep with all sinkside grooming.  Short Term Goal 3: Pt will complete LB dressing with SBA to increase indep and endurance in home environment.  Short Term Goal 4: Pt will demo functional mobility HH distances with SBA to increase IND with toileting.  Long Term Goals  Time Frame for Long Term Goals : 3 weeks from admission on IP rehab  Long Term Goal 1: Pt will complete full ADL routine including shower in Tub/shower Mod Indep to increase occupational preformance in home environment.  Long Term Goal 2: Pt will complete simple IADL with Mod indep to increase indep with cooking simple meals in home.    Following session, patient left in safe position with all fall risk precautions in place.

## 2024-07-19 LAB
ANION GAP SERPL CALC-SCNC: 12 MEQ/L (ref 8–16)
BASOPHILS ABSOLUTE: 0.1 THOU/MM3 (ref 0–0.1)
BASOPHILS NFR BLD AUTO: 1.1 %
BUN SERPL-MCNC: 14 MG/DL (ref 7–22)
CALCIUM SERPL-MCNC: 8.6 MG/DL (ref 8.5–10.5)
CHLORIDE SERPL-SCNC: 92 MEQ/L (ref 98–111)
CO2 SERPL-SCNC: 23 MEQ/L (ref 23–33)
CREAT SERPL-MCNC: 0.5 MG/DL (ref 0.4–1.2)
DEPRECATED RDW RBC AUTO: 43 FL (ref 35–45)
EOSINOPHIL NFR BLD AUTO: 4.5 %
EOSINOPHILS ABSOLUTE: 0.4 THOU/MM3 (ref 0–0.4)
ERYTHROCYTE [DISTWIDTH] IN BLOOD BY AUTOMATED COUNT: 13.1 % (ref 11.5–14.5)
GFR SERPL CREATININE-BSD FRML MDRD: 87 ML/MIN/1.73M2
GLUCOSE SERPL-MCNC: 92 MG/DL (ref 70–108)
HCT VFR BLD AUTO: 38 % (ref 37–47)
HGB BLD-MCNC: 12.4 GM/DL (ref 12–16)
IMM GRANULOCYTES # BLD AUTO: 0.06 THOU/MM3 (ref 0–0.07)
IMM GRANULOCYTES NFR BLD AUTO: 0.7 %
LYMPHOCYTES ABSOLUTE: 1.6 THOU/MM3 (ref 1–4.8)
LYMPHOCYTES NFR BLD AUTO: 18.1 %
MCH RBC QN AUTO: 29.2 PG (ref 26–33)
MCHC RBC AUTO-ENTMCNC: 32.6 GM/DL (ref 32.2–35.5)
MCV RBC AUTO: 89.4 FL (ref 81–99)
MONOCYTES ABSOLUTE: 1.1 THOU/MM3 (ref 0.4–1.3)
MONOCYTES NFR BLD AUTO: 12.6 %
NEUTROPHILS ABSOLUTE: 5.7 THOU/MM3 (ref 1.8–7.7)
NEUTROPHILS NFR BLD AUTO: 63 %
NRBC BLD AUTO-RTO: 0 /100 WBC
PLATELET # BLD AUTO: 230 THOU/MM3 (ref 130–400)
PMV BLD AUTO: 10.5 FL (ref 9.4–12.4)
POTASSIUM SERPL-SCNC: 4.4 MEQ/L (ref 3.5–5.2)
RBC # BLD AUTO: 4.25 MILL/MM3 (ref 4.2–5.4)
SODIUM SERPL-SCNC: 127 MEQ/L (ref 135–145)
SODIUM SERPL-SCNC: 127 MEQ/L (ref 135–145)
WBC # BLD AUTO: 9.1 THOU/MM3 (ref 4.8–10.8)

## 2024-07-19 PROCEDURE — 84295 ASSAY OF SERUM SODIUM: CPT

## 2024-07-19 PROCEDURE — 99232 SBSQ HOSP IP/OBS MODERATE 35: CPT | Performed by: STUDENT IN AN ORGANIZED HEALTH CARE EDUCATION/TRAINING PROGRAM

## 2024-07-19 PROCEDURE — 97110 THERAPEUTIC EXERCISES: CPT

## 2024-07-19 PROCEDURE — 97530 THERAPEUTIC ACTIVITIES: CPT

## 2024-07-19 PROCEDURE — 36415 COLL VENOUS BLD VENIPUNCTURE: CPT

## 2024-07-19 PROCEDURE — 6370000000 HC RX 637 (ALT 250 FOR IP): Performed by: STUDENT IN AN ORGANIZED HEALTH CARE EDUCATION/TRAINING PROGRAM

## 2024-07-19 PROCEDURE — 80048 BASIC METABOLIC PNL TOTAL CA: CPT

## 2024-07-19 PROCEDURE — 85025 COMPLETE CBC W/AUTO DIFF WBC: CPT

## 2024-07-19 PROCEDURE — 6360000002 HC RX W HCPCS: Performed by: STUDENT IN AN ORGANIZED HEALTH CARE EDUCATION/TRAINING PROGRAM

## 2024-07-19 PROCEDURE — 97112 NEUROMUSCULAR REEDUCATION: CPT

## 2024-07-19 PROCEDURE — 90791 PSYCH DIAGNOSTIC EVALUATION: CPT | Performed by: PSYCHOLOGIST

## 2024-07-19 PROCEDURE — 92526 ORAL FUNCTION THERAPY: CPT

## 2024-07-19 PROCEDURE — 97129 THER IVNTJ 1ST 15 MIN: CPT

## 2024-07-19 PROCEDURE — 1180000000 HC REHAB R&B

## 2024-07-19 PROCEDURE — 97535 SELF CARE MNGMENT TRAINING: CPT

## 2024-07-19 PROCEDURE — 6370000000 HC RX 637 (ALT 250 FOR IP): Performed by: FAMILY MEDICINE

## 2024-07-19 RX ADMIN — DICLOFENAC SODIUM 2 G: 10 GEL TOPICAL at 08:32

## 2024-07-19 RX ADMIN — METOPROLOL TARTRATE 50 MG: 50 TABLET, FILM COATED ORAL at 08:30

## 2024-07-19 RX ADMIN — BUPROPION HYDROCHLORIDE 75 MG: 75 TABLET, FILM COATED ORAL at 20:08

## 2024-07-19 RX ADMIN — METOPROLOL TARTRATE 50 MG: 50 TABLET, FILM COATED ORAL at 20:08

## 2024-07-19 RX ADMIN — Medication 5000 UNITS: at 08:30

## 2024-07-19 RX ADMIN — CETIRIZINE HYDROCHLORIDE 10 MG: 10 TABLET ORAL at 08:30

## 2024-07-19 RX ADMIN — DICLOFENAC SODIUM 2 G: 10 GEL TOPICAL at 20:08

## 2024-07-19 RX ADMIN — AMLODIPINE BESYLATE 5 MG: 5 TABLET ORAL at 08:30

## 2024-07-19 RX ADMIN — PANTOPRAZOLE SODIUM 40 MG: 40 TABLET, DELAYED RELEASE ORAL at 05:56

## 2024-07-19 RX ADMIN — GUAIFENESIN 600 MG: 600 TABLET, EXTENDED RELEASE ORAL at 08:30

## 2024-07-19 RX ADMIN — BUSPIRONE HYDROCHLORIDE 5 MG: 5 TABLET ORAL at 08:30

## 2024-07-19 RX ADMIN — BUPROPION HYDROCHLORIDE 75 MG: 75 TABLET, FILM COATED ORAL at 08:30

## 2024-07-19 RX ADMIN — FLUTICASONE PROPIONATE 2 SPRAY: 50 SPRAY, METERED NASAL at 08:30

## 2024-07-19 RX ADMIN — FLUTICASONE PROPIONATE 2 SPRAY: 50 SPRAY, METERED NASAL at 20:08

## 2024-07-19 RX ADMIN — ATORVASTATIN CALCIUM 40 MG: 40 TABLET, FILM COATED ORAL at 20:08

## 2024-07-19 RX ADMIN — BUSPIRONE HYDROCHLORIDE 5 MG: 5 TABLET ORAL at 20:08

## 2024-07-19 RX ADMIN — ENOXAPARIN SODIUM 40 MG: 100 INJECTION SUBCUTANEOUS at 08:30

## 2024-07-19 RX ADMIN — CLOPIDOGREL BISULFATE 75 MG: 75 TABLET ORAL at 08:30

## 2024-07-19 RX ADMIN — ASPIRIN 81 MG 81 MG: 81 TABLET ORAL at 08:30

## 2024-07-19 NOTE — PROGRESS NOTES
Catheter inserted over   J guidewire  Physical Therapy   [facility]  INPATIENT PHYSICAL THERAPY  DAILY NOTE  [facility] - 8K-20/020-A  Time In: 0930  Time Out: 1100  Timed Code Treatment Minutes: 90 Minutes  Minutes: 90          Date: 2024  Patient Name: Rahel Lopez,  Gender:  female        MRN: [id number]  : 3/4/1931  (89 y.o.)  Referral Date : 24  Referring Practitioner: Kaitlynn Herron DO  Diagnosis: Acute cerebrovascular accident  Additional Pertinent Hx: Per H&P: Rahel Lopez is a 89 y.o. female with PMH significant for GERD, hyperlipidemia, hypertension, and cataracts who was admitted to [facility] on 7/10/2024.Patient initially presented to [facility] ED on 7/10/2024 with concerns due to multiple falls and impaired speech.  She reported that speech impairment began approximately 2 days prior and at one point she noted some left-sided weakness, however, felt that was back to baseline.  On the day of presentation, patient reportedly fell twice in her house.  The first time she was able to get herself back up and did not report the incident to anyone.  However, later in the day she was talking to her sister on the phone after they hung up the sister contacted another family member to check on the patient due to confusion and slurred speech.  When the family member arrived, the patient was found down on the ground and unable to get up.  CT head was obtained and was reportedly negative, however, decision made to admit patient for further evaluation for CVA.MRI of the brain resulted and reportedly revealed an acute infarct of the right thalamus.     Prior Level of Function:  Lives With: Alone  Type of Home: House  Home Layout: One level  Home Access: Stairs to enter with rails  Entrance Stairs - Number of Steps: 2  Entrance Stairs - Rails: Left  Home Equipment: Rollator, Walker - Rolling, Cane   Bathroom Shower/Tub: Tub/Shower unit  Bathroom Toilet: Standard  Bathroom Equipment:  extremities.  Ankle pumps, Glut sets, Seated marches, Seated hamstring curls, Seated heel/toe raises, Long arc quads, Seated isometric hip adduction, Seated abduction/adduction, Mini squats, and 10 sit<>stand, seated modified core crunch, seated retro leans. Exercises were completed for increased independence with functional mobility.    Functional Outcome Measures:  Not completed  Modified Cain Scale:  +3 - Moderate disability; requiring some help, but able to walk without physical assistance (SBA/CGA).   Patient could not live alone but could walk from one room to another without physical help from another person.  Education provided regarding stroke rehabilitation management.    ASSESSMENT:  Assessment: Patient progressing toward established goals. and She would continue to benefit from skilled therapy services in order to address deficits in strength, balance, endurance limiting her overall independence with mobility  Activity Tolerance:  Patient tolerance of  treatment: good.   Equipment Recommendations:Equipment Needed:  (monitor for need for wheelchair)  Discharge Recommendations: Continue to assess pending progress and Patient would benefit from continued PT at discharge  Plan: Current Treatment Recommendations: Strengthening, Balance training, Functional mobility training, Transfer training, Gait training, Endurance training, Neuromuscular re-education, Wheelchair mobility training, Home exercise program, Pain management, Safety education & training, Patient/Caregiver education & training, Therapeutic activities  General Plan:  (5x/wk 90min)    Education:  Learners: Patient  Patient Education: Plan of Care, Home Exercise Program, Education Related to Diagnosis, Bed Mobility, Equipment Education, Transfers, Reviewed Prior Education, Gait, Use of Gait Belt, Verbal Exercise Instruction, Activity Pacing, Energy Conservation    Goals:  Patient Goals : be able to get around on own  Short Term Goals  Time Frame

## 2024-07-19 NOTE — PROGRESS NOTES
--------------- APPROVED REPORT --------------





EKG Measurement

Heart [id number]

AK [id number]

[id number]

[id number]

[id number]





Normal sinus rhythm

Normal ECG Patient educated on how to use incentive spirometer. Patient verbalized understanding and demonstrated proper use. Emphasized importance and usage of device, with coughing and deep breathing every 4 hours while awake.

## 2024-07-19 NOTE — PROGRESS NOTES
[facility]  Occupational Therapy  Daily Note  Time:   Time In: 1340  Time Out: 1510  Timed Code Treatment Minutes: 90 Minutes  Minutes: 90          Date: 2024  Patient Name: Rahel Lopez,   Gender: female      Room: [facility]20/020-A  MRN: [id number]  : 3/4/1931  (89 y.o.)  Referring Practitioner: Attending and Ordering: Kaitlynn Herron DO  Diagnosis: Stroke  Additional Pertinent Hx: Per EMR, \"Rahel Lopez is a 89 y.o. female with PMH significant for GERD, hyperlipidemia, hypertension, and cataracts who was admitted to [facility] on 7/10/2024.  History obtained via: ED documentation, acute care documentation, and patient     Patient initially presented to [facility] ED on 7/10/2024 with concerns due to multiple falls and impaired speech.  She reported that speech impairment began approximately 2 days prior and at one point she noted some left-sided weakness, however, felt that was back to baseline.  On the day of presentation, patient reportedly fell twice in her house.  The first time she was able to get herself back up and did not report the incident to anyone.  However, later in the day she was talking to her sister on the phone after they hung up the sister contacted another family member to check on the patient due to confusion and slurred speech.  When the family member arrived, the patient was found down on the ground and unable to get up.  CT head was obtained and was reportedly negative, however, decision made to admit patient for further evaluation for CVA.     On admission, patient was started on aspirin and statin therapy.  An MRI and echo were ordered.  Of note, patient was being treated by PCP for upper respiratory infection at time of presentation.  MRI of the brain resulted and reportedly revealed an acute infarct of the right thalamus.  Neurology consulted and recommends DAPT with aspirin 81 mg + Plavix 75 mg daily for 21 days

## 2024-07-19 NOTE — PLAN OF CARE
Problem: Discharge Planning  Goal: Discharge to home or other facility with appropriate resources  7/19/2024 1315 by Deb Veloz RN  Outcome: Progressing  Flowsheets (Taken 7/19/2024 1315)  Discharge to home or other facility with appropriate resources: Identify barriers to discharge with patient and caregiver     Problem: Safety - Adult  Goal: Free from fall injury  7/19/2024 1315 by Deb Veloz RN  Outcome: Progressing  Flowsheets (Taken 7/19/2024 1315)  Free From Fall Injury: Instruct family/caregiver on patient safety     Problem: ABCDS Injury Assessment  Goal: Absence of physical injury  7/19/2024 1315 by Deb Veloz RN  Outcome: Progressing  Flowsheets (Taken 7/19/2024 1315)  Absence of Physical Injury: Implement safety measures based on patient assessment     Problem: Pain  Goal: Verbalizes/displays adequate comfort level or baseline comfort level  7/19/2024 1315 by Deb Veloz RN  Outcome: Progressing  Flowsheets (Taken 7/19/2024 1315)  Verbalizes/displays adequate comfort level or baseline comfort level:   Encourage patient to monitor pain and request assistance   Assess pain using appropriate pain scale     Problem: Nutrition Deficit:  Goal: Optimize nutritional status  Outcome: Progressing  Flowsheets (Taken 7/19/2024 1315)  Nutrient intake appropriate for improving, restoring, or maintaining nutritional needs:   Assess nutritional status and recommend course of action   Monitor oral intake, labs, and treatment plans

## 2024-07-19 NOTE — PROGRESS NOTES
Patient: Rahel Lopez  Unit/Bed: 8K-20/020-A  YOB: 1931  MRN: [id number] Acct: [id number]   Admitting Diagnosis: Stroke (HCC) [I63.9]  Acute cerebrovascular accident (CVA) (HCC) [I63.9]  Admit Date:  7/12/2024  Hospital Day: 6    This patient was seen earlier in the day.    Assessment:     Principal Problem:    Acute cerebrovascular accident (CVA) (HCC)  Active Problems:    Hypertension  Resolved Problems:    * No resolved hospital problems. *      Plan:     Supplement the vit D        Subjective:     Patient has no complaint of CP or SOB..   Medication side effects: none    Scheduled Meds:   Vitamin D  5,000 Units Oral Daily    diclofenac sodium  2 g Topical BID    guaiFENesin  600 mg Oral BID    amLODIPine  5 mg Oral Daily    busPIRone  5 mg Oral BID    metoprolol tartrate  50 mg Oral BID    pantoprazole  40 mg Oral QAM AC    sodium chloride flush  5-40 mL IntraVENous 2 times per day    enoxaparin  40 mg SubCUTAneous Daily    aspirin  81 mg Oral Daily    atorvastatin  40 mg Oral Nightly    buPROPion  75 mg Oral BID    fluticasone  2 spray Each Nostril BID    cetirizine  10 mg Oral Daily    clopidogrel  75 mg Oral Daily     Continuous Infusions:   sodium chloride      sodium chloride       PRN Meds:sodium chloride flush, sodium chloride, potassium chloride **OR** potassium alternative oral replacement **OR** potassium chloride, magnesium sulfate, polyethylene glycol, sodium chloride flush, sodium chloride, senna, acetaminophen, ondansetron, melatonin    Review of Systems  Pertinent items are noted in HPI.    Objective:     No data found.  I/O last 3 completed shifts:  In: 1050 [P.O.:1050]  Out: -   No intake/output data recorded.    /65   Pulse 70   Temp 97.9 °F (36.6 °C) (Oral)   Resp 20   Ht 1.575 m (5' 2\")   Wt 57.8 kg (127 lb 6.8 oz)   SpO2 99%   BMI 23.31 kg/m²     General appearance: alert, appears stated age, and cooperative  Head: Normocephalic, without obvious abnormality,

## 2024-07-19 NOTE — CONSULTS
[place], [place]     PSYCHOLOGY CONSULTATION REPORT    TO:Kaitlynn Herron DO  PATIENT: Rahel Lopez  : 3/4/1931   MR NUMBER: [id number]  FROM: Barb Luis, Ph. D.   DATE: 2024      REPORT OF CONSULTATION: FINDINGS, OPINIONS and RECOMMENDATIONS     REASON FOR REFERRAL: The patient was referred for evaluation due to concerns about emotional status and coping in the wake of recent admission. This occurred after she was admitted with acute CVA and has a hx of depression going back several years.     Patient presents with the following presenting problems:   Patient Active Problem List   Diagnosis    Hypertension    Closed displaced fracture of medial condyle of right femur (HCC)    Closed comminuted supracondylar fracture of right femur (HCC)    Displaced fracture of medial condyle of right femur, sequela    Reactive depression    Closed fracture of base of fifth metacarpal bone of left hand    History of cataract    Dysarthria    Acute CVA (cerebrovascular accident) (HCC)    Acute cerebrovascular accident (CVA) (HCC)       BASIS OF EVALUATION:   Clinical assessment of the patient, review of medical records, with attending physician, extended (phone) conversation with family members, son Godfrey and daughter Mary.     BEHAVIORAL OBSERVATIONS: The patient presents as a 89 y.o.-year-old female of  descent. Grooming was WNL. Interpersonally, the patient was pleasant, but very reserved and didn't elaborate much on questions I asked her. Cooperation with assessment was good. Level of consciousness was alert.    Affect was mood-congruent, very sad. Mood was depressed, anxious. Memory is fair for her age. Receptive language was intact, and expressive language was intact. Attention/concentration was fair. Judgment and problem solving were fair to WNL.  Frustration tolerance was fair.     PRESENTING PROBLEM: The patient acknowledged having difficulty with depression and anxiety,  chronic pain. Primary coping strategies involve reliance. Support systems are children and grandchildren, sister.    Staff report noticing difficulties with sadness, lethargy, slowed responses     MEDICAL HISTORY:   Past Medical History:   Diagnosis Date    Acute CVA (cerebrovascular accident) ([facility]) 7/11/2024    Arthritis     Cataract of both eyes     GERD (gastroesophageal reflux disease)     Hyperlipidemia     Hypertension     Keratosis     benign lichemoid    Psychiatric problem         SOCIAL HISTORY:   Social History     Socioeconomic History    Marital status:      Spouse name: Not on file    Number of children: 7    Years of education: High school    Highest education level: Not on file   Occupational History    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    Drug use: No    Sexual activity: Not Currently     Partners: Male   Other Topics Concern    Not on file   Social History Narrative    Not on file     Social Determinants of Health     Financial Resource Strain: Not on file   Food Insecurity: No Food Insecurity (7/12/2024)    Hunger Vital Sign     Worried About Running Out of Food in the Last Year: Never true     Ran Out of Food in the Last Year: Never true   Transportation Needs: No Transportation Needs (7/12/2024)    PRAPARE - Transportation     Lack of Transportation (Medical): No     Lack of Transportation (Non-Medical): No   Physical Activity: Not on file   Stress: Not on file   Social Connections: Not on file   Intimate Partner Violence: Not on file   Housing Stability: Low Risk  (7/12/2024)    Housing Stability Vital Sign     Unable to Pay for Housing in the Last

## 2024-07-19 NOTE — PROGRESS NOTES
cues  Kitchen items: 6/8 independently, 2/8 min cues  Living room items: 8/8 independently  Bedroom items:8/8 independently  *increased thought organization and processing speed this date  *Required min cues for some categories, overall good success    SHORT TERM GOAL #4:  Goal 4: Patient will complete sustained, selective, alternating, and divided attention tasks with no more than three errors/redirections in one task to allow for safe return to [facility] and potential driving.  INTERVENTIONS: DNT d/t focus on other goals    SHORT TERM GOAL #5:  Goal 5: Patient will recall and implete SOS speech strategies with min assist within structured speech tasks to improve overall overall speech intelligibility to 90% at the conversational level.  INTERVENTIONS:DNT d/t focus on other goals    SHORT TERM GOAL #6:  Goal 6: Patient will consume a regular texture diet with thin liquids and po medications while implementing recommended swallow strategies with stable pulmonary status across 1-3 dietary analysis to meet nutrition/hydration needs.  INTERVENTIONS:ST completed skilled dietary analysis with regular diet and thin liquids (shivam steak, dinner roll, butter, mashed potato, gravy and lemonade). ST reviewed swallowing strategies with patient. Patient presents with adequate mastication pattern for textural breakdown, adequate bolus formation resulting no residue remaining on lingual body. Independently alternating bites/sips and taking small bites/sips throughout. No overt s/s of airway invasion demonstrated across all trials consumed, certainly not able to assess pharyngeal phase dysfunction and/or airway invasion events in its entirety. ST followed-up with nurse, there is not concern for consuming po medications whole with thin liquids.    Recommend continuation of regular diet with thin liquids and PO medications with thin liquid.     Long-Term Goals:  Time Frame for Long Term Goals: 3 weeks    LONG TERM GOAL #1:  Goal 1:  Patient will improve cognitive-linguistic skills to a supervision level (FIMS of 5) in order to resume ADL/IADL completion with least amount of supervision/assistance upon discharge.     LONG TERM GOAL #2:  Goal 2: Patient will consume a regular texture with thin liquids with stable pulmonary status to maintain hydration/nutrition needs.    Comprehension: 5 - Patient understands basic needs (hungry/hot/pain)  Expression: 6 - Device used to express complex ideas/needs  Social Interaction: 6 - Patient requires medication for mood and/or effect  Problem Solvin - Patient able to solve simple/routine tasks  Memory: 3 - Patient remembers 50%-74% of the time    Functional Oral Intake Scale: Total Oral Intake: 7.  Total oral intake with no restrictions    EDUCATION:  Learner: Patient  Education:  Reviewed ST goals and Plan of Care, and Reviewed recommendations for follow-up, memory strategies.  Evaluation of Education: Verbalizes understanding    ASSESSMENT/PLAN:  Activity Tolerance:  Patient tolerance of  treatment: good.      Assessment/Plan: Patient progressing toward established goals.  Continues to require skilled care of licensed speech pathologist to progress toward achievement of established goals and plan of care..     Plan for Next Session: recall information, reasoning, and attention.   Discharge Recommendations: Home Health     Saritha KU, Student Intern

## 2024-07-20 LAB
ANION GAP SERPL CALC-SCNC: 12 MEQ/L (ref 8–16)
BUN SERPL-MCNC: 9 MG/DL (ref 7–22)
CALCIUM SERPL-MCNC: 8.7 MG/DL (ref 8.5–10.5)
CHLORIDE SERPL-SCNC: 93 MEQ/L (ref 98–111)
CO2 SERPL-SCNC: 22 MEQ/L (ref 23–33)
CREAT SERPL-MCNC: 0.4 MG/DL (ref 0.4–1.2)
GFR SERPL CREATININE-BSD FRML MDRD: > 90 ML/MIN/1.73M2
GLUCOSE SERPL-MCNC: 95 MG/DL (ref 70–108)
OSMOLALITY UR: 475 MOSMOL/KG (ref 250–750)
POTASSIUM SERPL-SCNC: 4.2 MEQ/L (ref 3.5–5.2)
SODIUM SERPL-SCNC: 127 MEQ/L (ref 135–145)
SODIUM UR-SCNC: 69 MEQ/L

## 2024-07-20 PROCEDURE — 36415 COLL VENOUS BLD VENIPUNCTURE: CPT

## 2024-07-20 PROCEDURE — 83935 ASSAY OF URINE OSMOLALITY: CPT

## 2024-07-20 PROCEDURE — 6370000000 HC RX 637 (ALT 250 FOR IP): Performed by: FAMILY MEDICINE

## 2024-07-20 PROCEDURE — 6370000000 HC RX 637 (ALT 250 FOR IP): Performed by: STUDENT IN AN ORGANIZED HEALTH CARE EDUCATION/TRAINING PROGRAM

## 2024-07-20 PROCEDURE — 1180000000 HC REHAB R&B

## 2024-07-20 PROCEDURE — 6360000002 HC RX W HCPCS: Performed by: STUDENT IN AN ORGANIZED HEALTH CARE EDUCATION/TRAINING PROGRAM

## 2024-07-20 PROCEDURE — 99222 1ST HOSP IP/OBS MODERATE 55: CPT | Performed by: INTERNAL MEDICINE

## 2024-07-20 PROCEDURE — 80048 BASIC METABOLIC PNL TOTAL CA: CPT

## 2024-07-20 PROCEDURE — 84300 ASSAY OF URINE SODIUM: CPT

## 2024-07-20 PROCEDURE — 6370000000 HC RX 637 (ALT 250 FOR IP): Performed by: INTERNAL MEDICINE

## 2024-07-20 RX ORDER — METHYLPHENIDATE HYDROCHLORIDE 5 MG/1
5 TABLET ORAL DAILY
Status: DISCONTINUED | OUTPATIENT
Start: 2024-07-22 | End: 2024-07-26 | Stop reason: HOSPADM

## 2024-07-20 RX ORDER — SODIUM CHLORIDE 1 G/1
1 TABLET ORAL 2 TIMES DAILY WITH MEALS
Status: DISCONTINUED | OUTPATIENT
Start: 2024-07-20 | End: 2024-07-21

## 2024-07-20 RX ADMIN — ATORVASTATIN CALCIUM 40 MG: 40 TABLET, FILM COATED ORAL at 19:29

## 2024-07-20 RX ADMIN — BUPROPION HYDROCHLORIDE 75 MG: 75 TABLET, FILM COATED ORAL at 09:45

## 2024-07-20 RX ADMIN — DICLOFENAC SODIUM 2 G: 10 GEL TOPICAL at 19:28

## 2024-07-20 RX ADMIN — BUSPIRONE HYDROCHLORIDE 5 MG: 5 TABLET ORAL at 19:29

## 2024-07-20 RX ADMIN — AMLODIPINE BESYLATE 5 MG: 5 TABLET ORAL at 09:45

## 2024-07-20 RX ADMIN — FLUTICASONE PROPIONATE 2 SPRAY: 50 SPRAY, METERED NASAL at 19:28

## 2024-07-20 RX ADMIN — METOPROLOL TARTRATE 50 MG: 50 TABLET, FILM COATED ORAL at 19:29

## 2024-07-20 RX ADMIN — BUPROPION HYDROCHLORIDE 75 MG: 75 TABLET, FILM COATED ORAL at 19:29

## 2024-07-20 RX ADMIN — SODIUM CHLORIDE 1 G: 1 TABLET ORAL at 17:24

## 2024-07-20 RX ADMIN — ASPIRIN 81 MG 81 MG: 81 TABLET ORAL at 09:45

## 2024-07-20 RX ADMIN — CETIRIZINE HYDROCHLORIDE 10 MG: 10 TABLET ORAL at 09:45

## 2024-07-20 RX ADMIN — BUSPIRONE HYDROCHLORIDE 5 MG: 5 TABLET ORAL at 09:45

## 2024-07-20 RX ADMIN — METOPROLOL TARTRATE 50 MG: 50 TABLET, FILM COATED ORAL at 09:45

## 2024-07-20 RX ADMIN — PANTOPRAZOLE SODIUM 40 MG: 40 TABLET, DELAYED RELEASE ORAL at 06:23

## 2024-07-20 RX ADMIN — DICLOFENAC SODIUM 2 G: 10 GEL TOPICAL at 09:46

## 2024-07-20 RX ADMIN — FLUTICASONE PROPIONATE 2 SPRAY: 50 SPRAY, METERED NASAL at 09:46

## 2024-07-20 RX ADMIN — Medication 5000 UNITS: at 09:45

## 2024-07-20 RX ADMIN — CLOPIDOGREL BISULFATE 75 MG: 75 TABLET ORAL at 09:46

## 2024-07-20 RX ADMIN — ENOXAPARIN SODIUM 40 MG: 100 INJECTION SUBCUTANEOUS at 09:46

## 2024-07-20 ASSESSMENT — PAIN SCALES - GENERAL: PAINLEVEL_OUTOF10: 0

## 2024-07-20 NOTE — PROGRESS NOTES
Patient: Rahel Lopez  Unit/Bed: 8K-20/020-A  YOB: 1931  MRN: [id number] Acct: [id number]   Admitting Diagnosis: Stroke (HCC) [I63.9]  Acute cerebrovascular accident (CVA) (HCC) [I63.9]  Admit Date:  7/12/2024  Hospital Day: 7    Assessment:     Principal Problem:    Acute cerebrovascular accident (CVA) (HCC)  Active Problems:    Hypertension  Resolved Problems:    * No resolved hospital problems. *      Plan:     Follow the sodium with the fluid restriction        Subjective:     Patient has no complaint of CP or SOB..   Medication side effects: none    Scheduled Meds:   Vitamin D  5,000 Units Oral Daily    diclofenac sodium  2 g Topical BID    amLODIPine  5 mg Oral Daily    busPIRone  5 mg Oral BID    metoprolol tartrate  50 mg Oral BID    pantoprazole  40 mg Oral QAM AC    sodium chloride flush  5-40 mL IntraVENous 2 times per day    enoxaparin  40 mg SubCUTAneous Daily    aspirin  81 mg Oral Daily    atorvastatin  40 mg Oral Nightly    buPROPion  75 mg Oral BID    fluticasone  2 spray Each Nostril BID    cetirizine  10 mg Oral Daily    clopidogrel  75 mg Oral Daily     Continuous Infusions:   sodium chloride       PRN Meds:sodium chloride flush, sodium chloride, potassium chloride **OR** potassium alternative oral replacement **OR** potassium chloride, magnesium sulfate, polyethylene glycol, sodium chloride flush, senna, acetaminophen, ondansetron, melatonin    Review of Systems  Pertinent items are noted in HPI.    Objective:     Patient Vitals for the past 8 hrs:   BP Temp Temp src Pulse Resp SpO2   07/19/24 2000 135/72 97.7 °F (36.5 °C) Oral 77 18 96 %     I/O last 3 completed shifts:  In: 1040 [P.O.:1040]  Out: -   No intake/output data recorded.    /72   Pulse 77   Temp 97.7 °F (36.5 °C) (Oral)   Resp 18   Ht 1.575 m (5' 2\")   Wt 57.8 kg (127 lb 6.8 oz)   SpO2 96%   BMI 23.31 kg/m²     General appearance: alert, appears stated age, and cooperative  Head: Normocephalic, without

## 2024-07-20 NOTE — PLAN OF CARE
Problem: Discharge Planning  Goal: Discharge to home or other facility with appropriate resources  Outcome: Progressing  Flowsheets (Taken 7/19/2024 2000)  Discharge to home or other facility with appropriate resources: Identify barriers to discharge with patient and caregiver     Problem: Safety - Adult  Goal: Free from fall injury  Outcome: Progressing     Problem: ABCDS Injury Assessment  Goal: Absence of physical injury  Outcome: Progressing     Problem: Skin/Tissue Integrity  Goal: Absence of new skin breakdown  Description: 1.  Monitor for areas of redness and/or skin breakdown  2.  Assess vascular access sites hourly  3.  Every 4-6 hours minimum:  Change oxygen saturation probe site  4.  Every 4-6 hours:  If on nasal continuous positive airway pressure, respiratory therapy assess nares and determine need for appliance change or resting period.  Outcome: Progressing

## 2024-07-20 NOTE — CONSULTS
[facility]    [address], [place] [address]  [phone]     Hospital Consult  7/20/2024 2:40 PM    Pt Name:    Rahel Lopez  MRN:     [id number]   [id number]  YOB: 1931  Admit Date:    7/12/2024  3:54 PM  Primary Care Physician:  Herminia Lopez MD        Reason for Consult:  Hyponatremia    History:   The patient is a 89 y.o. female seen in renal consult for hyponatremia.  She has had mild hyponatremia going back as far as 2013.  Has a hx of HTN, HLD, GERD, depression, and as below. She was admitted initially for acute CVA and now on inpatient rehab floor.  Sodium on admission was 132, was noted to be lower at 127 , placed on 1800 mL fluid restriction,is 127 again today so nephrology consulted.  She is on wellbutrin.  States appetite hasn't been great. Was eating [employer] for lunch when I saw her.  Feels like she doesn't drink much fluids. No diarrhea. No edema, no SOB.    Past Medical History:  Past Medical History:   Diagnosis Date    Acute CVA (cerebrovascular accident) (HCC) 7/11/2024    Arthritis     Cataract of both eyes     GERD (gastroesophageal reflux disease)     Hyperlipidemia     Hypertension     Keratosis     benign lichemoid    Psychiatric problem        Past Surgical History:  Past Surgical History:   Procedure Laterality Date    ABDOMEN SURGERY      APPENDECTOMY  1934    CHOLECYSTECTOMY  1999    COLON SURGERY  2000    resection for diverticulitis    COLONOSCOPY      DILATATION, ESOPHAGUS      ENDOSCOPY, COLON, DIAGNOSTIC      HYSTERECTOMY (CERVIX STATUS UNKNOWN)  1979    JOINT REPLACEMENT Right     right knee    KNEE ARTHROPLASTY Right 02/27/2021    RIGHT TOTAL KNEE REVISION WITH OSS performed by Jared Burns MD at [facility] OR    KNEE SURGERY  2010    ROTATOR CUFF REPAIR Left 2006       Family History:  Family History   Problem Relation Age of Onset    Heart Attack Mother        Social History:  Social History     Socioeconomic History    Marital

## 2024-07-20 NOTE — PLAN OF CARE
Problem: Discharge Planning  Goal: Discharge to home or other facility with appropriate resources  7/20/2024 1123 by Nat Lane RN  Outcome: Progressing  Flowsheets (Taken 7/20/2024 0942)  Discharge to home or other facility with appropriate resources:   Identify barriers to discharge with patient and caregiver   Arrange for needed discharge resources and transportation as appropriate   Identify discharge learning needs (meds, wound care, etc)     Problem: Safety - Adult  Goal: Free from fall injury  7/20/2024 1123 by Nat Lane RN  Outcome: Progressing  Falling star prevention in place. Bed and chair alarms in use. Call light in reach. Purposeful hourly rounding.    Problem: Skin/Tissue Integrity  Goal: Absence of new skin breakdown  Description: 1.  Monitor for areas of redness and/or skin breakdown  2.  Assess vascular access sites hourly  3.  Every 4-6 hours minimum:  Change oxygen saturation probe site  4.  Every 4-6 hours:  If on nasal continuous positive airway pressure, respiratory therapy assess nares and determine need for appliance change or resting period.  7/20/2024 1123 by Nat Lane RN  Outcome: Progressing     Problem: Chronic Conditions and Co-morbidities  Goal: Patient's chronic conditions and co-morbidity symptoms are monitored and maintained or improved  7/20/2024 1123 by Nat Lane RN  Outcome: Progressing  Flowsheets (Taken 7/20/2024 0942)  Care Plan - Patient's Chronic Conditions and Co-Morbidity Symptoms are Monitored and Maintained or Improved: Monitor and assess patient's chronic conditions and comorbid symptoms for stability, deterioration, or improvement     Problem: Pain  Goal: Verbalizes/displays adequate comfort level or baseline comfort level  7/20/2024 1123 by Nat Lane RN  Outcome: Progressing  Flowsheets (Taken 7/20/2024 0942)  Verbalizes/displays adequate comfort level or baseline comfort level:   Encourage patient to monitor

## 2024-07-21 LAB
ANION GAP SERPL CALC-SCNC: 10 MEQ/L (ref 8–16)
BUN SERPL-MCNC: 12 MG/DL (ref 7–22)
CALCIUM SERPL-MCNC: 8.6 MG/DL (ref 8.5–10.5)
CHLORIDE SERPL-SCNC: 92 MEQ/L (ref 98–111)
CO2 SERPL-SCNC: 23 MEQ/L (ref 23–33)
CREAT SERPL-MCNC: 0.4 MG/DL (ref 0.4–1.2)
GFR SERPL CREATININE-BSD FRML MDRD: > 90 ML/MIN/1.73M2
GLUCOSE SERPL-MCNC: 92 MG/DL (ref 70–108)
POTASSIUM SERPL-SCNC: 4.1 MEQ/L (ref 3.5–5.2)
SODIUM SERPL-SCNC: 124 MEQ/L (ref 135–145)
SODIUM SERPL-SCNC: 125 MEQ/L (ref 135–145)
T4 FREE SERPL-MCNC: 1.27 NG/DL (ref 0.93–1.68)
TSH SERPL DL<=0.005 MIU/L-ACNC: 5.03 UIU/ML (ref 0.4–4.2)
URATE SERPL-MCNC: 3 MG/DL (ref 2.4–5.7)

## 2024-07-21 PROCEDURE — 84295 ASSAY OF SERUM SODIUM: CPT

## 2024-07-21 PROCEDURE — 6370000000 HC RX 637 (ALT 250 FOR IP): Performed by: STUDENT IN AN ORGANIZED HEALTH CARE EDUCATION/TRAINING PROGRAM

## 2024-07-21 PROCEDURE — 97530 THERAPEUTIC ACTIVITIES: CPT

## 2024-07-21 PROCEDURE — 97110 THERAPEUTIC EXERCISES: CPT

## 2024-07-21 PROCEDURE — 84550 ASSAY OF BLOOD/URIC ACID: CPT

## 2024-07-21 PROCEDURE — 80048 BASIC METABOLIC PNL TOTAL CA: CPT

## 2024-07-21 PROCEDURE — 6370000000 HC RX 637 (ALT 250 FOR IP): Performed by: FAMILY MEDICINE

## 2024-07-21 PROCEDURE — 84439 ASSAY OF FREE THYROXINE: CPT

## 2024-07-21 PROCEDURE — 84443 ASSAY THYROID STIM HORMONE: CPT

## 2024-07-21 PROCEDURE — 97535 SELF CARE MNGMENT TRAINING: CPT

## 2024-07-21 PROCEDURE — 36415 COLL VENOUS BLD VENIPUNCTURE: CPT

## 2024-07-21 PROCEDURE — 1180000000 HC REHAB R&B

## 2024-07-21 PROCEDURE — 99232 SBSQ HOSP IP/OBS MODERATE 35: CPT | Performed by: INTERNAL MEDICINE

## 2024-07-21 PROCEDURE — 97116 GAIT TRAINING THERAPY: CPT

## 2024-07-21 PROCEDURE — 6360000002 HC RX W HCPCS: Performed by: STUDENT IN AN ORGANIZED HEALTH CARE EDUCATION/TRAINING PROGRAM

## 2024-07-21 PROCEDURE — 6370000000 HC RX 637 (ALT 250 FOR IP): Performed by: INTERNAL MEDICINE

## 2024-07-21 RX ORDER — SODIUM CHLORIDE 1 G/1
2 TABLET ORAL 2 TIMES DAILY WITH MEALS
Status: DISCONTINUED | OUTPATIENT
Start: 2024-07-21 | End: 2024-07-24

## 2024-07-21 RX ADMIN — ATORVASTATIN CALCIUM 40 MG: 40 TABLET, FILM COATED ORAL at 20:26

## 2024-07-21 RX ADMIN — METOPROLOL TARTRATE 50 MG: 50 TABLET, FILM COATED ORAL at 09:13

## 2024-07-21 RX ADMIN — BUSPIRONE HYDROCHLORIDE 5 MG: 5 TABLET ORAL at 20:25

## 2024-07-21 RX ADMIN — SODIUM CHLORIDE 2 G: 1 TABLET ORAL at 12:52

## 2024-07-21 RX ADMIN — PANTOPRAZOLE SODIUM 40 MG: 40 TABLET, DELAYED RELEASE ORAL at 05:42

## 2024-07-21 RX ADMIN — ACETAMINOPHEN 650 MG: 325 TABLET ORAL at 09:17

## 2024-07-21 RX ADMIN — SODIUM CHLORIDE 1 G: 1 TABLET ORAL at 09:13

## 2024-07-21 RX ADMIN — Medication 5000 UNITS: at 12:48

## 2024-07-21 RX ADMIN — AMLODIPINE BESYLATE 5 MG: 5 TABLET ORAL at 09:13

## 2024-07-21 RX ADMIN — CETIRIZINE HYDROCHLORIDE 10 MG: 10 TABLET ORAL at 09:13

## 2024-07-21 RX ADMIN — SODIUM CHLORIDE 2 G: 1 TABLET ORAL at 17:36

## 2024-07-21 RX ADMIN — ENOXAPARIN SODIUM 40 MG: 100 INJECTION SUBCUTANEOUS at 09:13

## 2024-07-21 RX ADMIN — BUSPIRONE HYDROCHLORIDE 5 MG: 5 TABLET ORAL at 09:13

## 2024-07-21 RX ADMIN — ASPIRIN 81 MG 81 MG: 81 TABLET ORAL at 09:13

## 2024-07-21 RX ADMIN — DICLOFENAC SODIUM 2 G: 10 GEL TOPICAL at 20:26

## 2024-07-21 RX ADMIN — BUPROPION HYDROCHLORIDE 75 MG: 75 TABLET, FILM COATED ORAL at 20:25

## 2024-07-21 RX ADMIN — METOPROLOL TARTRATE 50 MG: 50 TABLET, FILM COATED ORAL at 20:25

## 2024-07-21 RX ADMIN — BUPROPION HYDROCHLORIDE 75 MG: 75 TABLET, FILM COATED ORAL at 09:13

## 2024-07-21 RX ADMIN — FLUTICASONE PROPIONATE 2 SPRAY: 50 SPRAY, METERED NASAL at 09:13

## 2024-07-21 RX ADMIN — DICLOFENAC SODIUM 2 G: 10 GEL TOPICAL at 09:13

## 2024-07-21 RX ADMIN — CLOPIDOGREL BISULFATE 75 MG: 75 TABLET ORAL at 09:13

## 2024-07-21 RX ADMIN — FLUTICASONE PROPIONATE 2 SPRAY: 50 SPRAY, METERED NASAL at 20:26

## 2024-07-21 ASSESSMENT — PAIN - FUNCTIONAL ASSESSMENT: PAIN_FUNCTIONAL_ASSESSMENT: ACTIVITIES ARE NOT PREVENTED

## 2024-07-21 ASSESSMENT — PAIN SCALES - GENERAL
PAINLEVEL_OUTOF10: 2
PAINLEVEL_OUTOF10: 0
PAINLEVEL_OUTOF10: 3
PAINLEVEL_OUTOF10: 2
PAINLEVEL_OUTOF10: 3

## 2024-07-21 ASSESSMENT — PAIN DESCRIPTION - FREQUENCY: FREQUENCY: INTERMITTENT

## 2024-07-21 ASSESSMENT — PAIN DESCRIPTION - LOCATION: LOCATION: HEAD

## 2024-07-21 ASSESSMENT — PAIN DESCRIPTION - DESCRIPTORS: DESCRIPTORS: ACHING

## 2024-07-21 ASSESSMENT — PAIN DESCRIPTION - ONSET: ONSET: GRADUAL

## 2024-07-21 ASSESSMENT — PAIN DESCRIPTION - PAIN TYPE: TYPE: ACUTE PAIN

## 2024-07-21 NOTE — PROGRESS NOTES
[facility]   Nephrology progress note  7/21/2024, 2:40 PM      Pt Name:    Rahel Lopez  MRN:     [id number]     YOB: 1931  Admit Date:    7/12/2024  3:54 PM    Chief Complaint: Nephrology following for hyponatremia.    Subjective:  Patient was seen and examined this morning.   C/o sinus drainage.    Objective:  24HR INTAKE/OUTPUT:    Intake/Output Summary (Last 24 hours) at 7/21/2024 1440  Last data filed at 7/21/2024 0558  Gross per 24 hour   Intake 510 ml   Output --   Net 510 ml         I/O last 3 completed shifts:  In: 1440 [P.O.:1440]  Out: -   No intake/output data recorded.   Admission weight: 57.9 kg (127 lb 11.2 oz)  Wt Readings from Last 3 Encounters:   07/15/24 57.8 kg (127 lb 6.8 oz)   07/12/24 57.3 kg (126 lb 5.2 oz)   09/05/23 59.8 kg (131 lb 12.8 oz)        Vitals :   Vitals:    07/20/24 0942 07/20/24 1349 07/20/24 1915 07/21/24 0907   BP: 125/60 125/60 125/82 132/69   Pulse: 80 80 68 75   Resp: 17 17 18 17   Temp: 98.1 °F (36.7 °C) 98.1 °F (36.7 °C) 97.5 °F (36.4 °C) 97.9 °F (36.6 °C)   TempSrc: Oral Oral Oral Oral   SpO2: 97%  95% 98%   Weight:       Height:           Physical examination  General Appearance: alert and cooperative with exam, appears comfortable, no distress  Mouth/Throat: Oral mucosa moist  Neck: No JVD  Lungs: Air entry B/L, no rales, no use of accessory muscles  Heart:  S1, S2 heard  GI: soft, non-tender, no guarding  Extremities: no significant edema    Medications:  Infusion:   Meds:    sodium chloride  2 g Oral BID WC    [START ON 7/22/2024] methylphenidate  5 mg Oral Daily    Vitamin D  5,000 Units Oral Daily    diclofenac sodium  2 g Topical BID    amLODIPine  5 mg Oral Daily    busPIRone  5 mg Oral BID    metoprolol tartrate  50 mg Oral BID    pantoprazole  40 mg Oral QAM AC    enoxaparin  40 mg SubCUTAneous Daily    aspirin  81 mg Oral Daily    atorvastatin  40 mg Oral Nightly    buPROPion  75 mg Oral BID    fluticasone  2 spray Each

## 2024-07-21 NOTE — PROGRESS NOTES
Seated marches, Long arc quads, and hip  IR/ER .  Exercises were completed for increased independence with functional mobility.    Functional Outcome Measures:  Not completed  Modified Temple Scale:  +3 - Moderate disability; requiring some help, but able to walk without physical assistance (SBA/CGA).   Patient could not live alone but could walk from one room to another without physical help from another person.  Education provided regarding stroke rehabilitation management.    ASSESSMENT:  Assessment: Patient progressing toward established goals.  Activity Tolerance:  Patient tolerance of  treatment: good. Rahel has good tolerance with activities today. She experiences Rt knee pain and soreness with ambulation. LLE demos weakness and decrease step length and height that partially improves with verbal cues. Continue to assess patient's progress with PT. She would continue to benefit from PT to improve functional mobility and increase independence.   Equipment Recommendations:Equipment Needed:  (monitor for need for wheelchair)  Discharge Recommendations: Continue to assess pending progress and Patient would benefit from continued PT at discharge  Plan: Current Treatment Recommendations: Strengthening, Balance training, Functional mobility training, Transfer training, Gait training, Endurance training, Neuromuscular re-education, Wheelchair mobility training, Home exercise program, Pain management, Safety education & training, Patient/Caregiver education & training, Therapeutic activities  General Plan:  (5x/wk 90min)    Education:  Learners: Patient  Patient Education: Plan of Care, Precautions/Restrictions, Education Related to Diagnosis, Bed Mobility, Transfers, Gait, Use of Gait Belt, Up in Chair for All Meals, Verbal Exercise Instruction, Health Promotion and Wellness Education, Energy Conservation,  - Patient Verbalized Understanding    Goals:  Patient Goals : be able to get around on own  Short Term  Goals  Time Frame for Short Term Goals: 1 week  Short Term Goal 1: Patient will complete supine < > sit and rolling with head of bed flat and modified independence to transfer in and out of bed safely.  Short Term Goal 2: Patient will complete sit < > stand with CGA consistently to/from various surfaces to stand to ambulate safely.  Short Term Goal 3: Patient will ambulate 30' with a RW with CGA with left foot clearing from the floor at least 75% of the time to progress towards navigating home safely.  Short Term Goal 4: Patient will ascend/descend 2, 6\" steps with bilateral hands and CGA to prepare for safe home entry.  Short Term Goal 5: Patient will improve tinetti to at least 13/28 to achieve MDC and improve gait and balance.  Long Term Goals  Time Frame for Long Term Goals : 3 weeks from initial evaluation  Long Term Goal 1: Patient will complete sit < > stand with a RW with modified independence to stand to ambulate safely.  Long Term Goal 2: Patient will completed bed < > chair transfer with modified independence to transfer surface to surface safely.  Long Term Goal 3: Patient will ambulate 150' with a rollator with modified independence to navigate home safely.  Long Term Goal 4: Patient will ascend/descend 2, 6\" steps with one hand rail to access/leave home safely.  Long Term Goal 5: Patient will complete car transfer with modified independence to transfer in and out of vehicle safely.  Additional Goals?: Yes  Long term goal 6: Patient will improve 5x sit < > stand with use of UE's to less than or equal to 15 seconds to improve LE strength for increased ease with transfers    Following session, patient left in safe position with all fall risk precautions in place.

## 2024-07-21 NOTE — PLAN OF CARE
Problem: Discharge Planning  Goal: Discharge to home or other facility with appropriate resources  7/21/2024 1019 by Nat Lane RN  Outcome: Progressing  Flowsheets (Taken 7/21/2024 0907)  Discharge to home or other facility with appropriate resources:   Identify barriers to discharge with patient and caregiver   Arrange for needed discharge resources and transportation as appropriate   Identify discharge learning needs (meds, wound care, etc)     Problem: Safety - Adult  Goal: Free from fall injury  7/21/2024 1019 by Nat Lane RN  Outcome: Progressing  Falling star prevention in place. Bed and chair alarms in use. Call light in reach. Purposeful hourly rounding.    Problem: ABCDS Injury Assessment  Goal: Absence of physical injury  7/21/2024 1019 by Nat Lane RN  Outcome: Progressing  No physical injury noted this shift.    Problem: Skin/Tissue Integrity  Goal: Absence of new skin breakdown  Description: 1.  Monitor for areas of redness and/or skin breakdown  2.  Assess vascular access sites hourly  3.  Every 4-6 hours minimum:  Change oxygen saturation probe site  4.  Every 4-6 hours:  If on nasal continuous positive airway pressure, respiratory therapy assess nares and determine need for appliance change or resting period.  7/21/2024 1019 by Nat Lane RN  Outcome: Progressing     Problem: Chronic Conditions and Co-morbidities  Goal: Patient's chronic conditions and co-morbidity symptoms are monitored and maintained or improved  7/21/2024 1019 by Nat Lane RN  Outcome: Progressing  Flowsheets (Taken 7/21/2024 0907)  Care Plan - Patient's Chronic Conditions and Co-Morbidity Symptoms are Monitored and Maintained or Improved: Monitor and assess patient's chronic conditions and comorbid symptoms for stability, deterioration, or improvement     Problem: Pain  Goal: Verbalizes/displays adequate comfort level or baseline comfort level  7/21/2024 1019 by Ariana

## 2024-07-21 NOTE — PLAN OF CARE
Problem: Discharge Planning  Goal: Discharge to home or other facility with appropriate resources  7/21/2024 0051 by Yury Schmidt, RN  Outcome: Progressing  Flowsheets (Taken 7/20/2024 1915)  Discharge to home or other facility with appropriate resources: Identify barriers to discharge with patient and caregiver     Problem: Safety - Adult  Goal: Free from fall injury  7/21/2024 0051 by Yury Schmidt, RN  Outcome: Progressing     Problem: ABCDS Injury Assessment  Goal: Absence of physical injury  7/21/2024 0051 by Yury Schmidt, RN  Outcome: Progressing     Problem: Skin/Tissue Integrity  Goal: Absence of new skin breakdown  Description: 1.  Monitor for areas of redness and/or skin breakdown  2.  Assess vascular access sites hourly  3.  Every 4-6 hours minimum:  Change oxygen saturation probe site  4.  Every 4-6 hours:  If on nasal continuous positive airway pressure, respiratory therapy assess nares and determine need for appliance change or resting period.  7/21/2024 0051 by Yury Schmidt, RN  Outcome: Progressing

## 2024-07-21 NOTE — PROGRESS NOTES
RECREATIONAL THERAPY  [facility]      Date:  7/21/2024            Patient Name: Rahel Lopez           MRN: [id number]  Acct: [id number]          YOB: 1931 (89 y.o.)       Gender: female   Diagnosis: Stroke  Physician: Referring Practitioner: Attending and Ordering: Kaitlynn Herron DO    REASON FOR MISSED TREATMENT:  Pt asleep in bed after lunch -no RT     Alida Jacques, CTRS    7/21/2024

## 2024-07-21 NOTE — PROGRESS NOTES
buttons / zippers within BADLs.           Modified Shaniko Scale:  +3 - Moderate disability; requiring some help, but able to walk without physical assistance (SBA/CGA).   Patient could not live alone but could walk from one room to another without physical help from another person.  Education provided regarding stroke rehabilitation management.    ASSESSMENT:     Activity Tolerance:  Patient tolerance of  treatment: Good treatment tolerance      Discharge Recommendations: Home with assistance of aide and Home with Home Health OT  Equipment Recommendations: Equipment Needed: No  Plan: Times Per Week: 5x/week x 90 minutes  Times Per Day: Once a day  Current Treatment Recommendations: Strengthening, Cognitive reorientation, Balance training, Self-Care / ADL, Home management training, Safety education & training, Functional mobility training, Neuromuscular re-education, Cognitive/Perceptual training, Patient/Caregiver education & training, Endurance training, Equipment evaluation, education, & procurement    Education:  Learners: Patient  Role of OT, Plan of Care, ADL's, Home Exercise Program, Precautions, Reviewed Prior Education, Home Safety, Importance of Increasing Activity, and Assistive Device Safety    Goals  Short Term Goals  Time Frame for Short Term Goals: 2 weeks from admission on IP rehab  Short Term Goal 1: Pt will increased L UE 9 hole peg score by 15 seconds to increase indep with ADL/IADL routine.  Short Term Goal 2: Pt will complete dynamic standing task x 5 minutes with 2 UE release and SBA to increase indep with all sinkside grooming.  Short Term Goal 3: Pt will complete LB dressing with SBA to increase indep and endurance in home environment.  Short Term Goal 4: Pt will demo functional mobility HH distances with SBA to increase IND with toileting.  Long Term Goals  Time Frame for Long Term Goals : 3 weeks from admission on IP rehab  Long Term Goal 1: Pt will complete full ADL routine including shower

## 2024-07-22 PROBLEM — E87.1 HYPONATREMIA: Status: ACTIVE | Noted: 2024-07-22

## 2024-07-22 LAB
ANION GAP SERPL CALC-SCNC: 13 MEQ/L (ref 8–16)
BUN SERPL-MCNC: 12 MG/DL (ref 7–22)
CALCIUM SERPL-MCNC: 8.5 MG/DL (ref 8.5–10.5)
CHLORIDE SERPL-SCNC: 94 MEQ/L (ref 98–111)
CO2 SERPL-SCNC: 22 MEQ/L (ref 23–33)
CREAT SERPL-MCNC: 0.5 MG/DL (ref 0.4–1.2)
GFR SERPL CREATININE-BSD FRML MDRD: 87 ML/MIN/1.73M2
GLUCOSE SERPL-MCNC: 93 MG/DL (ref 70–108)
POTASSIUM SERPL-SCNC: 4.2 MEQ/L (ref 3.5–5.2)
SODIUM SERPL-SCNC: 129 MEQ/L (ref 135–145)

## 2024-07-22 PROCEDURE — 80048 BASIC METABOLIC PNL TOTAL CA: CPT

## 2024-07-22 PROCEDURE — 6370000000 HC RX 637 (ALT 250 FOR IP): Performed by: FAMILY MEDICINE

## 2024-07-22 PROCEDURE — 36415 COLL VENOUS BLD VENIPUNCTURE: CPT

## 2024-07-22 PROCEDURE — 97130 THER IVNTJ EA ADDL 15 MIN: CPT

## 2024-07-22 PROCEDURE — 99233 SBSQ HOSP IP/OBS HIGH 50: CPT | Performed by: NURSE PRACTITIONER

## 2024-07-22 PROCEDURE — 97110 THERAPEUTIC EXERCISES: CPT

## 2024-07-22 PROCEDURE — 6370000000 HC RX 637 (ALT 250 FOR IP): Performed by: INTERNAL MEDICINE

## 2024-07-22 PROCEDURE — 99232 SBSQ HOSP IP/OBS MODERATE 35: CPT | Performed by: INTERNAL MEDICINE

## 2024-07-22 PROCEDURE — 1180000000 HC REHAB R&B

## 2024-07-22 PROCEDURE — 6370000000 HC RX 637 (ALT 250 FOR IP): Performed by: NURSE PRACTITIONER

## 2024-07-22 PROCEDURE — 97129 THER IVNTJ 1ST 15 MIN: CPT

## 2024-07-22 PROCEDURE — 6360000002 HC RX W HCPCS: Performed by: STUDENT IN AN ORGANIZED HEALTH CARE EDUCATION/TRAINING PROGRAM

## 2024-07-22 PROCEDURE — 6370000000 HC RX 637 (ALT 250 FOR IP): Performed by: STUDENT IN AN ORGANIZED HEALTH CARE EDUCATION/TRAINING PROGRAM

## 2024-07-22 PROCEDURE — 97112 NEUROMUSCULAR REEDUCATION: CPT

## 2024-07-22 PROCEDURE — 97530 THERAPEUTIC ACTIVITIES: CPT

## 2024-07-22 PROCEDURE — 97535 SELF CARE MNGMENT TRAINING: CPT

## 2024-07-22 PROCEDURE — 97116 GAIT TRAINING THERAPY: CPT

## 2024-07-22 RX ADMIN — DICLOFENAC SODIUM 2 G: 10 GEL TOPICAL at 08:56

## 2024-07-22 RX ADMIN — CLOPIDOGREL BISULFATE 75 MG: 75 TABLET ORAL at 08:56

## 2024-07-22 RX ADMIN — ATORVASTATIN CALCIUM 40 MG: 40 TABLET, FILM COATED ORAL at 19:45

## 2024-07-22 RX ADMIN — BUSPIRONE HYDROCHLORIDE 5 MG: 5 TABLET ORAL at 19:46

## 2024-07-22 RX ADMIN — METOPROLOL TARTRATE 50 MG: 50 TABLET, FILM COATED ORAL at 08:55

## 2024-07-22 RX ADMIN — SODIUM CHLORIDE 2 G: 1 TABLET ORAL at 17:24

## 2024-07-22 RX ADMIN — ASPIRIN 81 MG 81 MG: 81 TABLET ORAL at 08:55

## 2024-07-22 RX ADMIN — ENOXAPARIN SODIUM 40 MG: 100 INJECTION SUBCUTANEOUS at 08:56

## 2024-07-22 RX ADMIN — BUSPIRONE HYDROCHLORIDE 5 MG: 5 TABLET ORAL at 08:56

## 2024-07-22 RX ADMIN — Medication 5000 UNITS: at 12:46

## 2024-07-22 RX ADMIN — BUPROPION HYDROCHLORIDE 75 MG: 75 TABLET, FILM COATED ORAL at 08:56

## 2024-07-22 RX ADMIN — METOPROLOL TARTRATE 50 MG: 50 TABLET, FILM COATED ORAL at 19:45

## 2024-07-22 RX ADMIN — Medication 3 MG: at 19:46

## 2024-07-22 RX ADMIN — PANTOPRAZOLE SODIUM 40 MG: 40 TABLET, DELAYED RELEASE ORAL at 05:39

## 2024-07-22 RX ADMIN — FLUTICASONE PROPIONATE 2 SPRAY: 50 SPRAY, METERED NASAL at 19:46

## 2024-07-22 RX ADMIN — CETIRIZINE HYDROCHLORIDE 10 MG: 10 TABLET ORAL at 08:55

## 2024-07-22 RX ADMIN — METHYLPHENIDATE HYDROCHLORIDE 5 MG: 5 TABLET ORAL at 08:55

## 2024-07-22 RX ADMIN — DICLOFENAC SODIUM 2 G: 10 GEL TOPICAL at 19:48

## 2024-07-22 RX ADMIN — FLUTICASONE PROPIONATE 2 SPRAY: 50 SPRAY, METERED NASAL at 08:56

## 2024-07-22 RX ADMIN — AMLODIPINE BESYLATE 5 MG: 5 TABLET ORAL at 08:56

## 2024-07-22 RX ADMIN — SODIUM CHLORIDE 2 G: 1 TABLET ORAL at 08:55

## 2024-07-22 RX ADMIN — BUPROPION HYDROCHLORIDE 75 MG: 75 TABLET, FILM COATED ORAL at 19:46

## 2024-07-22 ASSESSMENT — PAIN SCALES - GENERAL
PAINLEVEL_OUTOF10: 0
PAINLEVEL_OUTOF10: 0

## 2024-07-22 NOTE — CARE COORDINATION
Ambulate with 1 assist and walker, tolerating well.  Denies pain but admits to being sad this shift, states her family is supportive and gave her some coping strategies that are helpful.  Cont to be a stay with me due to risk of fall and can be impulsive.

## 2024-07-22 NOTE — PLAN OF CARE
Problem: Discharge Planning  Goal: Discharge to home or other facility with appropriate resources  7/22/2024 1418 by Nat Lane RN  Outcome: Progressing  Flowsheets (Taken 7/22/2024 0829)  Discharge to home or other facility with appropriate resources:   Identify barriers to discharge with patient and caregiver   Arrange for needed discharge resources and transportation as appropriate   Identify discharge learning needs (meds, wound care, etc)     Problem: Safety - Adult  Goal: Free from fall injury  7/22/2024 1418 by Nat Lane RN  Outcome: Progressing  Falling star prevention in place. Bed and chair alarms in use. Call light in reach. Purposeful hourly rounding.    Problem: Skin/Tissue Integrity  Goal: Absence of new skin breakdown  Description: 1.  Monitor for areas of redness and/or skin breakdown  2.  Assess vascular access sites hourly  3.  Every 4-6 hours minimum:  Change oxygen saturation probe site  4.  Every 4-6 hours:  If on nasal continuous positive airway pressure, respiratory therapy assess nares and determine need for appliance change or resting period.  7/22/2024 1418 by Nat Lane RN  Outcome: Progressing     Problem: Chronic Conditions and Co-morbidities  Goal: Patient's chronic conditions and co-morbidity symptoms are monitored and maintained or improved  7/22/2024 1418 by Nat Lane RN  Outcome: Progressing  Flowsheets (Taken 7/22/2024 0829)  Care Plan - Patient's Chronic Conditions and Co-Morbidity Symptoms are Monitored and Maintained or Improved: Monitor and assess patient's chronic conditions and comorbid symptoms for stability, deterioration, or improvement     Problem: Pain  Goal: Verbalizes/displays adequate comfort level or baseline comfort level  7/22/2024 1418 by Nat Lane RN  Outcome: Progressing  Flowsheets (Taken 7/22/2024 0829)  Verbalizes/displays adequate comfort level or baseline comfort level:   Encourage patient to monitor  pain and request assistance   Assess pain using appropriate pain scale   Implement non-pharmacological measures as appropriate and evaluate response   Administer analgesics based on type and severity of pain and evaluate response     Problem: Nutrition Deficit:  Goal: Optimize nutritional status  7/22/2024 1418 by Nat Lane, RN  Outcome: Progressing      Tolerating current diet and po fluids fair. Does not care for her supplement. Dietician switched to magic cup for additional nutrition source

## 2024-07-22 NOTE — DISCHARGE INSTRUCTIONS
stroke.     Manage other health problems that raise your risk of another stroke. These include atrial fibrillation, diabetes, high blood pressure, and high cholesterol.     Have a heart-healthy lifestyle.  Don't smoke and avoid secondhand smoke.  Limit alcohol to 2 drinks a day for men and 1 drink a day for women.  Stay at a healthy weight. Lose weight if you need to.  Be active. Ask your doctor what type and level of activity is safe for you.  Eat heart-healthy foods. These include vegetables, fruits, nuts, beans, lean meat, fish, and whole grains. Limit sodium and sugar.     Avoid infections such as COVID-19, colds, and the flu. Get the flu vaccine every year. Get a pneumococcal vaccine shot. If you have had one before, ask your doctor whether you need another dose. Stay up to date on your COVID-19 vaccines.     If you think you may have a problem with alcohol or drug use, talk to your doctor.   Medicines    Be safe with medicines. Take your medicines exactly as prescribed. Call your doctor if you think you are having a problem with your medicine. You will get more details on the specific medicines your doctor prescribes.     You may take a few medicines to help lower your risk of another stroke. These include:  Blood pressure medicine such as an ACE (angiotensin-converting enzyme) inhibitor, angiotensin II receptor blocker (ARBs), or diuretic.  Cholesterol medicine such as a statin.  Aspirin or another blood thinner to prevent blood clots.     If your doctor prescribed a blood thinner, be sure you get instructions about how to take your medicine safely. Blood thinners can cause serious bleeding problems.     Do not take any over-the-counter medicines or herbal products without talking to your doctor first.     If you take hormonal birth control or hormone therapy for menopause, talk to your doctor about whether they are right for you. They may raise the risk of stroke in some people.   For caregivers    Make the  and safety. Be sure to make and go to all appointments, and call your doctor if you are having problems. It's also a good idea to know your test results and keep a list of the medicines you take.  Where can you learn more?  Go to https://www.[employer].net/patientEd and enter E171 to learn more about \"Learning About Emotional Changes After a Stroke.\"  Current as of: October 24, 2023  Content Version: 14.1  © 2006-2024 [employer].   Care instructions adapted under license by [employer]. If you have questions about a medical condition or this instruction, always ask your healthcare professional. [employer] disclaims any warranty or liability for your use of this information.

## 2024-07-22 NOTE — PROGRESS NOTES
Decreased Weight Shift Right, Lean to Left, Decreased Trunk Rotation, Decreased Heel Strike Bilaterally, Decreased Foot Clearance Left, and Narrow Base of Support  Balance:  Static Sitting Balance:  Supervision  Dynamic Sitting Balance: Stand By Assistance, Contact Guard Assistance  Static Standing Balance: Stand By Assistance, Contact Guard Assistance  Dynamic Standing Balance: Contact Guard Assistance    OT:  ADL:   Upper Extremity Dressing: Supervision.  To doff/don pullover shirt seated in bedside chair  Footwear Management: Supervision.  Seated on EOB to untie and doff B shoes. .  BALANCE:  Sitting Balance:  Supervision.    Standing Balance: Stand By Assistance.    BED MOBILITY:  Sit to Supine: Supervision, with head of bed flat, with rail, with increased time for completion    TRANSFERS:  Sit to Stand:  Stand By Assistance, with increased time for completion.    Stand to Sit: Contact Guard Assistance, with increased time for completion, cues for hand placement. Pt requires mod vc for safety and to control body on movement towards sit.  FUNCTIONAL MOBILITY:  Assistive Device: Rolling Walker  Assist Level:  Contact Guard Assistance.   Distance: Within room and HH distances within unit  Pt declines any dizziness at this time however does state she feels like she may have a sinus headache     ST:  INTERVENTIONS:  Recall functional information- Appointment- ( 09/10/ 2024- @9:30 am, Dr. Abad, cardiologist, [facility])  ~24 hours delayed recall: 3/5 independently, 2/5 given max cues  *good recall and retention information this date    INTERVENTIONS:  Thought organization- concrete divergent naming:  Food items: 8/8 independently  Non-food items: 4/8 independently, 4/8 given min cues  Bathroom items: 7/8 independently, 1/8 min cues  Kitchen items: 6/8 independently, 2/8 min cues  Living room items: 8/8 independently  Bedroom items:8/8 independently  *increased thought organization and processing speed this date  *Required  management.  Nursing will also provide education and training to patient and family.    Dr. Torres consulted for medical management   Acute CVA: acute infarcts in the posterior limb of the internal capsule on the right and right thalamus. Per Neurology continue DAPT  with ASA 81 mg + Plavix 75 mg daily x21 days followed by ASA 81 mg indefinitely. Continue atorvastatin 40 mg QHS   Hyponatremia: Na 129 today. Nephrology now following   HTN: Continue amlodipine 5 mg daily and Metoprolol 50 mg BID  Prophylaxis:  DVT: Lovenox. GI: Pantoprazole 40 mg daily  Pain: Continue PRN tylenol and Voltaren gel   Sinusitis: Zyrtec 10 mg daily, flonase BID, and Keflex 500 mg BID. Per family Keflex started by PCP on 7/10/2024 (will add stop date to complete 7 day course) encouraged warm compresses to forehead.  Due to age, do not feel like adding another antihistamine would be beneficial and outweigh the risks.  Continue to monitor at this time,  Mood: Continue wellbutrin 75 mg BID and Buspar 5 mg BID- Psychology evaluated- starting low dose of Ritalin today  Nutrition:  Consultation to dietician for nutritional counseling and recommendations.  Prealbumin will be checked on admission.    Bladder: Will monitor for signs and symptoms of infection/ retention  Bowel: PRN miralax and senna  Follow-ups: PCP (1-2 weeks), Neurology- Dr. Gordon (2-4 weeks), PM&R- Gopal or Molly (6-8 weeks)   and case management consultations for coordination of care and discharge planning. Team conference held Wednesday with anticipated DC home with HH therapies on 7/26/2024.  Family meeting today with social work.    Missed Therapy Time:  None    Carrie Pierce, APRN - CNP

## 2024-07-22 NOTE — PROGRESS NOTES
[facility]  Occupational Therapy  Daily Note  Time:   Time In: 1104  Time Out: 1234  Timed Code Treatment Minutes: 90 Minutes  Minutes: 90          Date: 2024  Patient Name: Rahel Lopez,   Gender: female      Room: [facility]20/020-A  MRN: [id number]  : 3/4/1931  (89 y.o.)  Referring Practitioner: Attending and Ordering: Kaitlynn Herron DO  Diagnosis: Stroke  Additional Pertinent Hx: Per EMR, \"Rahel Lopez is a 89 y.o. female with PMH significant for GERD, hyperlipidemia, hypertension, and cataracts who was admitted to [facility] on 7/10/2024.  History obtained via: ED documentation, acute care documentation, and patient     Patient initially presented to [facility] ED on 7/10/2024 with concerns due to multiple falls and impaired speech.  She reported that speech impairment began approximately 2 days prior and at one point she noted some left-sided weakness, however, felt that was back to baseline.  On the day of presentation, patient reportedly fell twice in her house.  The first time she was able to get herself back up and did not report the incident to anyone.  However, later in the day she was talking to her sister on the phone after they hung up the sister contacted another family member to check on the patient due to confusion and slurred speech.  When the family member arrived, the patient was found down on the ground and unable to get up.  CT head was obtained and was reportedly negative, however, decision made to admit patient for further evaluation for CVA.     On admission, patient was started on aspirin and statin therapy.  An MRI and echo were ordered.  Of note, patient was being treated by PCP for upper respiratory infection at time of presentation.  MRI of the brain resulted and reportedly revealed an acute infarct of the right thalamus.  Neurology consulted and recommends DAPT with aspirin 81 mg + Plavix 75 mg daily for 21 days

## 2024-07-22 NOTE — PROGRESS NOTES
Physical Therapy   [facility]  INPATIENT PHYSICAL THERAPY  DAILY NOTE  [facility] - 8K-20/020-A  Time In: 0700  Time Out: 0830  Timed Code Treatment Minutes: 90 Minutes  Minutes: 90          Date: 2024  Patient Name: Rahel Lopez,  Gender:  female        MRN: [id number]  : 3/4/1931  (89 y.o.)  Referral Date : 24  Referring Practitioner: Kaitlynn Herron DO  Diagnosis: Acute cerebrovascular accident  Additional Pertinent Hx: Per H&P: Rahel Lopez is a 89 y.o. female with PMH significant for GERD, hyperlipidemia, hypertension, and cataracts who was admitted to [facility] on 7/10/2024.Patient initially presented to [facility] ED on 7/10/2024 with concerns due to multiple falls and impaired speech.  She reported that speech impairment began approximately 2 days prior and at one point she noted some left-sided weakness, however, felt that was back to baseline.  On the day of presentation, patient reportedly fell twice in her house.  The first time she was able to get herself back up and did not report the incident to anyone.  However, later in the day she was talking to her sister on the phone after they hung up the sister contacted another family member to check on the patient due to confusion and slurred speech.  When the family member arrived, the patient was found down on the ground and unable to get up.  CT head was obtained and was reportedly negative, however, decision made to admit patient for further evaluation for CVA.MRI of the brain resulted and reportedly revealed an acute infarct of the right thalamus.     Prior Level of Function:  Lives With: Alone  Type of Home: House  Home Layout: One level  Home Access: Stairs to enter with rails  Entrance Stairs - Number of Steps: 2  Entrance Stairs - Rails: Left  Home Equipment: Rollator, Walker - Rolling, Cane   Bathroom Shower/Tub: Tub/Shower unit  Bathroom Toilet: Standard  Bathroom Equipment:

## 2024-07-22 NOTE — PLAN OF CARE
Problem: Discharge Planning  Goal: Discharge to home or other facility with appropriate resources  Outcome: Progressing  Flowsheets (Taken 7/21/2024 2020)  Discharge to home or other facility with appropriate resources: Identify barriers to discharge with patient and caregiver     Problem: Safety - Adult  Goal: Free from fall injury  Outcome: Progressing     Problem: ABCDS Injury Assessment  Goal: Absence of physical injury  Outcome: Progressing     Problem: Skin/Tissue Integrity  Goal: Absence of new skin breakdown  Description: 1.  Monitor for areas of redness and/or skin breakdown  2.  Assess vascular access sites hourly  3.  Every 4-6 hours minimum:  Change oxygen saturation probe site  4.  Every 4-6 hours:  If on nasal continuous positive airway pressure, respiratory therapy assess nares and determine need for appliance change or resting period.  Outcome: Progressing     Problem: Chronic Conditions and Co-morbidities  Goal: Patient's chronic conditions and co-morbidity symptoms are monitored and maintained or improved  Outcome: Progressing  Flowsheets (Taken 7/21/2024 2020)  Care Plan - Patient's Chronic Conditions and Co-Morbidity Symptoms are Monitored and Maintained or Improved: Monitor and assess patient's chronic conditions and comorbid symptoms for stability, deterioration, or improvement     Problem: Pain  Goal: Verbalizes/displays adequate comfort level or baseline comfort level  Outcome: Progressing  Flowsheets (Taken 7/21/2024 2023)  Verbalizes/displays adequate comfort level or baseline comfort level: Encourage patient to monitor pain and request assistance

## 2024-07-22 NOTE — PROGRESS NOTES
[facility]  INPATIENT REHABILITATION  TEAM CONFERENCE NOTE    Conference Date: 2024  Admit Date:  2024  3:54 PM  Patient Name: Rahel Lopez    MRN: [id number]    : 3/4/1931  (89 y.o.)  Rehabilitation Admitting Diagnosis:  Stroke (HCC) [I63.9]  Acute cerebrovascular accident (CVA) (HCC) [I63.9]  Referring Practitioner: Kaitlynn Herron DO      CASE MANAGEMENT  Current issues/needs regarding patient and family discharge status: Patient continues to be motivated and progressing with therapy. Patient plans to be discharged on Friday,  with home health services for RN, PT, OT, ST and HHA. SW will follow and maintain involvement in discharge planning.    PHYSICAL THERAPY  Patient tolerated session well and is showing steady progress towards goals at this time. She has met 3/5 STG and 0/6 LTG. She is able to complete rolling mod I and supine <->sit with SBA. The pt is able to complete sit <->stand with CGA to SBA. The pt is able to complete a car transfer with CGA. She is able to ambulate 100' with RW and CGA. She is able to complete 1 six inch platform step with CGA, and 4 six inch steps with CGA. She improved her Tinetti to score an 18/28 indicating a high fall risk. She completed the 5x sit to  31.38 seconds indicating an increased risk of falls.The pt still demonstrates limitations due to increased balance deficits that's affects her safe functional mobility at this time. Would benefit from additional skilled PT to improve functional mobility.  Equipment Needed:  (monitor for need for wheelchair)    SPEECH THERAPY  Patient achomplished 3/6 STGs and 1/2 LTGs this therapy reporting period. Patient with demonstrated gains in immediate/delayed recall, basic problem solving (safety-ADLs), and basic executive functioning (money management), thought organization. Patient continues to present with deficits in attention, complex executive functioning, and high level immediate/delayed  input(s): \"POCGLU\" in the last 72 hours.    No results found for: \"LDLDIRECT\"      Vitals:    07/22/24 0829 07/22/24 1940 07/23/24 0800 07/23/24 2205   BP: 130/67 (!) 144/68 136/61 123/60   Pulse: 73 71 72 75   Resp: 17 16 20 16   Temp: 98.1 °F (36.7 °C) 97.5 °F (36.4 °C) 97.5 °F (36.4 °C) 97.7 °F (36.5 °C)   TempSrc: Oral Oral Oral Oral   SpO2: 98% 100% 99% 97%   Weight:       Height:              Family Education: Family available and participating in education   Fall Risk:  Falling star program initiated  Is the patient appropriate for a stay in the functional apartment? no    Discharge Plan   Estimated Discharge Date:  7/26/2024    Destination: skilled nursing facility  Services at Discharge: SNF Physical Therapy, Occupational Therapy, and Speech Therapy 5x week  Is patient appropriate for an outpatient driving evaluation? Not appropriate for driving at this time  Equipment at Discharge:    Factors facilitating achievement of predicted outcomes: Family support, Cooperative, and Pleasant  Barriers to the achievement of predicted outcomes: Depression, Decreased endurance, Upper extremity weakness, and Lower extremity weakness  Follow up with physiatrist? yes, 6-8 weeks following DC, okay to follow up with Dr. Herron or Molly    Team Members Present at Conference:  :BIBI Bone  Occupational Therapist:Sumanth DECKER, OTR/L [id number]  Physical Therapist:Janet Chatman, [place] [id number]  Speech Therapist:Lorraine Adame MS, CCC-SLP [id number]  Nurse:Sirisha Parker RN   Psychologist: N/A    I approve the established interdisciplinary plan of care as documented within the medical record of Rahel Lopez. I was present and led the interdisciplinary care conference.    Kaitlynn Herron DO  Electronically signed by Kaitlynn Herron DO on 7/24/2024 at 9:19 AM

## 2024-07-22 NOTE — PLAN OF CARE
Problem: Discharge Planning  Goal: Discharge to home or other facility with appropriate resources  7/22/2024 1609 by Savanah Mccartney LISW  Note: SW met with patient and her children on this date to discuss discharge planning. SW educated patient and family on her progress and needs at discharge. SW answered questions and educated them on their options. Patient reported not feeling ready to return home and is wanting to go SNF. Patient's first choice is [facility]. Children are in agreement with this decision. Mary to provide transport at discharge.    SW made a referral to Mey at [facility]. She is unsure at this time if they will have a bed available.    Post-acute (PAC) provider list was provided to patient. Patient was informed of their freedom to choose PAC provider. Discussed and offered to show the patient the relevant PAC Providers quality and resource use measures on Medicare Compare web site via computer based on patient's goals of care and treatment preferences. Questions regarding selection process were answered. Family to review list and will come up with other choices for patient.    ADRIAN met with patient and her children. The second choice is [facility] and third choice is [facility].    SW left a message for Negin at [facility].    SW will follow and maintain involvement in discharge planning.

## 2024-07-22 NOTE — PROGRESS NOTES
[facility]   Nephrology progress note  7/22/2024, 2:36 PM      Pt Name:    Rahel Lopez  MRN:     [id number]     YOB: 1931  Admit Date:    7/12/2024  3:54 PM    Chief Complaint: Nephrology following for hyponatremia.    Subjective:  Patient was seen and examined this morning.   C/o sinus drainage, otherwise no new events.    Objective:  24HR INTAKE/OUTPUT:    Intake/Output Summary (Last 24 hours) at 7/22/2024 1436  Last data filed at 7/22/2024 1341  Gross per 24 hour   Intake 1000 ml   Output --   Net 1000 ml         I/O last 3 completed shifts:  In: 1690 [P.O.:1690]  Out: -   I/O this shift:  In: 60 [P.O.:60]  Out: -    Admission weight: 57.9 kg (127 lb 11.2 oz)  Wt Readings from Last 3 Encounters:   07/22/24 56.3 kg (124 lb 1.9 oz)   07/12/24 57.3 kg (126 lb 5.2 oz)   09/05/23 59.8 kg (131 lb 12.8 oz)        Vitals :   Vitals:    07/21/24 0907 07/21/24 2023 07/22/24 0627 07/22/24 0829   BP: 132/69 (!) 133/58  130/67   Pulse: 75 66  73   Resp: 17 16  17   Temp: 97.9 °F (36.6 °C) 98.1 °F (36.7 °C)  98.1 °F (36.7 °C)   TempSrc: Oral Oral  Oral   SpO2: 98% 93%  98%   Weight:   56.3 kg (124 lb 1.9 oz)    Height:           Physical examination  General Appearance: alert and cooperative with exam, appears comfortable, no distress  Mouth/Throat: Oral mucosa moist  Neck: No JVD  Lungs: Air entry B/L, no rales, no use of accessory muscles  Heart:  S1, S2 heard  GI: soft, non-tender, no guarding  Extremities: no significant edema    Medications:  Infusion:   Meds:    sodium chloride  2 g Oral BID WC    methylphenidate  5 mg Oral Daily    Vitamin D  5,000 Units Oral Daily    diclofenac sodium  2 g Topical BID    amLODIPine  5 mg Oral Daily    busPIRone  5 mg Oral BID    metoprolol tartrate  50 mg Oral BID    pantoprazole  40 mg Oral QAM AC    enoxaparin  40 mg SubCUTAneous Daily    aspirin  81 mg Oral Daily    atorvastatin  40 mg Oral Nightly    buPROPion  75 mg Oral BID    fluticasone  2  DOMINIQUE:'[id number]:[id number]'

## 2024-07-22 NOTE — PROGRESS NOTES
Comprehensive Nutrition Assessment    Type and Reason for Visit:  Reassess    Nutrition Recommendations/Plan:   Recommend diet as per SLP.  Send chopped meats w/ side of low sodium gravy per pt. Request.  Change ONS to Magic Cups TID as pt. Dislikes Ensure.  Rx per provider to assist with symptom management (c/o sinus drainage, some nausea, dizziness when turns head).  Consider MVI once nausea resolves.  Encouraged po, good nutrition at best efforts.     Malnutrition Assessment:  Malnutrition Status:  At risk for malnutrition (Comment) (07/15/24 9297)    Context:  Acute Illness     Findings of the 6 clinical characteristics of malnutrition:  Energy Intake:  Mild decrease in energy intake (Comment)  Weight Loss:  No significant weight loss     Body Fat Loss:  No significant body fat loss (although difficult to evaluate w/ advanced age)     Muscle Mass Loss:  No significant muscle mass loss (although difficult to evaluate w/ advanced age)    Fluid Accumulation:  Unable to assess     Strength:  Not Performed    Nutrition Assessment:     Pt. nutritionally compromised AEB inadequate oral intake.  At risk for further nutrition compromise r/t admit s/p CVA, advanced age and underlying medical condition (hx CVA, GERD, HLD, HTN).     Nutrition Related Findings:    Pt. Report/Treatments/Miscellaneous:   7/22: pt. Seen - reports not feeling well; states has been dealing with sinus drainage, pressure; having some nausea (? R/t drainage); also reports having dizziness at times when moves her head; sipping on gingerale and eating crackers; states intake is minimal; hasn't been able to get down the Ensures; agrees to trial Magic Cups; discussed dizziness w/ RN and PT; ? Need for BM  7/15: pt seen - reports appetite is \"not the best\"; states just doesn't feel like eating; reports acceptance of Ensures (other than chocolate flavor); ; states appetite/intake was \"so-so\" pta - consumes 2 meals/day and snacks at lunch; denies any

## 2024-07-22 NOTE — PROGRESS NOTES
[facility]

 Created on: 07/15/2016



Guerline Estrada

External Reference #: [id number]

: 1988

Sex: Female



Demographics







 Address  [address] [place]  [address]

 

 Home Phone  [phone]

 

 Preferred Language  Unknown

 

 Marital Status  Unknown

 

 Protestant Affiliation  Unknown

 

 Race  Black

 

 Ethnic Group  Not  or 





Author







 Author  SHANAE LEWIS

 

 Organization  [employer]

 

 Address  Unknown

 

 Phone  Unavailable







Care Team Providers







 Care Team Member Name  Role  Phone

 

 SHANAE LEWIS  CP  Unavailable



                                                                



Allergies

          No Known Allergies                                                   
                                     



Problems

          





 Problem Type  Condition  Code  Onset Dates  Condition Status

 

 Problem  Encounter for counseling regarding contraception  Z30.9     Active

 

 Problem  General medical examination  Z00.00     Active

 

 Problem  Routine gynecological examination  Z01.419     Active

 

 Problem  Unspecified constipation  564.00     Active

 

 Assessment  General medical examination  Z00.00     Active

 

 Problem  Pain in joint, forearm  719.43     Active

 

 Problem  Overweight  278.02     Active



                                                                               
                                                                     



Medications

          No Known Medications                                                 
                                       



Procedures

          





 Procedure  Coding System  Code  Date

 

 LIPID PANEL  CPT-4  92375  July 15, 2016

 

 COMPREHEN METABOLIC PANEL  CPT-4  84472  July 15, 2016

 

 ASSAY THYROID STIM HORMONE  CPT-4  72172  July 15, 2016

 

 VENIPUNCT, ROUTINE*  CPT-4  23037  July 15, 2016



                                                                               
                             



Results

          No Known Results                                                     
               



Summary Purpose

          [employer] Submission [facility]  INPATIENT SPEECH THERAPY  [facility]  DAILY NOTE    TIME   SLP Individual Minutes  Time In: 1330  Time Out: 1400  Minutes: 30  Timed Code Treatment Minutes: 30 Minutes       Date: 2024  Patient Name: Rahel Lopez      CSN: [id number]   : 3/4/1931  (89 y.o.)  Gender: female   Referring Physician:  Kaitlynn Herron DO   Diagnosis:  Acute cerebrovascular accident (CVA) (HCC)   Precautions: Fall risk  Current Diet: Regular diet with thin liquids   Respiratory Status: Room Air  Swallowing Strategies:  Full Upright Position, Small Bite/Sip, Medications Whole with Puree, and Alternate Solids and Liquids    Date of Last MBS/FEES: Not Applicable    Pain:  No pain reported.    Subjective: Patient seen awake in recliner. Cooperative and agreeable during ST intervention. Patient's daughters, and son present.    Short-Term Goals:  SHORT TERM GOAL #1:  Goal 1: Patient will complete immediate/delayed recall tasks with and without use of compensatory memory strategies with 85% accuracy and moderate cuing to improve retention of novel information.  INTERVENTIONS:  Recall functional information- Grocery list- (milk, apple, egg, meat, cereal, and bread)  Immediate recall: 4/6 independently, 2/6 utilization of written list  ~20 Minutes delayed recall: 5/6 independently, 1/6 min cue  *good recall of information this date    SHORT TERM GOAL #2:  Goal 2: Patient will complete problem solving and executive functioning tasks (i.e., medication management, finance management, etc.) with 85% accuracy and moderate cuing to improve completion of IADLs.  INTERVENTIONS:  Medication Management- sorting medication: 1/4 self-correction, 1/4 mod cue, 2/4 max cues  Patient asked to read label on medication bottle and sort in pill box  *Decreased reasoning and problem solving  *Required mod-max cues to re-check pill box and find missing medications.  *Patient dropped medication, she was not

## 2024-07-23 LAB
ANION GAP SERPL CALC-SCNC: 14 MEQ/L (ref 8–16)
BUN SERPL-MCNC: 10 MG/DL (ref 7–22)
CALCIUM SERPL-MCNC: 8.8 MG/DL (ref 8.5–10.5)
CHLORIDE SERPL-SCNC: 94 MEQ/L (ref 98–111)
CO2 SERPL-SCNC: 21 MEQ/L (ref 23–33)
CREAT SERPL-MCNC: 0.4 MG/DL (ref 0.4–1.2)
GFR SERPL CREATININE-BSD FRML MDRD: > 90 ML/MIN/1.73M2
GLUCOSE SERPL-MCNC: 102 MG/DL (ref 70–108)
POTASSIUM SERPL-SCNC: 4.3 MEQ/L (ref 3.5–5.2)
SODIUM SERPL-SCNC: 129 MEQ/L (ref 135–145)

## 2024-07-23 PROCEDURE — 36415 COLL VENOUS BLD VENIPUNCTURE: CPT

## 2024-07-23 PROCEDURE — 80048 BASIC METABOLIC PNL TOTAL CA: CPT

## 2024-07-23 PROCEDURE — 97112 NEUROMUSCULAR REEDUCATION: CPT

## 2024-07-23 PROCEDURE — 1180000000 HC REHAB R&B

## 2024-07-23 PROCEDURE — 99232 SBSQ HOSP IP/OBS MODERATE 35: CPT | Performed by: INTERNAL MEDICINE

## 2024-07-23 PROCEDURE — 6370000000 HC RX 637 (ALT 250 FOR IP): Performed by: STUDENT IN AN ORGANIZED HEALTH CARE EDUCATION/TRAINING PROGRAM

## 2024-07-23 PROCEDURE — 6370000000 HC RX 637 (ALT 250 FOR IP): Performed by: NURSE PRACTITIONER

## 2024-07-23 PROCEDURE — 97535 SELF CARE MNGMENT TRAINING: CPT

## 2024-07-23 PROCEDURE — 6370000000 HC RX 637 (ALT 250 FOR IP): Performed by: FAMILY MEDICINE

## 2024-07-23 PROCEDURE — 97110 THERAPEUTIC EXERCISES: CPT

## 2024-07-23 PROCEDURE — 97530 THERAPEUTIC ACTIVITIES: CPT

## 2024-07-23 PROCEDURE — 97129 THER IVNTJ 1ST 15 MIN: CPT

## 2024-07-23 PROCEDURE — 99231 SBSQ HOSP IP/OBS SF/LOW 25: CPT | Performed by: NURSE PRACTITIONER

## 2024-07-23 PROCEDURE — 6370000000 HC RX 637 (ALT 250 FOR IP): Performed by: INTERNAL MEDICINE

## 2024-07-23 PROCEDURE — 97116 GAIT TRAINING THERAPY: CPT

## 2024-07-23 PROCEDURE — 6360000002 HC RX W HCPCS: Performed by: STUDENT IN AN ORGANIZED HEALTH CARE EDUCATION/TRAINING PROGRAM

## 2024-07-23 PROCEDURE — 97130 THER IVNTJ EA ADDL 15 MIN: CPT

## 2024-07-23 RX ADMIN — BUPROPION HYDROCHLORIDE 75 MG: 75 TABLET, FILM COATED ORAL at 08:17

## 2024-07-23 RX ADMIN — CLOPIDOGREL BISULFATE 75 MG: 75 TABLET ORAL at 08:17

## 2024-07-23 RX ADMIN — ENOXAPARIN SODIUM 40 MG: 100 INJECTION SUBCUTANEOUS at 08:16

## 2024-07-23 RX ADMIN — METOPROLOL TARTRATE 50 MG: 50 TABLET, FILM COATED ORAL at 08:17

## 2024-07-23 RX ADMIN — SODIUM CHLORIDE 2 G: 1 TABLET ORAL at 08:17

## 2024-07-23 RX ADMIN — ATORVASTATIN CALCIUM 40 MG: 40 TABLET, FILM COATED ORAL at 22:07

## 2024-07-23 RX ADMIN — FLUTICASONE PROPIONATE 2 SPRAY: 50 SPRAY, METERED NASAL at 08:17

## 2024-07-23 RX ADMIN — AMLODIPINE BESYLATE 5 MG: 5 TABLET ORAL at 08:17

## 2024-07-23 RX ADMIN — PANTOPRAZOLE SODIUM 40 MG: 40 TABLET, DELAYED RELEASE ORAL at 05:12

## 2024-07-23 RX ADMIN — Medication 3 MG: at 22:07

## 2024-07-23 RX ADMIN — BUSPIRONE HYDROCHLORIDE 5 MG: 5 TABLET ORAL at 22:07

## 2024-07-23 RX ADMIN — METOPROLOL TARTRATE 50 MG: 50 TABLET, FILM COATED ORAL at 22:07

## 2024-07-23 RX ADMIN — CETIRIZINE HYDROCHLORIDE 10 MG: 10 TABLET ORAL at 08:17

## 2024-07-23 RX ADMIN — METHYLPHENIDATE HYDROCHLORIDE 5 MG: 5 TABLET ORAL at 08:19

## 2024-07-23 RX ADMIN — ASPIRIN 81 MG 81 MG: 81 TABLET ORAL at 08:17

## 2024-07-23 RX ADMIN — DICLOFENAC SODIUM 2 G: 10 GEL TOPICAL at 08:17

## 2024-07-23 RX ADMIN — BUPROPION HYDROCHLORIDE 75 MG: 75 TABLET, FILM COATED ORAL at 22:07

## 2024-07-23 RX ADMIN — FLUTICASONE PROPIONATE 2 SPRAY: 50 SPRAY, METERED NASAL at 22:07

## 2024-07-23 RX ADMIN — BUSPIRONE HYDROCHLORIDE 5 MG: 5 TABLET ORAL at 08:17

## 2024-07-23 RX ADMIN — Medication 5000 UNITS: at 08:16

## 2024-07-23 RX ADMIN — SODIUM CHLORIDE 2 G: 1 TABLET ORAL at 17:32

## 2024-07-23 ASSESSMENT — 9 HOLE PEG TEST
TESTTIME_SECONDS: 28.37
TESTTIME_SECONDS: 67

## 2024-07-23 NOTE — PROGRESS NOTES
[facility]  Recreational Therapy  Daily Note  [facility]    Time Spent with Patient: 0 minutes    Date:  7/23/2024       Patient Name: Rahel Lopez      MRN: [id number]      YOB: 1931 (89 y.o.)       Gender: female  Diagnosis: Stroke  Referring Practitioner: Attending and Ordering: Kaitlynn Herron DO    RESTRICTIONS/PRECAUTIONS:  Restrictions/Precautions: Fall Risk, General Precautions     Hearing: Exceptions to WFL  Hearing Exceptions: Bilateral hearing aid, Hard of hearing/hearing concerns    PAIN: 0    SUBJECTIVE:  I would like that     OBJECTIVE:  Pt would like to get her hair washed and styled by our beautician tomorrow-pleasant          Patient Education  New Education Provided: Importance of Leisure,     Electronically signed by: JOELLE Faith  Date: 7/23/2024

## 2024-07-23 NOTE — PLAN OF CARE
Problem: Discharge Planning  Goal: Discharge to home or other facility with appropriate resources  7/23/2024 0224 by Lanny Orellana RN  Outcome: Progressing  Flowsheets (Taken 7/22/2024 1937)  Discharge to home or other facility with appropriate resources: Identify barriers to discharge with patient and caregiver     Problem: Safety - Adult  Goal: Free from fall injury  7/23/2024 0224 by Lanny Orellana RN  Outcome: Progressing     Problem: ABCDS Injury Assessment  Goal: Absence of physical injury  7/23/2024 0224 by Lanny Orellana RN  Outcome: Progressing     Problem: Skin/Tissue Integrity  Goal: Absence of new skin breakdown  Description: 1.  Monitor for areas of redness and/or skin breakdown  2.  Assess vascular access sites hourly  3.  Every 4-6 hours minimum:  Change oxygen saturation probe site  4.  Every 4-6 hours:  If on nasal continuous positive airway pressure, respiratory therapy assess nares and determine need for appliance change or resting period.  7/23/2024 0224 by Lanny Orellana RN  Outcome: Progressing     Problem: Chronic Conditions and Co-morbidities  Goal: Patient's chronic conditions and co-morbidity symptoms are monitored and maintained or improved  7/23/2024 0224 by Lanny Orellana RN  Outcome: Progressing  Flowsheets (Taken 7/22/2024 1937)  Care Plan - Patient's Chronic Conditions and Co-Morbidity Symptoms are Monitored and Maintained or Improved: Monitor and assess patient's chronic conditions and comorbid symptoms for stability, deterioration, or improvement     Problem: Pain  Goal: Verbalizes/displays adequate comfort level or baseline comfort level  7/23/2024 0224 by Lanny Orellana RN  Outcome: Progressing  Flowsheets (Taken 7/22/2024 1940)  Verbalizes/displays adequate comfort level or baseline comfort level:   Encourage patient to monitor pain and request assistance   Assess pain using appropriate pain scale   Administer analgesics based on type and severity of pain and evaluate

## 2024-07-23 NOTE — PROGRESS NOTES
[facility]  Inpatient Rehabilitation  Occupational Therapy  Progress Note  Time:  Time In: 1330  Time Out: 1500  Timed Code Treatment Minutes: 90 Minutes  Minutes: 90          Date: 2024  Patient Name: Rahel Lopez,   Gender: female      Room: [facility]020-A  MRN: [id number]  : 3/4/1931  (89 y.o.)  Referring Practitioner: Attending and Ordering: Kaitlynn Herron DO  Diagnosis: Stroke  Additional Pertinent Hx: Per EMR, \"Rahel Lopez is a 89 y.o. female with PMH significant for GERD, hyperlipidemia, hypertension, and cataracts who was admitted to [facility] on 7/10/2024.  History obtained via: ED documentation, acute care documentation, and patient     Patient initially presented to [facility] ED on 7/10/2024 with concerns due to multiple falls and impaired speech.  She reported that speech impairment began approximately 2 days prior and at one point she noted some left-sided weakness, however, felt that was back to baseline.  On the day of presentation, patient reportedly fell twice in her house.  The first time she was able to get herself back up and did not report the incident to anyone.  However, later in the day she was talking to her sister on the phone after they hung up the sister contacted another family member to check on the patient due to confusion and slurred speech.  When the family member arrived, the patient was found down on the ground and unable to get up.  CT head was obtained and was reportedly negative, however, decision made to admit patient for further evaluation for CVA.     On admission, patient was started on aspirin and statin therapy.  An MRI and echo were ordered.  Of note, patient was being treated by PCP for upper respiratory infection at time of presentation.  MRI of the brain resulted and reportedly revealed an acute infarct of the right thalamus.  Neurology consulted and recommends DAPT with aspirin 81 mg + Plavix 75 mg daily for 21 days followed by  completing majority of task seated with appropriate sequencing of task but demo'ed 3 LOB when releasing BUEs from counter top to walker. Rahel is able to complete UB dressing with Josué, as patient tangled herself up when donning bra requiring modA to recognize and correct mistake. She is able to complete LB dressing with CGA for clothing management and footwear management with SBA with increased time. Rahel is able to complete standing tasks >5 minutes however has difficulty maintaining balance at midline, demo'ing with posterior lean. Rahel is able to complete IADL task of preparing fried egg, demo'ing with LOB requiring modA to correct and return to midline and expressed feeling dizzy, requiring assist from OT to remove egg from stove and manage stovetop. She continues to demonstrate L olvin body weakness which negatively impacts her ADL/IADL function.  Rahel demo's L olvin body waekness, however her 9HPT has improved to 67 seconds and L  strength 25.6#. Rahel is motivated, and cont to require skilled OT to improve safety and indep with ADL / IADL based activities prior to discharge to home.Without skilled services patient is at risk of falls, decrease in function and increased dependency on others.   Discharge Recommendations: Home with Home health OT, Home with nursing aide  Equipment Recommendations: Equipment Needed: No  Plan: Times Per Week: 5x/week x 90 minutes  Times Per Day: Once a day  Current Treatment Recommendations: Strengthening, Cognitive reorientation, Balance training, Self-Care / ADL, Home management training, Safety education & training, Functional mobility training, Neuromuscular re-education, Cognitive/Perceptual training, Patient/Caregiver education & training, Endurance training, Equipment evaluation, education, & procurement    Education:  Learners: Patient  Safety with transfers and mobility, ADL strategies, FMC,  strength, weight bearing, table top slides    Goals  Short

## 2024-07-23 NOTE — PROGRESS NOTES
[facility]  INPATIENT SPEECH THERAPY  [facility]  PROGRESS NOTE    TIME   SLP Individual Minutes  Time In: 1200  Time Out: 1230  Minutes: 30  Timed Code Treatment Minutes: 30 Minutes       Date: 2024  Patient Name: Rahel Lopez      CSN: [id number]   : 3/4/1931  (89 y.o.)  Gender: female   Referring Physician:  Kaitlynn Herron DO   Diagnosis:  Acute cerebrovascular accident (CVA) (HCC)   Precautions: Fall risk  Current Diet: Regular diet with thin liquids   Respiratory Status: Room Air  Swallowing Strategies:  Full Upright Position, Small Bite/Sip, Medications Whole with Puree, and Alternate Solids and Liquids    Date of Last MBS/FEES: Not Applicable    Pain:  No pain reported.    Subjective: Patient seen awake in recliner. Cooperative and agreeable during ST intervention. No family present    Short-Term Goals:  SHORT TERM GOAL #1:  Goal 1: Patient will complete immediate/delayed recall tasks with and without use of compensatory memory strategies with 85% accuracy and moderate cuing to improve retention of novel information.GOAL NOT MET; CONTINUE  INTERVENTIONS:  Recall information- transportation ( car, bicycle, boat, airplane, truck)  Immediate recall: 5/5 independently  ~10 Minutes delayed recall: 4/5 independently, 1/5 min cue  *good recall of information this date    Previous session   Recall functional information- Grocery list- (milk, apple, egg, meat, cereal, and bread)  Immediate recall: 4/6 independently, 2/6 utilization of written list  ~20 Minutes delayed recall: 5/6 independently, 1/6 min cue  *good recall of information this date    Recall functional information- Appointment- ( 09/10/ 2024- @9:30 am, Dr. Abad, cardiologist, [facility])  ~24 hours delayed recall: 3/5 independently, 2/5 given max cues  *good recall and retention information this date    SHORT TERM GOAL #2:  Goal 2: Patient will complete problem solving and executive functioning tasks (i.e.,  throughout. No overt s/s of airway invasion demonstrated across all trials consumed, certainly not able to assess pharyngeal phase dysfunction and/or airway invasion events in its entirety. ST followed-up with nurse, there is not concern for consuming po medications whole with thin liquids     Long-Term Goals:  Time Frame for Long Term Goals: 3 weeks    LONG TERM GOAL #1:  Goal 1: Patient will improve cognitive-linguistic skills to a supervision level (FIMS of 5) in order to resume ADL/IADL completion with least amount of supervision/assistance upon discharge. ONGOING    LONG TERM GOAL #2:  Goal 2: Patient will consume a regular texture with thin liquids with stable pulmonary status to maintain hydration/nutrition needs.GOAL MET    Comprehension: 6 - Complex ideas 90% or device (hearing aid or glasses- if patient is primarily a visual learner)  Expression: 6 - Device used to express complex ideas/needs  Social Interaction: 6 - Patient requires medication for mood and/or effect  Problem Solvin - Patient able to solve simple/routine tasks  Memory: 4 - Patient remembers 75-90%+ of the time    Functional Oral Intake Scale: Total Oral Intake: 7.  Total oral intake with no restrictions    EDUCATION:  Learner: Patient  Education:  Reviewed ST goals and Plan of Care, and Reviewed recommendations for follow-up, memory strategies.  Evaluation of Education: Verbalizes understanding    ASSESSMENT/PLAN:  SUMMARY: Patient achomplished 3/6 STGs and 1/2 LTGs this therapy reporting period. Patient with demonstrated gains in immediate/delayed recall, basic problem solving (safety-ADLs), and basic executive functioning (money management), thought organization. Patient continues to present with deficits in attention, complex executive functioning, and high level immediate/delayed recall. Patein's expressive and receptive language are WFL . Patient's speech is WFL; however, presence of mild dysphonia given decreased vocal

## 2024-07-23 NOTE — CARE COORDINATION
Ambulating with 1 assist with walker and gait belt and tolerating ambulation well.   Does c/o of knee discomfort with turning but states she is satisfied with the voltaren on bilateral knees. Remains on a 1500 fluid restriction and is a Stay with me to avoid a fall

## 2024-07-23 NOTE — PROGRESS NOTES
Patient: Rahel Lopez  Unit/Bed: 8K-20/020-A  YOB: 1931  MRN: [id number] Acct: [id number]   Admitting Diagnosis: Stroke (HCC) [I63.9]  Acute cerebrovascular accident (CVA) (HCC) [I63.9]  Admit Date:  7/12/2024  Hospital Day: 11    Assessment:     Principal Problem:    Acute cerebrovascular accident (CVA) (HCC)  Active Problems:    Hypertension    Hyponatremia  Resolved Problems:    * No resolved hospital problems. *      Plan:     Nephrology following the sodium        Subjective:     Patient has no complaint of CP or SOB..   Medication side effects: none    Scheduled Meds:   sodium chloride  2 g Oral BID WC    methylphenidate  5 mg Oral Daily    Vitamin D  5,000 Units Oral Daily    diclofenac sodium  2 g Topical BID    amLODIPine  5 mg Oral Daily    busPIRone  5 mg Oral BID    metoprolol tartrate  50 mg Oral BID    pantoprazole  40 mg Oral QAM AC    enoxaparin  40 mg SubCUTAneous Daily    aspirin  81 mg Oral Daily    atorvastatin  40 mg Oral Nightly    buPROPion  75 mg Oral BID    fluticasone  2 spray Each Nostril BID    cetirizine  10 mg Oral Daily    clopidogrel  75 mg Oral Daily     Continuous Infusions:  PRN Meds:potassium chloride **OR** potassium alternative oral replacement **OR** potassium chloride, magnesium sulfate, polyethylene glycol, senna, acetaminophen, ondansetron, melatonin    Review of Systems  Pertinent items are noted in HPI.    Objective:     Patient Vitals for the past 8 hrs:   BP Temp Temp src Pulse Resp SpO2   07/23/24 0800 136/61 97.5 °F (36.4 °C) Oral 72 20 99 %     I/O last 3 completed shifts:  In: 840 [P.O.:840]  Out: -   I/O this shift:  In: 200 [P.O.:200]  Out: -     /61   Pulse 72   Temp 97.5 °F (36.4 °C) (Oral)   Resp 20   Ht 1.575 m (5' 2\")   Wt 56.3 kg (124 lb 1.9 oz)   SpO2 99%   BMI 22.70 kg/m²     General appearance: alert, appears stated age, and cooperative  Head: Normocephalic, without obvious abnormality, atraumatic  Lungs: clear to

## 2024-07-23 NOTE — PROGRESS NOTES
Physical Medicine & Rehabilitation   Progress Note    Chief Complaint:  CVA    Subjective:    7/22/24: Patient seen today in follow-up, up in bedside chair.  Patient with #1 complaint of sinus pressure and drainage.  Patient reports is causing her stomach to be somewhat upset.  Discussed use of Zofran as needed.  Patient already on PPI.  Patient states that nothing seems to be helping with her sinus pressure, however she does feel like it is draining down the back of her throat.  Encouraged her to use I.S., nurse tech will obtain 1 for her, in order to prevent pneumonia due to ongoing drainage.  Patient already on Flonase, Zyrtec, and was on Mucinex however this was not beneficial so it was discontinued.  Encouraged warm compresses to the forehead. patient understanding.  Sodium is improving, 129 today.  Nephrology following.  Discussed starting Ritalin this morning, for mood and alertness.  Explained this was recommended by the psychologist last week and was discussed with her family.    1530: Presented back to room to discuss with family some questions. Addressed dizziness post stroke, ritalin and weaning other depression medications by PCP, hyponatremia and management by nephrology at this time, anticipated medications at discharge, causes of strokes and reducing risks in patient, findings on CT head lacking for sinus issues - consideration for ENT in future if felt necessary, use of NSAIDs post stroke and avoidance at this time, tylenol for pain available. No other questions voiced. Additional 20+ minutes spent with family.     7/23/24: Patient seen today in follow up.  Just returned from therapy.  Patient with complaints of increased dizziness with turning her head.  Discussed with patient again about decreasing dizziness by turning entire upper body instead of just head or eyes.  Explained also about the dizziness related to the area of her stroke.  Patient verbalizes understanding states she will try to  senna  Follow-ups: PCP (1-2 weeks), Neurology- Dr. Gordon (2-4 weeks), PM&R- Gopal or Molly (6-8 weeks)   and case management consultations for coordination of care and discharge planning. Team conference held Wednesday with anticipated DC home with HH therapies vs SNF on 7/26/2024.    Missed Therapy Time:  None    Carrie Pierce, MOISÉS - CNP

## 2024-07-23 NOTE — PLAN OF CARE
Problem: Discharge Planning  Goal: Discharge to home or other facility with appropriate resources  7/23/2024 1259 by Bessie Schmidt RN  Outcome: Progressing  Flowsheets (Taken 7/23/2024 1259)  Discharge to home or other facility with appropriate resources:   Identify barriers to discharge with patient and caregiver   Arrange for needed discharge resources and transportation as appropriate   Identify discharge learning needs (meds, wound care, etc)     Problem: Safety - Adult  Goal: Free from fall injury  7/23/2024 1259 by Bessie Schmidt RN  Outcome: Progressing  Flowsheets (Taken 7/23/2024 1259)  Free From Fall Injury:   Instruct family/caregiver on patient safety   Based on caregiver fall risk screen, instruct family/caregiver to ask for assistance with transferring infant if caregiver noted to have fall risk factors     Problem: ABCDS Injury Assessment  Goal: Absence of physical injury  7/23/2024 1259 by Bessie Schmidt RN  Outcome: Progressing  Flowsheets (Taken 7/23/2024 1259)  Absence of Physical Injury: Implement safety measures based on patient assessment     Problem: Chronic Conditions and Co-morbidities  Goal: Patient's chronic conditions and co-morbidity symptoms are monitored and maintained or improved  7/23/2024 1259 by Bessie Schmidt RN  Outcome: Progressing  Flowsheets (Taken 7/23/2024 1259)  Care Plan - Patient's Chronic Conditions and Co-Morbidity Symptoms are Monitored and Maintained or Improved:   Monitor and assess patient's chronic conditions and comorbid symptoms for stability, deterioration, or improvement   Collaborate with multidisciplinary team to address chronic and comorbid conditions and prevent exacerbation or deterioration  Problem: Pain  Goal: Verbalizes/displays adequate comfort level or baseline comfort level  7/23/2024 1259 by Bessie Schmidt RN  Outcome: Progressing  Flowsheets (Taken 7/23/2024 1259)  Verbalizes/displays adequate comfort level or baseline comfort level:

## 2024-07-23 NOTE — PROGRESS NOTES
Message received from Mray betancourt. SW returned phone call to Mary betancourt. SW informed Mary betancourt, of admission approval for transition to [facility] on Friday, 07/26/2024. Mary Betancourt, indicates ability to provide transportation to facility at discharge. SW to follow up with facility and daughterMary, for specific discharge time and additional discharge details.     Mary Betancourt, also requesting for patient to receive hair care tomorrow, 07/24/2024. ADRIAN updated RTAlida, of daughter'sMary, request. DaughterMary, to provide $10 for hair care upon her arrival tomorrow, 07/24/2024, when she visits with patient around 1pm.    SW to follow and maintain involvement in discharge planning.

## 2024-07-23 NOTE — PROGRESS NOTES
flowsheet    Restrictions/Precautions:  Restrictions/Precautions: Fall Risk, General Precautions  Position Activity Restriction  Other position/activity restrictions: wear shoes- pt has leg length discrepancy and right built up shoe    SUBJECTIVE: Pt. Laying in her bed and pleasantly agrees to therapy session.  Requesting to use the BR at the start of session, incontinent of BM, therapist assisting with clothing change. Pt complained of dizziness with turning her head and lighting change once getting back to room at the end of session. Also noted neck pain in the back of neck. Therapist notified RN and CNP (Jada) of this. CNP reports that this has been ongoing since her stroke.     PAIN: Yes, pain in bilateral knees and R shoulder from arthritis, no level indicated.     Vitals:   Patient Vitals for the past 8 hrs:   BP Patient Position Temp Temp src Pulse Resp SpO2 O2 Device   07/23/24 0800 136/61 High fowlers 97.5 °F (36.4 °C) Oral 72 20 99 % None (Room air)       OBJECTIVE:  Bed Mobility:  Rolling to Left: Modified Independent   Rolling to Right: Modified Independent   Supine to Sit: Stand By Assistance  Sit to Supine: Stand By Assistance     Transfers:  Sit to Stand:Stand By Assistance, Contact Guard Assistance, cues for hand placement  Stand to Sit:Stand By Assistance, Contact Guard Assistance, cues for hand placement, with verbal cues  Car:Contact Guard Assistance, cues for hand placement, with verbal cues for technique and utilizing safe hand placement.     Ambulation:  Contact Guard Assistance  Distance: 15'x1; 30'x1; 80' x1; 100'x1   Surface: Level Tile  Device: Rolling Walker  Gait Deviations: Forward Flexed Posture, Slow Yessenia, Decreased Step Length Bilaterally, Decreased Weight Shift Right, Decreased Gait Speed, Decreased Heel Strike Bilaterally, Decreased Foot Clearance Left, and Mild Path Deviations    Stairs:  Stairs: 6\" steps X 4 using Bilateral Handrails and Contact Guard Assistance.  Platform: 6\"  Goal 2: Patient will completed bed < > chair transfer with modified independence to transfer surface to surface safely. GOAL NOT MET  Long Term Goal 3: Patient will ambulate 150' with a rollator with modified independence to navigate home safely. GOAL NOT MET  Long Term Goal 4: Patient will ascend/descend 2, 6\" steps with one hand rail to access/leave home safely. GOAL NOT MET  Long Term Goal 5: Patient will complete car transfer with modified independence to transfer in and out of vehicle safely. GOAL NOT MET  Long term goal 6: Patient will improve 5x sit < > stand with use of UE's to less than or equal to 15 seconds to improve LE strength for increased ease with transfers GOAL NOT MET                      Following session, patient left in safe position with all fall risk precautions in place.

## 2024-07-23 NOTE — PROGRESS NOTES
SNF referral initiated for transition on Friday, 07/26/2024. As requested, referral sent to [facility]. Message received from Negin at [facility] indicating that facility is able to accept patient for admission on Friday, 07/26/2024. SW to contact facility tomorrow, 07/24/2024, to further coordinate transfer details. SW to follow and maintain involvement in discharge planning.

## 2024-07-23 NOTE — PROGRESS NOTES
[facility]   Nephrology progress note  7/23/2024, 12:20 PM      Pt Name:    Rahel Lopez  MRN:     [id number]     YOB: 1931  Admit Date:    7/12/2024  3:54 PM    Chief Complaint: Nephrology following for hyponatremia.    Subjective:  Patient was seen and examined this morning.   No new events.    Objective:  24HR INTAKE/OUTPUT:    Intake/Output Summary (Last 24 hours) at 7/23/2024 1220  Last data filed at 7/23/2024 0956  Gross per 24 hour   Intake 580 ml   Output --   Net 580 ml         I/O last 3 completed shifts:  In: 840 [P.O.:840]  Out: -   I/O this shift:  In: 200 [P.O.:200]  Out: -    Admission weight: 57.9 kg (127 lb 11.2 oz)  Wt Readings from Last 3 Encounters:   07/22/24 56.3 kg (124 lb 1.9 oz)   07/12/24 57.3 kg (126 lb 5.2 oz)   09/05/23 59.8 kg (131 lb 12.8 oz)        Vitals :   Vitals:    07/22/24 0627 07/22/24 0829 07/22/24 1940 07/23/24 0800   BP:  130/67 (!) 144/68 136/61   Pulse:  73 71 72   Resp:  17 16 20   Temp:  98.1 °F (36.7 °C) 97.5 °F (36.4 °C) 97.5 °F (36.4 °C)   TempSrc:  Oral Oral Oral   SpO2:  98% 100% 99%   Weight: 56.3 kg (124 lb 1.9 oz)      Height:           Physical examination  General Appearance: alert and cooperative with exam, appears comfortable, no distress  Mouth/Throat: Oral mucosa moist  Neck: No JVD  Lungs: Air entry B/L, no rales, no use of accessory muscles  Heart:  S1, S2 heard  GI: soft, non-tender, no guarding  Extremities: no significant edema    Medications:  Infusion:   Meds:    sodium chloride  2 g Oral BID WC    methylphenidate  5 mg Oral Daily    Vitamin D  5,000 Units Oral Daily    diclofenac sodium  2 g Topical BID    amLODIPine  5 mg Oral Daily    busPIRone  5 mg Oral BID    metoprolol tartrate  50 mg Oral BID    pantoprazole  40 mg Oral QAM AC    enoxaparin  40 mg SubCUTAneous Daily    aspirin  81 mg Oral Daily    atorvastatin  40 mg Oral Nightly    buPROPion  75 mg Oral BID    fluticasone  2 spray Each Nostril BID

## 2024-07-24 LAB
ANION GAP SERPL CALC-SCNC: 9 MEQ/L (ref 8–16)
BUN SERPL-MCNC: 12 MG/DL (ref 7–22)
CALCIUM SERPL-MCNC: 8.6 MG/DL (ref 8.5–10.5)
CHLORIDE SERPL-SCNC: 96 MEQ/L (ref 98–111)
CO2 SERPL-SCNC: 24 MEQ/L (ref 23–33)
CREAT SERPL-MCNC: 0.5 MG/DL (ref 0.4–1.2)
GFR SERPL CREATININE-BSD FRML MDRD: 87 ML/MIN/1.73M2
GLUCOSE SERPL-MCNC: 98 MG/DL (ref 70–108)
POTASSIUM SERPL-SCNC: 4.2 MEQ/L (ref 3.5–5.2)
SODIUM SERPL-SCNC: 129 MEQ/L (ref 135–145)

## 2024-07-24 PROCEDURE — 36415 COLL VENOUS BLD VENIPUNCTURE: CPT

## 2024-07-24 PROCEDURE — 97130 THER IVNTJ EA ADDL 15 MIN: CPT

## 2024-07-24 PROCEDURE — 6370000000 HC RX 637 (ALT 250 FOR IP): Performed by: INTERNAL MEDICINE

## 2024-07-24 PROCEDURE — 6370000000 HC RX 637 (ALT 250 FOR IP): Performed by: FAMILY MEDICINE

## 2024-07-24 PROCEDURE — 97110 THERAPEUTIC EXERCISES: CPT

## 2024-07-24 PROCEDURE — 97129 THER IVNTJ 1ST 15 MIN: CPT

## 2024-07-24 PROCEDURE — 97112 NEUROMUSCULAR REEDUCATION: CPT

## 2024-07-24 PROCEDURE — 97535 SELF CARE MNGMENT TRAINING: CPT

## 2024-07-24 PROCEDURE — 99232 SBSQ HOSP IP/OBS MODERATE 35: CPT | Performed by: INTERNAL MEDICINE

## 2024-07-24 PROCEDURE — 80048 BASIC METABOLIC PNL TOTAL CA: CPT

## 2024-07-24 PROCEDURE — 97116 GAIT TRAINING THERAPY: CPT

## 2024-07-24 PROCEDURE — 6360000002 HC RX W HCPCS: Performed by: STUDENT IN AN ORGANIZED HEALTH CARE EDUCATION/TRAINING PROGRAM

## 2024-07-24 PROCEDURE — 1180000000 HC REHAB R&B

## 2024-07-24 PROCEDURE — 6370000000 HC RX 637 (ALT 250 FOR IP): Performed by: STUDENT IN AN ORGANIZED HEALTH CARE EDUCATION/TRAINING PROGRAM

## 2024-07-24 PROCEDURE — 99232 SBSQ HOSP IP/OBS MODERATE 35: CPT | Performed by: STUDENT IN AN ORGANIZED HEALTH CARE EDUCATION/TRAINING PROGRAM

## 2024-07-24 PROCEDURE — 6370000000 HC RX 637 (ALT 250 FOR IP): Performed by: NURSE PRACTITIONER

## 2024-07-24 PROCEDURE — 97530 THERAPEUTIC ACTIVITIES: CPT

## 2024-07-24 RX ORDER — BUPROPION HYDROCHLORIDE 100 MG/1
50 TABLET ORAL 2 TIMES DAILY
Status: DISCONTINUED | OUTPATIENT
Start: 2024-07-24 | End: 2024-07-26 | Stop reason: HOSPADM

## 2024-07-24 RX ORDER — SODIUM CHLORIDE 1 G/1
2 TABLET ORAL
Status: DISCONTINUED | OUTPATIENT
Start: 2024-07-24 | End: 2024-07-26 | Stop reason: HOSPADM

## 2024-07-24 RX ADMIN — SODIUM CHLORIDE 2 G: 1 TABLET ORAL at 17:25

## 2024-07-24 RX ADMIN — BUSPIRONE HYDROCHLORIDE 5 MG: 5 TABLET ORAL at 20:37

## 2024-07-24 RX ADMIN — CETIRIZINE HYDROCHLORIDE 10 MG: 10 TABLET ORAL at 10:58

## 2024-07-24 RX ADMIN — SODIUM CHLORIDE 2 G: 1 TABLET ORAL at 10:58

## 2024-07-24 RX ADMIN — AMLODIPINE BESYLATE 5 MG: 5 TABLET ORAL at 10:58

## 2024-07-24 RX ADMIN — BUPROPION HYDROCHLORIDE 50 MG: 100 TABLET, FILM COATED ORAL at 20:35

## 2024-07-24 RX ADMIN — FLUTICASONE PROPIONATE 2 SPRAY: 50 SPRAY, METERED NASAL at 20:37

## 2024-07-24 RX ADMIN — Medication 5000 UNITS: at 10:57

## 2024-07-24 RX ADMIN — METOPROLOL TARTRATE 50 MG: 50 TABLET, FILM COATED ORAL at 10:58

## 2024-07-24 RX ADMIN — Medication 3 MG: at 20:34

## 2024-07-24 RX ADMIN — DICLOFENAC SODIUM 2 G: 10 GEL TOPICAL at 20:32

## 2024-07-24 RX ADMIN — ATORVASTATIN CALCIUM 40 MG: 40 TABLET, FILM COATED ORAL at 20:34

## 2024-07-24 RX ADMIN — DICLOFENAC SODIUM 2 G: 10 GEL TOPICAL at 10:57

## 2024-07-24 RX ADMIN — BUSPIRONE HYDROCHLORIDE 5 MG: 5 TABLET ORAL at 10:58

## 2024-07-24 RX ADMIN — PANTOPRAZOLE SODIUM 40 MG: 40 TABLET, DELAYED RELEASE ORAL at 05:27

## 2024-07-24 RX ADMIN — ONDANSETRON 4 MG: 4 TABLET, ORALLY DISINTEGRATING ORAL at 17:27

## 2024-07-24 RX ADMIN — CLOPIDOGREL BISULFATE 75 MG: 75 TABLET ORAL at 10:58

## 2024-07-24 RX ADMIN — METHYLPHENIDATE HYDROCHLORIDE 5 MG: 5 TABLET ORAL at 10:57

## 2024-07-24 RX ADMIN — ASPIRIN 81 MG 81 MG: 81 TABLET ORAL at 10:58

## 2024-07-24 RX ADMIN — FLUTICASONE PROPIONATE 2 SPRAY: 50 SPRAY, METERED NASAL at 10:57

## 2024-07-24 RX ADMIN — METOPROLOL TARTRATE 50 MG: 50 TABLET, FILM COATED ORAL at 20:37

## 2024-07-24 RX ADMIN — ENOXAPARIN SODIUM 40 MG: 100 INJECTION SUBCUTANEOUS at 10:57

## 2024-07-24 ASSESSMENT — PAIN DESCRIPTION - ORIENTATION: ORIENTATION: RIGHT;LEFT

## 2024-07-24 ASSESSMENT — PAIN SCALES - GENERAL
PAINLEVEL_OUTOF10: 1
PAINLEVEL_OUTOF10: 1

## 2024-07-24 ASSESSMENT — PAIN DESCRIPTION - DESCRIPTORS: DESCRIPTORS: ACHING

## 2024-07-24 NOTE — PROGRESS NOTES
extremities.  Glut sets, Heelslides, Short arc quads, Hip abduction/adduction, Straight leg raises, Bridges, Seated marches, Seated hamstring curls, Seated heel/toe raises, Long arc quads, Seated isometric hip adduction, Standing heel/toe raises, Standing marches, Standing hip abduction/adduction, Standing hip flexion, and Mini squats all with 2# ankle weights applied.    Pt participated with NuStep activity completing 8 mins, with Ave SPM 62, and .25 mile(s). Seat/handle setting 8.  Exercises were completed for increased independence with functional mobility.    Functional Outcome Measures:  Not completed  Modified [place] Scale:  +3 - Moderate disability; requiring some help, but able to walk without physical assistance (SBA/CGA).   Patient could not live alone but could walk from one room to another without physical help from another person.  Education provided regarding stroke rehabilitation management.    ASSESSMENT:  Assessment: Patient progressing toward established goals.  Activity Tolerance:  Patient tolerance of  treatment:good   Equipment Recommendations:Equipment Needed: No (defer to next level of care)  Discharge Recommendations: Subacte/Skilled Nursing Facility  Plan: Current Treatment Recommendations: Strengthening, Balance training, Functional mobility training, Transfer training, Gait training, Endurance training, Neuromuscular re-education, Wheelchair mobility training, Home exercise program, Pain management, Safety education & training, Patient/Caregiver education & training, Therapeutic activities  General Plan:  (5x/wk 90min)    Education:  Learners: Patient and Family  Patient Education: Plan of Care, Bed Mobility, Transfers, Gait, Up in Chair for All Meals, Verbal Exercise Instruction    Goals:  Patient Goals : be able to get around on own  Short Term Goals  Time Frame for Short Term Goals: 1 week  Short Term Goal 1: Patient will complete supine < > sit and rolling with head of bed flat and

## 2024-07-24 NOTE — PROGRESS NOTES
[facility]   Nephrology progress note  7/24/2024, 2:38 PM      Pt Name:    Rahel Lopez  MRN:     [id number]     YOB: 1931  Admit Date:    7/12/2024  3:54 PM    Chief Complaint: Nephrology following for hyponatremia.    Subjective:  Patient was seen and examined this morning.   Sodium remains low.    Objective:  24HR INTAKE/OUTPUT:    Intake/Output Summary (Last 24 hours) at 7/24/2024 1438  Last data filed at 7/24/2024 0830  Gross per 24 hour   Intake 660 ml   Output --   Net 660 ml         I/O last 3 completed shifts:  In: 820 [P.O.:820]  Out: -   I/O this shift:  In: 360 [P.O.:360]  Out: -    Admission weight: 57.9 kg (127 lb 11.2 oz)  Wt Readings from Last 3 Encounters:   07/22/24 56.3 kg (124 lb 1.9 oz)   07/12/24 57.3 kg (126 lb 5.2 oz)   09/05/23 59.8 kg (131 lb 12.8 oz)        Vitals :   Vitals:    07/22/24 1940 07/23/24 0800 07/23/24 2205 07/24/24 1045   BP: (!) 144/68 136/61 123/60 114/63   Pulse: 71 72 75 73   Resp: 16 20 16 20   Temp: 97.5 °F (36.4 °C) 97.5 °F (36.4 °C) 97.7 °F (36.5 °C) 97.7 °F (36.5 °C)   TempSrc: Oral Oral Oral Oral   SpO2: 100% 99% 97% 98%   Weight:       Height:           Physical examination  General Appearance: alert and cooperative with exam, appears comfortable, no distress  Mouth/Throat: Oral mucosa moist  Neck: No JVD  Lungs: Air entry B/L, no rales, no use of accessory muscles  Heart:  S1, S2 heard  GI: soft, non-tender, no guarding  Extremities: no significant edema    Medications:  Infusion:   Meds:    sodium chloride  2 g Oral TID WC    buPROPion  50 mg Oral BID    methylphenidate  5 mg Oral Daily    Vitamin D  5,000 Units Oral Daily    diclofenac sodium  2 g Topical BID    amLODIPine  5 mg Oral Daily    busPIRone  5 mg Oral BID    metoprolol tartrate  50 mg Oral BID    pantoprazole  40 mg Oral QAM AC    enoxaparin  40 mg SubCUTAneous Daily    aspirin  81 mg Oral Daily    atorvastatin  40 mg Oral Nightly    fluticasone  2 spray Each  Nostril BID    cetirizine  10 mg Oral Daily    clopidogrel  75 mg Oral Daily     Meds prn: potassium chloride **OR** potassium alternative oral replacement **OR** potassium chloride, magnesium sulfate, polyethylene glycol, senna, acetaminophen, ondansetron, melatonin     Lab Data :  CBC:   No results for input(s): \"WBC\", \"HGB\", \"HCT\", \"PLT\" in the last 72 hours.    CMP:  Recent Labs     07/22/24  0546 07/23/24  0650 07/24/24  0610   * 129* 129*   K 4.2 4.3 4.2   CL 94* 94* 96*   CO2 22* 21* 24   BUN 12 10 12   CREATININE 0.5 0.4 0.5   GLUCOSE 93 102 98   CALCIUM 8.5 8.8 8.6     Hepatic: No results for input(s): \"LABALBU\", \"AST\", \"ALT\", \"BILITOT\", \"ALKPHOS\" in the last 72 hours.    Invalid input(s): \"ALB\"      Assessment and Plan:  Hyponatremia, chronic but has gotten has worse.   Urine studies suggest SIADH.  Likely from Wellbutrin, dose reduced today  Increase salt tabs to 2 g tid, continue with fluid restriction. Recheck in AM  S/p CVA  HTN  HLD      D/W patient and MARCELINO Whitney, DO  [facility]    This report has been created using voice recognition software. It may contain minor errors which are inherent in voice recognition technology

## 2024-07-24 NOTE — PLAN OF CARE
Problem: Discharge Planning  Goal: Discharge to home or other facility with appropriate resources  7/24/2024 1557 by Savanah Mccartney LISW  Note: Team conference held Wednesday, 7/24. Recommendations of the team were explained to the patient by Dr Herron and ADRIAN. Team is recommending that patient continue on acute inpatient rehab for PT, OT and ST, with expected discharge date of Friday, 7/26. Following discharge, team is recommending SNF. Care plan reviewed with patient. Patient verbalized understanding of the plan of care and contributed to goal setting.     SW spoke with Negin at [facility]. They are able to accept patient for admission on Friday, 7/26. Planning on admission around 1:30 PM.    ADRIAN met with patient's daughter, Mary, to discuss discharge. Mary to provide transport and will be available at 11 AM. Mary is aware of admission time. SW answered questions and concerns.    SW to follow and maintain involvement in discharge planning.

## 2024-07-24 NOTE — PROGRESS NOTES
[facility]  Occupational Therapy  Daily Note  Time:   Time In: 0900  Time Out: 1030  Timed Code Treatment Minutes: 90 Minutes  Minutes: 90          Date: 2024  Patient Name: Rahel Lopez,   Gender: female      Room: [facility]20/020-A  MRN: [id number]  : 3/4/1931  (89 y.o.)  Referring Practitioner: Attending and Ordering: Kaitlynn Herron DO  Diagnosis: Stroke  Additional Pertinent Hx: Per EMR, \"Rahel Lopez is a 89 y.o. female with PMH significant for GERD, hyperlipidemia, hypertension, and cataracts who was admitted to [facility] on 7/10/2024.  History obtained via: ED documentation, acute care documentation, and patient     Patient initially presented to [facility] ED on 7/10/2024 with concerns due to multiple falls and impaired speech.  She reported that speech impairment began approximately 2 days prior and at one point she noted some left-sided weakness, however, felt that was back to baseline.  On the day of presentation, patient reportedly fell twice in her house.  The first time she was able to get herself back up and did not report the incident to anyone.  However, later in the day she was talking to her sister on the phone after they hung up the sister contacted another family member to check on the patient due to confusion and slurred speech.  When the family member arrived, the patient was found down on the ground and unable to get up.  CT head was obtained and was reportedly negative, however, decision made to admit patient for further evaluation for CVA.     On admission, patient was started on aspirin and statin therapy.  An MRI and echo were ordered.  Of note, patient was being treated by PCP for upper respiratory infection at time of presentation.  MRI of the brain resulted and reportedly revealed an acute infarct of the right thalamus.  Neurology consulted and recommends DAPT with aspirin 81 mg + Plavix 75 mg daily for 21 days  Recommendations: Strengthening, Cognitive reorientation, Balance training, Self-Care / ADL, Home management training, Safety education & training, Functional mobility training, Neuromuscular re-education, Cognitive/Perceptual training, Patient/Caregiver education & training, Endurance training, Equipment evaluation, education, & procurement    Education:  Learners: Patient  Safety with transfers and mobility, weight bearing, table top slides ADL strategies     Goals  Short Term Goals  Time Frame for Short Term Goals: 2 weeks from admission on IP rehab  Short Term Goal 1: Pt will increased L UE 9 hole peg score by 15 seconds to increase indep with ADL/IADL routine.  Short Term Goal 2: Pt will complete dynamic standing task x 5 minutes with 2 UE release and SBA to increase indep with all sinkside grooming.  Short Term Goal 3: Pt will complete LB dressing with SBA to increase indep and endurance in home environment.  Short Term Goal 4: Pt will demo functional mobility HH distances with SBA to increase IND with toileting.  Long Term Goals  Time Frame for Long Term Goals : 3 weeks from admission on IP rehab  Long Term Goal 1: Pt will complete full ADL routine including shower in Tub/shower Mod Indep to increase occupational preformance in home environment.  Long Term Goal 2: Pt will complete simple IADL with Mod indep to increase indep with cooking simple meals in home.    Following session, patient left in safe position with all fall risk precautions in place.

## 2024-07-24 NOTE — PROGRESS NOTES
Physical Medicine & Rehabilitation   Progress Note    Chief Complaint:  CVA    Subjective:  Patient seen and examined. Overall, patient reports that she is doing okay. She is looking forward to anticipated DC tomorrow.  Plan is to DC to SNF.  She is looking forward to this is closer to home.  She continues to endorse of sinus pressure.  No reports of chest pain or shortness of breath.  No reports of any significant changes to bowel or bladder.  She feels that her mood is stable.      Rehabilitation:  PT 07/24/2024:  Bed Mobility:  Rolling to Left: Modified Independent, on flat mat   Supine to Sit: Modified Independent  Sit to Supine: Modified Independent   **on mat     Transfers:  Sit to Stand: Supervision  Stand to Sit:Supervision     Ambulation:  Stand By Assistance  Distance: ~120ft, 12ft, 15ft  Surface: Level Tile  Device: Rolling Walker  Gait Deviations:  Forward Flexed Posture, Slow Yessenia, Decreased Step Length Bilaterally, Decreased Gait Speed, and Decreased Heel Strike Bilaterally     Dynamic gait: CGA in parallel bars            -stepping forward over hurdles leading R/L and side-stepping over hurdles, using BUE support, rest breaks provided as needed.     Balance:  Dynamic Standing Balance: Stand By Assistance, standing in parallel bars performed LE exercises with BUE     Exercise:  Patient was guided in 1 set(s) 15 reps of exercise to both lower extremities.  Glut sets, Heelslides, Short arc quads, Hip abduction/adduction, Straight leg raises, Bridges, Seated marches, Seated hamstring curls, Seated heel/toe raises, Long arc quads, Seated isometric hip adduction, Standing heel/toe raises, Standing marches, Standing hip abduction/adduction, Standing hip flexion, and Mini squats all with 2# ankle weights applied.    Pt participated with NuStep activity completing 8 mins, with Ave SPM 62, and .25 mile(s). Seat/handle setting 8.  Exercises were completed for increased independence with functional  Continue Salt tabs 2 g TID and fluid restriction  7/25: Na 130.  In anticipation for discharge, nephrology recommends continued salt tabs and fluid restriction on discharge.  Additionally, recommends checking weekly sodium levels x 4 if able at DC to try to wean the salt tabs.  HTN: Continue amlodipine 5 mg daily and Metoprolol 50 mg BID  Prophylaxis:  DVT: Lovenox. GI: Pantoprazole 40 mg daily  Pain: Continue PRN tylenol and Voltaren gel   Sinusitis: Completed course of Keflex. Continue Zyrtec 10 mg daily, flonase  Dizziness: possibly related to CVA. Consider need for BPPV testing  Mood: Continue wellbutrin 50 mg BID and Buspar 5 mg BID. Continue low dose ritalin  7/25: Discussed with pharmacy Discussed with pharmacy today.  There are no specific guidelines for the weaning of Wellbutrin.  However, they recommend decreasing dose weekly.  This is something that can continue to be done in SNF setting if hyponatremia persists  Nutrition:  Consultation to dietician for nutritional counseling and recommendations.     Bladder: Will monitor for signs and symptoms of infection/ retention  Bowel: PRN miralax and senna  Follow-ups: PCP (1-2 weeks), Neurology- Dr. Gordon (2-4 weeks), PM&R- Gopal or Molly (6-8 weeks)   and case management consultations for coordination of care and discharge planning. Team conference held Wednesday with anticipated DC SNF on 7/26/2024.    Missed Therapy Time:  None    Kaitlynn Herron DO

## 2024-07-24 NOTE — PROGRESS NOTES
Physical Medicine & Rehabilitation   Progress Note    Chief Complaint:  CVA    Subjective:  Patient seen independently this morning and then again on rounds with CARIDAD Bone, RJ, following patient's inpatient Rehab Team Conference. Overall, patient reports that she is doing well. She reports feeling a little more energy since starting low dose ritalin. 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 She is currently on a fluid restriction and salt tabs. Mood seems stable. No reports of any CP or SOB. No reports of any significant changes to bowel or bladder.      Rehabilitation:PT, OT, and SLP updates reviewed during team rounds. See separate note.       Review of Systems:  CONSTITUTIONAL:  negative for  fevers and chills; positive for decreased appetite  EYES:  positive for  glasses; negative for diplopia  HEENT:  negative for  sore throat; +head fullness sensation  RESPIRATORY:  negative for  dyspnea and wheezing  CARDIOVASCULAR:  negative for  chest pain, palpitations  GASTROINTESTINAL:  negative for nausea, vomiting, diarrhea  GENITOURINARY:  negative for urinary symptoms  SKIN:  negative for rash  MUSCULOSKELETAL:  positive for  arthralgias, stiff joints, and decreased range of motion  NEUROLOGICAL:  positive for coordination problems, gait problems, and weakness; negative for numbness and tingling  BEHAVIOR/PSYCH:  negative  System review otherwise negative      Objective:  /60   Pulse 75   Temp 97.7 °F (36.5 °C) (Oral)   Resp 16   Ht 1.575 m (5' 2\")   Wt 56.3 kg (124 lb 1.9 oz)   SpO2 97%   BMI 22.70 kg/m²   Patient is awake and alert. Laying in bed   Orientation: She is oriented to person, place, month/year (did initially state that it was June but corrected when asked again)  Mood: within normal limits  Affect: calm  General appearance: Patient is well nourished, well developed, well groomed and in no acute distress    appropriate.       Rehabilitation nursing will be involved for bowel, bladder, skin, and pain management.  Nursing will also provide education and training to patient and family.    Dr. Torres consulted for medical management   Acute CVA: acute infarcts in the posterior limb of the internal capsule on the right and right thalamus. Per Neurology continue DAPT  with ASA 81 mg + Plavix 75 mg daily x21 days followed by ASA 81 mg indefinitely. Continue atorvastatin 40 mg QHS   Hyponatremia: Nephrology following, concern for SIADH related to wellbutrin  7/24: Na 129- messaged Nephrologist this morning. Will decrease Wellbutrin  HTN: Continue amlodipine 5 mg daily and Metoprolol 50 mg BID  Prophylaxis:  DVT: Lovenox. GI: Pantoprazole 40 mg daily  Pain: Continue PRN tylenol and Voltaren gel   Sinusitis: Completed course of Keflex. Continue Zyrtec 10 mg daily, flonase  Dizziness: possibly related to CVA. Consider need for BPPV testing  Mood: Continue wellbutrin 75 mg BID and Buspar 5 mg BID. Continue low dose ritalin  7/24: Will decrease wellbutrin to 50 mg BID  Nutrition:  Consultation to dietician for nutritional counseling and recommendations.     Bladder: Will monitor for signs and symptoms of infection/ retention  Bowel: PRN miralax and senna  Follow-ups: PCP (1-2 weeks), Neurology- Dr. Gordon (2-4 weeks), PM&R- Gopal or Molly (6-8 weeks)   and case management consultations for coordination of care and discharge planning. Team conference held today with anticipated DC SNF on 7/26/2024.    Missed Therapy Time:  None    Kaitlynn Herron DO

## 2024-07-24 NOTE — PROGRESS NOTES
Patient: Rahel Lopez  Unit/Bed: 8K-20/020-A  YOB: 1931  MRN: [id number] Acct: [id number]   Admitting Diagnosis: Stroke (HCC) [I63.9]  Acute cerebrovascular accident (CVA) (HCC) [I63.9]  Admit Date:  7/12/2024  Hospital Day: 12    Assessment:     Principal Problem:    Acute cerebrovascular accident (CVA) (HCC)  Active Problems:    Hypertension    Hyponatremia  Resolved Problems:    * No resolved hospital problems. *      Plan:     Continue to follow         Subjective:     Patient has no complaint of CP or SOB..   Medication side effects: none    Scheduled Meds:   sodium chloride  2 g Oral TID WC    buPROPion  50 mg Oral BID    methylphenidate  5 mg Oral Daily    Vitamin D  5,000 Units Oral Daily    diclofenac sodium  2 g Topical BID    amLODIPine  5 mg Oral Daily    busPIRone  5 mg Oral BID    metoprolol tartrate  50 mg Oral BID    pantoprazole  40 mg Oral QAM AC    enoxaparin  40 mg SubCUTAneous Daily    aspirin  81 mg Oral Daily    atorvastatin  40 mg Oral Nightly    fluticasone  2 spray Each Nostril BID    cetirizine  10 mg Oral Daily    clopidogrel  75 mg Oral Daily     Continuous Infusions:  PRN Meds:potassium chloride **OR** potassium alternative oral replacement **OR** potassium chloride, magnesium sulfate, polyethylene glycol, senna, acetaminophen, ondansetron, melatonin    Review of Systems  Pertinent items are noted in HPI.    Objective:     Patient Vitals for the past 8 hrs:   BP Temp Temp src Pulse Resp SpO2   07/24/24 1045 114/63 97.7 °F (36.5 °C) Oral 73 20 98 %     I/O last 3 completed shifts:  In: 820 [P.O.:820]  Out: -   I/O this shift:  In: 360 [P.O.:360]  Out: -     /63   Pulse 73   Temp 97.7 °F (36.5 °C) (Oral)   Resp 20   Ht 1.575 m (5' 2\")   Wt 56.3 kg (124 lb 1.9 oz)   SpO2 98%   BMI 22.70 kg/m²     General appearance: alert, appears stated age, and cooperative  Head: Normocephalic, without obvious abnormality, atraumatic  Lungs: clear to auscultation

## 2024-07-24 NOTE — PROGRESS NOTES
Patient: Rahel Lopez  Unit/Bed: 8K-20/020-A  YOB: 1931  MRN: [id number] Acct: [id number]   Admitting Diagnosis: Stroke (HCC) [I63.9]  Acute cerebrovascular accident (CVA) (HCC) [I63.9]  Admit Date:  7/12/2024  Hospital Day: 11    Assessment:     Principal Problem:    Acute cerebrovascular accident (CVA) (HCC)  Active Problems:    Hypertension    Hyponatremia  Resolved Problems:    * No resolved hospital problems. *      Plan:     Continue to follow        Subjective:     Patient has no complaint of CP or SOB..   Medication side effects: none    Scheduled Meds:   sodium chloride  2 g Oral BID WC    methylphenidate  5 mg Oral Daily    Vitamin D  5,000 Units Oral Daily    diclofenac sodium  2 g Topical BID    amLODIPine  5 mg Oral Daily    busPIRone  5 mg Oral BID    metoprolol tartrate  50 mg Oral BID    pantoprazole  40 mg Oral QAM AC    enoxaparin  40 mg SubCUTAneous Daily    aspirin  81 mg Oral Daily    atorvastatin  40 mg Oral Nightly    buPROPion  75 mg Oral BID    fluticasone  2 spray Each Nostril BID    cetirizine  10 mg Oral Daily    clopidogrel  75 mg Oral Daily     Continuous Infusions:  PRN Meds:potassium chloride **OR** potassium alternative oral replacement **OR** potassium chloride, magnesium sulfate, polyethylene glycol, senna, acetaminophen, ondansetron, melatonin    Review of Systems  Pertinent items are noted in HPI.    Objective:     No data found.  I/O last 3 completed shifts:  In: 700 [P.O.:700]  Out: -   I/O this shift:  In: 180 [P.O.:180]  Out: -     /61   Pulse 72   Temp 97.5 °F (36.4 °C) (Oral)   Resp 20   Ht 1.575 m (5' 2\")   Wt 56.3 kg (124 lb 1.9 oz)   SpO2 99%   BMI 22.70 kg/m²     General appearance: alert, appears stated age, and cooperative  Head: Normocephalic, without obvious abnormality, atraumatic  Lungs: clear to auscultation bilaterally  Heart: regular rate and rhythm, S1, S2 normal, no murmur, click, rub or gallop  Abdomen: soft, non-tender; bowel  sounds normal; no masses,  no organomegaly  Extremities: extremities normal, atraumatic, no cyanosis or edema  Skin: Skin color, texture, turgor normal. No rashes or lesions  Neurologic:  weak    Electronically signed by Florentin Torres MD on 7/23/2024 at 8:35 PM

## 2024-07-24 NOTE — PLAN OF CARE
Problem: Discharge Planning  Goal: Discharge to home or other facility with appropriate resources  7/24/2024 1237 by Bessie Schmidt RN  Outcome: Progressing  Flowsheets (Taken 7/24/2024 1237)  Discharge to home or other facility with appropriate resources:   Identify barriers to discharge with patient and caregiver   Arrange for needed discharge resources and transportation as appropriate   Identify discharge learning needs (meds, wound care, etc)     Problem: Safety - Adult  Goal: Free from fall injury  7/24/2024 1237 by Bessie Schmidt RN  Outcome: Progressing  Flowsheets (Taken 7/24/2024 1237)  Free From Fall Injury:   Instruct family/caregiver on patient safety   Based on caregiver fall risk screen, instruct family/caregiver to ask for assistance with transferring infant if caregiver noted to have fall risk factors     Problem: ABCDS Injury Assessment  Goal: Absence of physical injury  7/24/2024 1237 by Bessie Schmidt RN  Outcome: Progressing  Flowsheets (Taken 7/24/2024 1237)  Absence of Physical Injury: Implement safety measures based on patient assessment     Problem: Skin/Tissue Integrity  Goal: Absence of new skin breakdown  Description: 1.  Monitor for areas of redness and/or skin breakdown  2.  Assess vascular access sites hourly  3.  Every 4-6 hours minimum:  Change oxygen saturation probe site  4.  Every 4-6 hours:  If on nasal continuous positive airway pressure, respiratory therapy assess nares and determine need for appliance change or resting period.  7/24/2024 1237 by Bessie Schmidt RN  Outcome: Progressing     Problem: Chronic Conditions and Co-morbidities  Goal: Patient's chronic conditions and co-morbidity symptoms are monitored and maintained or improved  7/24/2024 1237 by Bessie Schmidt RN  Outcome: Progressing  Flowsheets (Taken 7/24/2024 1237)  Care Plan - Patient's Chronic Conditions and Co-Morbidity Symptoms are Monitored and Maintained or Improved:   Monitor and assess patient's

## 2024-07-24 NOTE — PLAN OF CARE
Problem: Discharge Planning  Goal: Discharge to home or other facility with appropriate resources  7/24/2024 0050 by Tere Lopez RN  Outcome: Progressing     Problem: Safety - Adult  Goal: Free from fall injury  7/24/2024 0050 by Tere Lopez RN  Outcome: Progressing     Problem: ABCDS Injury Assessment  Goal: Absence of physical injury  7/24/2024 0050 by Tere Lopez RN  Outcome: Progressing       Problem: ABCDS Injury Assessment  Goal: Absence of physical injury  7/24/2024 0050 by Tere Lopez RN  Outcome: Progressing     Problem: Skin/Tissue Integrity  Goal: Absence of new skin breakdown  Description: 1.  Monitor for areas of redness and/or skin breakdown  2.  Assess vascular access sites hourly  3.  Every 4-6 hours minimum:  Change oxygen saturation probe site  4.  Every 4-6 hours:  If on nasal continuous positive airway pressure, respiratory therapy assess nares and determine need for appliance change or resting period.  7/24/2024 0050 by Tere Lopez RN  Outcome: Progressing     Problem: Chronic Conditions and Co-morbidities  Goal: Patient's chronic conditions and co-morbidity symptoms are monitored and maintained or improved  7/24/2024 0050 by Tere Lopez RN  Outcome: Progressing     Problem: Pain  Goal: Verbalizes/displays adequate comfort level or baseline comfort level  7/24/2024 0050 by Tere Lopez RN  Outcome: Progressing     Problem: Nutrition Deficit:  Goal: Optimize nutritional status  7/24/2024 0050 by Tere Lopez RN  Outcome: Progressing

## 2024-07-24 NOTE — PROGRESS NOTES
[facility]  INPATIENT SPEECH THERAPY  [facility]  DAILY NOTE    TIME   SLP Individual Minutes  Time In: 0800  Time Out: 0830  Minutes: 30  Timed Code Treatment Minutes: 30 Minutes       Date: 2024  Patient Name: Rahel Lopez      CSN: [id number]   : 3/4/1931  (89 y.o.)  Gender: female   Referring Physician:  Kaitlynn Herron DO   Diagnosis:  Acute cerebrovascular accident (CVA) (HCC)   Precautions: Fall risk  Current Diet: Regular diet with thin liquids   Respiratory Status: Room Air  Swallowing Strategies:  Full Upright Position, Small Bite/Sip, Medications Whole with Puree, and Alternate Solids and Liquids    Date of Last MBS/FEES: Not Applicable    Pain:  No pain reported.    Subjective: Patient was sleeping in recliner. Cooperative and agreeable during ST intervention. No family present    Short-Term Goals:  SHORT TERM GOAL #1:  Goal 1: Patient will complete immediate/delayed recall tasks with and without use of compensatory memory strategies with 85% accuracy and moderate cuing to improve retention of novel information.GOAL NOT MET; CONTINUE  INTERVENTIONS:  Recall Food items recall ( banana, apple, cheese, egg, milk)  Immediate recall: 4/5 independently, 1/5 min cue,   *good recall of information this date  *Patient self generated list. Therapist providing assist for writing information down.    SHORT TERM GOAL #2:  Goal 2: Patient will complete problem solving and executive functioning tasks (i.e., medication management, finance management, etc.) with 85% accuracy and moderate cuing to improve completion of IADLs.   INTERVENTIONS:   Problem solving-ADLs/Safety: (What would you if you lost your wallet, what would you if you smell smoke, what would you do if you want to stand up and your walked far away from you): 2/3 independently. 1/3 mod cues  *Decreased reasoning; required extra time to respond compare to previous sessions    SHORT TERM GOAL #3:  GOAL 3:

## 2024-07-25 LAB
ANION GAP SERPL CALC-SCNC: 11 MEQ/L (ref 8–16)
BUN SERPL-MCNC: 11 MG/DL (ref 7–22)
CALCIUM SERPL-MCNC: 8.6 MG/DL (ref 8.5–10.5)
CHLORIDE SERPL-SCNC: 96 MEQ/L (ref 98–111)
CO2 SERPL-SCNC: 23 MEQ/L (ref 23–33)
CREAT SERPL-MCNC: 0.4 MG/DL (ref 0.4–1.2)
GFR SERPL CREATININE-BSD FRML MDRD: > 90 ML/MIN/1.73M2
GLUCOSE SERPL-MCNC: 95 MG/DL (ref 70–108)
POTASSIUM SERPL-SCNC: 4.5 MEQ/L (ref 3.5–5.2)
SODIUM SERPL-SCNC: 130 MEQ/L (ref 135–145)

## 2024-07-25 PROCEDURE — 6370000000 HC RX 637 (ALT 250 FOR IP): Performed by: FAMILY MEDICINE

## 2024-07-25 PROCEDURE — 36415 COLL VENOUS BLD VENIPUNCTURE: CPT

## 2024-07-25 PROCEDURE — 97535 SELF CARE MNGMENT TRAINING: CPT

## 2024-07-25 PROCEDURE — 97116 GAIT TRAINING THERAPY: CPT

## 2024-07-25 PROCEDURE — 97530 THERAPEUTIC ACTIVITIES: CPT

## 2024-07-25 PROCEDURE — 6360000002 HC RX W HCPCS: Performed by: STUDENT IN AN ORGANIZED HEALTH CARE EDUCATION/TRAINING PROGRAM

## 2024-07-25 PROCEDURE — 97129 THER IVNTJ 1ST 15 MIN: CPT

## 2024-07-25 PROCEDURE — 1180000000 HC REHAB R&B

## 2024-07-25 PROCEDURE — 97130 THER IVNTJ EA ADDL 15 MIN: CPT

## 2024-07-25 PROCEDURE — 99232 SBSQ HOSP IP/OBS MODERATE 35: CPT | Performed by: INTERNAL MEDICINE

## 2024-07-25 PROCEDURE — 6370000000 HC RX 637 (ALT 250 FOR IP): Performed by: STUDENT IN AN ORGANIZED HEALTH CARE EDUCATION/TRAINING PROGRAM

## 2024-07-25 PROCEDURE — 6370000000 HC RX 637 (ALT 250 FOR IP): Performed by: INTERNAL MEDICINE

## 2024-07-25 PROCEDURE — 80048 BASIC METABOLIC PNL TOTAL CA: CPT

## 2024-07-25 PROCEDURE — 6370000000 HC RX 637 (ALT 250 FOR IP): Performed by: NURSE PRACTITIONER

## 2024-07-25 PROCEDURE — 97110 THERAPEUTIC EXERCISES: CPT

## 2024-07-25 RX ORDER — FLUTICASONE PROPIONATE 50 MCG
2 SPRAY, SUSPENSION (ML) NASAL 2 TIMES DAILY
Qty: 16 G | Refills: 3 | DISCHARGE
Start: 2024-07-25

## 2024-07-25 RX ORDER — CHOLECALCIFEROL (VITAMIN D3) 25 MCG
5000 TABLET ORAL DAILY
Qty: 60 TABLET | DISCHARGE
Start: 2024-07-26

## 2024-07-25 RX ORDER — ENOXAPARIN SODIUM 100 MG/ML
40 INJECTION SUBCUTANEOUS DAILY
DISCHARGE
Start: 2024-07-26

## 2024-07-25 RX ORDER — ATORVASTATIN CALCIUM 40 MG/1
40 TABLET, FILM COATED ORAL NIGHTLY
Qty: 30 TABLET | Refills: 3 | DISCHARGE
Start: 2024-07-25

## 2024-07-25 RX ORDER — CETIRIZINE HYDROCHLORIDE 10 MG/1
10 TABLET ORAL DAILY
DISCHARGE
Start: 2024-07-26

## 2024-07-25 RX ORDER — BUPROPION HYDROCHLORIDE 100 MG/1
50 TABLET ORAL 2 TIMES DAILY
Qty: 60 TABLET | Refills: 3 | DISCHARGE
Start: 2024-07-25

## 2024-07-25 RX ORDER — ONDANSETRON 4 MG/1
4 TABLET, ORALLY DISINTEGRATING ORAL EVERY 8 HOURS PRN
DISCHARGE
Start: 2024-07-25

## 2024-07-25 RX ORDER — SODIUM CHLORIDE 1 G/1
2 TABLET ORAL
Qty: 90 TABLET | Refills: 3 | DISCHARGE
Start: 2024-07-25

## 2024-07-25 RX ORDER — METHYLPHENIDATE HYDROCHLORIDE 5 MG/1
5 TABLET ORAL DAILY
Qty: 30 TABLET | Refills: 0 | Status: SHIPPED | DISCHARGE
Start: 2024-07-26 | End: 2024-08-25

## 2024-07-25 RX ORDER — CLOPIDOGREL BISULFATE 75 MG/1
75 TABLET ORAL DAILY
Qty: 6 TABLET | Refills: 0 | DISCHARGE
Start: 2024-07-27 | End: 2024-08-02

## 2024-07-25 RX ORDER — SENNOSIDES A AND B 8.6 MG/1
1 TABLET, FILM COATED ORAL DAILY PRN
DISCHARGE
Start: 2024-07-25 | End: 2024-08-24

## 2024-07-25 RX ORDER — LANOLIN ALCOHOL/MO/W.PET/CERES
3 CREAM (GRAM) TOPICAL NIGHTLY PRN
Refills: 1 | DISCHARGE
Start: 2024-07-25

## 2024-07-25 RX ORDER — ASPIRIN 81 MG/1
81 TABLET, CHEWABLE ORAL DAILY
Qty: 30 TABLET | Refills: 3 | DISCHARGE
Start: 2024-07-26

## 2024-07-25 RX ORDER — POLYETHYLENE GLYCOL 3350 17 G/17G
17 POWDER, FOR SOLUTION ORAL DAILY PRN
Qty: 527 G | Refills: 1 | DISCHARGE
Start: 2024-07-25 | End: 2024-09-25

## 2024-07-25 RX ADMIN — SODIUM CHLORIDE 2 G: 1 TABLET ORAL at 18:13

## 2024-07-25 RX ADMIN — AMLODIPINE BESYLATE 5 MG: 5 TABLET ORAL at 10:55

## 2024-07-25 RX ADMIN — DICLOFENAC SODIUM 2 G: 10 GEL TOPICAL at 21:21

## 2024-07-25 RX ADMIN — CETIRIZINE HYDROCHLORIDE 10 MG: 10 TABLET ORAL at 10:45

## 2024-07-25 RX ADMIN — ENOXAPARIN SODIUM 40 MG: 100 INJECTION SUBCUTANEOUS at 10:44

## 2024-07-25 RX ADMIN — DICLOFENAC SODIUM 2 G: 10 GEL TOPICAL at 10:44

## 2024-07-25 RX ADMIN — BUSPIRONE HYDROCHLORIDE 5 MG: 5 TABLET ORAL at 10:45

## 2024-07-25 RX ADMIN — BUSPIRONE HYDROCHLORIDE 5 MG: 5 TABLET ORAL at 21:22

## 2024-07-25 RX ADMIN — CLOPIDOGREL BISULFATE 75 MG: 75 TABLET ORAL at 10:45

## 2024-07-25 RX ADMIN — SODIUM CHLORIDE 2 G: 1 TABLET ORAL at 10:45

## 2024-07-25 RX ADMIN — METOPROLOL TARTRATE 50 MG: 50 TABLET, FILM COATED ORAL at 21:21

## 2024-07-25 RX ADMIN — SODIUM CHLORIDE 2 G: 1 TABLET ORAL at 12:36

## 2024-07-25 RX ADMIN — METHYLPHENIDATE HYDROCHLORIDE 5 MG: 5 TABLET ORAL at 10:45

## 2024-07-25 RX ADMIN — Medication 5000 UNITS: at 10:45

## 2024-07-25 RX ADMIN — ATORVASTATIN CALCIUM 40 MG: 40 TABLET, FILM COATED ORAL at 21:21

## 2024-07-25 RX ADMIN — PANTOPRAZOLE SODIUM 40 MG: 40 TABLET, DELAYED RELEASE ORAL at 05:40

## 2024-07-25 RX ADMIN — BUPROPION HYDROCHLORIDE 50 MG: 100 TABLET, FILM COATED ORAL at 10:45

## 2024-07-25 RX ADMIN — FLUTICASONE PROPIONATE 2 SPRAY: 50 SPRAY, METERED NASAL at 10:46

## 2024-07-25 RX ADMIN — BUPROPION HYDROCHLORIDE 50 MG: 100 TABLET, FILM COATED ORAL at 21:22

## 2024-07-25 RX ADMIN — ASPIRIN 81 MG 81 MG: 81 TABLET ORAL at 10:45

## 2024-07-25 RX ADMIN — FLUTICASONE PROPIONATE 2 SPRAY: 50 SPRAY, METERED NASAL at 21:21

## 2024-07-25 RX ADMIN — METOPROLOL TARTRATE 50 MG: 50 TABLET, FILM COATED ORAL at 10:45

## 2024-07-25 ASSESSMENT — 9 HOLE PEG TEST
TESTTIME_SECONDS: 24.61
TESTTIME_SECONDS: 47.97

## 2024-07-25 ASSESSMENT — PAIN SCALES - GENERAL: PAINLEVEL_OUTOF10: 0

## 2024-07-25 NOTE — DISCHARGE INSTR - COC
Continuity of Care Form    Patient Name: Rahel oLpez   :  3/4/1931  MRN:  [id number]    Admit date:  2024  Discharge date:  2024      Code Status Order: Full Code   Advance Directives:     Admitting Physician:  Kaitlynn Herron DO  PCP: Herminia Lopez MD    Discharging Nurse: Saritha Randle LPN  Discharging Hospital Unit/Room#: 8K-20/020-A  Discharging Unit Phone Number: [phone]    Emergency Contact:   Extended Emergency Contact Information  Primary Emergency Contact: Bessy Castro   [facility]  Home Phone: [phone]  Mobile Phone: [phone]  Relation: Child  Secondary Emergency Contact: Helder Lopez  Home Phone: [phone]  Mobile Phone: [phone]  Relation: Child    Past Surgical History:  Past Surgical History:   Procedure Laterality Date    ABDOMEN SURGERY      APPENDECTOMY      CHOLECYSTECTOMY      COLON SURGERY      resection for diverticulitis    COLONOSCOPY      DILATATION, ESOPHAGUS      ENDOSCOPY, COLON, DIAGNOSTIC      HYSTERECTOMY (CERVIX STATUS UNKNOWN)  1979    JOINT REPLACEMENT Right     right knee    KNEE ARTHROPLASTY Right 2021    RIGHT TOTAL KNEE REVISION WITH OSS performed by Jared Burns MD at [facility]    KNEE SURGERY  2010    ROTATOR CUFF REPAIR Left 2006       Immunization History:   Immunization History   Administered Date(s) Administered    COVID-19, J&J, (age 18y+), IM, 0.5 mL 2021       Active Problems:  Patient Active Problem List   Diagnosis Code    Hypertension I10    Closed displaced fracture of medial condyle of right femur ([facility]) S72.431A    Closed comminuted supracondylar fracture of right femur ([facility]) S72.451A    Displaced fracture of medial condyle of right femur, sequela S72.431S    Reactive depression F32.9    Closed fracture of base of fifth metacarpal bone of left hand S62.317A    History of cataract Z86.69    Dysarthria R47.1    Acute CVA (cerebrovascular accident) ([facility]) I63.9    Acute cerebrovascular

## 2024-07-25 NOTE — PROGRESS NOTES
Pt encouraged and instructed to use incentive spirometer at this time.Explained dr order is every 4 hrs while awake and what it is used for.She understands .

## 2024-07-25 NOTE — PROGRESS NOTES
RECREATIONAL THERAPY  [facility]      Date:  7/25/2024            Patient Name: Rahel Lopez           MRN: [id number]  Acct: [id number]          YOB: 1931 (89 y.o.)       Gender: female   Diagnosis: Stroke  Physician: Referring Practitioner: Attending and Ordering: Kaitlynn Herron DO    REASON FOR MISSED TREATMENT:  Pt declined painting a sign with peers this afternoon-she enjoyed getting her hair washed and styled by our beautician yesterday-peggy Jacques, CTRS    7/25/2024

## 2024-07-25 NOTE — PLAN OF CARE
Problem: Discharge Planning  Goal: Discharge to home or other facility with appropriate resources  Note: Transportation:   Has transportation kept you from medical appointments, meetings, work, or from getting things needed for daily living? (Check all that apply)  No.      Health Literacy:   How often do you need to have someone help you when you read instructions, pamphlets, or other written material from your doctor or pharmacy?  0. - Never    Social Isolation:  How often do you feel lonely or isolated from those around you?  2.  Sometimes      Patient Mood Interview (PHQ-2 to 9) (from [employer]©)   Say to Patient: \"Over the last 2 weeks, have you been bothered by any of the following problems?\"   If symptom is present, enter 1 (yes) in column 1 (Symptom Presence)  If yes in column 1, then ask the patient: “About how often have you been bothered by this?”  Read and show the patient a card with the symptom frequency choices.    Indicate response in column 2, Symptom Frequency.   Symptom Presence  No (enter 0 in column 2)   Yes (enter 0-3 in column 2)  9.    No response (leave column 2 blank) Symptom Frequency  Never or 1 day  2-6 days (several days)  7-11 days (half or more of the days)  12-14 days (nearly every day    Symptom Presence Symptom Frequency   Little interest or pleasure in doing things 0. No 0. Never or 1 day   Feeling down, depressed, or hopeless 1. Yes 3. 12-14 days (nearly every day)        If either A or B above has symptom frequency coded 2 or 3, CONTINUE asking questions below.  If not, END the interview   Trouble falling or staying asleep, or sleeping too much 0. No 0. Never or 1 day   Feeling tired or having little energy 1. Yes 3. 12-14 days (nearly every day)   Poor appetite or overeating 1. Yes 3. 12-14 days (nearly every day)   Feeling bad about yourself-or that you are a failure or have let yourself or your family down 1. Yes 3. 12-14 days (nearly every day)   Trouble concentrating on

## 2024-07-25 NOTE — PROGRESS NOTES
Patient: Rahel Lopez  Unit/Bed: 8K-20/020-A  YOB: 1931  MRN: [id number] Acct: [id number]   Admitting Diagnosis: Stroke (HCC) [I63.9]  Acute cerebrovascular accident (CVA) (HCC) [I63.9]  Admit Date:  7/12/2024  Hospital Day: 13    Assessment:     Principal Problem:    Acute cerebrovascular accident (CVA) (HCC)  Active Problems:    Hypertension    Hyponatremia  Resolved Problems:    * No resolved hospital problems. *      Plan:     Nephrology managing the sodium.        Subjective:     Patient has no complaint of CP or SOB..   Medication side effects: none    Scheduled Meds:   sodium chloride  2 g Oral TID WC    buPROPion  50 mg Oral BID    methylphenidate  5 mg Oral Daily    Vitamin D  5,000 Units Oral Daily    diclofenac sodium  2 g Topical BID    amLODIPine  5 mg Oral Daily    busPIRone  5 mg Oral BID    metoprolol tartrate  50 mg Oral BID    pantoprazole  40 mg Oral QAM AC    enoxaparin  40 mg SubCUTAneous Daily    aspirin  81 mg Oral Daily    atorvastatin  40 mg Oral Nightly    fluticasone  2 spray Each Nostril BID    cetirizine  10 mg Oral Daily    clopidogrel  75 mg Oral Daily     Continuous Infusions:  PRN Meds:potassium chloride **OR** potassium alternative oral replacement **OR** potassium chloride, magnesium sulfate, polyethylene glycol, senna, acetaminophen, ondansetron, melatonin    Review of Systems  Pertinent items are noted in HPI.    Objective:     No data found.  I/O last 3 completed shifts:  In: 1340 [P.O.:1340]  Out: -   No intake/output data recorded.    BP (!) 115/55   Pulse 72   Temp 98.1 °F (36.7 °C) (Oral)   Resp 16   Ht 1.575 m (5' 2\")   Wt 56.3 kg (124 lb 1.9 oz)   SpO2 96%   BMI 22.70 kg/m²     General appearance: alert, appears stated age, and cooperative  Head: Normocephalic, without obvious abnormality, atraumatic  Lungs: clear to auscultation bilaterally  Heart: regular rate and rhythm, S1, S2 normal, no murmur, click, rub or gallop  Abdomen: soft, non-tender;  bowel sounds normal; no masses,  no organomegaly  Extremities: extremities normal, atraumatic, no cyanosis or edema  Skin: Skin color, texture, turgor normal. No rashes or lesions  Neurologic:  weak    Electronically signed by Florentin Torres MD on 7/25/2024 at 9:51 AM

## 2024-07-25 NOTE — PROGRESS NOTES
[facility]  INPATIENT SPEECH THERAPY  [facility]  DAILY NOTE    TIME   SLP Individual Minutes  Time In: 0930  Time Out: 1000  Minutes: 30  Timed Code Treatment Minutes: 30 Minutes       Date: 2024  Patient Name: Rahel Lopez      CSN: [id number]   : 3/4/1931  (89 y.o.)  Gender: female   Referring Physician:  Kaitlynn Herron DO   Diagnosis:  Acute cerebrovascular accident (CVA) (HCC)   Precautions: Fall risk  Current Diet: Regular diet with thin liquids   Respiratory Status: Room Air  Swallowing Strategies:  Full Upright Position, Small Bite/Sip, Medications Whole with Puree, and Alternate Solids and Liquids    Date of Last MBS/FEES: Not Applicable    Pain:  No pain reported.    Subjective: Patient was sleeping in recliner. Cooperative and agreeable during ST intervention. No family present    Short-Term Goals:  SHORT TERM GOAL #1:  Goal 1: Patient will complete immediate/delayed recall tasks with and without use of compensatory memory strategies with 85% accuracy and moderate cuing to improve retention of novel information.  INTERVENTIONS:  Recall Food items recall (banana, apple, cheese, egg, milk)  Delayed recall (24 hours): 1/5 independently, 2/5 given categorical cue, 1/5 given mod cues, 1/5 given max cues     SHORT TERM GOAL #2:  Goal 2: Patient will complete problem solving and executive functioning tasks (i.e., medication management, finance management, etc.) with 85% accuracy and moderate cuing to improve completion of IADLs.   INTERVENTIONS:   Time Management - Word Problems   6/10 independently, 1/10 given min cues, 2/10 given mod cues, 1/10 unable to elicit  *good success with basic time management     SHORT TERM GOAL #3:  GOAL 3:  Patient will complete thought organization tasks with 80% accuracy and min cuing to improve completion of ADLs/IADLs  INTERVENTIONS:  Cross Stitch Ornaments for Grandchildren/Great-Grandchildren - Supplies and  Education: Verbalizes understanding    ASSESSMENT/PLAN:  Activity Tolerance:  Patient tolerance of  treatment: good.      Assessment/Plan: Patient progressing toward established goals.  Continues to require skilled care of licensed speech pathologist to progress toward achievement of established goals and plan of care..     Plan for Next Session: Family Education, Recall, Problem Solving/Reasoning  Discharge Recommendations: SNF      Melissa Whitney M.A., CCC-SLP [id number]

## 2024-07-25 NOTE — CARE COORDINATION
Uneventful shift. Mood appears to be stable. Several visitors today. Wellbutrin dose decreased due to suspected involvement with hyponatremia. Continues on Ritalin for mood/energy per Dr. Luis recommendation . Nephrology rounded and would like weekly sodium labs while at SNF with follow up in 3-4 weeks.

## 2024-07-25 NOTE — DISCHARGE SUMMARY
Physical Medicine & Rehabilitation   Discharge Summary     Patient Identification:  Rahel Lopez  : 3/4/1931  Admit date: 2024  Discharge date: 2024   Attending provider: Kaitlynn Herron DO        Primary care provider: Herminia Lopez MD     Discharge Diagnoses:   Acute CVA, posterior limb of the internal capsule on the right and right thalamus  Left hemiparesis  Sinusitis  Hypertension  Depression  Chronic back and shoulder pain  Impaired mobility and ADLs  Mild cognitive impairment       Consults:   Family Medicine, nephrology      Inpatient Acute Hospital Course:   Rahel Lopez is a 89 y.o. female with PMH significant for GERD, hyperlipidemia, hypertension, and cataracts who was admitted to [facility] on 7/10/2024.  History obtained via: ED documentation, acute care documentation, and patient     Patient initially presented to [facility] ED on 7/10/2024 with concerns due to multiple falls and impaired speech.  She reported that speech impairment began approximately 2 days prior and at one point she noted some left-sided weakness, however, felt that was back to baseline.  On the day of presentation, patient reportedly fell twice in her house.  The first time she was able to get herself back up and did not report the incident to anyone.  However, later in the day she was talking to her sister on the phone after they hung up the sister contacted another family member to check on the patient due to confusion and slurred speech.  When the family member arrived, the patient was found down on the ground and unable to get up.  CT head was obtained and was reportedly negative, however, decision made to admit patient for further evaluation for CVA.     On admission, patient was started on aspirin and statin therapy.  An MRI and echo were ordered.  Of note, patient was being treated by PCP for upper respiratory infection at time of presentation.  MRI of the brain resulted and reportedly  Pain - Patient was clinically upgraded due to pain.

## 2024-07-25 NOTE — PROGRESS NOTES
[facility]  INPATIENT PHYSICAL THERAPY  DAILY NOTE  [facility] - 8K-20/020-[id number]    Time In: 0730  Time Out: 0900  Timed Code Treatment Minutes: 90 Minutes  Minutes: 90          Date: 2024  Patient Name: Rahel Lopez,  Gender:  female        MRN: [id number]  : 3/4/1931  (89 y.o.)  Referral Date : 24  Referring Practitioner: Kaitlynn Herron DO  Diagnosis: Acute cerebrovascular accident  Additional Pertinent Hx: Per H&P: Rahel oLpez is a 89 y.o. female with PMH significant for GERD, hyperlipidemia, hypertension, and cataracts who was admitted to [facility] on 7/10/2024.Patient initially presented to [facility] ED on 7/10/2024 with concerns due to multiple falls and impaired speech.  She reported that speech impairment began approximately 2 days prior and at one point she noted some left-sided weakness, however, felt that was back to baseline.  On the day of presentation, patient reportedly fell twice in her house.  The first time she was able to get herself back up and did not report the incident to anyone.  However, later in the day she was talking to her sister on the phone after they hung up the sister contacted another family member to check on the patient due to confusion and slurred speech.  When the family member arrived, the patient was found down on the ground and unable to get up.  CT head was obtained and was reportedly negative, however, decision made to admit patient for further evaluation for CVA.MRI of the brain resulted and reportedly revealed an acute infarct of the right thalamus.     Prior Level of Function:  Lives With: Alone  Type of Home: House  Home Layout: One level  Home Access: Stairs to enter with rails  Entrance Stairs - Number of Steps: 2  Entrance Stairs - Rails: Left  Home Equipment: Rollator, Walker - Rolling, Cane   Bathroom Shower/Tub: Tub/Shower unit  Bathroom Toilet: Standard  Bathroom Equipment: Grab bars in  Stairs, Car Transfers, Up in Chair for All Meals, Verbal Exercise Instruction    Goals:  Patient Goals : be able to get around on own  Short Term Goals  Time Frame for Short Term Goals: 1 week  Short Term Goal 1: Patient will complete supine < > sit and rolling with head of bed flat and modified independence to transfer in and out of bed safely.  GOAL MET  Short Term Goal 2: Patient will complete sit < > stand with CGA consistently to/from various surfaces to stand to ambulate safely.  GOAL MET  Short Term Goal 3: Patient will ambulate 30' with a RW with CGA with left foot clearing from the floor at least 75% of the time to progress towards navigating home safely.  GOAL NOT MET  Short Term Goal 4: Patient will ascend/descend 2, 6\" steps with bilateral hands and CGA to prepare for safe home entry.  GOAL MET  Short Term Goal 5: Patient will improve tinetti to at least 13/28 to achieve MDC and improve gait and balance.  GOAL MET  Long Term Goals  Time Frame for Long Term Goals : 3 weeks from initial evaluation  Long Term Goal 1: Patient will complete sit < > stand with a RW with modified independence to stand to ambulate safely.  GOAL NOT MET  Long Term Goal 2: Patient will completed bed < > chair transfer with modified independence to transfer surface to surface safely.  GOAL NOT MET  Long Term Goal 3: Patient will ambulate 150' with a rollator with modified independence to navigate home safely.  GOAL NOT MET  Long Term Goal 4: Patient will ascend/descend 2, 6\" steps with one hand rail to access/leave home safely.  GOAL MET  Long Term Goal 5: Patient will complete car transfer with modified independence to transfer in and out of vehicle safely.  GOAL NOT MET  Additional Goals?: Yes  Long term goal 6: Patient will improve 5x sit < > stand with use of UE's to less than or equal to 15 seconds to improve LE strength for increased ease with transfers  GOAL NOT MET    Following session, patient left in safe position with

## 2024-07-25 NOTE — PROGRESS NOTES
[facility]  Occupational Therapy  Daily Note  Time:   Time In: 1030  Time Out: 1200  Timed Code Treatment Minutes: 90 Minutes  Minutes: 90          Date: 2024  Patient Name: Rahel Loepz,   Gender: female      Room: [facility]20/020-A  MRN: [id number]  : 3/4/1931  (89 y.o.)  Referring Practitioner: Attending and Ordering: Kaitlynn Herron DO  Diagnosis: Stroke  Additional Pertinent Hx: Per EMR, \"Rahel Lopez is a 89 y.o. female with PMH significant for GERD, hyperlipidemia, hypertension, and cataracts who was admitted to [facility] on 7/10/2024.  History obtained via: ED documentation, acute care documentation, and patient     Patient initially presented to [facility] ED on 7/10/2024 with concerns due to multiple falls and impaired speech.  She reported that speech impairment began approximately 2 days prior and at one point she noted some left-sided weakness, however, felt that was back to baseline.  On the day of presentation, patient reportedly fell twice in her house.  The first time she was able to get herself back up and did not report the incident to anyone.  However, later in the day she was talking to her sister on the phone after they hung up the sister contacted another family member to check on the patient due to confusion and slurred speech.  When the family member arrived, the patient was found down on the ground and unable to get up.  CT head was obtained and was reportedly negative, however, decision made to admit patient for further evaluation for CVA.     On admission, patient was started on aspirin and statin therapy.  An MRI and echo were ordered.  Of note, patient was being treated by PCP for upper respiratory infection at time of presentation.  MRI of the brain resulted and reportedly revealed an acute infarct of the right thalamus.  Neurology consulted and recommends DAPT with aspirin 81 mg + Plavix 75 mg daily for 21 days

## 2024-07-25 NOTE — PROGRESS NOTES
[facility]   Nephrology progress note  7/25/2024, 12:21 PM      Pt Name:    Rahel Lopez  MRN:     [id number]     YOB: 1931  Admit Date:    7/12/2024  3:54 PM    Chief Complaint: Nephrology following for hyponatremia.    Subjective:  Patient was seen and examined this morning.   Getting discharged tomorrow.    Objective:  24HR INTAKE/OUTPUT:    Intake/Output Summary (Last 24 hours) at 7/25/2024 1221  Last data filed at 7/25/2024 1048  Gross per 24 hour   Intake 915 ml   Output --   Net 915 ml         I/O last 3 completed shifts:  In: 1340 [P.O.:1340]  Out: -   I/O this shift:  In: 235 [P.O.:235]  Out: -    Admission weight: 57.9 kg (127 lb 11.2 oz)  Wt Readings from Last 3 Encounters:   07/22/24 56.3 kg (124 lb 1.9 oz)   07/12/24 57.3 kg (126 lb 5.2 oz)   09/05/23 59.8 kg (131 lb 12.8 oz)        Vitals :   Vitals:    07/24/24 1045 07/24/24 2015 07/24/24 2037 07/25/24 1041   BP: 114/63 (!) 115/55 (!) 115/55 131/68   Pulse: 73 72  71   Resp: 20 16  16   Temp: 97.7 °F (36.5 °C) 98.1 °F (36.7 °C)     TempSrc: Oral Oral  Oral   SpO2: 98% 96%  99%   Weight:       Height:           Physical examination  General Appearance: alert and cooperative with exam, appears comfortable, no distress  Mouth/Throat: Oral mucosa moist  Neck: No JVD  Lungs: Air entry B/L, no rales, no use of accessory muscles  Heart:  S1, S2 heard  GI: soft, non-tender, no guarding  Extremities: no significant edema    Medications:  Infusion:   Meds:    sodium chloride  2 g Oral TID WC    buPROPion  50 mg Oral BID    methylphenidate  5 mg Oral Daily    Vitamin D  5,000 Units Oral Daily    diclofenac sodium  2 g Topical BID    amLODIPine  5 mg Oral Daily    busPIRone  5 mg Oral BID    metoprolol tartrate  50 mg Oral BID    pantoprazole  40 mg Oral QAM AC    enoxaparin  40 mg SubCUTAneous Daily    aspirin  81 mg Oral Daily    atorvastatin  40 mg Oral Nightly    fluticasone  2 spray Each Nostril BID    cetirizine  10 mg

## 2024-07-26 ENCOUNTER — CARE COORDINATION (OUTPATIENT)
Dept: CARE COORDINATION | Age: 89
End: 2024-07-26

## 2024-07-26 VITALS
SYSTOLIC BLOOD PRESSURE: 136 MMHG | BODY MASS INDEX: 22.84 KG/M2 | DIASTOLIC BLOOD PRESSURE: 69 MMHG | TEMPERATURE: 97.7 F | HEIGHT: 62 IN | HEART RATE: 84 BPM | WEIGHT: 124.12 LBS | RESPIRATION RATE: 16 BRPM | OXYGEN SATURATION: 100 %

## 2024-07-26 PROCEDURE — 6370000000 HC RX 637 (ALT 250 FOR IP): Performed by: FAMILY MEDICINE

## 2024-07-26 PROCEDURE — 6360000002 HC RX W HCPCS: Performed by: STUDENT IN AN ORGANIZED HEALTH CARE EDUCATION/TRAINING PROGRAM

## 2024-07-26 PROCEDURE — 99239 HOSP IP/OBS DSCHRG MGMT >30: CPT | Performed by: STUDENT IN AN ORGANIZED HEALTH CARE EDUCATION/TRAINING PROGRAM

## 2024-07-26 PROCEDURE — 6370000000 HC RX 637 (ALT 250 FOR IP): Performed by: INTERNAL MEDICINE

## 2024-07-26 PROCEDURE — 6370000000 HC RX 637 (ALT 250 FOR IP): Performed by: NURSE PRACTITIONER

## 2024-07-26 PROCEDURE — 6370000000 HC RX 637 (ALT 250 FOR IP): Performed by: STUDENT IN AN ORGANIZED HEALTH CARE EDUCATION/TRAINING PROGRAM

## 2024-07-26 RX ADMIN — Medication 5000 UNITS: at 08:04

## 2024-07-26 RX ADMIN — FLUTICASONE PROPIONATE 2 SPRAY: 50 SPRAY, METERED NASAL at 08:02

## 2024-07-26 RX ADMIN — AMLODIPINE BESYLATE 5 MG: 5 TABLET ORAL at 08:04

## 2024-07-26 RX ADMIN — CETIRIZINE HYDROCHLORIDE 10 MG: 10 TABLET ORAL at 08:04

## 2024-07-26 RX ADMIN — PANTOPRAZOLE SODIUM 40 MG: 40 TABLET, DELAYED RELEASE ORAL at 06:35

## 2024-07-26 RX ADMIN — METOPROLOL TARTRATE 50 MG: 50 TABLET, FILM COATED ORAL at 08:04

## 2024-07-26 RX ADMIN — BUSPIRONE HYDROCHLORIDE 5 MG: 5 TABLET ORAL at 08:04

## 2024-07-26 RX ADMIN — CLOPIDOGREL BISULFATE 75 MG: 75 TABLET ORAL at 08:04

## 2024-07-26 RX ADMIN — ENOXAPARIN SODIUM 40 MG: 100 INJECTION SUBCUTANEOUS at 08:02

## 2024-07-26 RX ADMIN — BUPROPION HYDROCHLORIDE 50 MG: 100 TABLET, FILM COATED ORAL at 08:04

## 2024-07-26 RX ADMIN — DICLOFENAC SODIUM 2 G: 10 GEL TOPICAL at 08:05

## 2024-07-26 RX ADMIN — METHYLPHENIDATE HYDROCHLORIDE 5 MG: 5 TABLET ORAL at 08:04

## 2024-07-26 RX ADMIN — SODIUM CHLORIDE 2 G: 1 TABLET ORAL at 08:04

## 2024-07-26 RX ADMIN — ASPIRIN 81 MG 81 MG: 81 TABLET ORAL at 08:04

## 2024-07-26 ASSESSMENT — PAIN SCALES - GENERAL: PAINLEVEL_OUTOF10: 4

## 2024-07-26 NOTE — PLAN OF CARE
Problem: Discharge Planning  Goal: Discharge to home or other facility with appropriate resources  7/26/2024 0156 by Lanny Orellana RN  Outcome: Progressing  Flowsheets (Taken 7/25/2024 2110)  Discharge to home or other facility with appropriate resources: Identify barriers to discharge with patient and caregiver     Problem: Safety - Adult  Goal: Free from fall injury  Outcome: Progressing     Problem: ABCDS Injury Assessment  Goal: Absence of physical injury  Outcome: Progressing     Problem: Skin/Tissue Integrity  Goal: Absence of new skin breakdown  Description: 1.  Monitor for areas of redness and/or skin breakdown  2.  Assess vascular access sites hourly  3.  Every 4-6 hours minimum:  Change oxygen saturation probe site  4.  Every 4-6 hours:  If on nasal continuous positive airway pressure, respiratory therapy assess nares and determine need for appliance change or resting period.  Outcome: Progressing     Problem: Chronic Conditions and Co-morbidities  Goal: Patient's chronic conditions and co-morbidity symptoms are monitored and maintained or improved  Outcome: Progressing  Flowsheets (Taken 7/25/2024 2110)  Care Plan - Patient's Chronic Conditions and Co-Morbidity Symptoms are Monitored and Maintained or Improved: Monitor and assess patient's chronic conditions and comorbid symptoms for stability, deterioration, or improvement     Problem: Pain  Goal: Verbalizes/displays adequate comfort level or baseline comfort level  Outcome: Progressing  Flowsheets (Taken 7/25/2024 2116)  Verbalizes/displays adequate comfort level or baseline comfort level:   Encourage patient to monitor pain and request assistance   Assess pain using appropriate pain scale   Administer analgesics based on type and severity of pain and evaluate response   Implement non-pharmacological measures as appropriate and evaluate response

## 2024-07-26 NOTE — PLAN OF CARE
Problem: Discharge Planning  Goal: Discharge to home or other facility with appropriate resources  7/26/2024 0838 by Savanah Mccartney LISW  Note: Patient to be discharged on Friday, 7/26 to [facility]. Patient will be under the supervision of staff. Family education was not provided during patient's stay. Patient will be receiving services through [facility]. SW provided information to Negin on 7/23 via Epic.

## 2024-07-26 NOTE — PROGRESS NOTES
Physical Therapy   [facility]  INPATIENT PHYSICAL THERAPY  QUICK DISCHARGE NOTE  [facility]      Date: 2024  Patient Name: Rahel Lopez,  Gender:  female        MRN: [id number]  : 3/4/1931  (89 y.o.)  Referral Date : 24  Referring Practitioner: Kaitlynn Herron DO  Diagnosis: Acute cerebrovascular accident  Additional Pertinent Hx: Per H&P: Rahel Lopez is a 89 y.o. female with PMH significant for GERD, hyperlipidemia, hypertension, and cataracts who was admitted to [facility] on 7/10/2024.Patient initially presented to [facility] ED on 7/10/2024 with concerns due to multiple falls and impaired speech.  She reported that speech impairment began approximately 2 days prior and at one point she noted some left-sided weakness, however, felt that was back to baseline.  On the day of presentation, patient reportedly fell twice in her house.  The first time she was able to get herself back up and did not report the incident to anyone.  However, later in the day she was talking to her sister on the phone after they hung up the sister contacted another family member to check on the patient due to confusion and slurred speech.  When the family member arrived, the patient was found down on the ground and unable to get up.  CT head was obtained and was reportedly negative, however, decision made to admit patient for further evaluation for CVA.MRI of the brain resulted and reportedly revealed an acute infarct of the right thalamus.     Restrictions/Precautions:  Restrictions/Precautions: Fall Risk, General Precautions            Position Activity Restriction  Other position/activity restrictions: wear shoes- pt has leg length discrepancy and right built up shoe         Social/Functional:  Type of Home: House  Home Layout: One level  Home Access: Stairs to enter with rails  Entrance Stairs - Number of Steps: 2  Entrance Stairs - Rails: Left  Home Equipment:

## 2024-07-26 NOTE — DISCHARGE INSTR - DIET

## 2024-07-26 NOTE — PROGRESS NOTES
Patient in her room with her daughter ready for discharge.Understanding that she is going to a therapy unit.Explaained to patient and daughtr her med list and to call for any questions along with the net facility if needed the patient understanding.

## 2024-07-26 NOTE — PROGRESS NOTES
Patient discharged in stable condition as per order of attending physician to Acute Rehab per staff at Time: 1155AM to car with daughter.Transported tp discharge area via wheelchair.    AVS provided by RN at time of discharge, which includes all necessary medical information pertaining to the patients current course of illness, treatment, medications, post-discharge goals of care, and treatment preferences.     Patient/ family verbalize understanding of discharge plan and are in agreement with goal/plan/treatment preferences.     Belongings including Hearing Aides bilateral and dentures sent with patient.      Home medications sent home with patient yes,diclofenac gel and flonsae nasal spray    Availability of \"My Chart\" offered to patient as a tool for updated health record.  Steps for activation discussed with patient as mentioned on AVS.

## 2024-07-26 NOTE — CARE COORDINATION
1 assist for ambulation and tolerating well, remains a stay-with-me for her safety.  Due to risk of falling and does not always use her call light. Denies pain.

## 2024-07-26 NOTE — PROGRESS NOTES
[facility]  Recreational Therapy  Discharge Note  Inpatient Rehabilitation Unit         Date:  7/26/2024       Patient Name: Rahel Lopez      MRN: [id number]       YOB: 1931 (89 y.o.)       Gender: female  Diagnosis: Stroke  Referring Practitioner: Attending and Ordering: Kaitlynn Herron DO    Patient discharged from Recreational Therapy at this time.  See recreational therapy notes for details.    Electronically signed by: Alida Jacques CTRS  Date: 7/26/2024

## 2024-07-26 NOTE — PLAN OF CARE
Problem: Skin/Tissue Integrity  Goal: Absence of new skin breakdown  Description: 1.  Monitor for areas of redness and/or skin breakdown  2.  Assess vascular access sites hourly  3.  Every 4-6 hours minimum:  Change oxygen saturation probe site  4.  Every 4-6 hours:  If on nasal continuous positive airway pressure, respiratory therapy assess nares and determine need for appliance change or resting period.  7/26/2024 0156 by Lanny Orellana RN  Outcome: Progressing     Problem: Discharge Planning  Goal: Discharge to home or other facility with appropriate resources  7/26/2024 1531 by Saritha Randle LPN  Outcome: Adequate for Discharge  7/26/2024 0838 by Savanah Mccartney LISW  Note: Patient to be discharged on Friday, 7/26 to [facility]. Patient will be under the supervision of staff. Family education was not provided during patient's stay. Patient will be receiving services through [facility]. ADRIAN provided information to Negin on 7/23 via Epic.    7/26/2024 0156 by Lanny Orellana RN  Outcome: Progressing  Flowsheets (Taken 7/25/2024 2110)  Discharge to home or other facility with appropriate resources: Identify barriers to discharge with patient and caregiver     Problem: ABCDS Injury Assessment  Goal: Absence of physical injury  7/26/2024 1531 by Saritha Randle LPN  Outcome: Adequate for Discharge  7/26/2024 0156 by Lanny Orellana RN  Outcome: Progressing     Problem: Pain  Goal: Verbalizes/displays adequate comfort level or baseline comfort level  7/26/2024 1531 by Saritha Randle LPN  Outcome: Adequate for Discharge  7/26/2024 0156 by Lanny Orellana RN  Outcome: Progressing  Flowsheets (Taken 7/25/2024 2116)  Verbalizes/displays adequate comfort level or baseline comfort level:   Encourage patient to monitor pain and request assistance   Assess pain using appropriate pain scale   Administer analgesics based on type and severity of pain and evaluate response   Implement

## 2024-07-26 NOTE — PROGRESS NOTES
[facility]  INPATIENT SPEECH THERAPY  [facility]ENTER  DISCHARGE NOTE    TIME: No billable time given discharge to SNF this date             Date: 2024  Patient Name: Rahel Lopez      CSN: [id number]   : 3/4/1931  (89 y.o.)  Gender: female   Referring Physician:  Kaitlynn Herron DO   Diagnosis:  Acute cerebrovascular accident (CVA) (HCC)   Precautions: Fall risk  Current Diet: Regular diet with thin liquids   Respiratory Status: Room Air  Swallowing Strategies:  Full Upright Position, Small Bite/Sip, Medications Whole with Puree, and Alternate Solids and Liquids    Date of Last MBS/FEES: Not Applicable    Pain:  No pain reported.    Subjective: Patient was sleeping in recliner. Cooperative and agreeable during ST intervention. No family present    Short-Term Goals:  SHORT TERM GOAL #1:  Goal 1: Patient will complete immediate/delayed recall tasks with and without use of compensatory memory strategies with 85% accuracy and moderate cuing to improve retention of novel information. - GOAL NOT MET  INTERVENTIONS:  Recall Food items recall (banana, apple, cheese, egg, milk)  Delayed recall (24 hours): 1/5 independently, 2/5 given categorical cue, 1/5 given mod cues, 1/5 given max cues     SHORT TERM GOAL #2:  Goal 2: Patient will complete problem solving and executive functioning tasks (i.e., medication management, finance management, etc.) with 85% accuracy and moderate cuing to improve completion of IADLs. - GOAL MET  INTERVENTIONS:   Time Management - Word Problems   6/10 independently, 1/10 given min cues, 2/10 given mod cues, 1/10 unable to elicit  *good success with basic time management     SHORT TERM GOAL #3:  GOAL 3:  Patient will complete thought organization tasks with 80% accuracy and min cuing to improve completion of ADLs/IADLs - GOAL NOT MET  INTERVENTIONS:  Cross Stitch Ornaments for Grandchildren/Great-Grandchildren - Supplies and Sequencing  Supplies:

## 2024-07-29 ENCOUNTER — HOSPITAL ENCOUNTER (EMERGENCY)
Age: 89
Discharge: HOME | End: 2024-07-29
Attending: FAMILY MEDICINE
Payer: MEDICARE

## 2024-07-29 ENCOUNTER — APPOINTMENT (OUTPATIENT)
Dept: GENERAL RADIOLOGY | Age: 89
End: 2024-07-29
Payer: MEDICARE

## 2024-07-29 ENCOUNTER — APPOINTMENT (OUTPATIENT)
Dept: CT IMAGING | Age: 89
End: 2024-07-29
Payer: MEDICARE

## 2024-07-29 VITALS
HEIGHT: 62 IN | BODY MASS INDEX: 23.92 KG/M2 | WEIGHT: 130 LBS | DIASTOLIC BLOOD PRESSURE: 74 MMHG | TEMPERATURE: 97.4 F | HEART RATE: 84 BPM | SYSTOLIC BLOOD PRESSURE: 144 MMHG | OXYGEN SATURATION: 97 % | RESPIRATION RATE: 18 BRPM
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
## 2024-07-29 DIAGNOSIS — S00.03XA HEMATOMA OF SCALP, INITIAL ENCOUNTER: ICD-10-CM

## 2024-07-29 LAB
ANION GAP SERPL CALC-SCNC: 10 MEQ/L (ref 8–16)
APTT PPP: 40.5 SECONDS (ref 22–38)
BASOPHILS ABSOLUTE: 0.1 THOU/MM3 (ref 0–0.1)
BASOPHILS NFR BLD AUTO: 1.3 %
BUN SERPL-MCNC: 9 MG/DL (ref 7–22)
CALCIUM SERPL-MCNC: 8.7 MG/DL (ref 8.5–10.5)
CHLORIDE SERPL-SCNC: 94 MEQ/L (ref 98–111)
CO2 SERPL-SCNC: 25 MEQ/L (ref 23–33)
CREAT SERPL-MCNC: 0.4 MG/DL (ref 0.4–1.2)
DEPRECATED RDW RBC AUTO: 43 FL (ref 35–45)
EOSINOPHIL NFR BLD AUTO: 2.5 %
EOSINOPHILS ABSOLUTE: 0.2 THOU/MM3 (ref 0–0.4)
ERYTHROCYTE [DISTWIDTH] IN BLOOD BY AUTOMATED COUNT: 13.2 % (ref 11.5–14.5)
GFR SERPL CREATININE-BSD FRML MDRD: > 90 ML/MIN/1.73M2
GLUCOSE SERPL-MCNC: 137 MG/DL (ref 70–108)
HCT VFR BLD AUTO: 41.1 % (ref 37–47)
HGB BLD-MCNC: 13.7 GM/DL (ref 12–16)
IMM GRANULOCYTES # BLD AUTO: 0.08 THOU/MM3 (ref 0–0.07)
IMM GRANULOCYTES NFR BLD AUTO: 0.9 %
INR PPP: 1.05 (ref 0.85–1.13)
LYMPHOCYTES ABSOLUTE: 1.2 THOU/MM3 (ref 1–4.8)
LYMPHOCYTES NFR BLD AUTO: 13.2 %
MCH RBC QN AUTO: 29.8 PG (ref 26–33)
MCHC RBC AUTO-ENTMCNC: 33.3 GM/DL (ref 32.2–35.5)
MCV RBC AUTO: 89.3 FL (ref 81–99)
MONOCYTES ABSOLUTE: 0.8 THOU/MM3 (ref 0.4–1.3)
MONOCYTES NFR BLD AUTO: 8.8 %
NEUTROPHILS ABSOLUTE: 6.5 THOU/MM3 (ref 1.8–7.7)
NEUTROPHILS NFR BLD AUTO: 73.3 %
NRBC BLD AUTO-RTO: 0 /100 WBC
OSMOLALITY SERPL CALC.SUM OF ELEC: 259.8 MOSMOL/KG (ref 275–300)
PLATELET # BLD AUTO: 322 THOU/MM3 (ref 130–400)
PMV BLD AUTO: 10 FL (ref 9.4–12.4)
POTASSIUM SERPL-SCNC: 4.2 MEQ/L (ref 3.5–5.2)
RBC # BLD AUTO: 4.6 MILL/MM3 (ref 4.2–5.4)
SODIUM SERPL-SCNC: 129 MEQ/L (ref 135–145)
WBC # BLD AUTO: 8.8 THOU/MM3 (ref 4.8–10.8)

## 2024-07-29 PROCEDURE — 71045 X-RAY EXAM CHEST 1 VIEW: CPT

## 2024-07-29 PROCEDURE — 85025 COMPLETE CBC W/AUTO DIFF WBC: CPT

## 2024-07-29 PROCEDURE — 72170 X-RAY EXAM OF PELVIS: CPT

## 2024-07-29 PROCEDURE — 99284 EMERGENCY DEPT VISIT MOD MDM: CPT

## 2024-07-29 PROCEDURE — 72125 CT NECK SPINE W/O DYE: CPT

## 2024-07-29 PROCEDURE — 85610 PROTHROMBIN TIME: CPT

## 2024-07-29 PROCEDURE — 36415 COLL VENOUS BLD VENIPUNCTURE: CPT

## 2024-07-29 PROCEDURE — 80048 BASIC METABOLIC PNL TOTAL CA: CPT

## 2024-07-29 PROCEDURE — 70450 CT HEAD/BRAIN W/O DYE: CPT

## 2024-07-29 PROCEDURE — 85730 THROMBOPLASTIN TIME PARTIAL: CPT

## 2024-07-29 ASSESSMENT — PAIN - FUNCTIONAL ASSESSMENT
PAIN_FUNCTIONAL_ASSESSMENT: NONE - DENIES PAIN
PAIN_FUNCTIONAL_ASSESSMENT: NONE - DENIES PAIN

## 2024-07-29 NOTE — PROGRESS NOTES
[facility]  INPATIENT OCCUPATIONAL THERAPY  [facility]  DISCHARGE NOTE    Date: 2024  Patient Name: Rahel Lopez,   Gender: female      MRN: [id number]  : 3/4/1931  (89 y.o.)  Referring Practitioner: Attending and Ordering: Kaitlynn Herron DO  Diagnosis: Stroke  Additional Pertinent Hx: Per EMR, \"Rahel Lopez is a 89 y.o. female with PMH significant for GERD, hyperlipidemia, hypertension, and cataracts who was admitted to [facility] on 7/10/2024.  History obtained via: ED documentation, acute care documentation, and patient     Patient initially presented to [facility] ED on 7/10/2024 with concerns due to multiple falls and impaired speech.  She reported that speech impairment began approximately 2 days prior and at one point she noted some left-sided weakness, however, felt that was back to baseline.  On the day of presentation, patient reportedly fell twice in her house.  The first time she was able to get herself back up and did not report the incident to anyone.  However, later in the day she was talking to her sister on the phone after they hung up the sister contacted another family member to check on the patient due to confusion and slurred speech.  When the family member arrived, the patient was found down on the ground and unable to get up.  CT head was obtained and was reportedly negative, however, decision made to admit patient for further evaluation for CVA.     On admission, patient was started on aspirin and statin therapy.  An MRI and echo were ordered.  Of note, patient was being treated by PCP for upper respiratory infection at time of presentation.  MRI of the brain resulted and reportedly revealed an acute infarct of the right thalamus.  Neurology consulted and recommends DAPT with aspirin 81 mg + Plavix 75 mg daily for 21 days followed by aspirin indefinitely.     Patient received therapy throughout acute care stay. PM&R was

## 2024-07-29 NOTE — DISCHARGE INSTRUCTIONS
You are seen here today for fall.  Take Tylenol 500 mg every 6 hours needed for pain.  Return here if you develop any confusion, difficulty speaking, difficulty moving arms or legs, or change in vision, or significant bleeding from your wound

## 2024-07-29 NOTE — ED PROVIDER NOTES
[facility] EMERGENCY DEPT      EMERGENCY MEDICINE     Pt Name: Rahel Lopez  MRN: [id number]  Birthdate 3/4/1931  Date of evaluation: 7/29/2024  Provider: Thai Quiroz DO  Supervising Physician: Douglas Khan MD    CHIEF COMPLAINT       Chief Complaint   Patient presents with    Fall    Head Injury     HISTORY OF PRESENT ILLNESS   Rahel Lopez is a 89 y.o. female with a history of CVA, HTN, HLD who presents to the emergency department from [facility] via EMS for a fall that happened shortly prior to arrival.  Patient states she dropped some pills on the floor bent forward to pick them up and struck the back of her head against the floor.  Patient denies LOC, vomiting, or any pain.  She is on Plavix.    PASTMEDICAL HISTORY     Past Medical History:   Diagnosis Date    Acute CVA (cerebrovascular accident) ([facility]) 7/11/2024    Arthritis     Cataract of both eyes     GERD (gastroesophageal reflux disease)     Hyperlipidemia     Hypertension     Keratosis     benign lichemoid    Psychiatric problem        Patient Active Problem List   Diagnosis Code    Hypertension I10    Closed displaced fracture of medial condyle of right femur ([facility]) S72.431A    Closed comminuted supracondylar fracture of right femur ([facility]) S72.451A    Displaced fracture of medial condyle of right femur, sequela S72.431S    Reactive depression F32.9    Closed fracture of base of fifth metacarpal bone of left hand S62.317A    History of cataract Z86.69    Dysarthria R47.1    Acute CVA (cerebrovascular accident) ([facility]) I63.9    Acute cerebrovascular accident (CVA) ([facility]) I63.9    Hyponatremia E87.1     SURGICAL HISTORY       Past Surgical History:   Procedure Laterality Date    ABDOMEN SURGERY      APPENDECTOMY  1934    CHOLECYSTECTOMY  1999    COLON SURGERY  2000    resection for diverticulitis    COLONOSCOPY      DILATATION, ESOPHAGUS      ENDOSCOPY, COLON, DIAGNOSTIC      HYSTERECTOMY (CERVIX STATUS UNKNOWN)  1979    JOINT REPLACEMENT Right     right

## 2024-07-29 NOTE — ED TRIAGE NOTES
Pt to the ED via [facility] with c/o fall. Pt bent down to  her medications on the floor and hit the back of her head on the floor. Pt reports she fell on her right side but denies any pain/injury. Pt takes Lovenox, ASA and Plavix. Pt is A&O x4 on arrival. Pt has large hematoma on the back of her head. Pt denies any LOC. Pt has hx of CVA on 7/11/24.

## 2024-08-26 ENCOUNTER — LAB (OUTPATIENT)
Dept: LAB | Age: 89
End: 2024-08-26

## 2024-08-26 DIAGNOSIS — E87.1 HYPONATREMIA: ICD-10-CM

## 2024-08-26 DIAGNOSIS — E87.1 HYPONATREMIA: Primary | ICD-10-CM

## 2024-08-27 LAB
ANION GAP SERPL CALC-SCNC: 20 MEQ/L (ref 8–16)
BUN SERPL-MCNC: 9 MG/DL (ref 7–22)
CALCIUM SERPL-MCNC: 9.4 MG/DL (ref 8.5–10.5)
CHLORIDE SERPL-SCNC: 97 MEQ/L (ref 98–111)
CO2 SERPL-SCNC: 18 MEQ/L (ref 23–33)
CREAT SERPL-MCNC: 0.5 MG/DL (ref 0.4–1.2)
GFR SERPL CREATININE-BSD FRML MDRD: 87 ML/MIN/1.73M2
GLUCOSE SERPL-MCNC: 134 MG/DL (ref 70–108)
POTASSIUM SERPL-SCNC: 4.2 MEQ/L (ref 3.5–5.2)
SODIUM SERPL-SCNC: 135 MEQ/L (ref 135–145)

## 2024-09-03 ENCOUNTER — OFFICE VISIT (OUTPATIENT)
Dept: NEPHROLOGY | Age: 89
End: 2024-09-03
Payer: MEDICARE

## 2024-09-03 VITALS
SYSTOLIC BLOOD PRESSURE: 130 MMHG | HEART RATE: 84 BPM | BODY MASS INDEX: 22.68 KG/M2 | DIASTOLIC BLOOD PRESSURE: 70 MMHG | WEIGHT: 124 LBS | OXYGEN SATURATION: 98 %

## 2024-09-03 DIAGNOSIS — E87.1 HYPONATREMIA: Primary | ICD-10-CM

## 2024-09-03 PROCEDURE — G8428 CUR MEDS NOT DOCUMENT: HCPCS | Performed by: INTERNAL MEDICINE

## 2024-09-03 PROCEDURE — 1090F PRES/ABSN URINE INCON ASSESS: CPT | Performed by: INTERNAL MEDICINE

## 2024-09-03 PROCEDURE — 1036F TOBACCO NON-USER: CPT | Performed by: INTERNAL MEDICINE

## 2024-09-03 PROCEDURE — G8420 CALC BMI NORM PARAMETERS: HCPCS | Performed by: INTERNAL MEDICINE

## 2024-09-03 PROCEDURE — 1123F ACP DISCUSS/DSCN MKR DOCD: CPT | Performed by: INTERNAL MEDICINE

## 2024-09-03 PROCEDURE — 99214 OFFICE O/P EST MOD 30 MIN: CPT | Performed by: INTERNAL MEDICINE

## 2024-09-03 RX ORDER — SODIUM CHLORIDE 1 G/1
1 TABLET ORAL DAILY
Qty: 90 TABLET | Refills: 1
Start: 2024-09-03 | End: 2024-12-02

## 2024-09-03 NOTE — PROGRESS NOTES
[facility] [place] [facility]  [facility] - [facility] KIDNEY AND HYPERTENSION  [address]  [address]  [facility] [address]  Dept: [phone]  Loc: [phone]  Progress Note  9/3/2024 1:40 PM      Pt Name:    Rahel Lopez  YOB: 1931  Primary Care Physician:  Herminia Lopez MD     Chief Complaint:   Chief Complaint   Patient presents with    Follow-Up from Hospital        History of Present Illness:   This is a hospital follow-up visit for hyponatremia. HTN, HLD, GERD, depression. Was hospitalized for CVA.    Taking sodium chloride 1 gram tid. She states it's making her nauseated.   No edema noted.          Pertinent items are noted in HPI.         Past History:  Past Medical History:   Diagnosis Date    Acute CVA (cerebrovascular accident) ([facility]) 7/11/2024    Arthritis     Cataract of both eyes     GERD (gastroesophageal reflux disease)     Hyperlipidemia     Hypertension     Keratosis     benign lichemoid    Psychiatric problem      Past Surgical History:   Procedure Laterality Date    ABDOMEN SURGERY      APPENDECTOMY  1934    CHOLECYSTECTOMY  1999    COLON SURGERY  2000    resection for diverticulitis    COLONOSCOPY      DILATATION, ESOPHAGUS      ENDOSCOPY, COLON, DIAGNOSTIC      HYSTERECTOMY (CERVIX STATUS UNKNOWN)  1979    JOINT REPLACEMENT Right     right knee    KNEE ARTHROPLASTY Right 02/27/2021    RIGHT TOTAL KNEE REVISION WITH OSS performed by Jared Burns MD at [facility] OR    KNEE SURGERY  2010    ROTATOR CUFF REPAIR Left 2006        VITALS:  /70 (Site: Left Upper Arm, Position: Sitting, Cuff Size: Large Adult)   Pulse 84   Wt 56.2 kg (124 lb)   SpO2 98%   BMI 22.68 kg/m²   Wt Readings from Last 3 Encounters:   09/03/24 56.2 kg (124 lb)   07/29/24 59 kg (130 lb)   07/22/24 56.3 kg (124 lb 1.9 oz)     Body mass index is 22.68 kg/m².     General Appearance: alert and cooperative with exam, appears comfortable, no distress  HEENT: EOMI, moist oral mucus

## 2024-09-10 ENCOUNTER — OFFICE VISIT (OUTPATIENT)
Dept: CARDIOLOGY CLINIC | Age: 89
End: 2024-09-10
Payer: MEDICARE

## 2024-09-10 VITALS
HEIGHT: 62 IN | WEIGHT: 121.2 LBS | BODY MASS INDEX: 22.31 KG/M2 | HEART RATE: 72 BPM | DIASTOLIC BLOOD PRESSURE: 84 MMHG | SYSTOLIC BLOOD PRESSURE: 152 MMHG

## 2024-09-10 DIAGNOSIS — I10 PRIMARY HYPERTENSION: Primary | ICD-10-CM

## 2024-09-10 DIAGNOSIS — E78.01 FAMILIAL HYPERCHOLESTEROLEMIA: ICD-10-CM

## 2024-09-10 PROCEDURE — 1090F PRES/ABSN URINE INCON ASSESS: CPT | Performed by: NUCLEAR MEDICINE

## 2024-09-10 PROCEDURE — G8427 DOCREV CUR MEDS BY ELIG CLIN: HCPCS | Performed by: NUCLEAR MEDICINE

## 2024-09-10 PROCEDURE — 99213 OFFICE O/P EST LOW 20 MIN: CPT | Performed by: NUCLEAR MEDICINE

## 2024-09-10 PROCEDURE — G8420 CALC BMI NORM PARAMETERS: HCPCS | Performed by: NUCLEAR MEDICINE

## 2024-09-10 PROCEDURE — 1123F ACP DISCUSS/DSCN MKR DOCD: CPT | Performed by: NUCLEAR MEDICINE

## 2024-09-10 PROCEDURE — 1036F TOBACCO NON-USER: CPT | Performed by: NUCLEAR MEDICINE

## 2024-09-10 RX ORDER — SENNOSIDES A AND B 8.6 MG/1
1 TABLET, FILM COATED ORAL DAILY
Status: ON HOLD | COMMUNITY

## 2024-09-11 ENCOUNTER — HOSPITAL ENCOUNTER (INPATIENT)
Age: 89
LOS: 5 days | Discharge: NURSING, REHAB OR LONG-TERM CARE | DRG: 481 | End: 2024-09-16
Attending: STUDENT IN AN ORGANIZED HEALTH CARE EDUCATION/TRAINING PROGRAM | Admitting: ORTHOPAEDIC SURGERY
Payer: MEDICARE

## 2024-09-11 ENCOUNTER — APPOINTMENT (OUTPATIENT)
Dept: GENERAL RADIOLOGY | Age: 89
DRG: 481 | End: 2024-09-11
Payer: MEDICARE

## 2024-09-11 ENCOUNTER — APPOINTMENT (OUTPATIENT)
Dept: CT IMAGING | Age: 89
DRG: 481 | End: 2024-09-11
Payer: MEDICARE

## 2024-09-11 DIAGNOSIS — S72.001A CLOSED FRACTURE OF RIGHT HIP, INITIAL ENCOUNTER (HCC): Primary | ICD-10-CM

## 2024-09-11 PROCEDURE — 2580000003 HC RX 258: Performed by: STUDENT IN AN ORGANIZED HEALTH CARE EDUCATION/TRAINING PROGRAM

## 2024-09-11 PROCEDURE — 72125 CT NECK SPINE W/O DYE: CPT

## 2024-09-11 PROCEDURE — 96375 TX/PRO/DX INJ NEW DRUG ADDON: CPT

## 2024-09-11 PROCEDURE — 93005 ELECTROCARDIOGRAM TRACING: CPT | Performed by: STUDENT IN AN ORGANIZED HEALTH CARE EDUCATION/TRAINING PROGRAM

## 2024-09-11 PROCEDURE — 99285 EMERGENCY DEPT VISIT HI MDM: CPT

## 2024-09-11 PROCEDURE — 6820000001 HC L2 TRAUMA SURGERY EVALUATION: Performed by: ORTHOPAEDIC SURGERY

## 2024-09-11 PROCEDURE — 71045 X-RAY EXAM CHEST 1 VIEW: CPT

## 2024-09-11 PROCEDURE — 96374 THER/PROPH/DIAG INJ IV PUSH: CPT

## 2024-09-11 PROCEDURE — 73552 X-RAY EXAM OF FEMUR 2/>: CPT

## 2024-09-11 PROCEDURE — 6360000002 HC RX W HCPCS: Performed by: STUDENT IN AN ORGANIZED HEALTH CARE EDUCATION/TRAINING PROGRAM

## 2024-09-11 PROCEDURE — 72170 X-RAY EXAM OF PELVIS: CPT

## 2024-09-11 PROCEDURE — 1200000000 HC SEMI PRIVATE

## 2024-09-11 PROCEDURE — 70450 CT HEAD/BRAIN W/O DYE: CPT

## 2024-09-11 RX ORDER — ONDANSETRON 2 MG/ML
4 INJECTION INTRAMUSCULAR; INTRAVENOUS ONCE
Status: COMPLETED | OUTPATIENT
Start: 2024-09-11 | End: 2024-09-11

## 2024-09-11 RX ORDER — 0.9 % SODIUM CHLORIDE 0.9 %
1000 INTRAVENOUS SOLUTION INTRAVENOUS ONCE
Status: COMPLETED | OUTPATIENT
Start: 2024-09-11 | End: 2024-09-12

## 2024-09-11 RX ORDER — MORPHINE SULFATE 2 MG/ML
2 INJECTION, SOLUTION INTRAMUSCULAR; INTRAVENOUS ONCE
Status: COMPLETED | OUTPATIENT
Start: 2024-09-11 | End: 2024-09-11

## 2024-09-11 RX ADMIN — MORPHINE SULFATE 2 MG: 2 INJECTION, SOLUTION INTRAMUSCULAR; INTRAVENOUS at 23:24

## 2024-09-11 RX ADMIN — SODIUM CHLORIDE 1000 ML: 9 INJECTION, SOLUTION INTRAVENOUS at 23:23

## 2024-09-11 RX ADMIN — ONDANSETRON 4 MG: 2 INJECTION INTRAMUSCULAR; INTRAVENOUS at 23:24

## 2024-09-11 ASSESSMENT — PAIN DESCRIPTION - ORIENTATION: ORIENTATION: RIGHT

## 2024-09-11 ASSESSMENT — PAIN - FUNCTIONAL ASSESSMENT: PAIN_FUNCTIONAL_ASSESSMENT: NONE - DENIES PAIN

## 2024-09-11 ASSESSMENT — PAIN SCALES - GENERAL: PAINLEVEL_OUTOF10: 5

## 2024-09-12 ENCOUNTER — ANESTHESIA (OUTPATIENT)
Dept: OPERATING ROOM | Age: 89
End: 2024-09-12
Payer: MEDICARE

## 2024-09-12 ENCOUNTER — ANESTHESIA EVENT (OUTPATIENT)
Dept: OPERATING ROOM | Age: 89
End: 2024-09-12
Payer: MEDICARE

## 2024-09-12 ENCOUNTER — APPOINTMENT (OUTPATIENT)
Dept: GENERAL RADIOLOGY | Age: 89
DRG: 481 | End: 2024-09-12
Payer: MEDICARE

## 2024-09-12 LAB
ABO: NORMAL
ALBUMIN SERPL BCG-MCNC: 3.9 G/DL (ref 3.5–5.1)
ALP SERPL-CCNC: 162 U/L (ref 38–126)
ALT SERPL W/O P-5'-P-CCNC: 19 U/L (ref 11–66)
ANION GAP SERPL CALC-SCNC: 12 MEQ/L (ref 8–16)
ANTIBODY SCREEN: NORMAL
APTT PPP: 32.5 SECONDS (ref 22–38)
AST SERPL-CCNC: 24 U/L (ref 5–40)
BASOPHILS ABSOLUTE: 0.1 THOU/MM3 (ref 0–0.1)
BASOPHILS NFR BLD AUTO: 0.9 %
BILIRUB SERPL-MCNC: 0.5 MG/DL (ref 0.3–1.2)
BUN SERPL-MCNC: 12 MG/DL (ref 7–22)
CALCIUM SERPL-MCNC: 8.7 MG/DL (ref 8.5–10.5)
CHLORIDE SERPL-SCNC: 99 MEQ/L (ref 98–111)
CO2 SERPL-SCNC: 22 MEQ/L (ref 23–33)
CREAT SERPL-MCNC: 0.4 MG/DL (ref 0.4–1.2)
DEPRECATED RDW RBC AUTO: 45.4 FL (ref 35–45)
EKG ATRIAL RATE: 81 BPM
EKG P AXIS: 39 DEGREES
EKG P-R INTERVAL: 202 MS
EKG Q-T INTERVAL: 406 MS
EKG QRS DURATION: 86 MS
EKG QTC CALCULATION (BAZETT): 471 MS
EKG R AXIS: -8 DEGREES
EKG T AXIS: 31 DEGREES
EKG VENTRICULAR RATE: 81 BPM
EOSINOPHIL NFR BLD AUTO: 1.9 %
EOSINOPHILS ABSOLUTE: 0.2 THOU/MM3 (ref 0–0.4)
ERYTHROCYTE [DISTWIDTH] IN BLOOD BY AUTOMATED COUNT: 13.4 % (ref 11.5–14.5)
GFR SERPL CREATININE-BSD FRML MDRD: > 90 ML/MIN/1.73M2
GLUCOSE SERPL-MCNC: 118 MG/DL (ref 70–108)
HCT VFR BLD AUTO: 41.6 % (ref 37–47)
HGB BLD-MCNC: 13.3 GM/DL (ref 12–16)
IMM GRANULOCYTES # BLD AUTO: 0.09 THOU/MM3 (ref 0–0.07)
IMM GRANULOCYTES NFR BLD AUTO: 0.7 %
INR PPP: 1.09 (ref 0.85–1.13)
LYMPHOCYTES ABSOLUTE: 1.6 THOU/MM3 (ref 1–4.8)
LYMPHOCYTES NFR BLD AUTO: 12.4 %
MCH RBC QN AUTO: 29.3 PG (ref 26–33)
MCHC RBC AUTO-ENTMCNC: 32 GM/DL (ref 32.2–35.5)
MCV RBC AUTO: 91.6 FL (ref 81–99)
MONOCYTES ABSOLUTE: 1.2 THOU/MM3 (ref 0.4–1.3)
MONOCYTES NFR BLD AUTO: 9.6 %
NEUTROPHILS ABSOLUTE: 9.4 THOU/MM3 (ref 1.8–7.7)
NEUTROPHILS NFR BLD AUTO: 74.5 %
NRBC BLD AUTO-RTO: 0 /100 WBC
OSMOLALITY SERPL CALC.SUM OF ELEC: 267.2 MOSMOL/KG (ref 275–300)
PLATELET # BLD AUTO: 281 THOU/MM3 (ref 130–400)
PMV BLD AUTO: 10.3 FL (ref 9.4–12.4)
POTASSIUM SERPL-SCNC: 3.6 MEQ/L (ref 3.5–5.2)
PROT SERPL-MCNC: 6.7 G/DL (ref 6.1–8)
RBC # BLD AUTO: 4.54 MILL/MM3 (ref 4.2–5.4)
REASON FOR REJECTION: NORMAL
REJECTED TEST: NORMAL
RH FACTOR: NORMAL
SODIUM SERPL-SCNC: 133 MEQ/L (ref 135–145)
WBC # BLD AUTO: 12.6 THOU/MM3 (ref 4.8–10.8)

## 2024-09-12 PROCEDURE — 86850 RBC ANTIBODY SCREEN: CPT

## 2024-09-12 PROCEDURE — 6360000002 HC RX W HCPCS: Performed by: NURSE ANESTHETIST, CERTIFIED REGISTERED

## 2024-09-12 PROCEDURE — 85025 COMPLETE CBC W/AUTO DIFF WBC: CPT

## 2024-09-12 PROCEDURE — 2500000003 HC RX 250 WO HCPCS: Performed by: ORTHOPAEDIC SURGERY

## 2024-09-12 PROCEDURE — 6360000002 HC RX W HCPCS: Performed by: ORTHOPAEDIC SURGERY

## 2024-09-12 PROCEDURE — 86900 BLOOD TYPING SEROLOGIC ABO: CPT

## 2024-09-12 PROCEDURE — 99222 1ST HOSP IP/OBS MODERATE 55: CPT

## 2024-09-12 PROCEDURE — 86901 BLOOD TYPING SEROLOGIC RH(D): CPT

## 2024-09-12 PROCEDURE — 2580000003 HC RX 258: Performed by: NURSE ANESTHETIST, CERTIFIED REGISTERED

## 2024-09-12 PROCEDURE — 80053 COMPREHEN METABOLIC PANEL: CPT

## 2024-09-12 PROCEDURE — 6360000002 HC RX W HCPCS: Performed by: ANESTHESIOLOGY

## 2024-09-12 PROCEDURE — 6370000000 HC RX 637 (ALT 250 FOR IP)

## 2024-09-12 PROCEDURE — C1713 ANCHOR/SCREW BN/BN,TIS/BN: HCPCS | Performed by: ORTHOPAEDIC SURGERY

## 2024-09-12 PROCEDURE — 2580000003 HC RX 258: Performed by: PHYSICIAN ASSISTANT

## 2024-09-12 PROCEDURE — 85610 PROTHROMBIN TIME: CPT

## 2024-09-12 PROCEDURE — 6360000002 HC RX W HCPCS: Performed by: PHYSICIAN ASSISTANT

## 2024-09-12 PROCEDURE — 2720000010 HC SURG SUPPLY STERILE: Performed by: ORTHOPAEDIC SURGERY

## 2024-09-12 PROCEDURE — 3700000000 HC ANESTHESIA ATTENDED CARE: Performed by: ORTHOPAEDIC SURGERY

## 2024-09-12 PROCEDURE — 0QS606Z REPOSITION RIGHT UPPER FEMUR WITH INTRAMEDULLARY INTERNAL FIXATION DEVICE, OPEN APPROACH: ICD-10-PCS | Performed by: ORTHOPAEDIC SURGERY

## 2024-09-12 PROCEDURE — 93010 ELECTROCARDIOGRAM REPORT: CPT | Performed by: NUCLEAR MEDICINE

## 2024-09-12 PROCEDURE — 85730 THROMBOPLASTIN TIME PARTIAL: CPT

## 2024-09-12 PROCEDURE — 2709999900 HC NON-CHARGEABLE SUPPLY: Performed by: ORTHOPAEDIC SURGERY

## 2024-09-12 PROCEDURE — 7100000001 HC PACU RECOVERY - ADDTL 15 MIN: Performed by: ORTHOPAEDIC SURGERY

## 2024-09-12 PROCEDURE — 6370000000 HC RX 637 (ALT 250 FOR IP): Performed by: PHYSICIAN ASSISTANT

## 2024-09-12 PROCEDURE — 2580000003 HC RX 258: Performed by: ORTHOPAEDIC SURGERY

## 2024-09-12 PROCEDURE — 3700000001 HC ADD 15 MINUTES (ANESTHESIA): Performed by: ORTHOPAEDIC SURGERY

## 2024-09-12 PROCEDURE — 7100000000 HC PACU RECOVERY - FIRST 15 MIN: Performed by: ORTHOPAEDIC SURGERY

## 2024-09-12 PROCEDURE — 3600000004 HC SURGERY LEVEL 4 BASE: Performed by: ORTHOPAEDIC SURGERY

## 2024-09-12 PROCEDURE — 36415 COLL VENOUS BLD VENIPUNCTURE: CPT

## 2024-09-12 PROCEDURE — 73502 X-RAY EXAM HIP UNI 2-3 VIEWS: CPT

## 2024-09-12 PROCEDURE — 3600000014 HC SURGERY LEVEL 4 ADDTL 15MIN: Performed by: ORTHOPAEDIC SURGERY

## 2024-09-12 PROCEDURE — 1200000000 HC SEMI PRIVATE

## 2024-09-12 DEVICE — TRIGEN LOW PROFILE SCREW 5.0MM X 30MM
Type: IMPLANTABLE DEVICE | Site: HIP | Status: FUNCTIONAL
Brand: TRIGEN

## 2024-09-12 DEVICE — INTERTAN LAG/COMPRESSION SCREW KIT                                    85MM / 80MM
Type: IMPLANTABLE DEVICE | Site: HIP | Status: FUNCTIONAL
Brand: TRIGEN

## 2024-09-12 DEVICE — TRIGEN INTERTAN 10S 10MM X 18CM 125DEGREE
Type: IMPLANTABLE DEVICE | Site: HIP | Status: FUNCTIONAL
Brand: TRIGEN

## 2024-09-12 RX ORDER — METHOCARBAMOL 500 MG/1
500 TABLET, FILM COATED ORAL EVERY 8 HOURS PRN
Status: COMPLETED | OUTPATIENT
Start: 2024-09-12 | End: 2024-09-15

## 2024-09-12 RX ORDER — PROPOFOL 10 MG/ML
INJECTION, EMULSION INTRAVENOUS
Status: DISCONTINUED | OUTPATIENT
Start: 2024-09-12 | End: 2024-09-12 | Stop reason: SDUPTHER

## 2024-09-12 RX ORDER — FLUTICASONE PROPIONATE 50 MCG
2 SPRAY, SUSPENSION (ML) NASAL 2 TIMES DAILY
Status: DISCONTINUED | OUTPATIENT
Start: 2024-09-12 | End: 2024-09-16 | Stop reason: HOSPADM

## 2024-09-12 RX ORDER — BUSPIRONE HYDROCHLORIDE 5 MG/1
5 TABLET ORAL 2 TIMES DAILY PRN
Status: DISCONTINUED | OUTPATIENT
Start: 2024-09-12 | End: 2024-09-16 | Stop reason: HOSPADM

## 2024-09-12 RX ORDER — TRAMADOL HYDROCHLORIDE 50 MG/1
100 TABLET ORAL EVERY 6 HOURS PRN
Status: DISCONTINUED | OUTPATIENT
Start: 2024-09-12 | End: 2024-09-16 | Stop reason: HOSPADM

## 2024-09-12 RX ORDER — TRAMADOL HYDROCHLORIDE 50 MG/1
50 TABLET ORAL EVERY 6 HOURS PRN
Status: DISCONTINUED | OUTPATIENT
Start: 2024-09-12 | End: 2024-09-16 | Stop reason: HOSPADM

## 2024-09-12 RX ORDER — SENNOSIDES A AND B 8.6 MG/1
1 TABLET, FILM COATED ORAL DAILY PRN
Status: DISCONTINUED | OUTPATIENT
Start: 2024-09-12 | End: 2024-09-16 | Stop reason: HOSPADM

## 2024-09-12 RX ORDER — ATORVASTATIN CALCIUM 40 MG/1
40 TABLET, FILM COATED ORAL NIGHTLY
Status: DISCONTINUED | OUTPATIENT
Start: 2024-09-12 | End: 2024-09-16 | Stop reason: HOSPADM

## 2024-09-12 RX ORDER — POLYETHYLENE GLYCOL 3350 17 G/17G
17 POWDER, FOR SOLUTION ORAL DAILY
Status: DISCONTINUED | OUTPATIENT
Start: 2024-09-12 | End: 2024-09-16 | Stop reason: HOSPADM

## 2024-09-12 RX ORDER — SODIUM CHLORIDE 0.9 % (FLUSH) 0.9 %
5-40 SYRINGE (ML) INJECTION EVERY 12 HOURS SCHEDULED
Status: DISCONTINUED | OUTPATIENT
Start: 2024-09-12 | End: 2024-09-16 | Stop reason: HOSPADM

## 2024-09-12 RX ORDER — SODIUM CHLORIDE 0.9 % (FLUSH) 0.9 %
5-40 SYRINGE (ML) INJECTION PRN
Status: DISCONTINUED | OUTPATIENT
Start: 2024-09-12 | End: 2024-09-16 | Stop reason: HOSPADM

## 2024-09-12 RX ORDER — ONDANSETRON 4 MG/1
4 TABLET, ORALLY DISINTEGRATING ORAL EVERY 8 HOURS PRN
Status: DISCONTINUED | OUTPATIENT
Start: 2024-09-12 | End: 2024-09-16 | Stop reason: HOSPADM

## 2024-09-12 RX ORDER — PANTOPRAZOLE SODIUM 40 MG/1
40 TABLET, DELAYED RELEASE ORAL
Status: DISCONTINUED | OUTPATIENT
Start: 2024-09-12 | End: 2024-09-16 | Stop reason: HOSPADM

## 2024-09-12 RX ORDER — SODIUM CHLORIDE 9 MG/ML
INJECTION, SOLUTION INTRAVENOUS PRN
Status: DISCONTINUED | OUTPATIENT
Start: 2024-09-12 | End: 2024-09-16 | Stop reason: HOSPADM

## 2024-09-12 RX ORDER — METOPROLOL TARTRATE 50 MG
50 TABLET ORAL 2 TIMES DAILY
Status: DISCONTINUED | OUTPATIENT
Start: 2024-09-12 | End: 2024-09-16 | Stop reason: HOSPADM

## 2024-09-12 RX ORDER — ACETAMINOPHEN 325 MG/1
650 TABLET ORAL EVERY 6 HOURS
Status: DISCONTINUED | OUTPATIENT
Start: 2024-09-12 | End: 2024-09-16 | Stop reason: HOSPADM

## 2024-09-12 RX ORDER — ONDANSETRON 2 MG/ML
4 INJECTION INTRAMUSCULAR; INTRAVENOUS EVERY 6 HOURS PRN
Status: DISCONTINUED | OUTPATIENT
Start: 2024-09-12 | End: 2024-09-16 | Stop reason: HOSPADM

## 2024-09-12 RX ORDER — AMLODIPINE BESYLATE 5 MG/1
5 TABLET ORAL DAILY
Status: DISCONTINUED | OUTPATIENT
Start: 2024-09-12 | End: 2024-09-16 | Stop reason: HOSPADM

## 2024-09-12 RX ORDER — BUPROPION HYDROCHLORIDE 150 MG/1
150 TABLET ORAL DAILY
Status: DISCONTINUED | OUTPATIENT
Start: 2024-09-12 | End: 2024-09-16 | Stop reason: HOSPADM

## 2024-09-12 RX ORDER — SENNOSIDES A AND B 8.6 MG/1
1 TABLET, FILM COATED ORAL DAILY
Status: DISCONTINUED | OUTPATIENT
Start: 2024-09-12 | End: 2024-09-16 | Stop reason: HOSPADM

## 2024-09-12 RX ORDER — SODIUM CHLORIDE 9 MG/ML
INJECTION, SOLUTION INTRAVENOUS
Status: DISCONTINUED | OUTPATIENT
Start: 2024-09-12 | End: 2024-09-12 | Stop reason: SDUPTHER

## 2024-09-12 RX ORDER — SODIUM CHLORIDE 1 G/1
1 TABLET ORAL DAILY
Status: DISCONTINUED | OUTPATIENT
Start: 2024-09-12 | End: 2024-09-16 | Stop reason: HOSPADM

## 2024-09-12 RX ORDER — CLOPIDOGREL BISULFATE 75 MG/1
75 TABLET ORAL DAILY
Status: DISCONTINUED | OUTPATIENT
Start: 2024-09-12 | End: 2024-09-12

## 2024-09-12 RX ORDER — VITAMIN B COMPLEX
5000 TABLET ORAL DAILY
Status: DISCONTINUED | OUTPATIENT
Start: 2024-09-12 | End: 2024-09-16 | Stop reason: HOSPADM

## 2024-09-12 RX ORDER — FENTANYL CITRATE 50 UG/ML
INJECTION, SOLUTION INTRAMUSCULAR; INTRAVENOUS
Status: DISCONTINUED | OUTPATIENT
Start: 2024-09-12 | End: 2024-09-12 | Stop reason: SDUPTHER

## 2024-09-12 RX ORDER — ASPIRIN 81 MG/1
81 TABLET, CHEWABLE ORAL DAILY
Status: DISCONTINUED | OUTPATIENT
Start: 2024-09-13 | End: 2024-09-16 | Stop reason: HOSPADM

## 2024-09-12 RX ORDER — POLYETHYLENE GLYCOL 3350 17 G/17G
17 POWDER, FOR SOLUTION ORAL DAILY PRN
Status: DISCONTINUED | OUTPATIENT
Start: 2024-09-12 | End: 2024-09-16 | Stop reason: HOSPADM

## 2024-09-12 RX ORDER — TRANEXAMIC ACID 100 MG/ML
INJECTION, SOLUTION INTRAVENOUS PRN
Status: DISCONTINUED | OUTPATIENT
Start: 2024-09-12 | End: 2024-09-12 | Stop reason: ALTCHOICE

## 2024-09-12 RX ORDER — MORPHINE SULFATE 2 MG/ML
1 INJECTION, SOLUTION INTRAMUSCULAR; INTRAVENOUS
Status: DISCONTINUED | OUTPATIENT
Start: 2024-09-12 | End: 2024-09-13

## 2024-09-12 RX ORDER — CEFAZOLIN SODIUM 1 G/50ML
1000 INJECTION, SOLUTION INTRAVENOUS EVERY 8 HOURS
Status: COMPLETED | OUTPATIENT
Start: 2024-09-13 | End: 2024-09-13

## 2024-09-12 RX ORDER — ASPIRIN 81 MG/1
81 TABLET, CHEWABLE ORAL DAILY
Status: DISCONTINUED | OUTPATIENT
Start: 2024-09-12 | End: 2024-09-12

## 2024-09-12 RX ORDER — MORPHINE SULFATE 2 MG/ML
2 INJECTION, SOLUTION INTRAMUSCULAR; INTRAVENOUS EVERY 5 MIN PRN
Status: DISCONTINUED | OUTPATIENT
Start: 2024-09-12 | End: 2024-09-12 | Stop reason: HOSPADM

## 2024-09-12 RX ORDER — CLOPIDOGREL BISULFATE 75 MG/1
75 TABLET ORAL DAILY
Status: DISCONTINUED | OUTPATIENT
Start: 2024-09-13 | End: 2024-09-16 | Stop reason: HOSPADM

## 2024-09-12 RX ORDER — LIDOCAINE HYDROCHLORIDE 20 MG/ML
INJECTION, SOLUTION INTRAVENOUS
Status: DISCONTINUED | OUTPATIENT
Start: 2024-09-12 | End: 2024-09-12 | Stop reason: SDUPTHER

## 2024-09-12 RX ADMIN — FENTANYL CITRATE 50 MCG: 50 INJECTION, SOLUTION INTRAMUSCULAR; INTRAVENOUS at 08:45

## 2024-09-12 RX ADMIN — LIDOCAINE HYDROCHLORIDE 50 MG: 20 INJECTION, SOLUTION INTRAVENOUS at 08:25

## 2024-09-12 RX ADMIN — PANTOPRAZOLE SODIUM 40 MG: 40 TABLET, DELAYED RELEASE ORAL at 05:42

## 2024-09-12 RX ADMIN — MORPHINE SULFATE 1 MG: 2 INJECTION, SOLUTION INTRAMUSCULAR; INTRAVENOUS at 02:42

## 2024-09-12 RX ADMIN — PROPOFOL 75 MG: 10 INJECTION, EMULSION INTRAVENOUS at 08:25

## 2024-09-12 RX ADMIN — SODIUM CHLORIDE: 9 INJECTION, SOLUTION INTRAVENOUS at 08:20

## 2024-09-12 RX ADMIN — FLUTICASONE PROPIONATE 2 SPRAY: 50 SPRAY, METERED NASAL at 21:07

## 2024-09-12 RX ADMIN — Medication 2000 MG: at 08:33

## 2024-09-12 RX ADMIN — WATER 1000 MG: 1 INJECTION INTRAMUSCULAR; INTRAVENOUS; SUBCUTANEOUS at 17:02

## 2024-09-12 RX ADMIN — TRANEXAMIC ACID 1000 MG: 100 INJECTION, SOLUTION INTRAVENOUS at 08:32

## 2024-09-12 RX ADMIN — ATORVASTATIN CALCIUM 40 MG: 40 TABLET, FILM COATED ORAL at 21:07

## 2024-09-12 RX ADMIN — ONDANSETRON 4 MG: 2 INJECTION INTRAMUSCULAR; INTRAVENOUS at 13:29

## 2024-09-12 RX ADMIN — SODIUM CHLORIDE, PRESERVATIVE FREE 10 ML: 5 INJECTION INTRAVENOUS at 21:07

## 2024-09-12 RX ADMIN — MORPHINE SULFATE 2 MG: 2 INJECTION, SOLUTION INTRAMUSCULAR; INTRAVENOUS at 09:30

## 2024-09-12 RX ADMIN — METOPROLOL TARTRATE 50 MG: 50 TABLET, FILM COATED ORAL at 21:07

## 2024-09-12 RX ADMIN — DICLOFENAC SODIUM 2 G: 10 GEL TOPICAL at 21:07

## 2024-09-12 RX ADMIN — ACETAMINOPHEN 650 MG: 325 TABLET ORAL at 23:26

## 2024-09-12 RX ADMIN — ACETAMINOPHEN 650 MG: 325 TABLET ORAL at 17:02

## 2024-09-12 ASSESSMENT — PAIN DESCRIPTION - ONSET
ONSET: ON-GOING
ONSET: ON-GOING

## 2024-09-12 ASSESSMENT — PAIN DESCRIPTION - LOCATION
LOCATION: LEG
LOCATION: HIP

## 2024-09-12 ASSESSMENT — PAIN DESCRIPTION - DESCRIPTORS
DESCRIPTORS: ACHING

## 2024-09-12 ASSESSMENT — PATIENT HEALTH QUESTIONNAIRE - PHQ9
SUM OF ALL RESPONSES TO PHQ QUESTIONS 1-9: 0
SUM OF ALL RESPONSES TO PHQ QUESTIONS 1-9: 0
2. FEELING DOWN, DEPRESSED OR HOPELESS: NOT AT ALL
1. LITTLE INTEREST OR PLEASURE IN DOING THINGS: NOT AT ALL
SUM OF ALL RESPONSES TO PHQ QUESTIONS 1-9: 0
SUM OF ALL RESPONSES TO PHQ QUESTIONS 1-9: 0
SUM OF ALL RESPONSES TO PHQ9 QUESTIONS 1 & 2: 0

## 2024-09-12 ASSESSMENT — LIFESTYLE VARIABLES
HOW OFTEN DO YOU HAVE A DRINK CONTAINING ALCOHOL: NEVER
HOW MANY STANDARD DRINKS CONTAINING ALCOHOL DO YOU HAVE ON A TYPICAL DAY: PATIENT DOES NOT DRINK

## 2024-09-12 ASSESSMENT — PAIN SCALES - GENERAL
PAINLEVEL_OUTOF10: 3
PAINLEVEL_OUTOF10: 4
PAINLEVEL_OUTOF10: 0
PAINLEVEL_OUTOF10: 8

## 2024-09-12 ASSESSMENT — PAIN - FUNCTIONAL ASSESSMENT
PAIN_FUNCTIONAL_ASSESSMENT: FACE, LEGS, ACTIVITY, CRY, AND CONSOLABILITY (FLACC)
PAIN_FUNCTIONAL_ASSESSMENT: PREVENTS OR INTERFERES SOME ACTIVE ACTIVITIES AND ADLS

## 2024-09-12 ASSESSMENT — PAIN DESCRIPTION - FREQUENCY
FREQUENCY: CONTINUOUS
FREQUENCY: CONTINUOUS

## 2024-09-12 ASSESSMENT — PAIN DESCRIPTION - PAIN TYPE
TYPE: ACUTE PAIN
TYPE: ACUTE PAIN

## 2024-09-12 ASSESSMENT — PAIN DESCRIPTION - ORIENTATION
ORIENTATION: RIGHT

## 2024-09-13 LAB
ANION GAP SERPL CALC-SCNC: 9 MEQ/L (ref 8–16)
BASOPHILS ABSOLUTE: 0.1 THOU/MM3 (ref 0–0.1)
BASOPHILS NFR BLD AUTO: 0.8 %
BUN SERPL-MCNC: 12 MG/DL (ref 7–22)
CALCIUM SERPL-MCNC: 8 MG/DL (ref 8.5–10.5)
CHLORIDE SERPL-SCNC: 100 MEQ/L (ref 98–111)
CO2 SERPL-SCNC: 24 MEQ/L (ref 23–33)
CREAT SERPL-MCNC: 0.5 MG/DL (ref 0.4–1.2)
DEPRECATED RDW RBC AUTO: 44.8 FL (ref 35–45)
EOSINOPHIL NFR BLD AUTO: 3.3 %
EOSINOPHILS ABSOLUTE: 0.4 THOU/MM3 (ref 0–0.4)
ERYTHROCYTE [DISTWIDTH] IN BLOOD BY AUTOMATED COUNT: 13.3 % (ref 11.5–14.5)
GFR SERPL CREATININE-BSD FRML MDRD: 87 ML/MIN/1.73M2
GLUCOSE SERPL-MCNC: 88 MG/DL (ref 70–108)
HCT VFR BLD AUTO: 27.1 % (ref 37–47)
HGB BLD-MCNC: 9.1 GM/DL (ref 12–16)
IMM GRANULOCYTES # BLD AUTO: 0.04 THOU/MM3 (ref 0–0.07)
IMM GRANULOCYTES NFR BLD AUTO: 0.4 %
LYMPHOCYTES ABSOLUTE: 1.7 THOU/MM3 (ref 1–4.8)
LYMPHOCYTES NFR BLD AUTO: 14.9 %
MCH RBC QN AUTO: 30.7 PG (ref 26–33)
MCHC RBC AUTO-ENTMCNC: 33.6 GM/DL (ref 32.2–35.5)
MCV RBC AUTO: 91.6 FL (ref 81–99)
MONOCYTES ABSOLUTE: 1.5 THOU/MM3 (ref 0.4–1.3)
MONOCYTES NFR BLD AUTO: 13.4 %
NEUTROPHILS ABSOLUTE: 7.5 THOU/MM3 (ref 1.8–7.7)
NEUTROPHILS NFR BLD AUTO: 67.2 %
NRBC BLD AUTO-RTO: 0 /100 WBC
PLATELET # BLD AUTO: 188 THOU/MM3 (ref 130–400)
PMV BLD AUTO: 10.7 FL (ref 9.4–12.4)
POTASSIUM SERPL-SCNC: 3.9 MEQ/L (ref 3.5–5.2)
RBC # BLD AUTO: 2.96 MILL/MM3 (ref 4.2–5.4)
SODIUM SERPL-SCNC: 133 MEQ/L (ref 135–145)
WBC # BLD AUTO: 11.1 THOU/MM3 (ref 4.8–10.8)

## 2024-09-13 PROCEDURE — 6370000000 HC RX 637 (ALT 250 FOR IP): Performed by: PHYSICIAN ASSISTANT

## 2024-09-13 PROCEDURE — 97535 SELF CARE MNGMENT TRAINING: CPT

## 2024-09-13 PROCEDURE — 1200000000 HC SEMI PRIVATE

## 2024-09-13 PROCEDURE — 6360000002 HC RX W HCPCS: Performed by: PHYSICIAN ASSISTANT

## 2024-09-13 PROCEDURE — 36415 COLL VENOUS BLD VENIPUNCTURE: CPT

## 2024-09-13 PROCEDURE — 97530 THERAPEUTIC ACTIVITIES: CPT

## 2024-09-13 PROCEDURE — 97162 PT EVAL MOD COMPLEX 30 MIN: CPT

## 2024-09-13 PROCEDURE — 2580000003 HC RX 258: Performed by: PHYSICIAN ASSISTANT

## 2024-09-13 PROCEDURE — 85025 COMPLETE CBC W/AUTO DIFF WBC: CPT

## 2024-09-13 PROCEDURE — 97116 GAIT TRAINING THERAPY: CPT

## 2024-09-13 PROCEDURE — 97166 OT EVAL MOD COMPLEX 45 MIN: CPT

## 2024-09-13 PROCEDURE — 99232 SBSQ HOSP IP/OBS MODERATE 35: CPT | Performed by: PHYSICIAN ASSISTANT

## 2024-09-13 PROCEDURE — 80048 BASIC METABOLIC PNL TOTAL CA: CPT

## 2024-09-13 RX ADMIN — TRAMADOL HYDROCHLORIDE 100 MG: 50 TABLET ORAL at 04:33

## 2024-09-13 RX ADMIN — SODIUM CHLORIDE, PRESERVATIVE FREE 10 ML: 5 INJECTION INTRAVENOUS at 19:51

## 2024-09-13 RX ADMIN — ACETAMINOPHEN 650 MG: 325 TABLET ORAL at 23:57

## 2024-09-13 RX ADMIN — POLYETHYLENE GLYCOL (3350) 17 G: 17 POWDER, FOR SOLUTION ORAL at 09:33

## 2024-09-13 RX ADMIN — BUPROPION HYDROCHLORIDE 150 MG: 150 TABLET, EXTENDED RELEASE ORAL at 09:32

## 2024-09-13 RX ADMIN — Medication 5000 UNITS: at 09:31

## 2024-09-13 RX ADMIN — METOPROLOL TARTRATE 50 MG: 50 TABLET, FILM COATED ORAL at 19:50

## 2024-09-13 RX ADMIN — ACETAMINOPHEN 650 MG: 325 TABLET ORAL at 11:12

## 2024-09-13 RX ADMIN — PANTOPRAZOLE SODIUM 40 MG: 40 TABLET, DELAYED RELEASE ORAL at 09:33

## 2024-09-13 RX ADMIN — FLUTICASONE PROPIONATE 2 SPRAY: 50 SPRAY, METERED NASAL at 19:50

## 2024-09-13 RX ADMIN — METHOCARBAMOL TABLETS 500 MG: 500 TABLET, COATED ORAL at 11:12

## 2024-09-13 RX ADMIN — TRAMADOL HYDROCHLORIDE 50 MG: 50 TABLET ORAL at 16:11

## 2024-09-13 RX ADMIN — METOPROLOL TARTRATE 50 MG: 50 TABLET, FILM COATED ORAL at 09:32

## 2024-09-13 RX ADMIN — CEFAZOLIN SODIUM 1000 MG: 1 INJECTION, SOLUTION INTRAVENOUS at 01:09

## 2024-09-13 RX ADMIN — AMLODIPINE BESYLATE 5 MG: 5 TABLET ORAL at 09:32

## 2024-09-13 RX ADMIN — SENNOSIDES 8.6 MG: 8.6 TABLET ORAL at 09:31

## 2024-09-13 RX ADMIN — SODIUM CHLORIDE 1 G: 1 TABLET ORAL at 09:33

## 2024-09-13 RX ADMIN — SODIUM CHLORIDE 25 ML: 9 INJECTION, SOLUTION INTRAVENOUS at 01:01

## 2024-09-13 RX ADMIN — DICLOFENAC SODIUM 2 G: 10 GEL TOPICAL at 19:50

## 2024-09-13 RX ADMIN — SODIUM CHLORIDE, PRESERVATIVE FREE 10 ML: 5 INJECTION INTRAVENOUS at 09:33

## 2024-09-13 RX ADMIN — ATORVASTATIN CALCIUM 40 MG: 40 TABLET, FILM COATED ORAL at 19:50

## 2024-09-13 RX ADMIN — BUSPIRONE HYDROCHLORIDE 5 MG: 5 TABLET ORAL at 09:32

## 2024-09-13 ASSESSMENT — PAIN DESCRIPTION - LOCATION
LOCATION: KNEE;SHOULDER
LOCATION: LEG
LOCATION: HIP
LOCATION: HIP

## 2024-09-13 ASSESSMENT — PAIN DESCRIPTION - ORIENTATION
ORIENTATION: RIGHT

## 2024-09-13 ASSESSMENT — PAIN - FUNCTIONAL ASSESSMENT: PAIN_FUNCTIONAL_ASSESSMENT: PREVENTS OR INTERFERES SOME ACTIVE ACTIVITIES AND ADLS

## 2024-09-13 ASSESSMENT — PAIN SCALES - GENERAL
PAINLEVEL_OUTOF10: 5
PAINLEVEL_OUTOF10: 1
PAINLEVEL_OUTOF10: 5
PAINLEVEL_OUTOF10: 7

## 2024-09-13 ASSESSMENT — PAIN DESCRIPTION - DESCRIPTORS
DESCRIPTORS: ACHING
DESCRIPTORS: SORE
DESCRIPTORS: ACHING

## 2024-09-13 ASSESSMENT — PAIN DESCRIPTION - FREQUENCY: FREQUENCY: INTERMITTENT

## 2024-09-13 ASSESSMENT — PAIN DESCRIPTION - PAIN TYPE: TYPE: CHRONIC PAIN

## 2024-09-14 LAB
ANION GAP SERPL CALC-SCNC: 10 MEQ/L (ref 8–16)
BASOPHILS ABSOLUTE: 0.1 THOU/MM3 (ref 0–0.1)
BASOPHILS NFR BLD AUTO: 0.4 %
BUN SERPL-MCNC: 12 MG/DL (ref 7–22)
CALCIUM SERPL-MCNC: 8.2 MG/DL (ref 8.5–10.5)
CHLORIDE SERPL-SCNC: 97 MEQ/L (ref 98–111)
CO2 SERPL-SCNC: 25 MEQ/L (ref 23–33)
CREAT SERPL-MCNC: 0.4 MG/DL (ref 0.4–1.2)
DEPRECATED RDW RBC AUTO: 44.1 FL (ref 35–45)
EOSINOPHIL NFR BLD AUTO: 1.7 %
EOSINOPHILS ABSOLUTE: 0.2 THOU/MM3 (ref 0–0.4)
ERYTHROCYTE [DISTWIDTH] IN BLOOD BY AUTOMATED COUNT: 13.4 % (ref 11.5–14.5)
GFR SERPL CREATININE-BSD FRML MDRD: > 90 ML/MIN/1.73M2
GLUCOSE SERPL-MCNC: 99 MG/DL (ref 70–108)
HCT VFR BLD AUTO: 25.2 % (ref 37–47)
HGB BLD-MCNC: 8.3 GM/DL (ref 12–16)
IMM GRANULOCYTES # BLD AUTO: 0.07 THOU/MM3 (ref 0–0.07)
IMM GRANULOCYTES NFR BLD AUTO: 0.5 %
LYMPHOCYTES ABSOLUTE: 1.5 THOU/MM3 (ref 1–4.8)
LYMPHOCYTES NFR BLD AUTO: 10.7 %
MCH RBC QN AUTO: 30.1 PG (ref 26–33)
MCHC RBC AUTO-ENTMCNC: 32.9 GM/DL (ref 32.2–35.5)
MCV RBC AUTO: 91.3 FL (ref 81–99)
MONOCYTES ABSOLUTE: 1.8 THOU/MM3 (ref 0.4–1.3)
MONOCYTES NFR BLD AUTO: 12.9 %
NEUTROPHILS ABSOLUTE: 10.6 THOU/MM3 (ref 1.8–7.7)
NEUTROPHILS NFR BLD AUTO: 73.8 %
NRBC BLD AUTO-RTO: 0 /100 WBC
PLATELET # BLD AUTO: 178 THOU/MM3 (ref 130–400)
PMV BLD AUTO: 10.5 FL (ref 9.4–12.4)
POTASSIUM SERPL-SCNC: 4.5 MEQ/L (ref 3.5–5.2)
RBC # BLD AUTO: 2.76 MILL/MM3 (ref 4.2–5.4)
SODIUM SERPL-SCNC: 132 MEQ/L (ref 135–145)
WBC # BLD AUTO: 14.3 THOU/MM3 (ref 4.8–10.8)

## 2024-09-14 PROCEDURE — 97530 THERAPEUTIC ACTIVITIES: CPT

## 2024-09-14 PROCEDURE — 2580000003 HC RX 258: Performed by: PHYSICIAN ASSISTANT

## 2024-09-14 PROCEDURE — 6370000000 HC RX 637 (ALT 250 FOR IP): Performed by: PHYSICIAN ASSISTANT

## 2024-09-14 PROCEDURE — 85025 COMPLETE CBC W/AUTO DIFF WBC: CPT

## 2024-09-14 PROCEDURE — 80048 BASIC METABOLIC PNL TOTAL CA: CPT

## 2024-09-14 PROCEDURE — 1200000000 HC SEMI PRIVATE

## 2024-09-14 PROCEDURE — 97535 SELF CARE MNGMENT TRAINING: CPT

## 2024-09-14 PROCEDURE — 36415 COLL VENOUS BLD VENIPUNCTURE: CPT

## 2024-09-14 PROCEDURE — 99232 SBSQ HOSP IP/OBS MODERATE 35: CPT | Performed by: PHYSICIAN ASSISTANT

## 2024-09-14 RX ADMIN — BUSPIRONE HYDROCHLORIDE 5 MG: 5 TABLET ORAL at 09:01

## 2024-09-14 RX ADMIN — BUPROPION HYDROCHLORIDE 150 MG: 150 TABLET, EXTENDED RELEASE ORAL at 09:02

## 2024-09-14 RX ADMIN — PANTOPRAZOLE SODIUM 40 MG: 40 TABLET, DELAYED RELEASE ORAL at 09:03

## 2024-09-14 RX ADMIN — AMLODIPINE BESYLATE 5 MG: 5 TABLET ORAL at 09:01

## 2024-09-14 RX ADMIN — BUSPIRONE HYDROCHLORIDE 5 MG: 5 TABLET ORAL at 20:37

## 2024-09-14 RX ADMIN — ACETAMINOPHEN 650 MG: 325 TABLET ORAL at 16:41

## 2024-09-14 RX ADMIN — ACETAMINOPHEN 650 MG: 325 TABLET ORAL at 12:20

## 2024-09-14 RX ADMIN — TRAMADOL HYDROCHLORIDE 100 MG: 50 TABLET ORAL at 03:36

## 2024-09-14 RX ADMIN — TRAMADOL HYDROCHLORIDE 100 MG: 50 TABLET ORAL at 20:36

## 2024-09-14 RX ADMIN — DICLOFENAC SODIUM 2 G: 10 GEL TOPICAL at 20:38

## 2024-09-14 RX ADMIN — METOPROLOL TARTRATE 50 MG: 50 TABLET, FILM COATED ORAL at 20:36

## 2024-09-14 RX ADMIN — FLUTICASONE PROPIONATE 2 SPRAY: 50 SPRAY, METERED NASAL at 09:00

## 2024-09-14 RX ADMIN — METOPROLOL TARTRATE 50 MG: 50 TABLET, FILM COATED ORAL at 09:01

## 2024-09-14 RX ADMIN — DICLOFENAC SODIUM 2 G: 10 GEL TOPICAL at 08:59

## 2024-09-14 RX ADMIN — SODIUM CHLORIDE 1 G: 1 TABLET ORAL at 09:01

## 2024-09-14 RX ADMIN — Medication 5000 UNITS: at 09:00

## 2024-09-14 RX ADMIN — METHOCARBAMOL TABLETS 500 MG: 500 TABLET, COATED ORAL at 03:35

## 2024-09-14 RX ADMIN — FLUTICASONE PROPIONATE 2 SPRAY: 50 SPRAY, METERED NASAL at 20:39

## 2024-09-14 RX ADMIN — SODIUM CHLORIDE, PRESERVATIVE FREE 5 ML: 5 INJECTION INTRAVENOUS at 20:39

## 2024-09-14 RX ADMIN — SODIUM CHLORIDE, PRESERVATIVE FREE 10 ML: 5 INJECTION INTRAVENOUS at 09:01

## 2024-09-14 RX ADMIN — ATORVASTATIN CALCIUM 40 MG: 40 TABLET, FILM COATED ORAL at 20:36

## 2024-09-14 ASSESSMENT — PAIN DESCRIPTION - LOCATION
LOCATION: HIP
LOCATION: HIP
LOCATION: KNEE;SHOULDER

## 2024-09-14 ASSESSMENT — PAIN DESCRIPTION - DESCRIPTORS
DESCRIPTORS: ACHING;SHARP
DESCRIPTORS: ACHING;SPASM;SHARP

## 2024-09-14 ASSESSMENT — PAIN DESCRIPTION - ORIENTATION
ORIENTATION: RIGHT
ORIENTATION: RIGHT

## 2024-09-14 ASSESSMENT — PAIN SCALES - GENERAL
PAINLEVEL_OUTOF10: 0
PAINLEVEL_OUTOF10: 9
PAINLEVEL_OUTOF10: 9
PAINLEVEL_OUTOF10: 1

## 2024-09-14 ASSESSMENT — PAIN - FUNCTIONAL ASSESSMENT
PAIN_FUNCTIONAL_ASSESSMENT: PREVENTS OR INTERFERES SOME ACTIVE ACTIVITIES AND ADLS
PAIN_FUNCTIONAL_ASSESSMENT: PREVENTS OR INTERFERES SOME ACTIVE ACTIVITIES AND ADLS

## 2024-09-15 LAB
ANION GAP SERPL CALC-SCNC: 6 MEQ/L (ref 8–16)
BASOPHILS ABSOLUTE: 0.1 THOU/MM3 (ref 0–0.1)
BASOPHILS NFR BLD AUTO: 0.7 %
BUN SERPL-MCNC: 12 MG/DL (ref 7–22)
CALCIUM SERPL-MCNC: 8.2 MG/DL (ref 8.5–10.5)
CHLORIDE SERPL-SCNC: 98 MEQ/L (ref 98–111)
CO2 SERPL-SCNC: 26 MEQ/L (ref 23–33)
CREAT SERPL-MCNC: 0.5 MG/DL (ref 0.4–1.2)
DEPRECATED RDW RBC AUTO: 46.9 FL (ref 35–45)
EOSINOPHIL NFR BLD AUTO: 3 %
EOSINOPHILS ABSOLUTE: 0.3 THOU/MM3 (ref 0–0.4)
ERYTHROCYTE [DISTWIDTH] IN BLOOD BY AUTOMATED COUNT: 13.5 % (ref 11.5–14.5)
GFR SERPL CREATININE-BSD FRML MDRD: 87 ML/MIN/1.73M2
GLUCOSE SERPL-MCNC: 88 MG/DL (ref 70–108)
HCT VFR BLD AUTO: 25.7 % (ref 37–47)
HGB BLD-MCNC: 8.1 GM/DL (ref 12–16)
IMM GRANULOCYTES # BLD AUTO: 0.09 THOU/MM3 (ref 0–0.07)
IMM GRANULOCYTES NFR BLD AUTO: 0.8 %
LYMPHOCYTES ABSOLUTE: 1.9 THOU/MM3 (ref 1–4.8)
LYMPHOCYTES NFR BLD AUTO: 17.1 %
MCH RBC QN AUTO: 30.2 PG (ref 26–33)
MCHC RBC AUTO-ENTMCNC: 31.5 GM/DL (ref 32.2–35.5)
MCV RBC AUTO: 95.9 FL (ref 81–99)
MONOCYTES ABSOLUTE: 1.5 THOU/MM3 (ref 0.4–1.3)
MONOCYTES NFR BLD AUTO: 13.4 %
NEUTROPHILS ABSOLUTE: 7.3 THOU/MM3 (ref 1.8–7.7)
NEUTROPHILS NFR BLD AUTO: 65 %
NRBC BLD AUTO-RTO: 0 /100 WBC
PLATELET # BLD AUTO: 195 THOU/MM3 (ref 130–400)
PMV BLD AUTO: 10.8 FL (ref 9.4–12.4)
POTASSIUM SERPL-SCNC: 4.4 MEQ/L (ref 3.5–5.2)
RBC # BLD AUTO: 2.68 MILL/MM3 (ref 4.2–5.4)
SODIUM SERPL-SCNC: 130 MEQ/L (ref 135–145)
WBC # BLD AUTO: 11.2 THOU/MM3 (ref 4.8–10.8)

## 2024-09-15 PROCEDURE — 6370000000 HC RX 637 (ALT 250 FOR IP): Performed by: PHYSICIAN ASSISTANT

## 2024-09-15 PROCEDURE — 80048 BASIC METABOLIC PNL TOTAL CA: CPT

## 2024-09-15 PROCEDURE — 85025 COMPLETE CBC W/AUTO DIFF WBC: CPT

## 2024-09-15 PROCEDURE — 2580000003 HC RX 258: Performed by: PHYSICIAN ASSISTANT

## 2024-09-15 PROCEDURE — 1200000000 HC SEMI PRIVATE

## 2024-09-15 PROCEDURE — 36415 COLL VENOUS BLD VENIPUNCTURE: CPT

## 2024-09-15 RX ORDER — OXYCODONE HYDROCHLORIDE 5 MG/1
5 TABLET ORAL EVERY 6 HOURS PRN
Qty: 28 TABLET | Refills: 0 | Status: SHIPPED | OUTPATIENT
Start: 2024-09-15 | End: 2024-09-15

## 2024-09-15 RX ORDER — OXYCODONE HYDROCHLORIDE 5 MG/1
5 TABLET ORAL EVERY 6 HOURS PRN
Qty: 28 TABLET | Refills: 0 | Status: SHIPPED | OUTPATIENT
Start: 2024-09-15 | End: 2024-09-15 | Stop reason: HOSPADM

## 2024-09-15 RX ORDER — TRAMADOL HYDROCHLORIDE 50 MG/1
50 TABLET ORAL EVERY 6 HOURS PRN
Qty: 28 TABLET | Refills: 0 | Status: SHIPPED | OUTPATIENT
Start: 2024-09-15 | End: 2024-09-22

## 2024-09-15 RX ADMIN — AMLODIPINE BESYLATE 5 MG: 5 TABLET ORAL at 08:34

## 2024-09-15 RX ADMIN — FLUTICASONE PROPIONATE 2 SPRAY: 50 SPRAY, METERED NASAL at 21:10

## 2024-09-15 RX ADMIN — ACETAMINOPHEN 650 MG: 325 TABLET ORAL at 17:40

## 2024-09-15 RX ADMIN — Medication 5000 UNITS: at 08:34

## 2024-09-15 RX ADMIN — PANTOPRAZOLE SODIUM 40 MG: 40 TABLET, DELAYED RELEASE ORAL at 08:34

## 2024-09-15 RX ADMIN — ACETAMINOPHEN 650 MG: 325 TABLET ORAL at 23:25

## 2024-09-15 RX ADMIN — METOPROLOL TARTRATE 50 MG: 50 TABLET, FILM COATED ORAL at 21:10

## 2024-09-15 RX ADMIN — TRAMADOL HYDROCHLORIDE 100 MG: 50 TABLET ORAL at 03:55

## 2024-09-15 RX ADMIN — DICLOFENAC SODIUM 2 G: 10 GEL TOPICAL at 21:10

## 2024-09-15 RX ADMIN — FLUTICASONE PROPIONATE 2 SPRAY: 50 SPRAY, METERED NASAL at 08:34

## 2024-09-15 RX ADMIN — ATORVASTATIN CALCIUM 40 MG: 40 TABLET, FILM COATED ORAL at 21:10

## 2024-09-15 RX ADMIN — TRAMADOL HYDROCHLORIDE 100 MG: 50 TABLET ORAL at 16:36

## 2024-09-15 RX ADMIN — SODIUM CHLORIDE 1 G: 1 TABLET ORAL at 08:34

## 2024-09-15 RX ADMIN — METHOCARBAMOL TABLETS 500 MG: 500 TABLET, COATED ORAL at 09:25

## 2024-09-15 RX ADMIN — SENNOSIDES 8.6 MG: 8.6 TABLET ORAL at 08:34

## 2024-09-15 RX ADMIN — POLYETHYLENE GLYCOL (3350) 17 G: 17 POWDER, FOR SOLUTION ORAL at 08:34

## 2024-09-15 RX ADMIN — SODIUM CHLORIDE, PRESERVATIVE FREE 10 ML: 5 INJECTION INTRAVENOUS at 08:34

## 2024-09-15 RX ADMIN — BUSPIRONE HYDROCHLORIDE 5 MG: 5 TABLET ORAL at 21:10

## 2024-09-15 RX ADMIN — METOPROLOL TARTRATE 50 MG: 50 TABLET, FILM COATED ORAL at 08:34

## 2024-09-15 RX ADMIN — TRAMADOL HYDROCHLORIDE 100 MG: 50 TABLET ORAL at 10:30

## 2024-09-15 RX ADMIN — BUPROPION HYDROCHLORIDE 150 MG: 150 TABLET, EXTENDED RELEASE ORAL at 08:34

## 2024-09-15 RX ADMIN — DICLOFENAC SODIUM 2 G: 10 GEL TOPICAL at 08:34

## 2024-09-15 RX ADMIN — ACETAMINOPHEN 650 MG: 325 TABLET ORAL at 11:58

## 2024-09-15 ASSESSMENT — PAIN SCALES - WONG BAKER
WONGBAKER_NUMERICALRESPONSE: HURTS A LITTLE BIT
WONGBAKER_NUMERICALRESPONSE: HURTS LITTLE MORE
WONGBAKER_NUMERICALRESPONSE: HURTS A LITTLE BIT

## 2024-09-15 ASSESSMENT — PAIN DESCRIPTION - DESCRIPTORS: DESCRIPTORS: ACHING;SHARP

## 2024-09-15 ASSESSMENT — PAIN SCALES - GENERAL
PAINLEVEL_OUTOF10: 2
PAINLEVEL_OUTOF10: 3
PAINLEVEL_OUTOF10: 3
PAINLEVEL_OUTOF10: 4
PAINLEVEL_OUTOF10: 8
PAINLEVEL_OUTOF10: 7
PAINLEVEL_OUTOF10: 3
PAINLEVEL_OUTOF10: 7
PAINLEVEL_OUTOF10: 3
PAINLEVEL_OUTOF10: 3
PAINLEVEL_OUTOF10: 7
PAINLEVEL_OUTOF10: 3
PAINLEVEL_OUTOF10: 3
PAINLEVEL_OUTOF10: 7
PAINLEVEL_OUTOF10: 0

## 2024-09-15 ASSESSMENT — PAIN DESCRIPTION - ORIENTATION: ORIENTATION: RIGHT

## 2024-09-15 ASSESSMENT — PAIN - FUNCTIONAL ASSESSMENT: PAIN_FUNCTIONAL_ASSESSMENT: PREVENTS OR INTERFERES SOME ACTIVE ACTIVITIES AND ADLS

## 2024-09-15 ASSESSMENT — PAIN DESCRIPTION - LOCATION: LOCATION: HIP;LEG

## 2024-09-16 VITALS
HEIGHT: 62 IN | RESPIRATION RATE: 18 BRPM | SYSTOLIC BLOOD PRESSURE: 125 MMHG | OXYGEN SATURATION: 99 % | TEMPERATURE: 97.5 F | DIASTOLIC BLOOD PRESSURE: 58 MMHG | HEART RATE: 76 BPM | WEIGHT: 125.44 LBS | BODY MASS INDEX: 23.08 KG/M2

## 2024-09-16 PROCEDURE — 97110 THERAPEUTIC EXERCISES: CPT

## 2024-09-16 PROCEDURE — 97116 GAIT TRAINING THERAPY: CPT

## 2024-09-16 PROCEDURE — 6370000000 HC RX 637 (ALT 250 FOR IP): Performed by: PHYSICIAN ASSISTANT

## 2024-09-16 RX ADMIN — BUPROPION HYDROCHLORIDE 150 MG: 150 TABLET, EXTENDED RELEASE ORAL at 10:26

## 2024-09-16 RX ADMIN — SODIUM CHLORIDE 1 G: 1 TABLET ORAL at 10:26

## 2024-09-16 RX ADMIN — METOPROLOL TARTRATE 50 MG: 50 TABLET, FILM COATED ORAL at 10:26

## 2024-09-16 RX ADMIN — ACETAMINOPHEN 650 MG: 325 TABLET ORAL at 11:23

## 2024-09-16 RX ADMIN — Medication 5000 UNITS: at 10:26

## 2024-09-16 RX ADMIN — PANTOPRAZOLE SODIUM 40 MG: 40 TABLET, DELAYED RELEASE ORAL at 10:27

## 2024-09-16 RX ADMIN — ACETAMINOPHEN 650 MG: 325 TABLET ORAL at 05:42

## 2024-09-16 RX ADMIN — TRAMADOL HYDROCHLORIDE 100 MG: 50 TABLET ORAL at 07:38

## 2024-09-16 RX ADMIN — AMLODIPINE BESYLATE 5 MG: 5 TABLET ORAL at 10:26

## 2024-09-16 ASSESSMENT — PAIN DESCRIPTION - DESCRIPTORS
DESCRIPTORS: ACHING;SHARP
DESCRIPTORS: ACHING

## 2024-09-16 ASSESSMENT — PAIN DESCRIPTION - PAIN TYPE: TYPE: SURGICAL PAIN

## 2024-09-16 ASSESSMENT — PAIN DESCRIPTION - ORIENTATION
ORIENTATION: RIGHT
ORIENTATION: RIGHT

## 2024-09-16 ASSESSMENT — PAIN DESCRIPTION - LOCATION
LOCATION: HIP;LEG
LOCATION: HIP;LEG

## 2024-09-16 ASSESSMENT — PAIN SCALES - GENERAL
PAINLEVEL_OUTOF10: 2
PAINLEVEL_OUTOF10: 8

## 2024-12-10 ENCOUNTER — HOSPITAL ENCOUNTER (EMERGENCY)
Age: 88
Discharge: HOME | End: 2024-12-10
Attending: EMERGENCY MEDICINE
Payer: MEDICARE

## 2024-12-10 ENCOUNTER — APPOINTMENT (OUTPATIENT)
Dept: CT IMAGING | Age: 88
End: 2024-12-10
Payer: MEDICARE

## 2024-12-10 VITALS
OXYGEN SATURATION: 96 % | DIASTOLIC BLOOD PRESSURE: 77 MMHG | HEART RATE: 85 BPM | SYSTOLIC BLOOD PRESSURE: 155 MMHG | RESPIRATION RATE: 17 BRPM

## 2024-12-10 DIAGNOSIS — S00.03XA CONTUSION OF SCALP, INITIAL ENCOUNTER: Primary | ICD-10-CM

## 2024-12-10 DIAGNOSIS — S01.01XA LACERATION OF SCALP, INITIAL ENCOUNTER: ICD-10-CM

## 2024-12-10 LAB
EKG ATRIAL RATE: 79 BPM
EKG P AXIS: 52 DEGREES
EKG P-R INTERVAL: 180 MS
EKG Q-T INTERVAL: 408 MS
EKG QRS DURATION: 80 MS
EKG QTC CALCULATION (BAZETT): 467 MS
EKG T AXIS: 36 DEGREES
EKG VENTRICULAR RATE: 79 BPM

## 2024-12-10 PROCEDURE — 6360000002 HC RX W HCPCS: Performed by: EMERGENCY MEDICINE

## 2024-12-10 PROCEDURE — 99284 EMERGENCY DEPT VISIT MOD MDM: CPT

## 2024-12-10 PROCEDURE — 72125 CT NECK SPINE W/O DYE: CPT

## 2024-12-10 PROCEDURE — 70450 CT HEAD/BRAIN W/O DYE: CPT

## 2024-12-10 PROCEDURE — 12002 RPR S/N/AX/GEN/TRNK2.6-7.5CM: CPT

## 2024-12-10 PROCEDURE — 93010 ELECTROCARDIOGRAM REPORT: CPT | Performed by: NUCLEAR MEDICINE

## 2024-12-10 PROCEDURE — 93005 ELECTROCARDIOGRAM TRACING: CPT | Performed by: EMERGENCY MEDICINE

## 2024-12-10 RX ORDER — LIDOCAINE HYDROCHLORIDE AND EPINEPHRINE 10; 10 MG/ML; UG/ML
20 INJECTION, SOLUTION INFILTRATION; PERINEURAL ONCE
Status: COMPLETED | OUTPATIENT
Start: 2024-12-10 | End: 2024-12-10

## 2024-12-10 RX ADMIN — LIDOCAINE HYDROCHLORIDE AND EPINEPHRINE 20 ML: 10; 10 INJECTION, SOLUTION INFILTRATION; PERINEURAL at 19:36

## 2024-12-10 NOTE — ED PROVIDER NOTES
[facility] EMERGENCY DEPT      CHIEF COMPLAINT       Chief Complaint   Patient presents with    Fall       Nurses Notes reviewed and I agree except as noted in the HPI.      HISTORY OF PRESENT ILLNESS    Rahel Lopez is a 89 y.o. female who presents with complaint of mechanical fall while leaving physical therapy, patient presenting with hematoma/scalp contusion.  Denies loss of consciousness, on aspirin.  Patient has no other focal pain.  Onset: Acute  Duration: Prior to arrival  Timing: Goal event  Location of Pain: Left scalp  Intesity/severity: Limited bleeding  Modifying Factors: Unsteady on her feet  Relieved by;  Previous Episodes;  Tx Before arrival: None    PAST MEDICAL HISTORY    has a past medical history of Acute CVA (cerebrovascular accident) (HCC), Arthritis, Cataract of both eyes, GERD (gastroesophageal reflux disease), Hyperlipidemia, Hypertension, Keratosis, and Psychiatric problem.    SURGICAL HISTORY      has a past surgical history that includes Rotator cuff repair (Left, 2006); knee surgery (2010); Appendectomy (1934); Hysterectomy (1979); Cholecystectomy (1999); Colon surgery (2000); Abdomen surgery; Colonoscopy; Endoscopy, colon, diagnostic; Dilatation, esophagus; joint replacement (Right); Knee Arthroplasty (Right, 02/27/2021); and hip surgery (Right, 9/12/2024).    CURRENT MEDICATIONS       Previous Medications    ACETAMINOPHEN (TYLENOL 8 HOUR ARTHRITIS PAIN PO)    Take 1 tablet by mouth as needed    AMLODIPINE (NORVASC) 5 MG TABLET    Take 1 tablet by mouth daily    ASPIRIN 81 MG CHEWABLE TABLET    Take 1 tablet by mouth daily    ATORVASTATIN (LIPITOR) 40 MG TABLET    Take 1 tablet by mouth nightly    BUPROPION (WELLBUTRIN) 100 MG TABLET    Take 0.5 tablets by mouth 2 times daily    BUSPIRONE (BUSPAR) 5 MG TABLET    Take 1 tablet by mouth 2 times daily as needed    CHOLECALCIFEROL (VITAMIN D) 25 MCG TABS    Take 5 tablets by mouth daily    CLOPIDOGREL (PLAVIX) 75 MG TABLET    Take 1

## 2024-12-10 NOTE — ED NOTES
Pt was in the process of doing physical therapy and lost her balance. Pt fell backwards and hit her head on the wall. Pt denies any pain or LOC. Neuro system intact. Pupils are round and reactive. Pt has wound to right BOH. Bleeding is controlled. Area is covered with a dry dressing. Pt in stable condition

## 2024-12-11 LAB — SODIUM BLD-SCNC: 140 MMOL/L

## 2024-12-20 DIAGNOSIS — E87.1 HYPONATREMIA: Primary | ICD-10-CM

## 2025-01-08 LAB
BUN BLDV-MCNC: 12 MG/DL
CALCIUM SERPL-MCNC: 9 MG/DL
CHLORIDE BLD-SCNC: 101 MMOL/L
CO2: 30 MMOL/L
CREAT SERPL-MCNC: 0.5 MG/DL
EGFR: 115
GLUCOSE BLD-MCNC: 86 MG/DL
POTASSIUM SERPL-SCNC: 4.2 MMOL/L
SODIUM BLD-SCNC: 138 MMOL/L

## 2025-02-06 ENCOUNTER — APPOINTMENT (OUTPATIENT)
Dept: CT IMAGING | Age: 89
End: 2025-02-06
Payer: MEDICARE

## 2025-02-06 ENCOUNTER — APPOINTMENT (OUTPATIENT)
Dept: GENERAL RADIOLOGY | Age: 89
End: 2025-02-06
Payer: MEDICARE

## 2025-02-06 ENCOUNTER — HOSPITAL ENCOUNTER (EMERGENCY)
Age: 89
Discharge: HOME | End: 2025-02-07
Attending: STUDENT IN AN ORGANIZED HEALTH CARE EDUCATION/TRAINING PROGRAM
Payer: MEDICARE

## 2025-02-06 VITALS
SYSTOLIC BLOOD PRESSURE: 174 MMHG | TEMPERATURE: 97.4 F | OXYGEN SATURATION: 96 % | BODY MASS INDEX: 21.95 KG/M2 | DIASTOLIC BLOOD PRESSURE: 74 MMHG | RESPIRATION RATE: 17 BRPM | WEIGHT: 120 LBS | HEART RATE: 78 BPM
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
## 2025-02-06 DIAGNOSIS — S73.102A HIP SPRAIN, LEFT, INITIAL ENCOUNTER: Primary | ICD-10-CM

## 2025-02-06 DIAGNOSIS — S09.90XA CLOSED HEAD INJURY, INITIAL ENCOUNTER: ICD-10-CM

## 2025-02-06 PROCEDURE — 71045 X-RAY EXAM CHEST 1 VIEW: CPT

## 2025-02-06 PROCEDURE — 99284 EMERGENCY DEPT VISIT MOD MDM: CPT

## 2025-02-06 PROCEDURE — 73502 X-RAY EXAM HIP UNI 2-3 VIEWS: CPT

## 2025-02-06 PROCEDURE — 70450 CT HEAD/BRAIN W/O DYE: CPT

## 2025-02-06 PROCEDURE — 93005 ELECTROCARDIOGRAM TRACING: CPT | Performed by: STUDENT IN AN ORGANIZED HEALTH CARE EDUCATION/TRAINING PROGRAM

## 2025-02-06 PROCEDURE — 72125 CT NECK SPINE W/O DYE: CPT

## 2025-02-06 RX ORDER — ACETAMINOPHEN 500 MG
1000 TABLET ORAL ONCE
Status: DISCONTINUED | OUTPATIENT
Start: 2025-02-06 | End: 2025-02-07 | Stop reason: HOSPADM

## 2025-02-06 ASSESSMENT — PAIN - FUNCTIONAL ASSESSMENT: PAIN_FUNCTIONAL_ASSESSMENT: NONE - DENIES PAIN

## 2025-02-07 LAB
EKG ATRIAL RATE: 77 BPM
EKG P AXIS: 64 DEGREES
EKG P-R INTERVAL: 198 MS
EKG Q-T INTERVAL: 408 MS
EKG QRS DURATION: 88 MS
EKG QTC CALCULATION (BAZETT): 461 MS
EKG R AXIS: 23 DEGREES
EKG T AXIS: 49 DEGREES
EKG VENTRICULAR RATE: 77 BPM

## 2025-02-07 PROCEDURE — 93010 ELECTROCARDIOGRAM REPORT: CPT | Performed by: NUCLEAR MEDICINE

## 2025-02-07 NOTE — ED PROVIDER NOTES
[facility] EMERGENCY DEPARTMENT  EMERGENCY DEPARTMENT ENCOUNTER          Pt Name: Rahel Lopez  MRN: [id number]  Birthdate 3/4/1931  Date of evaluation: 2/6/2025  Physician: Simone Gonzalez DO      CHIEF COMPLAINT     No chief complaint on file.        HISTORY OF PRESENT ILLNESS    HPI  Rahel Lopez is a 89 y.o. female who presents to the emergency department from home, brought in by EMS for evaluation of fall.  Patient reports she had a mechanical fall while reaching for her phone.  Patient fell and hit her left hip.  Patient is complaining of some pain in her left hip.  She also reports hitting her head.  She denies any pain in her back.  She denies any loss of consciousness.  She denies any bleeding.  Patient most recently had a hip fracture back in September that was repaired by orthopedic surgery.  Patient has been in and out of the hospital since then due to issues with her recovery.  The patient has no other acute complaints at this time.      REVIEW OF SYSTEMS   Review of Systems   All other systems reviewed and are negative.        PAST MEDICAL AND SURGICAL HISTORY     Past Medical History:   Diagnosis Date    Acute CVA (cerebrovascular accident) (HCC) 7/11/2024    Arthritis     Cataract of both eyes     GERD (gastroesophageal reflux disease)     Hyperlipidemia     Hypertension     Keratosis     benign lichemoid    Psychiatric problem      Past Surgical History:   Procedure Laterality Date    ABDOMEN SURGERY      APPENDECTOMY  1934    CHOLECYSTECTOMY  1999    COLON SURGERY  2000    resection for diverticulitis    COLONOSCOPY      DILATATION, ESOPHAGUS      ENDOSCOPY, COLON, DIAGNOSTIC      HIP SURGERY Right 9/12/2024    RIGHT HIP INTERTAN performed by Jared Burns MD at [facility] OR    HYSTERECTOMY (CERVIX STATUS UNKNOWN)  1979    JOINT REPLACEMENT Right     right knee    KNEE ARTHROPLASTY Right 02/27/2021    RIGHT TOTAL KNEE REVISION WITH OSS performed by Jared Burns MD at

## 2025-02-07 NOTE — ED TRIAGE NOTES
Patient presents to ED with c/o a mechanical fall. Patient states she was trying to grab her phone and lost her balance. Patient states she fell on her left hip and hit back of head. Patient denies losing LOC. Patient denies any pain at this time. Patient denies any dizziness.

## 2025-02-07 NOTE — DISCHARGE INSTRUCTIONS
Take all medications as prescribed.  Follow-up with primary care physician.  Return to the emergency department if you have any acute changes or worsening of your symptoms.

## 2025-03-19 DIAGNOSIS — E87.1 HYPONATREMIA: Primary | ICD-10-CM

## 2025-03-19 RX ORDER — CALCIUM CARBONATE 500 MG/1
1 TABLET, CHEWABLE ORAL DAILY
COMMUNITY

## 2025-03-19 RX ORDER — MELOXICAM 7.5 MG/1
7.5 TABLET ORAL DAILY
COMMUNITY

## 2025-04-09 LAB
BUN BLDV-MCNC: 20 MG/DL
CALCIUM SERPL-MCNC: 9 MG/DL
CHLORIDE BLD-SCNC: 101 MMOL/L
CO2: 28 MMOL/L
CREAT SERPL-MCNC: 0.6 MG/DL
EGFR: 93
GLUCOSE BLD-MCNC: 93 MG/DL
POTASSIUM SERPL-SCNC: 4.3 MMOL/L
SODIUM BLD-SCNC: 138 MMOL/L

## 2025-04-10 RX ORDER — ATORVASTATIN CALCIUM 10 MG/1
10 TABLET, FILM COATED ORAL DAILY
COMMUNITY

## 2025-04-10 RX ORDER — BUPROPION HYDROCHLORIDE 150 MG/1
150 TABLET, EXTENDED RELEASE ORAL DAILY
COMMUNITY

## 2025-04-10 RX ORDER — POLYETHYLENE GLYCOL 3350 17 G/17G
17 POWDER, FOR SOLUTION ORAL DAILY
COMMUNITY

## 2025-04-16 ENCOUNTER — OFFICE VISIT (OUTPATIENT)
Dept: NEPHROLOGY | Age: 89
End: 2025-04-16
Payer: MEDICARE

## 2025-04-16 VITALS
OXYGEN SATURATION: 98 % | HEART RATE: 74 BPM | BODY MASS INDEX: 21.95 KG/M2 | DIASTOLIC BLOOD PRESSURE: 66 MMHG | WEIGHT: 120 LBS | SYSTOLIC BLOOD PRESSURE: 126 MMHG

## 2025-04-16 DIAGNOSIS — E87.1 HYPONATREMIA: Primary | ICD-10-CM

## 2025-04-16 PROCEDURE — 99214 OFFICE O/P EST MOD 30 MIN: CPT | Performed by: INTERNAL MEDICINE

## 2025-04-16 PROCEDURE — 1090F PRES/ABSN URINE INCON ASSESS: CPT | Performed by: INTERNAL MEDICINE

## 2025-04-16 PROCEDURE — 1036F TOBACCO NON-USER: CPT | Performed by: INTERNAL MEDICINE

## 2025-04-16 PROCEDURE — G8420 CALC BMI NORM PARAMETERS: HCPCS | Performed by: INTERNAL MEDICINE

## 2025-04-16 PROCEDURE — G8427 DOCREV CUR MEDS BY ELIG CLIN: HCPCS | Performed by: INTERNAL MEDICINE

## 2025-04-16 PROCEDURE — 1160F RVW MEDS BY RX/DR IN RCRD: CPT | Performed by: INTERNAL MEDICINE

## 2025-04-16 PROCEDURE — 1159F MED LIST DOCD IN RCRD: CPT | Performed by: INTERNAL MEDICINE

## 2025-04-16 PROCEDURE — 1123F ACP DISCUSS/DSCN MKR DOCD: CPT | Performed by: INTERNAL MEDICINE

## 2025-04-16 NOTE — PROGRESS NOTES
[facility] [place] [facility]  [facility] KIDNEY AND HYPERTENSION  [address]  [address]  [place] [id number]  Dept: [phone]  Loc: [phone]  Progress Note  4/16/2025 11:05 AM      Pt Name:    Rahel Lopez  YOB: 1931  Primary Care Physician:  Herminia Lopez MD     Chief Complaint:   Chief Complaint   Patient presents with    Follow-up        History of Present Illness:   This is a hospital follow-up visit for hyponatremia. She has hx of HTN, HLD, GERD, depression. Was hospitalized for CVA.    Currently on sodium chloride 1 gram daily. Sodium has been stable. No edema. Bp looks good today.         Pertinent items are noted in HPI.         Past History:  Past Medical History:   Diagnosis Date    Acute CVA (cerebrovascular accident) (HCC) 7/11/2024    Arthritis     Cataract of both eyes     GERD (gastroesophageal reflux disease)     Hyperlipidemia     Hypertension     Keratosis     benign lichemoid    Psychiatric problem      Past Surgical History:   Procedure Laterality Date    ABDOMEN SURGERY      APPENDECTOMY  1934    CHOLECYSTECTOMY  1999    COLON SURGERY  2000    resection for diverticulitis    COLONOSCOPY      DILATATION, ESOPHAGUS      ENDOSCOPY, COLON, DIAGNOSTIC      HIP SURGERY Right 9/12/2024    RIGHT HIP INTERTAN performed by Jared Burns MD at [facility] OR    HYSTERECTOMY (CERVIX STATUS UNKNOWN)  1979    JOINT REPLACEMENT Right     right knee    KNEE ARTHROPLASTY Right 02/27/2021    RIGHT TOTAL KNEE REVISION WITH OSS performed by Jared Burns MD at [facility] OR    KNEE SURGERY  2010    ROTATOR CUFF REPAIR Left 2006        VITALS:  /66 (BP Site: Right Upper Arm, Patient Position: Sitting, BP Cuff Size: Large Adult)   Pulse 74   Wt 54.4 kg (120 lb)   SpO2 98%   BMI 21.95 kg/m²   Wt Readings from Last 3 Encounters:   04/16/25 54.4 kg (120 lb)   02/06/25 54.4 kg (120 lb)   09/12/24 56.9 kg (125 lb 7.1 oz)     Body mass index is 21.95 kg/m².

## 2025-05-17 ENCOUNTER — APPOINTMENT (OUTPATIENT)
Dept: CT IMAGING | Age: 89
End: 2025-05-17
Payer: MEDICARE

## 2025-05-17 ENCOUNTER — APPOINTMENT (OUTPATIENT)
Dept: GENERAL RADIOLOGY | Age: 89
End: 2025-05-17
Payer: MEDICARE

## 2025-05-17 ENCOUNTER — HOSPITAL ENCOUNTER (EMERGENCY)
Age: 89
Discharge: HOME | End: 2025-05-17
Attending: EMERGENCY MEDICINE
Payer: MEDICARE

## 2025-05-17 VITALS
OXYGEN SATURATION: 99 % | HEART RATE: 85 BPM | TEMPERATURE: 98.6 F | SYSTOLIC BLOOD PRESSURE: 158 MMHG | RESPIRATION RATE: 17 BRPM | DIASTOLIC BLOOD PRESSURE: 73 MMHG

## 2025-05-17 DIAGNOSIS — N30.00 ACUTE CYSTITIS WITHOUT HEMATURIA: Primary | ICD-10-CM

## 2025-05-17 DIAGNOSIS — R44.1 VISUAL HALLUCINATIONS: ICD-10-CM

## 2025-05-17 LAB
ALBUMIN SERPL BCG-MCNC: 4.2 G/DL (ref 3.4–4.9)
ALP SERPL-CCNC: 139 U/L (ref 38–126)
ALT SERPL W/O P-5'-P-CCNC: 11 U/L (ref 10–35)
ANION GAP SERPL CALC-SCNC: 13 MEQ/L (ref 8–16)
AST SERPL-CCNC: 31 U/L (ref 10–35)
BACTERIA URNS QL MICRO: ABNORMAL /HPF
BASOPHILS ABSOLUTE: 0.1 THOU/MM3 (ref 0–0.1)
BASOPHILS NFR BLD AUTO: 1.2 %
BILIRUB CONJ SERPL-MCNC: < 0.1 MG/DL (ref 0–0.2)
BILIRUB SERPL-MCNC: 0.3 MG/DL (ref 0.3–1.2)
BILIRUB UR QL STRIP.AUTO: NEGATIVE
BUN SERPL-MCNC: 14 MG/DL (ref 8–23)
CALCIUM SERPL-MCNC: 9.9 MG/DL (ref 8.2–9.6)
CASTS #/AREA URNS LPF: ABNORMAL /LPF
CASTS 2: ABNORMAL /LPF
CHARACTER UR: ABNORMAL
CHLORIDE SERPL-SCNC: 100 MEQ/L (ref 98–111)
CO2 SERPL-SCNC: 24 MEQ/L (ref 22–29)
COLOR, UA: YELLOW
CREAT SERPL-MCNC: 0.6 MG/DL (ref 0.5–0.9)
CRYSTALS URNS MICRO: ABNORMAL
DEPRECATED RDW RBC AUTO: 42.4 FL (ref 35–45)
EOSINOPHIL NFR BLD AUTO: 5 %
EOSINOPHILS ABSOLUTE: 0.4 THOU/MM3 (ref 0–0.4)
EPITHELIAL CELLS, UA: ABNORMAL /HPF
ERYTHROCYTE [DISTWIDTH] IN BLOOD BY AUTOMATED COUNT: 13 % (ref 11.5–14.5)
GFR SERPL CREATININE-BSD FRML MDRD: 83 ML/MIN/1.73M2
GLUCOSE BLD STRIP.AUTO-MCNC: 98 MG/DL (ref 70–108)
GLUCOSE SERPL-MCNC: 94 MG/DL (ref 74–109)
GLUCOSE UR QL STRIP.AUTO: NEGATIVE MG/DL
HCT VFR BLD AUTO: 42.3 % (ref 37–47)
HGB BLD-MCNC: 14 GM/DL (ref 12–16)
HGB UR QL STRIP.AUTO: ABNORMAL
IMM GRANULOCYTES # BLD AUTO: 0.04 THOU/MM3 (ref 0–0.07)
IMM GRANULOCYTES NFR BLD AUTO: 0.5 %
KETONES UR QL STRIP.AUTO: NEGATIVE
LYMPHOCYTES ABSOLUTE: 1.9 THOU/MM3 (ref 1–4.8)
LYMPHOCYTES NFR BLD AUTO: 23.1 %
MCH RBC QN AUTO: 29.4 PG (ref 26–33)
MCHC RBC AUTO-ENTMCNC: 33.1 GM/DL (ref 32.2–35.5)
MCV RBC AUTO: 88.9 FL (ref 81–99)
MISCELLANEOUS 2: ABNORMAL
MONOCYTES ABSOLUTE: 1.1 THOU/MM3 (ref 0.4–1.3)
MONOCYTES NFR BLD AUTO: 13.7 %
NEUTROPHILS ABSOLUTE: 4.6 THOU/MM3 (ref 1.8–7.7)
NEUTROPHILS NFR BLD AUTO: 56.5 %
NITRITE UR QL STRIP: NEGATIVE
NRBC BLD AUTO-RTO: 0 /100 WBC
OSMOLALITY SERPL CALC.SUM OF ELEC: 274 MOSMOL/KG (ref 275–300)
PH UR STRIP.AUTO: 7 [PH] (ref 5–9)
PLATELET # BLD AUTO: 290 THOU/MM3 (ref 130–400)
PMV BLD AUTO: 10.4 FL (ref 9.4–12.4)
POTASSIUM SERPL-SCNC: 4.6 MEQ/L (ref 3.5–5.2)
PROT SERPL-MCNC: 7.2 G/DL (ref 6.4–8.3)
PROT UR STRIP.AUTO-MCNC: NEGATIVE MG/DL
RBC # BLD AUTO: 4.76 MILL/MM3 (ref 4.2–5.4)
RBC URINE: ABNORMAL /HPF
RENAL EPI CELLS #/AREA URNS HPF: ABNORMAL /[HPF]
SODIUM SERPL-SCNC: 137 MEQ/L (ref 135–145)
SP GR UR REFRACT.AUTO: 1.01 (ref 1–1.03)
UROBILINOGEN, URINE: 0.2 EU/DL (ref 0–1)
WBC # BLD AUTO: 8.1 THOU/MM3 (ref 4.8–10.8)
WBC #/AREA URNS HPF: > 100 /HPF
WBC #/AREA URNS HPF: ABNORMAL /[HPF]
YEAST LIKE FUNGI URNS QL MICRO: ABNORMAL

## 2025-05-17 PROCEDURE — 87077 CULTURE AEROBIC IDENTIFY: CPT

## 2025-05-17 PROCEDURE — 93005 ELECTROCARDIOGRAM TRACING: CPT | Performed by: EMERGENCY MEDICINE

## 2025-05-17 PROCEDURE — 87086 URINE CULTURE/COLONY COUNT: CPT

## 2025-05-17 PROCEDURE — 36415 COLL VENOUS BLD VENIPUNCTURE: CPT

## 2025-05-17 PROCEDURE — 82248 BILIRUBIN DIRECT: CPT

## 2025-05-17 PROCEDURE — 2500000003 HC RX 250 WO HCPCS: Performed by: EMERGENCY MEDICINE

## 2025-05-17 PROCEDURE — 6360000002 HC RX W HCPCS: Performed by: EMERGENCY MEDICINE

## 2025-05-17 PROCEDURE — 96374 THER/PROPH/DIAG INJ IV PUSH: CPT

## 2025-05-17 PROCEDURE — 82948 REAGENT STRIP/BLOOD GLUCOSE: CPT

## 2025-05-17 PROCEDURE — 87186 SC STD MICRODIL/AGAR DIL: CPT

## 2025-05-17 PROCEDURE — 85025 COMPLETE CBC W/AUTO DIFF WBC: CPT

## 2025-05-17 PROCEDURE — 70450 CT HEAD/BRAIN W/O DYE: CPT

## 2025-05-17 PROCEDURE — 80053 COMPREHEN METABOLIC PANEL: CPT

## 2025-05-17 PROCEDURE — 81001 URINALYSIS AUTO W/SCOPE: CPT

## 2025-05-17 PROCEDURE — 99285 EMERGENCY DEPT VISIT HI MDM: CPT

## 2025-05-17 PROCEDURE — 71045 X-RAY EXAM CHEST 1 VIEW: CPT

## 2025-05-17 RX ORDER — CEPHALEXIN 500 MG/1
500 CAPSULE ORAL 2 TIMES DAILY
Qty: 14 CAPSULE | Refills: 0 | Status: SHIPPED | OUTPATIENT
Start: 2025-05-17 | End: 2025-05-24

## 2025-05-17 RX ADMIN — CEFTRIAXONE SODIUM 1000 MG: 1 INJECTION, POWDER, FOR SOLUTION INTRAMUSCULAR; INTRAVENOUS at 14:54

## 2025-05-17 ASSESSMENT — PAIN - FUNCTIONAL ASSESSMENT
PAIN_FUNCTIONAL_ASSESSMENT: NONE - DENIES PAIN
PAIN_FUNCTIONAL_ASSESSMENT: NONE - DENIES PAIN

## 2025-05-17 NOTE — ED TRIAGE NOTES
Pt to ED c/o altered mental status. Pt states she was seen at Urgent Care earlier today and diagnosed with a UTI but they told her to come to the ER to get further test done. Pt A&O. VSS

## 2025-05-17 NOTE — ED PROVIDER NOTES
[facility] EMERGENCY DEPARTMENT      EMERGENCY MEDICINE     Pt Name: Rahel Lopez  MRN: [id number]  Birthdate 3/4/1931  Date of evaluation: 5/17/2025  Provider: Thai Quiroz DO  Supervising Physician: Prem Ragsdale MD    CHIEF COMPLAINT       Chief Complaint   Patient presents with    Altered Mental Status     HISTORY OF PRESENT ILLNESS   Rahel Lopez is a 89 y.o. female with a history of CVA who presents to the emergency department from urgent care for concerns of visual hallucinations.  Patient reports this been going on for couple weeks.  It occurs when she first wakes up from sleep.  Other than that she was feeling well.  At the nursing facility and the urgent care she was tested positive for UTI, but neither started her on any medications.  Urgent care center because they are concerned that she might be septic.  Patient denies any fever, chills, vomiting, diarrhea, dyspnea, cough.  She has no other complaints besides the visual hallucinations.    PASTMEDICAL HISTORY     Past Medical History:   Diagnosis Date    Acute CVA (cerebrovascular accident) ([facility]) 7/11/2024    Arthritis     Cataract of both eyes     GERD (gastroesophageal reflux disease)     Hyperlipidemia     Hypertension     Keratosis     benign lichemoid    Psychiatric problem        Patient Active Problem List   Diagnosis Code    Hypertension I10    Closed displaced fracture of medial condyle of right femur ([facility]) S72.431A    Closed comminuted supracondylar fracture of right femur ([facility]) S72.451A    Displaced fracture of medial condyle of right femur, sequela S72.431S    Reactive depression F32.9    Closed fracture of base of fifth metacarpal bone of left hand S62.317A    History of cataract Z86.69    Dysarthria R47.1    Acute CVA (cerebrovascular accident) ([facility]) I63.9    Acute cerebrovascular accident (CVA) ([facility]) I63.9    Hyponatremia E87.1    Closed right hip fracture, initial encounter ([facility]) S72.001A     SURGICAL HISTORY       Past

## 2025-05-17 NOTE — ED PROVIDER NOTES
PATIENT NAME: Rahel Lopez  MRN: [id number]  : 3/4/1931  JESUS: 2025    I performed a history and physical examination of the patient and discussed management with the Resident. I reviewed the Resident's note and agree with the documented findings and plan of care. Any areas of disagreement are noted on the chart. I was personally present for the key portions of any procedures and have documented in the chart those procedures where I was not present during the key portions. I have reviewed the emergency nurses triage note and agree with the chief complaint, past medical history, past surgical history, allergies, medications, social and family history as documented unless otherwise noted below.    MEDICAL DEDISION MAKING (MDM)     Rahel Lopez is a 89 y.o. female presents with confusion.     Seen at urgent care diagnosed UTI and was told to go to ED to have blood work and head CT    EKG: Sinus rhythm, first degree AV block, ventricular rate 84 bpm, ME interval 220 ms, QRS duration 86 ms,  ms, no acute ischemic changes    Labs, CXR and head CT are reassuring.     Discharged with Keflex prescribed.     Vitals:    25 1409 25 1421 25 1517 25 1605   BP: (!) 166/82 (!) 158/81 (!) 147/73 (!) 158/73   Pulse: 84 82 84 85   Resp: 24    Temp: 98.6 °F (37 °C)      TempSrc: Oral      SpO2: 99% 100% 99% 99%     Labs Reviewed   BASIC METABOLIC PANEL - Abnormal; Notable for the following components:       Result Value    Calcium 9.9 (*)     All other components within normal limits   HEPATIC FUNCTION PANEL - Abnormal; Notable for the following components:    Alkaline Phosphatase 139 (*)     All other components within normal limits   URINE WITH REFLEXED MICRO - Abnormal; Notable for the following components:    Blood, Urine TRACE (*)     Leukocyte Esterase, Urine LARGE (*)     Character, Urine CLOUDY (*)     All other components within normal limits   OSMOLALITY - Abnormal; Notable

## 2025-05-17 NOTE — DISCHARGE INSTRUCTIONS
You are seen here today for hallucinations.  You are found to have a UTI.  Take Keflex as prescribed.  Follow-up with primary care doctor in 2 days.  Return here develop any confusion, uncontrolled vomiting, severe abdominal pain

## 2025-05-18 LAB
BACTERIA UR CULT: ABNORMAL
EKG ATRIAL RATE: 84 BPM
EKG P AXIS: 38 DEGREES
EKG P-R INTERVAL: 220 MS
EKG Q-T INTERVAL: 390 MS
EKG QRS DURATION: 86 MS
EKG QTC CALCULATION (BAZETT): 460 MS
EKG R AXIS: -10 DEGREES
EKG T AXIS: 18 DEGREES
EKG VENTRICULAR RATE: 84 BPM
ORGANISM: ABNORMAL

## 2025-05-18 PROCEDURE — 93010 ELECTROCARDIOGRAM REPORT: CPT | Performed by: INTERNAL MEDICINE

## 2025-05-19 LAB
BACTERIA UR CULT: ABNORMAL
ORGANISM: ABNORMAL

## 2025-05-20 NOTE — PROGRESS NOTES
Pharmacy Note  ED Culture Follow-up    Rahel Lopez is a 89 y.o. female.     Allergies: Hydrocodone, Penicillins, Tramadol, and Vicodin [hydrocodone-acetaminophen]     Current antimicrobials:   Reviewed patient's urine culture - culture positive for Klebsiella pneumoniae.  Patient was discharged on cephalexin, and culture is sensitive to prescribed medication.  Antibiotic prescribed at discharge is appropriate - no changes made to antibiotic regimen.    Please call with any questions. Ext. [phone]    Maria Del Rosario Stone RPH, PharmD 3:22 PM 5/20/2025

## 2025-07-26 ENCOUNTER — APPOINTMENT (OUTPATIENT)
Dept: CT IMAGING | Age: 89
DRG: 552 | End: 2025-07-26
Payer: MEDICARE

## 2025-07-26 ENCOUNTER — APPOINTMENT (OUTPATIENT)
Dept: GENERAL RADIOLOGY | Age: 89
DRG: 552 | End: 2025-07-26
Payer: MEDICARE

## 2025-07-26 ENCOUNTER — HOSPITAL ENCOUNTER (INPATIENT)
Age: 89
LOS: 6 days | Discharge: OTHER INSTITUTION | DRG: 552 | End: 2025-08-01
Attending: EMERGENCY MEDICINE | Admitting: INTERNAL MEDICINE
Payer: MEDICARE

## 2025-07-26 DIAGNOSIS — S32.010A COMPRESSION FRACTURE OF L1 VERTEBRA, INITIAL ENCOUNTER (HCC): Primary | ICD-10-CM

## 2025-07-26 PROBLEM — S32.000A LUMBAR COMPRESSION FRACTURE, CLOSED, INITIAL ENCOUNTER (HCC): Status: ACTIVE | Noted: 2025-07-26

## 2025-07-26 LAB
ALBUMIN SERPL BCG-MCNC: 3.6 G/DL (ref 3.4–4.9)
ALP SERPL-CCNC: 117 U/L (ref 38–126)
ALT SERPL W/O P-5'-P-CCNC: 12 U/L (ref 10–35)
ANION GAP SERPL CALC-SCNC: 11 MEQ/L (ref 8–16)
AST SERPL-CCNC: 27 U/L (ref 10–35)
BASOPHILS ABSOLUTE: 0.1 THOU/MM3 (ref 0–0.1)
BASOPHILS NFR BLD AUTO: 0.9 %
BILIRUB CONJ SERPL-MCNC: 0.2 MG/DL (ref 0–0.2)
BILIRUB SERPL-MCNC: 0.6 MG/DL (ref 0.3–1.2)
BUN SERPL-MCNC: 9 MG/DL (ref 8–23)
CALCIUM SERPL-MCNC: 8.8 MG/DL (ref 8.2–9.6)
CHLORIDE SERPL-SCNC: 99 MEQ/L (ref 98–111)
CO2 SERPL-SCNC: 24 MEQ/L (ref 22–29)
CREAT SERPL-MCNC: 0.5 MG/DL (ref 0.5–0.9)
DEPRECATED RDW RBC AUTO: 43.8 FL (ref 35–45)
EKG ATRIAL RATE: 78 BPM
EKG P AXIS: 45 DEGREES
EKG P-R INTERVAL: 194 MS
EKG Q-T INTERVAL: 410 MS
EKG QRS DURATION: 86 MS
EKG QTC CALCULATION (BAZETT): 467 MS
EKG R AXIS: 17 DEGREES
EKG T AXIS: 39 DEGREES
EKG VENTRICULAR RATE: 78 BPM
EOSINOPHIL NFR BLD AUTO: 3.3 %
EOSINOPHILS ABSOLUTE: 0.3 THOU/MM3 (ref 0–0.4)
ERYTHROCYTE [DISTWIDTH] IN BLOOD BY AUTOMATED COUNT: 13.1 % (ref 11.5–14.5)
GFR SERPL CREATININE-BSD FRML MDRD: 87 ML/MIN/1.73M2
GLUCOSE SERPL-MCNC: 108 MG/DL (ref 74–109)
HCT VFR BLD AUTO: 39.1 % (ref 37–47)
HGB BLD-MCNC: 13 GM/DL (ref 12–16)
IMM GRANULOCYTES # BLD AUTO: 0.03 THOU/MM3 (ref 0–0.07)
IMM GRANULOCYTES NFR BLD AUTO: 0.3 %
LYMPHOCYTES ABSOLUTE: 0.8 THOU/MM3 (ref 1–4.8)
LYMPHOCYTES NFR BLD AUTO: 8.6 %
MCH RBC QN AUTO: 30.4 PG (ref 26–33)
MCHC RBC AUTO-ENTMCNC: 33.2 GM/DL (ref 32.2–35.5)
MCV RBC AUTO: 91.4 FL (ref 81–99)
MONOCYTES ABSOLUTE: 1.3 THOU/MM3 (ref 0.4–1.3)
MONOCYTES NFR BLD AUTO: 13.3 %
NEUTROPHILS ABSOLUTE: 7.2 THOU/MM3 (ref 1.8–7.7)
NEUTROPHILS NFR BLD AUTO: 73.6 %
NRBC BLD AUTO-RTO: 0 /100 WBC
OSMOLALITY SERPL CALC.SUM OF ELEC: 267.5 MOSMOL/KG (ref 275–300)
PLATELET # BLD AUTO: 214 THOU/MM3 (ref 130–400)
PMV BLD AUTO: 10 FL (ref 9.4–12.4)
POTASSIUM SERPL-SCNC: 3.9 MEQ/L (ref 3.5–5.2)
PROT SERPL-MCNC: 6.6 G/DL (ref 6.4–8.3)
RBC # BLD AUTO: 4.28 MILL/MM3 (ref 4.2–5.4)
SODIUM SERPL-SCNC: 134 MEQ/L (ref 135–145)
TROPONIN, HIGH SENSITIVITY: 15 NG/L (ref 0–12)
WBC # BLD AUTO: 9.8 THOU/MM3 (ref 4.8–10.8)

## 2025-07-26 PROCEDURE — 6360000002 HC RX W HCPCS: Performed by: INTERNAL MEDICINE

## 2025-07-26 PROCEDURE — 93010 ELECTROCARDIOGRAM REPORT: CPT | Performed by: INTERNAL MEDICINE

## 2025-07-26 PROCEDURE — 85025 COMPLETE CBC W/AUTO DIFF WBC: CPT

## 2025-07-26 PROCEDURE — 74177 CT ABD & PELVIS W/CONTRAST: CPT

## 2025-07-26 PROCEDURE — 99285 EMERGENCY DEPT VISIT HI MDM: CPT

## 2025-07-26 PROCEDURE — 71045 X-RAY EXAM CHEST 1 VIEW: CPT

## 2025-07-26 PROCEDURE — 2500000003 HC RX 250 WO HCPCS: Performed by: INTERNAL MEDICINE

## 2025-07-26 PROCEDURE — 6360000004 HC RX CONTRAST MEDICATION

## 2025-07-26 PROCEDURE — 36415 COLL VENOUS BLD VENIPUNCTURE: CPT

## 2025-07-26 PROCEDURE — 84484 ASSAY OF TROPONIN QUANT: CPT

## 2025-07-26 PROCEDURE — 1200000000 HC SEMI PRIVATE

## 2025-07-26 PROCEDURE — 6820000001 HC L2 TRAUMA SURGERY EVALUATION: Performed by: ORTHOPAEDIC SURGERY

## 2025-07-26 PROCEDURE — 80053 COMPREHEN METABOLIC PANEL: CPT

## 2025-07-26 PROCEDURE — 76376 3D RENDER W/INTRP POSTPROCES: CPT

## 2025-07-26 PROCEDURE — 6370000000 HC RX 637 (ALT 250 FOR IP): Performed by: INTERNAL MEDICINE

## 2025-07-26 PROCEDURE — 93005 ELECTROCARDIOGRAM TRACING: CPT | Performed by: EMERGENCY MEDICINE

## 2025-07-26 PROCEDURE — 6360000002 HC RX W HCPCS: Performed by: EMERGENCY MEDICINE

## 2025-07-26 PROCEDURE — 96374 THER/PROPH/DIAG INJ IV PUSH: CPT

## 2025-07-26 PROCEDURE — 82248 BILIRUBIN DIRECT: CPT

## 2025-07-26 RX ORDER — ENOXAPARIN SODIUM 100 MG/ML
40 INJECTION SUBCUTANEOUS DAILY
Status: DISCONTINUED | OUTPATIENT
Start: 2025-07-26 | End: 2025-08-01 | Stop reason: HOSPADM

## 2025-07-26 RX ORDER — POLYETHYLENE GLYCOL 3350 17 G/17G
17 POWDER, FOR SOLUTION ORAL DAILY
Status: DISCONTINUED | OUTPATIENT
Start: 2025-07-27 | End: 2025-07-26 | Stop reason: RX

## 2025-07-26 RX ORDER — POLYETHYLENE GLYCOL 3350 17 G/17G
17 POWDER, FOR SOLUTION ORAL DAILY PRN
Status: DISCONTINUED | OUTPATIENT
Start: 2025-07-26 | End: 2025-08-01 | Stop reason: HOSPADM

## 2025-07-26 RX ORDER — BUPROPION HYDROCHLORIDE 150 MG/1
150 TABLET, EXTENDED RELEASE ORAL DAILY
Status: DISCONTINUED | OUTPATIENT
Start: 2025-07-27 | End: 2025-08-01 | Stop reason: HOSPADM

## 2025-07-26 RX ORDER — IOPAMIDOL 755 MG/ML
80 INJECTION, SOLUTION INTRAVASCULAR
Status: COMPLETED | OUTPATIENT
Start: 2025-07-26 | End: 2025-07-26

## 2025-07-26 RX ORDER — ACETAMINOPHEN 650 MG/1
650 SUPPOSITORY RECTAL EVERY 6 HOURS PRN
Status: DISCONTINUED | OUTPATIENT
Start: 2025-07-26 | End: 2025-08-01 | Stop reason: HOSPADM

## 2025-07-26 RX ORDER — TRAMADOL HYDROCHLORIDE 50 MG/1
50 TABLET ORAL EVERY 6 HOURS PRN
Status: DISCONTINUED | OUTPATIENT
Start: 2025-07-26 | End: 2025-07-26

## 2025-07-26 RX ORDER — ATORVASTATIN CALCIUM 10 MG/1
10 TABLET, FILM COATED ORAL NIGHTLY
Status: DISCONTINUED | OUTPATIENT
Start: 2025-07-26 | End: 2025-08-01 | Stop reason: HOSPADM

## 2025-07-26 RX ORDER — OXYCODONE HYDROCHLORIDE 5 MG/1
5 TABLET ORAL EVERY 4 HOURS PRN
Status: DISCONTINUED | OUTPATIENT
Start: 2025-07-26 | End: 2025-07-26

## 2025-07-26 RX ORDER — ONDANSETRON 4 MG/1
4 TABLET, ORALLY DISINTEGRATING ORAL EVERY 8 HOURS PRN
Status: DISCONTINUED | OUTPATIENT
Start: 2025-07-26 | End: 2025-08-01 | Stop reason: HOSPADM

## 2025-07-26 RX ORDER — AMLODIPINE BESYLATE 5 MG/1
5 TABLET ORAL DAILY
Status: DISCONTINUED | OUTPATIENT
Start: 2025-07-27 | End: 2025-08-01 | Stop reason: HOSPADM

## 2025-07-26 RX ORDER — POLYETHYLENE GLYCOL 3350 17 G/17G
17 POWDER, FOR SOLUTION ORAL DAILY
Status: DISCONTINUED | OUTPATIENT
Start: 2025-07-27 | End: 2025-08-01 | Stop reason: HOSPADM

## 2025-07-26 RX ORDER — MAGNESIUM SULFATE IN WATER 40 MG/ML
2000 INJECTION, SOLUTION INTRAVENOUS PRN
Status: DISCONTINUED | OUTPATIENT
Start: 2025-07-26 | End: 2025-08-01 | Stop reason: HOSPADM

## 2025-07-26 RX ORDER — SENNOSIDES 8.6 MG/1
1 TABLET ORAL DAILY
Status: DISCONTINUED | OUTPATIENT
Start: 2025-07-27 | End: 2025-08-01 | Stop reason: HOSPADM

## 2025-07-26 RX ORDER — ACETAMINOPHEN 325 MG/1
650 TABLET ORAL EVERY 6 HOURS PRN
Status: DISCONTINUED | OUTPATIENT
Start: 2025-07-26 | End: 2025-08-01 | Stop reason: HOSPADM

## 2025-07-26 RX ORDER — MORPHINE SULFATE 2 MG/ML
2 INJECTION, SOLUTION INTRAMUSCULAR; INTRAVENOUS ONCE
Refills: 0 | Status: COMPLETED | OUTPATIENT
Start: 2025-07-26 | End: 2025-07-26

## 2025-07-26 RX ORDER — POTASSIUM CHLORIDE 7.45 MG/ML
10 INJECTION INTRAVENOUS PRN
Status: DISCONTINUED | OUTPATIENT
Start: 2025-07-26 | End: 2025-08-01 | Stop reason: HOSPADM

## 2025-07-26 RX ORDER — METOPROLOL TARTRATE 50 MG
50 TABLET ORAL 2 TIMES DAILY
Status: DISCONTINUED | OUTPATIENT
Start: 2025-07-26 | End: 2025-08-01 | Stop reason: HOSPADM

## 2025-07-26 RX ORDER — OXYCODONE HYDROCHLORIDE 5 MG/1
2.5 TABLET ORAL EVERY 4 HOURS PRN
Status: DISCONTINUED | OUTPATIENT
Start: 2025-07-26 | End: 2025-07-30

## 2025-07-26 RX ORDER — CALCIUM CARBONATE 500 MG/1
1 TABLET, CHEWABLE ORAL DAILY
Status: DISCONTINUED | OUTPATIENT
Start: 2025-07-27 | End: 2025-08-01 | Stop reason: HOSPADM

## 2025-07-26 RX ORDER — ONDANSETRON 2 MG/ML
4 INJECTION INTRAMUSCULAR; INTRAVENOUS EVERY 6 HOURS PRN
Status: DISCONTINUED | OUTPATIENT
Start: 2025-07-26 | End: 2025-08-01 | Stop reason: HOSPADM

## 2025-07-26 RX ORDER — SODIUM CHLORIDE 9 MG/ML
INJECTION, SOLUTION INTRAVENOUS PRN
Status: DISCONTINUED | OUTPATIENT
Start: 2025-07-26 | End: 2025-08-01 | Stop reason: HOSPADM

## 2025-07-26 RX ORDER — BUPROPION HYDROCHLORIDE 75 MG/1
75 TABLET ORAL NIGHTLY
COMMUNITY

## 2025-07-26 RX ORDER — POTASSIUM CHLORIDE 1500 MG/1
40 TABLET, EXTENDED RELEASE ORAL PRN
Status: DISCONTINUED | OUTPATIENT
Start: 2025-07-26 | End: 2025-08-01 | Stop reason: HOSPADM

## 2025-07-26 RX ORDER — VITAMIN B COMPLEX
2000 TABLET ORAL DAILY
Status: DISCONTINUED | OUTPATIENT
Start: 2025-07-27 | End: 2025-08-01 | Stop reason: HOSPADM

## 2025-07-26 RX ORDER — SODIUM CHLORIDE 0.9 % (FLUSH) 0.9 %
5-40 SYRINGE (ML) INJECTION PRN
Status: DISCONTINUED | OUTPATIENT
Start: 2025-07-26 | End: 2025-08-01 | Stop reason: HOSPADM

## 2025-07-26 RX ORDER — OXYCODONE HYDROCHLORIDE 5 MG/1
5 TABLET ORAL EVERY 4 HOURS PRN
Status: DISCONTINUED | OUTPATIENT
Start: 2025-07-26 | End: 2025-07-30

## 2025-07-26 RX ORDER — PANTOPRAZOLE SODIUM 40 MG/1
40 TABLET, DELAYED RELEASE ORAL
Status: DISCONTINUED | OUTPATIENT
Start: 2025-07-27 | End: 2025-08-01 | Stop reason: HOSPADM

## 2025-07-26 RX ORDER — SODIUM CHLORIDE 0.9 % (FLUSH) 0.9 %
5-40 SYRINGE (ML) INJECTION EVERY 12 HOURS SCHEDULED
Status: DISCONTINUED | OUTPATIENT
Start: 2025-07-26 | End: 2025-08-01 | Stop reason: HOSPADM

## 2025-07-26 RX ADMIN — MORPHINE SULFATE 2 MG: 2 INJECTION, SOLUTION INTRAMUSCULAR; INTRAVENOUS at 11:07

## 2025-07-26 RX ADMIN — METOPROLOL TARTRATE 50 MG: 50 TABLET, FILM COATED ORAL at 20:21

## 2025-07-26 RX ADMIN — ENOXAPARIN SODIUM 40 MG: 100 INJECTION SUBCUTANEOUS at 20:21

## 2025-07-26 RX ADMIN — ACETAMINOPHEN 650 MG: 325 TABLET ORAL at 23:03

## 2025-07-26 RX ADMIN — ATORVASTATIN CALCIUM 10 MG: 10 TABLET, FILM COATED ORAL at 20:21

## 2025-07-26 RX ADMIN — IOPAMIDOL 80 ML: 755 INJECTION, SOLUTION INTRAVENOUS at 12:22

## 2025-07-26 RX ADMIN — SODIUM CHLORIDE, PRESERVATIVE FREE 10 ML: 5 INJECTION INTRAVENOUS at 20:16

## 2025-07-26 ASSESSMENT — PAIN DESCRIPTION - DESCRIPTORS: DESCRIPTORS: ACHING

## 2025-07-26 ASSESSMENT — PAIN DESCRIPTION - ORIENTATION: ORIENTATION: MID;LOWER

## 2025-07-26 ASSESSMENT — PAIN DESCRIPTION - LOCATION: LOCATION: BACK

## 2025-07-26 ASSESSMENT — PAIN - FUNCTIONAL ASSESSMENT
PAIN_FUNCTIONAL_ASSESSMENT: NONE - DENIES PAIN

## 2025-07-26 ASSESSMENT — PAIN SCALES - GENERAL: PAINLEVEL_OUTOF10: 4

## 2025-07-26 NOTE — ED PROVIDER NOTES
PATIENT NAME: Rahel Lopez  MRN: [id number]  : 3/4/1931  JESUS: 2025    I performed a history and physical examination of the patient and discussed management with the Resident. I reviewed the Resident's note and agree with the documented findings and plan of care. Any areas of disagreement are noted on the chart. I was personally present for the key portions of any procedures and have documented in the chart those procedures where I was not present during the key portions. I have reviewed the emergency nurses triage note and agree with the chief complaint, past medical history, past surgical history, allergies, medications, social and family history as documented unless otherwise noted below.    MEDICAL DEDISION MAKING (MDM)     Rahel Lopez is a 89 y.o. female presents with fall and back pain.     She fell 2 days at SNF after her knees gave out.     Her pain is worse from lower back.    A X-ray at [facility] done today showed L4-L5 Fx.    Exam: AVSS. Nontoxic appearing. Heart: RRR, S1 and S2. Lungs CTAB. Soft abdomen w/o tenderness. Neurologically intact. No skin rashes. Low back tenderness.     ED workup reveals L1 25% height loss compression fracture.    Patient is from assisted living, where there is no SNF available.    Admission for pain control, PT/OT evaluation and treatment is appropriate.    Discussed with and admitted by Dr. Fitzpatrick.       Vitals:    25 1340 25 1540 25 1630 25   BP: (!) 158/78 (!) 157/66 (!) 174/85 (!) 141/72   Pulse: 84 87 88 87   Resp:  15 18 18   Temp:   97.3 °F (36.3 °C) 98 °F (36.7 °C)   TempSrc:   Oral Oral   SpO2: 94% 95% 98% 96%   Weight:         Labs Reviewed   CBC WITH AUTO DIFFERENTIAL - Abnormal; Notable for the following components:       Result Value    Lymphocytes Absolute 0.8 (*)     All other components within normal limits   BASIC METABOLIC PANEL - Abnormal; Notable for the following components:    Sodium 134 (*)     All other components  mg IntraVENous Given 7/26/25 1107)   iopamidol (ISOVUE-370) 76 % injection 80 mL (80 mLs IntraVENous Given 7/26/25 1222)     FINAL IMPRESSION AND DISPOSITION      1. Compression fracture of L1 vertebra, initial encounter ([facility])        DISPOSITION Admitted 07/26/2025 02:36:57 PM   DISPOSITION CONDITION Stable     PATIENT REFERRED TO:  No follow-up provider specified.  DISCHARGE MEDICATIONS:  Current Discharge Medication List          (Please note that portions of this note were completed with a voice recognition program.  Efforts were made to edit the dictations but occasionally words aremis-transcribed.)    MD Jn Steve Jian, MD  07/26/25 [id number]

## 2025-07-26 NOTE — ED NOTES
Patient to ED with complaint of a fall a few days ago. Patient states that few days ago while ambulating with walker she tripped and fell. She states that she has no pain unless moving certain positions. She states that her pain is mainly in her low back. She states that she had outside xrays performed which revealed L4/L5 fractures. Patient is resting in bed with easy and unlabored respirations. Call light in reach. Side rails up x2. Patient denies further complaints or concerns. Will monitor.

## 2025-07-26 NOTE — ED NOTES
Patient assisted to restroom. Patient with difficulty standing and pivoting despite ED tech assist x2. Dr. Ragsdale notified.

## 2025-07-26 NOTE — ED PROVIDER NOTES
[facility] EMERGENCY DEPARTMENT  EMERGENCY DEPARTMENT ENCOUNTER          Pt Name: Rahel Lopez  MRN: [id number]  Birthdate 3/4/1931  Date of evaluation: 7/26/2025  Physician: Arlin Madsen MD  Supervising Attending Physician: Prem Ragsdale MD      CHIEF COMPLAINT       Chief Complaint   Patient presents with    Fall     Few days ago         HISTORY OF PRESENT ILLNESS    HPI  Rahel Lopez is a 89 y.o. female with past medical history of hyperlipidemia, hypertension, osteoarthritis, GERD, CVA, cataracts who presents to the emergency department from an assisted living after a fall 2 days ago.  She was walking with her walker to the bathroom when her knees gave out making her fall.  She immediately felt pain in her back however that she did not seek medical attention until 2 days after.  She denies head strike or loss of consciousness.  She has no pain at rest but shoots up to 8/10 when she moves.  This morning the patient went to an outside ED where they found L4-L5 fractures on x-rays.  This morning the patient was unable to stand up on her legs because of pain.  Otherwise the patient denies numbness tingling in the bilateral lower extremities, urinary and bowel issues, lightheadedness, dizziness, vision changes, shortness of breath.    PAST MEDICAL AND SURGICAL HISTORY     Past Medical History:   Diagnosis Date    Acute CVA (cerebrovascular accident) (HCC) 7/11/2024    Arthritis     Cataract of both eyes     GERD (gastroesophageal reflux disease)     Hyperlipidemia     Hypertension     Keratosis     benign lichemoid    Psychiatric problem        Past Surgical History:   Procedure Laterality Date    ABDOMEN SURGERY      APPENDECTOMY  1934    CHOLECYSTECTOMY  1999    COLON SURGERY  2000    resection for diverticulitis    COLONOSCOPY      DILATATION, ESOPHAGUS      ENDOSCOPY, COLON, DIAGNOSTIC      HIP SURGERY Right 9/12/2024    RIGHT HIP INTERTAN performed by Jared Burns MD at  lesion or rash.   Neurological:      General: No focal deficit present.      Mental Status: She is alert and oriented to person, place, and time.      Sensory: No sensory deficit.      Motor: No weakness.         ED RESULTS   Laboratory results (none if blank):  Labs Reviewed   CBC WITH AUTO DIFFERENTIAL - Abnormal; Notable for the following components:       Result Value    Lymphocytes Absolute 0.8 (*)     All other components within normal limits   BASIC METABOLIC PANEL - Abnormal; Notable for the following components:    Sodium 134 (*)     All other components within normal limits   TROPONIN - Abnormal; Notable for the following components:    Troponin, High Sensitivity 15 (*)     All other components within normal limits   OSMOLALITY - Abnormal; Notable for the following components:    Osmolality Calc 267.5 (*)     All other components within normal limits   HEPATIC FUNCTION PANEL   ANION GAP   GLOMERULAR FILTRATION RATE, ESTIMATED   URINALYSIS WITH REFLEX TO CULTURE     All laboratory results are individually reviewed and interpreted by me in the clinical context of this patient.  See ED course below for results interpretation if applicable.  (A negative COVID-19 test should be interpreted as COVID no longer suspected unless otherwise noted in this encounter documentation note)  (Any cultures that may have been sent were not resulted at the time of this patient ED visit)      Radiologic studies results available at the moment of this note (None if blank):  CT ABDOMEN PELVIS W IV CONTRAST Additional Contrast? None   Final Result      1. Acute fracture of the superior endplate of L1 with 25% height loss. Please   see the patient's CT lumbar spine report.   2. Cardiomegaly.   3. Crowding of the pulmonary vascularity in the lung bases, likely related to   lower lung volumes.               **This report has been created using voice recognition software. It may contain   minor errors which are inherent in voice

## 2025-07-27 LAB
25(OH)D3 SERPL-MCNC: 42 NG/ML (ref 30–100)
ANION GAP SERPL CALC-SCNC: 14 MEQ/L (ref 8–16)
BACTERIA URNS QL MICRO: ABNORMAL /HPF
BILIRUB UR QL STRIP.AUTO: NEGATIVE
BUN SERPL-MCNC: 9 MG/DL (ref 8–23)
CALCIUM SERPL-MCNC: 9 MG/DL (ref 8.2–9.6)
CASTS #/AREA URNS LPF: ABNORMAL /LPF
CASTS 2: ABNORMAL /LPF
CHARACTER UR: CLEAR
CHLORIDE SERPL-SCNC: 98 MEQ/L (ref 98–111)
CO2 SERPL-SCNC: 21 MEQ/L (ref 22–29)
COLOR, UA: YELLOW
CREAT SERPL-MCNC: 0.5 MG/DL (ref 0.5–0.9)
CRYSTALS URNS MICRO: ABNORMAL
DEPRECATED RDW RBC AUTO: 44.2 FL (ref 35–45)
EPITHELIAL CELLS, UA: ABNORMAL /HPF
ERYTHROCYTE [DISTWIDTH] IN BLOOD BY AUTOMATED COUNT: 13 % (ref 11.5–14.5)
GFR SERPL CREATININE-BSD FRML MDRD: 87 ML/MIN/1.73M2
GLUCOSE SERPL-MCNC: 98 MG/DL (ref 74–109)
GLUCOSE UR QL STRIP.AUTO: NEGATIVE MG/DL
HCT VFR BLD AUTO: 40 % (ref 37–47)
HGB BLD-MCNC: 13.2 GM/DL (ref 12–16)
HGB UR QL STRIP.AUTO: NEGATIVE
KETONES UR QL STRIP.AUTO: ABNORMAL
MCH RBC QN AUTO: 30.3 PG (ref 26–33)
MCHC RBC AUTO-ENTMCNC: 33 GM/DL (ref 32.2–35.5)
MCV RBC AUTO: 92 FL (ref 81–99)
MISCELLANEOUS 2: ABNORMAL
NITRITE UR QL STRIP: NEGATIVE
PH UR STRIP.AUTO: 6.5 [PH] (ref 5–9)
PLATELET # BLD AUTO: 215 THOU/MM3 (ref 130–400)
PMV BLD AUTO: 10.1 FL (ref 9.4–12.4)
POTASSIUM SERPL-SCNC: 3.9 MEQ/L (ref 3.5–5.2)
PROT UR STRIP.AUTO-MCNC: ABNORMAL MG/DL
RBC # BLD AUTO: 4.35 MILL/MM3 (ref 4.2–5.4)
RBC URINE: ABNORMAL /HPF
RENAL EPI CELLS #/AREA URNS HPF: ABNORMAL /[HPF]
SODIUM SERPL-SCNC: 133 MEQ/L (ref 135–145)
SP GR UR REFRACT.AUTO: 1.02 (ref 1–1.03)
TSH SERPL DL<=0.05 MIU/L-ACNC: 4.08 UIU/ML (ref 0.27–4.2)
UROBILINOGEN, URINE: 1 EU/DL (ref 0–1)
WBC # BLD AUTO: 7.6 THOU/MM3 (ref 4.8–10.8)
WBC #/AREA URNS HPF: ABNORMAL /HPF
WBC #/AREA URNS HPF: ABNORMAL /[HPF]
YEAST LIKE FUNGI URNS QL MICRO: ABNORMAL

## 2025-07-27 PROCEDURE — 36415 COLL VENOUS BLD VENIPUNCTURE: CPT

## 2025-07-27 PROCEDURE — 84443 ASSAY THYROID STIM HORMONE: CPT

## 2025-07-27 PROCEDURE — 6360000002 HC RX W HCPCS: Performed by: INTERNAL MEDICINE

## 2025-07-27 PROCEDURE — 6370000000 HC RX 637 (ALT 250 FOR IP): Performed by: INTERNAL MEDICINE

## 2025-07-27 PROCEDURE — 1200000000 HC SEMI PRIVATE

## 2025-07-27 PROCEDURE — 2500000003 HC RX 250 WO HCPCS: Performed by: INTERNAL MEDICINE

## 2025-07-27 PROCEDURE — 85027 COMPLETE CBC AUTOMATED: CPT

## 2025-07-27 PROCEDURE — 81001 URINALYSIS AUTO W/SCOPE: CPT

## 2025-07-27 PROCEDURE — 80048 BASIC METABOLIC PNL TOTAL CA: CPT

## 2025-07-27 PROCEDURE — 82306 VITAMIN D 25 HYDROXY: CPT

## 2025-07-27 RX ADMIN — METOPROLOL TARTRATE 50 MG: 50 TABLET, FILM COATED ORAL at 07:41

## 2025-07-27 RX ADMIN — METOPROLOL TARTRATE 50 MG: 50 TABLET, FILM COATED ORAL at 20:43

## 2025-07-27 RX ADMIN — BUPROPION HYDROCHLORIDE 150 MG: 150 TABLET, FILM COATED, EXTENDED RELEASE ORAL at 07:41

## 2025-07-27 RX ADMIN — SENNOSIDES 8.6 MG: 8.6 TABLET, FILM COATED ORAL at 07:41

## 2025-07-27 RX ADMIN — SODIUM CHLORIDE, PRESERVATIVE FREE 10 ML: 5 INJECTION INTRAVENOUS at 07:42

## 2025-07-27 RX ADMIN — AMLODIPINE BESYLATE 5 MG: 5 TABLET ORAL at 07:41

## 2025-07-27 RX ADMIN — ANTACID TABLETS 500 MG: 500 TABLET, CHEWABLE ORAL at 07:41

## 2025-07-27 RX ADMIN — PANTOPRAZOLE SODIUM 40 MG: 40 TABLET, DELAYED RELEASE ORAL at 05:39

## 2025-07-27 RX ADMIN — OXYCODONE 2.5 MG: 5 TABLET ORAL at 16:10

## 2025-07-27 RX ADMIN — ENOXAPARIN SODIUM 40 MG: 100 INJECTION SUBCUTANEOUS at 20:42

## 2025-07-27 RX ADMIN — ACETAMINOPHEN 650 MG: 325 TABLET ORAL at 11:37

## 2025-07-27 RX ADMIN — SODIUM CHLORIDE, PRESERVATIVE FREE 10 ML: 5 INJECTION INTRAVENOUS at 20:43

## 2025-07-27 RX ADMIN — POLYETHYLENE GLYCOL 3350 17 G: 17 POWDER, FOR SOLUTION ORAL at 07:41

## 2025-07-27 RX ADMIN — Medication 2000 UNITS: at 07:41

## 2025-07-27 RX ADMIN — ATORVASTATIN CALCIUM 10 MG: 10 TABLET, FILM COATED ORAL at 20:43

## 2025-07-27 RX ADMIN — ACETAMINOPHEN 650 MG: 325 TABLET ORAL at 17:15

## 2025-07-27 RX ADMIN — OXYCODONE 2.5 MG: 5 TABLET ORAL at 20:42

## 2025-07-27 ASSESSMENT — PAIN DESCRIPTION - DESCRIPTORS
DESCRIPTORS: ACHING
DESCRIPTORS: ACHING
DESCRIPTORS: ACHING;SORE

## 2025-07-27 ASSESSMENT — PAIN DESCRIPTION - ORIENTATION
ORIENTATION: MID
ORIENTATION: MID;LOWER
ORIENTATION: MID;LOWER

## 2025-07-27 ASSESSMENT — PAIN SCALES - GENERAL
PAINLEVEL_OUTOF10: 4
PAINLEVEL_OUTOF10: 8
PAINLEVEL_OUTOF10: 0
PAINLEVEL_OUTOF10: 6

## 2025-07-27 ASSESSMENT — PAIN - FUNCTIONAL ASSESSMENT
PAIN_FUNCTIONAL_ASSESSMENT: ACTIVITIES ARE NOT PREVENTED

## 2025-07-27 ASSESSMENT — PAIN DESCRIPTION - LOCATION
LOCATION: BACK

## 2025-07-27 ASSESSMENT — PAIN DESCRIPTION - PAIN TYPE: TYPE: ACUTE PAIN

## 2025-07-27 ASSESSMENT — PAIN DESCRIPTION - ONSET: ONSET: ON-GOING

## 2025-07-27 ASSESSMENT — PAIN DESCRIPTION - FREQUENCY: FREQUENCY: INTERMITTENT

## 2025-07-27 NOTE — CARE COORDINATION
Patient Goals/Plan/Treatment Preferences: Met with Rahel and Son. Patient is from [facility]. Patient has a walker . Per Son, patient may benefit from Acute Rehab.   If patient is discharged prior to next notation, then this note serves as note for discharge by case management.             07/27/25 1051   Service Assessment   Patient Orientation Alert and Oriented   Cognition Other (see comment)  (Confusion)   History Provided By Child/Family   Primary Caregiver Other (Comment)  (AL Staff)   Accompanied By/Relationship Son   Support Systems Children;Family Members   Patient's Healthcare Decision Maker is: Legal Next of Kin   PCP Verified by CM Yes   Last Visit to PCP Within last 3 months   Prior Functional Level Assistance with the following:;Mobility;Bathing;Dressing;Housework;Shopping;Toileting;Cooking   Current Functional Level Assistance with the following:;Bathing;Dressing;Toileting;Cooking;Housework;Shopping;Mobility   Can patient return to prior living arrangement Yes   Ability to make needs known: Good   Family able to assist with home care needs: Yes  (Son at bedside)   Would you like for me to discuss the discharge plan with any other family members/significant others, and if so, who? No   Financial Resources Medicare;Other (Comment)  ([place])   Community Resources Assisted Living   Discharge Planning   Type of Residence Assisted living   Living Arrangements Alone   Current Services Prior To Admission Other (Comment)  ([facility] - [facility])   Potential Assistance Needed Other (Comment)  (Acute Rehab)   DME Ordered? No   Potential Assistance Purchasing Medications No   Type of Home Care Services None   Patient expects to be discharged to: Assisted living   Condition of Participation: Discharge Planning   The Plan for Transition of Care is related to the following treatment goals: Lumbar Fracture Treatment

## 2025-07-27 NOTE — PLAN OF CARE
Problem: Chronic Conditions and Co-morbidities  Goal: Patient's chronic conditions and co-morbidity symptoms are monitored and maintained or improved  Outcome: Progressing  Flowsheets (Taken 7/27/2025 0842)  Care Plan - Patient's Chronic Conditions and Co-Morbidity Symptoms are Monitored and Maintained or Improved:   Monitor and assess patient's chronic conditions and comorbid symptoms for stability, deterioration, or improvement   Collaborate with multidisciplinary team to address chronic and comorbid conditions and prevent exacerbation or deterioration     Problem: Discharge Planning  Goal: Discharge to home or other facility with appropriate resources  Outcome: Progressing  Flowsheets (Taken 7/27/2025 0842)  Discharge to home or other facility with appropriate resources:   Identify barriers to discharge with patient and caregiver   Arrange for needed discharge resources and transportation as appropriate   Identify discharge learning needs (meds, wound care, etc)     Problem: Safety - Adult  Goal: Free from fall injury  Outcome: Progressing  Flowsheets (Taken 7/27/2025 0842)  Free From Fall Injury:   Instruct family/caregiver on patient safety   Based on caregiver fall risk screen, instruct family/caregiver to ask for assistance with transferring infant if caregiver noted to have fall risk factors     Problem: ABCDS Injury Assessment  Goal: Absence of physical injury  Outcome: Progressing  Flowsheets (Taken 7/27/2025 0842)  Absence of Physical Injury: Implement safety measures based on patient assessment     Problem: Pain  Goal: Verbalizes/displays adequate comfort level or baseline comfort level  Outcome: Progressing  Flowsheets (Taken 7/27/2025 0842)  Verbalizes/displays adequate comfort level or baseline comfort level:   Encourage patient to monitor pain and request assistance   Assess pain using appropriate pain scale     Problem: Skin/Tissue Integrity  Goal: Skin integrity remains intact  Description: 1.

## 2025-07-27 NOTE — H&P
Internal Medicine  History and Physical    Patient:  Rahel Lopez  MRN: [id number]      History Obtained From:  patient  PCP: Herminia Lopez MD    CHIEF COMPLAINT: Back pain, fall    HISTORY OF PRESENT ILLNESS:   The patient is a 89 y.o. female who presents with back pain following a fall at the nursing home.  The falll happened 3 days ago.  Patient subsequently developed severe pain in the lower back.  CT scan of the lumbar spine showed a new fracture in the lumbar #1 vertebrae.  Patient was seen in the emergency room evaluated and admitted for pain control and further care /orthospine consult.    Past Medical History:        Diagnosis Date    Acute CVA (cerebrovascular accident) ([facility]) 7/11/2024    Arthritis     Cataract of both eyes     GERD (gastroesophageal reflux disease)     Hyperlipidemia     Hypertension     Keratosis     benign lichemoid    Psychiatric problem        Past Surgical History:        Procedure Laterality Date    ABDOMEN SURGERY      APPENDECTOMY  1934    CHOLECYSTECTOMY  1999    COLON SURGERY  2000    resection for diverticulitis    COLONOSCOPY      DILATATION, ESOPHAGUS      ENDOSCOPY, COLON, DIAGNOSTIC      HIP SURGERY Right 9/12/2024    RIGHT HIP INTERTAN performed by Jared Burns MD at [facility] OR    HYSTERECTOMY (CERVIX STATUS UNKNOWN)  1979    JOINT REPLACEMENT Right     right knee    KNEE ARTHROPLASTY Right 02/27/2021    RIGHT TOTAL KNEE REVISION WITH OSS performed by Jared Burns MD at [facility] OR    KNEE SURGERY  2010    ROTATOR CUFF REPAIR Left 2006       Medications Prior to Admission:    Prior to Admission medications    Medication Sig Start Date End Date Taking? Authorizing Provider   buPROPion (WELLBUTRIN) 75 MG tablet Take 1 tablet by mouth at bedtime   Yes Elvis Mcintosh MD   vitamin D (VITAMIN D3) 25 MCG (1000 UT) CAPS Take by mouth daily   Yes Elvis Mcintosh MD   buPROPion (WELLBUTRIN SR) 150 MG extended release tablet Take 1 tablet by mouth daily   Yes  foraminal  stenosis.   There are no suspicious findings in the visualized aspects of the  retroperitoneum and paraspinal soft tissues.   Impression   1. Acute fracture of the superior endplate of L1 with 25% height loss. Please  see the patient's CT lumbar spine report.  2. Cardiomegaly.  3. Crowding of the pulmonary vascularity in the lung bases, likely related to  lower lung volumes.         Order Status: Completed     Ventricular Rate 78 BPM    Atrial Rate 78 BPM    P-R Interval 194 ms    QRS Duration 86 ms    Q-T Interval 410 ms    QTc Calculation (Bazett) 467 ms    P Axis 45 degrees    R Axis 17 degrees    T Axis 39 degrees   Narrative:     Normal sinus rhythm  Normal ECG       Assessment and Plan   Acute fracture of the superior endplate of L1 with 25% height loss.   Multilevel degenerative arthritis lumbar spine.  Multilevel lumbar spinal stenosis.  Moderate to severe L4-5 moderate L3-4 mild at L1, mild - Mod L2-L3  History of CVA with generalized weakness-mild  Hypertension  Mild hyponatremia    Patient is Admitted.    Mild analgesics  Lumbar brace support.  PT OT  DVT prophylaxis  SS PT OTe DVT prophylaxis  Am labs    Patient Active Problem List   Diagnosis Code    Hypertension I10    Closed displaced fracture of medial condyle of right femur ([facility]) S72.431A    Closed comminuted supracondylar fracture of right femur ([facility]) S72.451A    Displaced fracture of medial condyle of right femur, sequela S72.431S    Reactive depression F32.9    Closed fracture of base of fifth metacarpal bone of left hand S62.317A    History of cataract Z86.69    Dysarthria R47.1    Acute CVA (cerebrovascular accident) ([facility]) I63.9    Acute cerebrovascular accident (CVA) ([facility]) I63.9    Hyponatremia E87.1    Closed right hip fracture, initial encounter ([facility]) S72.001A    Lumbar compression fracture, closed, initial encounter ([facility]) S32.000A       Cruz Fitzpatrick MD, MD  Admitting Internist

## 2025-07-28 LAB
ANION GAP SERPL CALC-SCNC: 13 MEQ/L (ref 8–16)
BUN SERPL-MCNC: 10 MG/DL (ref 8–23)
CALCIUM SERPL-MCNC: 9.1 MG/DL (ref 8.2–9.6)
CHLORIDE SERPL-SCNC: 98 MEQ/L (ref 98–111)
CO2 SERPL-SCNC: 24 MEQ/L (ref 22–29)
CREAT SERPL-MCNC: 0.5 MG/DL (ref 0.5–0.9)
DEPRECATED RDW RBC AUTO: 44.5 FL (ref 35–45)
ERYTHROCYTE [DISTWIDTH] IN BLOOD BY AUTOMATED COUNT: 12.9 % (ref 11.5–14.5)
GFR SERPL CREATININE-BSD FRML MDRD: 87 ML/MIN/1.73M2
GLUCOSE SERPL-MCNC: 101 MG/DL (ref 74–109)
HCT VFR BLD AUTO: 41.4 % (ref 37–47)
HGB BLD-MCNC: 13.4 GM/DL (ref 12–16)
MCH RBC QN AUTO: 30.3 PG (ref 26–33)
MCHC RBC AUTO-ENTMCNC: 32.4 GM/DL (ref 32.2–35.5)
MCV RBC AUTO: 93.7 FL (ref 81–99)
PLATELET # BLD AUTO: 223 THOU/MM3 (ref 130–400)
PMV BLD AUTO: 10.3 FL (ref 9.4–12.4)
POTASSIUM SERPL-SCNC: 4.2 MEQ/L (ref 3.5–5.2)
RBC # BLD AUTO: 4.42 MILL/MM3 (ref 4.2–5.4)
SODIUM SERPL-SCNC: 135 MEQ/L (ref 135–145)
WBC # BLD AUTO: 7.3 THOU/MM3 (ref 4.8–10.8)

## 2025-07-28 PROCEDURE — 6370000000 HC RX 637 (ALT 250 FOR IP): Performed by: INTERNAL MEDICINE

## 2025-07-28 PROCEDURE — 97116 GAIT TRAINING THERAPY: CPT

## 2025-07-28 PROCEDURE — 6360000002 HC RX W HCPCS: Performed by: INTERNAL MEDICINE

## 2025-07-28 PROCEDURE — 1200000000 HC SEMI PRIVATE

## 2025-07-28 PROCEDURE — 36415 COLL VENOUS BLD VENIPUNCTURE: CPT

## 2025-07-28 PROCEDURE — 97162 PT EVAL MOD COMPLEX 30 MIN: CPT

## 2025-07-28 PROCEDURE — 99222 1ST HOSP IP/OBS MODERATE 55: CPT | Performed by: NURSE PRACTITIONER

## 2025-07-28 PROCEDURE — 85027 COMPLETE CBC AUTOMATED: CPT

## 2025-07-28 PROCEDURE — 80048 BASIC METABOLIC PNL TOTAL CA: CPT

## 2025-07-28 PROCEDURE — 2500000003 HC RX 250 WO HCPCS: Performed by: INTERNAL MEDICINE

## 2025-07-28 RX ADMIN — METOPROLOL TARTRATE 50 MG: 50 TABLET, FILM COATED ORAL at 21:20

## 2025-07-28 RX ADMIN — SODIUM CHLORIDE, PRESERVATIVE FREE 10 ML: 5 INJECTION INTRAVENOUS at 21:21

## 2025-07-28 RX ADMIN — METOPROLOL TARTRATE 50 MG: 50 TABLET, FILM COATED ORAL at 08:38

## 2025-07-28 RX ADMIN — AMLODIPINE BESYLATE 5 MG: 5 TABLET ORAL at 08:38

## 2025-07-28 RX ADMIN — Medication 2000 UNITS: at 08:38

## 2025-07-28 RX ADMIN — SODIUM CHLORIDE, PRESERVATIVE FREE 10 ML: 5 INJECTION INTRAVENOUS at 08:39

## 2025-07-28 RX ADMIN — OXYCODONE 5 MG: 5 TABLET ORAL at 05:04

## 2025-07-28 RX ADMIN — PANTOPRAZOLE SODIUM 40 MG: 40 TABLET, DELAYED RELEASE ORAL at 05:03

## 2025-07-28 RX ADMIN — BUPROPION HYDROCHLORIDE 150 MG: 150 TABLET, FILM COATED, EXTENDED RELEASE ORAL at 08:38

## 2025-07-28 RX ADMIN — ATORVASTATIN CALCIUM 10 MG: 10 TABLET, FILM COATED ORAL at 21:20

## 2025-07-28 RX ADMIN — OXYCODONE 5 MG: 5 TABLET ORAL at 08:38

## 2025-07-28 RX ADMIN — SENNOSIDES 8.6 MG: 8.6 TABLET, FILM COATED ORAL at 08:38

## 2025-07-28 RX ADMIN — ENOXAPARIN SODIUM 40 MG: 100 INJECTION SUBCUTANEOUS at 21:20

## 2025-07-28 RX ADMIN — ANTACID TABLETS 500 MG: 500 TABLET, CHEWABLE ORAL at 08:38

## 2025-07-28 RX ADMIN — POLYETHYLENE GLYCOL 3350 17 G: 17 POWDER, FOR SOLUTION ORAL at 08:39

## 2025-07-28 RX ADMIN — OXYCODONE 5 MG: 5 TABLET ORAL at 16:40

## 2025-07-28 ASSESSMENT — PAIN DESCRIPTION - PAIN TYPE
TYPE: ACUTE PAIN
TYPE: ACUTE PAIN

## 2025-07-28 ASSESSMENT — PAIN DESCRIPTION - LOCATION
LOCATION: BACK

## 2025-07-28 ASSESSMENT — PAIN DESCRIPTION - ORIENTATION
ORIENTATION: LOWER
ORIENTATION: MID;LOWER
ORIENTATION: LOWER

## 2025-07-28 ASSESSMENT — PAIN - FUNCTIONAL ASSESSMENT
PAIN_FUNCTIONAL_ASSESSMENT: PREVENTS OR INTERFERES SOME ACTIVE ACTIVITIES AND ADLS
PAIN_FUNCTIONAL_ASSESSMENT: ACTIVITIES ARE NOT PREVENTED
PAIN_FUNCTIONAL_ASSESSMENT: ACTIVITIES ARE NOT PREVENTED

## 2025-07-28 ASSESSMENT — PAIN DESCRIPTION - DESCRIPTORS
DESCRIPTORS: SHARP;SHOOTING
DESCRIPTORS: SHARP
DESCRIPTORS: DISCOMFORT;ACHING;DULL

## 2025-07-28 ASSESSMENT — PAIN DESCRIPTION - FREQUENCY
FREQUENCY: INTERMITTENT
FREQUENCY: INTERMITTENT

## 2025-07-28 ASSESSMENT — PAIN SCALES - GENERAL
PAINLEVEL_OUTOF10: 7
PAINLEVEL_OUTOF10: 8
PAINLEVEL_OUTOF10: 2
PAINLEVEL_OUTOF10: 10

## 2025-07-28 ASSESSMENT — PAIN DESCRIPTION - ONSET
ONSET: ON-GOING
ONSET: ON-GOING

## 2025-07-28 NOTE — CONSULTS
Physical Medicine & Rehabilitation   Consult Note      Admitting Physician: Cruz Fitzpatrick MD    Primary Care Provider: Herminia Lopez MD     Reason for Consult:  fall. Rehab needs    History of Present Illness:  Rahel Lopez is a 89 y.o. female admitted to [facility] on 7/26/2025. Patient  has a past medical history of Acute CVA (cerebrovascular accident) ([facility]), Arthritis, Cataract of both eyes, GERD (gastroesophageal reflux disease), Hyperlipidemia, Hypertension, Keratosis, and Psychiatric problem.  Patient presented to [facility] ER after suffering a fall at assisted living.  Patient was reportedly getting up into a small space without a walker in order to lower the blinds.  Patient suffered a fall onto her bottom.  An x-ray was completed at AL, questionable of a L4-5 fracture.  CT of the lumbar spine abdomen and pelvis revealed a L1 25% compression fracture.  Patient was admitted to the hospital.    7/28/2025: Patient was unable to work with therapy this morning due to new back brace.  Back brace is now available.  She reports getting up from the bed to the chair with assistance.  She states \"it hurt \".  Upon evaluation, patient does not have any pain with palpation through the thoracic or lumbar spine.  Patient with some pain with palpation in the sacral area.  CT abdomen pelvis report reviewed without any fractures noted per dictation.  Discussed with patient and daughter about IPR qualifications, even without patient's therapy evaluations completed yet, she does not meet the medical complexity or the diagnostic criteria that would qualify her for an IPR stay.  Consideration for home health at assisted living versus SNF depending on therapy needs.  Daughter understanding.  They deny any questions or concerns at this time    Current Rehabilitation Assessments:  PT:  await eval   OT:  await eval  ST:  not applicable    Past Medical History:        Diagnosis Date    Acute CVA (cerebrovascular

## 2025-07-28 NOTE — PROGRESS NOTES
[facility]  OCCUPATIONAL THERAPY MISSED TREATMENT NOTE  [facility] 7K  7K-09/009-A      Date: 2025  Patient Name: Rahel Lopez        CSN: [id number]   : 3/4/1931  (89 y.o.)  Gender: female                REASON FOR MISSED TREATMENT: OT will hold until brace arrives and spine consulted for POC. OT will check as time allows.

## 2025-07-28 NOTE — PROGRESS NOTES
Patient received sacramental anointing by a . If you are in need of  support, please call [phone]. If you are in need of a  after 6:30pm, please call the house supervisor for the on-call .     Rev. Jose Elias Marvin MDiv, [facility]  Director of Spiritual Health  O: [phone]    To reach a  call [phone]

## 2025-07-28 NOTE — CARE COORDINATION
07/28/25 1404   Service Assessment   Patient Orientation Alert and Oriented   Cognition Alert   History Provided By Child/Family   Primary Caregiver Other (Comment)  (assisted living)   Support Systems Family Members   Patient's Healthcare Decision Maker is: Legal Next of Kin   PCP Verified by CM Yes   Last Visit to PCP Within last 6 months   Prior Functional Level Assistance with the following:;Shopping;Housework;Cooking   Current Functional Level Assistance with the following:;Mobility;Shopping;Housework;Cooking   Can patient return to prior living arrangement Unknown at present   Ability to make needs known: Good   Family able to assist with home care needs: Yes   Would you like for me to discuss the discharge plan with any other family members/significant others, and if so, who? No   Financial Resources Medicare   Community Resources Assisted Living   CM/SW Referral Other (see comment)  (assisted living)   Social/Functional History   Lives With Other (Comment)  (assisted living)   Discharge Planning   Type of Residence Assisted living   Living Arrangements Other (Comment)  (assisted living)   Current Services Prior To Admission Other (Comment)  (assisted living)   Potential Assistance Needed Skilled Nursing Facility   DME Ordered? No   Potential Assistance Purchasing Medications No   Type of Home Care Services None   Patient expects to be discharged to: Skilled nursing facility   Services At/After Discharge   Transition of Care Consult (CM Consult) SNF   Services At/After Discharge Skilled Nursing Facility (SNF)    Resource Information Provided? No   Mode of Transport at Discharge Other (see comment)  (family)   Confirm Follow Up Transport Family   Condition of Participation: Discharge Planning   The Patient and/or Patient Representative was provided with a Choice of Provider? Patient Representative   Name of the Patient Representative who was provided with the Choice of Provider and agrees with the

## 2025-07-28 NOTE — PROGRESS NOTES
[facility]  INPATIENT PHYSICAL THERAPY  EVALUATION  [facility] ORTHOPEDICS 7K - 7K-09/009-A    Discharge Recommendations: Continue to assess pending progress, 24 hour supervision or assist, Patient would benefit from continued therapy after discharge  Equipment Recommendations: No               Time In: 1351  Time Out: 1407  Timed Code Treatment Minutes: 8 Minutes  Minutes: 16          Date: 2025  Patient Name: Rahel Lopez,  Gender:  female        MRN: [id number]  : 3/4/1931  (89 y.o.)      Referring Practitioner: Dr. ERIC Fitzpatrick  Diagnosis: Lumbar compression fracture, closed, initial encounter ([facility])  Additional Pertinent Hx: History of Present Illness:  Rahel Lopez is a 89 y.o. female admitted to [facility] on 2025. Patient  has a past medical history of Acute CVA (cerebrovascular accident) ([facility]), Arthritis, Cataract of both eyes, GERD (gastroesophageal reflux disease), Hyperlipidemia, Hypertension, Keratosis, and Psychiatric problem.  Patient presented to [facility] ER after suffering a fall at assisted living.  Patient was reportedly getting up into a small space without a walker in order to lower the blinds.  Patient suffered a fall onto her bottom.  An x-ray was completed at AL, questionable of a L4-5 fracture.  CT of the lumbar spine abdomen and pelvis revealed a L1 25% compression fracture.  Patient was admitted to the hospital.     Restrictions/Precautions:  Restrictions/Precautions: Fall Risk       Spinal Precautions: No Bending/Lifting/Twisting (BLT)    Required Braces or Orthoses?: Yes  Spinal: Lumbar Corset      Subjective:  Chart Reviewed: Yes  Patient assessed for rehabilitation services?: Yes  Subjective: pt up to chair upon arrival, pt agreeable for amb and requesting back to bed. dtr present and supportive. with mobility pt stated has had room spinning dizziness with positional changes in last 1-2 weeks. pt unable to tolerate artery screening so PT sent perfect serve

## 2025-07-28 NOTE — PROGRESS NOTES
[facility]  INPATIENT REHABILITATION  ADMISSIONS COORDINATOR CONSULT    Referral Type: internal    Patient Name: Rahel Lopez      MRN: [id number]    : 3/4/1931  (89 y.o.)  Gender: female   Race:White (non-)     Payor Source: Payor: MEDICARE / Plan: MEDICARE PART A AND B / Product Type: *No Product type* /   Secondary Payor Source:  [facility]    Isolation Status: No active isolations                  Disciplines Required upon Admission to Inpatient Rehabilitation: Physical Therapy and Occupational Therapy  Post operative: No  Fall: Yes  Dialysis: No  Diet: ADULT DIET; Regular  Discussed patient with  and PM&R provider: Await completion of therapy evaluations for further determination of patient needs.

## 2025-07-28 NOTE — PROGRESS NOTES
Patient is not a candidate for IPR.  PM&R provider recommending home with home health at assisted living vs. SNF pending therapy evaluations.

## 2025-07-28 NOTE — PROGRESS NOTES
[facility]  PHYSICAL THERAPY MISSED TREATMENT NOTE  [facility] 7K    Date: 2025  Patient Name: Rahel Lopez        MRN: [id number]   : 3/4/1931  (89 y.o.)  Gender: female                REASON FOR MISSED TREATMENT:  Missed Treat.      Pt back brace delivered but per RN, hospitalist is ordering orthospine consult. Will await consult and activity recommendations for PT eval.

## 2025-07-28 NOTE — PROGRESS NOTES
INTERNAL MEDICINE Progress Note  7/28/2025 6:01 PM  Subjective:   Admit Date: 7/26/2025  PCP: Herminia Lopez MD  Interval History: Reports severe lower back pain.  Uses a lumbar brace.    Objective:   Vitals: /74   Pulse 84   Temp 97.2 °F (36.2 °C) (Oral)   Resp 16   Wt 54.4 kg (120 lb)   SpO2 99%   BMI 21.95 kg/m²   General appearance: alert and cooperative with exam  HEENT: Head: atraumatic  Neck: no adenopathy, no carotid bruit, and no JVD  Lungs: clear to auscultation bilaterally  Heart: S1, S2 normal  Abdomen: soft, non-tender; bowel sounds normal; no masses,  no organomegaly  Extremities: no edema, redness or tenderness in the calves or thighs, diffuse tenderness over lower L spine  Neurologic: Mental status: Alert, oriented, thought content appropriate      Medications:   Scheduled Meds:   amLODIPine  5 mg Oral Daily    atorvastatin  10 mg Oral Nightly    buPROPion  150 mg Oral Daily    calcium carbonate  1 tablet Oral Daily    metoprolol tartrate  50 mg Oral BID    pantoprazole  40 mg Oral QAM AC    senna  1 tablet Oral Daily    Vitamin D  2,000 Units Oral Daily    sodium chloride flush  5-40 mL IntraVENous 2 times per day    enoxaparin  40 mg SubCUTAneous Daily    polyethylene glycol  17 g Oral Daily     Continuous Infusions:   sodium chloride         Lab Results:   CBC:   Recent Labs     07/26/25  1100 07/27/25  0739 07/28/25  0629   WBC 9.8 7.6 7.3   HGB 13.0 13.2 13.4    215 223     BMP:    Recent Labs     07/26/25  1100 07/27/25  0739 07/28/25  0629   * 133* 135   K 3.9 3.9 4.2   CL 99 98 98   CO2 24 21* 24   BUN 9 9 10   CREATININE 0.5 0.5 0.5   GLUCOSE 108 98 101     Hepatic:   Recent Labs     07/26/25  1100   AST 27   ALT 12   BILITOT 0.6   ALKPHOS 117       Magnesium:    Lab Results   Component Value Date/Time    MG 1.9 07/10/2024 08:33 PM     Uric Acid:  No components found for: \"URIC\"  HgBA1c:    Lab Results   Component Value Date/Time    LABA1C 5.7 07/12/2024 10:05 AM

## 2025-07-28 NOTE — PLAN OF CARE
Problem: Chronic Conditions and Co-morbidities  Goal: Patient's chronic conditions and co-morbidity symptoms are monitored and maintained or improved  Outcome: Progressing  Flowsheets (Taken 7/27/2025 0842 by Marquita Polanco LPN)  Care Plan - Patient's Chronic Conditions and Co-Morbidity Symptoms are Monitored and Maintained or Improved:   Monitor and assess patient's chronic conditions and comorbid symptoms for stability, deterioration, or improvement   Collaborate with multidisciplinary team to address chronic and comorbid conditions and prevent exacerbation or deterioration     Problem: Discharge Planning  Goal: Discharge to home or other facility with appropriate resources  Outcome: Progressing  Flowsheets (Taken 7/27/2025 0842 by Marquita Polanco LPN)  Discharge to home or other facility with appropriate resources:   Identify barriers to discharge with patient and caregiver   Arrange for needed discharge resources and transportation as appropriate   Identify discharge learning needs (meds, wound care, etc)     Problem: Safety - Adult  Goal: Free from fall injury  Outcome: Progressing  Flowsheets (Taken 7/27/2025 0842 by Marquita Polanco LPN)  Free From Fall Injury:   Instruct family/caregiver on patient safety   Based on caregiver fall risk screen, instruct family/caregiver to ask for assistance with transferring infant if caregiver noted to have fall risk factors     Problem: ABCDS Injury Assessment  Goal: Absence of physical injury  Outcome: Progressing  Flowsheets (Taken 7/27/2025 0842 by Marquita Polanco LPN)  Absence of Physical Injury: Implement safety measures based on patient assessment     Problem: Pain  Goal: Verbalizes/displays adequate comfort level or baseline comfort level  Outcome: Progressing  Flowsheets (Taken 7/27/2025 0842 by Marquita Polanco LPN)  Verbalizes/displays adequate comfort level or baseline comfort level:   Encourage patient to monitor pain and request assistance   Assess pain using  appropriate pain scale     Problem: Skin/Tissue Integrity  Goal: Skin integrity remains intact  Description: 1.  Monitor for areas of redness and/or skin breakdown  2.  Assess vascular access sites hourly  3.  Every 4-6 hours minimum:  Change oxygen saturation probe site  4.  Every 4-6 hours:  If on nasal continuous positive airway pressure, respiratory therapy assess nares and determine need for appliance change or resting period  Outcome: Progressing  Flowsheets (Taken 7/27/2025 0842 by Marquita Polanco LPN)  Skin Integrity Remains Intact:   Monitor for areas of redness and/or skin breakdown   Every 4-6 hours minimum:  Change oxygen saturation probe site   Care plan reviewed with patient and family.  Patient and fsmily verbalize understanding of the plan of care and contribute to goal setting.

## 2025-07-29 ENCOUNTER — APPOINTMENT (OUTPATIENT)
Dept: MRI IMAGING | Age: 89
DRG: 552 | End: 2025-07-29
Payer: MEDICARE

## 2025-07-29 PROCEDURE — 6360000002 HC RX W HCPCS: Performed by: INTERNAL MEDICINE

## 2025-07-29 PROCEDURE — 72148 MRI LUMBAR SPINE W/O DYE: CPT

## 2025-07-29 PROCEDURE — 97535 SELF CARE MNGMENT TRAINING: CPT

## 2025-07-29 PROCEDURE — 97166 OT EVAL MOD COMPLEX 45 MIN: CPT

## 2025-07-29 PROCEDURE — 1200000000 HC SEMI PRIVATE

## 2025-07-29 PROCEDURE — 97530 THERAPEUTIC ACTIVITIES: CPT

## 2025-07-29 PROCEDURE — 97116 GAIT TRAINING THERAPY: CPT

## 2025-07-29 PROCEDURE — 6370000000 HC RX 637 (ALT 250 FOR IP): Performed by: INTERNAL MEDICINE

## 2025-07-29 PROCEDURE — 2500000003 HC RX 250 WO HCPCS: Performed by: INTERNAL MEDICINE

## 2025-07-29 PROCEDURE — 72195 MRI PELVIS W/O DYE: CPT

## 2025-07-29 PROCEDURE — 99232 SBSQ HOSP IP/OBS MODERATE 35: CPT | Performed by: NURSE PRACTITIONER

## 2025-07-29 RX ADMIN — SENNOSIDES 8.6 MG: 8.6 TABLET, FILM COATED ORAL at 09:38

## 2025-07-29 RX ADMIN — BUPROPION HYDROCHLORIDE 150 MG: 150 TABLET, FILM COATED, EXTENDED RELEASE ORAL at 09:39

## 2025-07-29 RX ADMIN — PANTOPRAZOLE SODIUM 40 MG: 40 TABLET, DELAYED RELEASE ORAL at 09:39

## 2025-07-29 RX ADMIN — OXYCODONE 5 MG: 5 TABLET ORAL at 06:47

## 2025-07-29 RX ADMIN — OXYCODONE 5 MG: 5 TABLET ORAL at 22:14

## 2025-07-29 RX ADMIN — SODIUM CHLORIDE, PRESERVATIVE FREE 10 ML: 5 INJECTION INTRAVENOUS at 20:08

## 2025-07-29 RX ADMIN — ANTACID TABLETS 500 MG: 500 TABLET, CHEWABLE ORAL at 09:39

## 2025-07-29 RX ADMIN — ATORVASTATIN CALCIUM 10 MG: 10 TABLET, FILM COATED ORAL at 20:01

## 2025-07-29 RX ADMIN — Medication 2000 UNITS: at 10:19

## 2025-07-29 RX ADMIN — OXYCODONE 5 MG: 5 TABLET ORAL at 01:28

## 2025-07-29 RX ADMIN — METOPROLOL TARTRATE 50 MG: 50 TABLET, FILM COATED ORAL at 20:07

## 2025-07-29 RX ADMIN — AMLODIPINE BESYLATE 5 MG: 5 TABLET ORAL at 09:39

## 2025-07-29 RX ADMIN — ENOXAPARIN SODIUM 40 MG: 100 INJECTION SUBCUTANEOUS at 20:01

## 2025-07-29 RX ADMIN — METOPROLOL TARTRATE 50 MG: 50 TABLET, FILM COATED ORAL at 09:39

## 2025-07-29 RX ADMIN — SODIUM CHLORIDE, PRESERVATIVE FREE 10 ML: 5 INJECTION INTRAVENOUS at 09:39

## 2025-07-29 RX ADMIN — OXYCODONE 5 MG: 5 TABLET ORAL at 14:01

## 2025-07-29 RX ADMIN — POLYETHYLENE GLYCOL 3350 17 G: 17 POWDER, FOR SOLUTION ORAL at 09:39

## 2025-07-29 ASSESSMENT — PAIN DESCRIPTION - ORIENTATION
ORIENTATION: MID
ORIENTATION: LOWER
ORIENTATION: MID;LOWER
ORIENTATION: MID

## 2025-07-29 ASSESSMENT — PAIN DESCRIPTION - DESCRIPTORS
DESCRIPTORS: ACHING
DESCRIPTORS: ACHING;SHARP;SHOOTING
DESCRIPTORS: ACHING;SHARP
DESCRIPTORS: ACHING

## 2025-07-29 ASSESSMENT — PAIN DESCRIPTION - FREQUENCY: FREQUENCY: INTERMITTENT

## 2025-07-29 ASSESSMENT — PAIN SCALES - GENERAL
PAINLEVEL_OUTOF10: 10
PAINLEVEL_OUTOF10: 7
PAINLEVEL_OUTOF10: 3
PAINLEVEL_OUTOF10: 7
PAINLEVEL_OUTOF10: 3
PAINLEVEL_OUTOF10: 4
PAINLEVEL_OUTOF10: 7
PAINLEVEL_OUTOF10: 7

## 2025-07-29 ASSESSMENT — PAIN DESCRIPTION - LOCATION
LOCATION: BACK

## 2025-07-29 ASSESSMENT — PAIN DESCRIPTION - PAIN TYPE: TYPE: ACUTE PAIN

## 2025-07-29 ASSESSMENT — PAIN DESCRIPTION - ONSET: ONSET: ON-GOING

## 2025-07-29 ASSESSMENT — PAIN - FUNCTIONAL ASSESSMENT
PAIN_FUNCTIONAL_ASSESSMENT: PREVENTS OR INTERFERES SOME ACTIVE ACTIVITIES AND ADLS
PAIN_FUNCTIONAL_ASSESSMENT: ACTIVITIES ARE NOT PREVENTED

## 2025-07-29 NOTE — PROGRESS NOTES
Physical Medicine & Rehabilitation   Progress Note      Chief Complaint:  fall. Rehab needs    History of Present Illness:  Rahel Lopez is a 89 y.o. female admitted to [facility] on 7/26/2025. Patient  has a past medical history of Acute CVA (cerebrovascular accident) (HCC), Arthritis, Cataract of both eyes, GERD (gastroesophageal reflux disease), Hyperlipidemia, Hypertension, Keratosis, and Psychiatric problem.  Patient presented to [facility] ER after suffering a fall at assisted living.  Patient was reportedly getting up into a small space without a walker in order to lower the blinds.  Patient suffered a fall onto her bottom.  An x-ray was completed at AL, questionable of a L4-5 fracture.  CT of the lumbar spine abdomen and pelvis revealed a L1 25% compression fracture.  Patient was admitted to the hospital.    7/28/2025: Patient was unable to work with therapy this morning due to new back brace.  Back brace is now available.  She reports getting up from the bed to the chair with assistance.  She states \"it hurt \".  Upon evaluation, patient does not have any pain with palpation through the thoracic or lumbar spine.  Patient with some pain with palpation in the sacral area.  CT abdomen pelvis report reviewed without any fractures noted per dictation.  Discussed with patient and daughter about IPR qualifications, even without patient's therapy evaluations completed yet, she does not meet the medical complexity or the diagnostic criteria that would qualify her for an IPR stay.  Consideration for home health at assisted living versus SNF depending on therapy needs.  Daughter understanding.  They deny any questions or concerns at this time    7/29/25: Patient seen today up in hallway with therapy.  Patient with complaints of lumbar brace not supporting where her pain is.  Reposition brace, she reports it still not in a good spot.  Again palpated the lumbar spine, she has no pain with palpation through  negative    Physical Exam:  /67   Pulse 83   Temp 98.1 °F (36.7 °C) (Oral)   Resp 18   Wt 54.4 kg (120 lb)   SpO2 96%   BMI 21.95 kg/m²   awake  Orientation:   person, place, time  Mood: within normal limits  Affect: calm  General appearance: Patient is well nourished, well developed, well groomed and in no acute distress, appearing stated age, flattened affect    Memory:   normal,   Attention/Concentration: normal  Language:  normal    Cranial Nerves:  no obvious deficits noted  ROM:  normal  Tone:  normal  Muscle bulk: within normal limits  Sensory:  Sensory intact  Palpation of thoracic and lumbar spine without noted tenderness. Sacral tenderness noted    Heart:no shortness of breath, extremities well perfused  Lungs: no audible wheezing or crackles, no increased WOB  Skin: warm and dry, no rash or erythema  Peripheral vascular: Pulses: Normal upper and lower extremity pulses; Edema: no      Diagnostics:  No results found for this or any previous visit (from the past 24 hours).    CT ABDOMEN PELVIS W IV CONTRAST  CLINICAL INFORMATION: fell, back pain .  COMPARISON: CT abdomen pelvis 5/6/2020.  TECHNIQUE: Axial 5 mm CT images were obtained through the abdomen and pelvis  after the administration of intravenous contrast. Coronal and sagittal  reconstructions were obtained. ...There is an acute compression fracture of L4. Fracture lines are visible. These  parallel the superior endplate. Height loss is 20%. The posterior superior  corner of L1 extends into the spinal canal. Fracture lines are visible in all 3  planes.   There is osseous demineralization. There is 6 mm of anterolisthesis of L4 on L5.  The other lumbar vertebral bodies are normally aligned. There are no fractures  of the posterior elements. No pars defects are noted. No suspicious osseous  lesions are present.  There is vacuum disc phenomena at the L4-5 level. There is  scattered mineralization of the discs. There are degenerative changes

## 2025-07-29 NOTE — PLAN OF CARE
Problem: Chronic Conditions and Co-morbidities  Goal: Patient's chronic conditions and co-morbidity symptoms are monitored and maintained or improved  7/29/2025 1052 by Natalee Green LPN  Outcome: Progressing  Flowsheets (Taken 7/27/2025 0842 by Marquita Polanco LPN)  Care Plan - Patient's Chronic Conditions and Co-Morbidity Symptoms are Monitored and Maintained or Improved:   Monitor and assess patient's chronic conditions and comorbid symptoms for stability, deterioration, or improvement   Collaborate with multidisciplinary team to address chronic and comorbid conditions and prevent exacerbation or deterioration     Problem: Discharge Planning  Goal: Discharge to home or other facility with appropriate resources  7/29/2025 1052 by Natalee Green LPN  Outcome: Progressing  Flowsheets (Taken 7/27/2025 0842 by Marquita Polanco LPN)  Discharge to home or other facility with appropriate resources:   Identify barriers to discharge with patient and caregiver   Arrange for needed discharge resources and transportation as appropriate   Identify discharge learning needs (meds, wound care, etc)     Problem: Safety - Adult  Goal: Free from fall injury  7/29/2025 1052 by Natalee Green LPN  Outcome: Progressing  Flowsheets (Taken 7/27/2025 0842 by Marquita Polanco LPN)  Free From Fall Injury:   Instruct family/caregiver on patient safety   Based on caregiver fall risk screen, instruct family/caregiver to ask for assistance with transferring infant if caregiver noted to have fall risk factors     Problem: ABCDS Injury Assessment  Goal: Absence of physical injury  7/29/2025 1052 by Natalee Green LPN  Outcome: Progressing  Flowsheets (Taken 7/27/2025 0842 by Marquita Polanco LPN)  Absence of Physical Injury: Implement safety measures based on patient assessment     Problem: Pain  Goal: Verbalizes/displays adequate comfort level or baseline comfort level  7/29/2025 1052 by Natalee Green LPN  Outcome: Progressing  Flowsheets (Taken

## 2025-07-29 NOTE — CONSULTS
Inpatient Consultation    Rahel Lopez (3/4/1931)  7/29/2025    Reason for Consult:  L1 VCF  Requesting Physician: Dr. Fitzpatrick    CHIEF COMPLAINT:   Back pain    History Obtained From:  patient, electronic medical record    HISTORY OF PRESENT ILLNESS:                The patient is a 89 y.o. female who presents with above chief complaint.  Patient admitted on 7/27 after a fall at NH with complaints of low back pain. Symptoms began 3 days prior to admission. She reports ongoing, severe low back pain. CT showed a L1 VCF. Denies radicular complaints. Symptoms are better with sitting in chair but worsen with walking and movement. Patient does not feel the back brace is where it should be, just finished working with therapy.     Past Medical History:        Diagnosis Date    Acute CVA (cerebrovascular accident) (HCC) 7/11/2024    Arthritis     Cataract of both eyes     GERD (gastroesophageal reflux disease)     Hyperlipidemia     Hypertension     Keratosis     benign lichemoid    Psychiatric problem      Past Surgical History:        Procedure Laterality Date    ABDOMEN SURGERY      APPENDECTOMY  1934    CHOLECYSTECTOMY  1999    COLON SURGERY  2000    resection for diverticulitis    COLONOSCOPY      DILATATION, ESOPHAGUS      ENDOSCOPY, COLON, DIAGNOSTIC      HIP SURGERY Right 9/12/2024    RIGHT HIP INTERTAN performed by Jared Burns MD at [facility] OR    HYSTERECTOMY (CERVIX STATUS UNKNOWN)  1979    JOINT REPLACEMENT Right     right knee    KNEE ARTHROPLASTY Right 02/27/2021    RIGHT TOTAL KNEE REVISION WITH OSS performed by Jared Burns MD at [facility] OR    KNEE SURGERY  2010    ROTATOR CUFF REPAIR Left 2006     Current Medications:   Current Facility-Administered Medications: amLODIPine (NORVASC) tablet 5 mg, 5 mg, Oral, Daily  atorvastatin (LIPITOR) tablet 10 mg, 10 mg, Oral, Nightly  buPROPion (WELLBUTRIN SR) extended release tablet 150 mg, 150 mg, Oral, Daily  calcium carbonate (TUMS) chewable tablet 500 mg, 1  negative for cough and shortness of breath  CARDIOVASCULAR:  negative for chest pain and palpitations  GASTROINTESTINAL:  negative for nausea, vomiting  GENITOURINARY:  negative for frequency  MUSCULOSKELETAL:  (+) back pain, negative leg pain   NEUROLOGICAL:  negative for headaches  BEHAVIOR/PSYCH:  negative for depressed mood, anxiety    PHYSICAL EXAM:    BP (!) 149/93   Pulse 80   Temp 97.9 °F (36.6 °C) (Oral)   Resp 18   Wt 54.4 kg (120 lb)   SpO2 96%   BMI 21.95 kg/m²   CONSTITUTIONAL:  awake, alert, cooperative, no apparent distress, and appears stated age, sitting up in chair eating breakfast  HEAD: atraumatic, normocephalic  LUNGS:  No respiratory distress. CTA bilaterally per H&P  CARDIOVASCULAR:  RRR, no murmur per H&P  ABDOMEN:  Soft, non-distended, non-tender  MUSCULOSKELETAL:  TTP upper lumbar spine and right along left lumbar spine. Pain with ROM of back  NEUROLOGIC:  Awake, alert, oriented to name, place and time. Sensory intact to touch LE    DATA:    CBC:   Lab Results   Component Value Date/Time    WBC 7.3 07/28/2025 06:29 AM    RBC 4.42 07/28/2025 06:29 AM    RBC 4.69 02/21/2022 09:10 AM    HGB 13.4 07/28/2025 06:29 AM    HCT 41.4 07/28/2025 06:29 AM    MCV 93.7 07/28/2025 06:29 AM    MCH 30.3 07/28/2025 06:29 AM    MCHC 32.4 07/28/2025 06:29 AM    RDW 12.9 02/21/2022 09:10 AM     07/28/2025 06:29 AM    MPV 10.3 07/28/2025 06:29 AM     WBC:    Lab Results   Component Value Date/Time    WBC 7.3 07/28/2025 06:29 AM     Hemoglobin/Hematocrit:    Lab Results   Component Value Date/Time    HGB 13.4 07/28/2025 06:29 AM    HCT 41.4 07/28/2025 06:29 AM     CMP:    Lab Results   Component Value Date/Time     07/28/2025 06:29 AM    K 4.2 07/28/2025 06:29 AM    K 3.9 07/27/2025 07:39 AM    CL 98 07/28/2025 06:29 AM    CO2 24 07/28/2025 06:29 AM    BUN 10 07/28/2025 06:29 AM    CREATININE 0.5 07/28/2025 06:29 AM    LABGLOM 87 07/28/2025 06:29 AM    LABGLOM 93 04/09/2025 07:51 AM    GLUCOSE

## 2025-07-29 NOTE — PROGRESS NOTES
[facility]  INPATIENT PHYSICAL THERAPY  DAILY NOTE  [facility] ORTHOPEDICS 7K - 7K-09/009-A      Discharge Recommendations: Subacute/Skilled Nursing Facility, Patient would benefit from continued PT at discharge, and pt would benefit from further vestibular assessment as tolerates.  Equipment Recommendations: No               Time In: 1106  Time Out: 1130  Timed Code Treatment Minutes: 24 Minutes  Minutes: 24          Date: 2025  Patient Name: Rahel Lopez,  Gender:  female        MRN: [id number]  : 3/4/1931  (89 y.o.)     Referring Practitioner: Dr. ERIC Fitzpatrick  Diagnosis: Lumbar compression fracture, closed, initial encounter ([facility])  Additional Pertinent Hx: History of Present Illness:  Rahel Lopez is a 89 y.o. female admitted to [facility] on 2025. Patient  has a past medical history of Acute CVA (cerebrovascular accident) ([facility]), Arthritis, Cataract of both eyes, GERD (gastroesophageal reflux disease), Hyperlipidemia, Hypertension, Keratosis, and Psychiatric problem.  Patient presented to [facility] ER after suffering a fall at assisted living.  Patient was reportedly getting up into a small space without a walker in order to lower the blinds.  Patient suffered a fall onto her bottom.  An x-ray was completed at AL, questionable of a L4-5 fracture.  CT of the lumbar spine abdomen and pelvis revealed a L1 25% compression fracture.  Patient was admitted to the hospital.     Prior Level of Function:  Lives With: Other (Comment) (assisted living)  Type of Home: Assisted living  Home Layout: One level  Home Access: Level entry  Home Equipment: Walker - Rolling   Bathroom Shower/Tub: Walk-in shower, Shower chair with back  Bathroom Toilet: Handicap height  Bathroom Equipment: Grab bars in shower  Bathroom Accessibility: Accessible    Prior Level of Assist for ADLs: Needs assistance  Prior Level of Assist for Homemaking: Needs assistance  Prior Level of Assist for Transfers: Needs

## 2025-07-29 NOTE — PROGRESS NOTES
At 1421,  received a call from patient's [place] office [facility], requesting a  to visit with patient. The office received a call from patient's daughter informing the [employer] of patient's admission. Staff texted [facility] of the request to visit with patient.

## 2025-07-29 NOTE — PLAN OF CARE
Problem: Chronic Conditions and Co-morbidities  Goal: Patient's chronic conditions and co-morbidity symptoms are monitored and maintained or improved  7/29/2025 0051 by Makeda Saleh RN  Outcome: Progressing  Flowsheets (Taken 7/27/2025 0842 by Marquita Polanco LPN)  Care Plan - Patient's Chronic Conditions and Co-Morbidity Symptoms are Monitored and Maintained or Improved:   Monitor and assess patient's chronic conditions and comorbid symptoms for stability, deterioration, or improvement   Collaborate with multidisciplinary team to address chronic and comorbid conditions and prevent exacerbation or deterioration  7/29/2025 0050 by Makeda Saleh RN  Outcome: Progressing  Flowsheets (Taken 7/27/2025 0842 by Marquita Polanco LPN)  Care Plan - Patient's Chronic Conditions and Co-Morbidity Symptoms are Monitored and Maintained or Improved:   Monitor and assess patient's chronic conditions and comorbid symptoms for stability, deterioration, or improvement   Collaborate with multidisciplinary team to address chronic and comorbid conditions and prevent exacerbation or deterioration  7/28/2025 1050 by Natalee Green LPN  Outcome: Progressing  Flowsheets (Taken 7/27/2025 0842 by Marquita Polanco LPN)  Care Plan - Patient's Chronic Conditions and Co-Morbidity Symptoms are Monitored and Maintained or Improved:   Monitor and assess patient's chronic conditions and comorbid symptoms for stability, deterioration, or improvement   Collaborate with multidisciplinary team to address chronic and comorbid conditions and prevent exacerbation or deterioration     Problem: Discharge Planning  Goal: Discharge to home or other facility with appropriate resources  7/29/2025 0051 by Makeda Saleh RN  Outcome: Progressing  Flowsheets (Taken 7/27/2025 0842 by Marquita Polanco LPN)  Discharge to home or other facility with appropriate resources:   Identify barriers to discharge with patient and caregiver   Arrange for needed

## 2025-07-29 NOTE — PROGRESS NOTES
[facility]  INPATIENT OCCUPATIONAL THERAPY  [facility] ORTHOPEDICS 7K  EVALUATION      Discharge Recommendations: Continue to assess pending progress, Subacute/Skilled Nursing Facility  Equipment Recommendations: No        Time In: 0815  Time Out: 0838  Timed Code Treatment Minutes: 23 Minutes  Minutes: 23          Date: 2025  Patient Name: Rahel Lopez,   Gender: female      MRN: [id number]  : 3/4/1931  (89 y.o.)  Referring Practitioner: Cruz Fitzpatrick MD  Diagnosis: Lumbar compression fracture, closed, initial encounter ([facility])  Additional Pertinent Hx: Per EMR, \"The patient is a 89 y.o. female who presents with back pain following a fall at the nursing home.  The falll happened 3 days ago.  Patient subsequently developed severe pain in the lower back.  CT scan of the lumbar spine showed a new fracture in the lumbar #1 vertebrae.  Patient was seen in the emergency room evaluated and admitted for pain control and further care /orthospine consult.\"    Restrictions/Precautions:  Restrictions/Precautions: Fall Risk, General Precautions  Required Braces or Orthoses  Spinal: Lumbar Corset  Position Activity Restriction  Spinal Precautions: No Bending/Lifting/Twisting (BLT)    Subjective  Chart Reviewed: Yes, Orders, Progress Notes, History and Physical  Patient assessed for rehabilitation services?: Yes    Subjective: RN okayed OT session. Upon arrival patient was attempting to eat in bed. Pt agreeable to OT session.    Pain: 3/10: Back    Vitals: Vitals not assessed per clinical judgement, see nursing flowsheet    Social/Functional History:  Lives With: Other (Comment) (assisted living)  Type of Home: Assisted living  Home Layout: One level  Home Access: Level entry  Home Equipment: Walker - Rolling   Bathroom Shower/Tub: Walk-in shower, Shower chair with back  Bathroom Toilet: Handicap height  Bathroom Equipment: Grab bars in shower  Bathroom Accessibility: Accessible       Prior Level of Assist for

## 2025-07-29 NOTE — PROGRESS NOTES
INTERNAL MEDICINE Progress Note  7/29/2025 10:52 AM  Subjective:   Admit Date: 7/26/2025  PCP: Herminia Lopez MD  Interval History:     + lower back pain, lumbar brace in situ  To have MRI L spine    Objective:   Vitals: /67   Pulse 83   Temp 98.1 °F (36.7 °C) (Oral)   Resp 18   Wt 54.4 kg (120 lb)   SpO2 96%   BMI 21.95 kg/m²   General appearance: alert and cooperative with exam  HEENT:  atraumatic  Neck: no adenopathy, no carotid bruit, and no JVD  Lungs: clear to auscultation bilaterally  Heart: S1, S2 normal  Abdomen: soft, non-tender; bowel sounds normal; no masses,  no organomegaly  Extremities: no edema, redness or tenderness in the calves or thighs, diffuse tenderness over lower L spine  Neurologic: Alert, oriented, thought content appropriate      Medications:   Scheduled Meds:   amLODIPine  5 mg Oral Daily    atorvastatin  10 mg Oral Nightly    buPROPion  150 mg Oral Daily    calcium carbonate  1 tablet Oral Daily    metoprolol tartrate  50 mg Oral BID    pantoprazole  40 mg Oral QAM AC    senna  1 tablet Oral Daily    Vitamin D  2,000 Units Oral Daily    sodium chloride flush  5-40 mL IntraVENous 2 times per day    enoxaparin  40 mg SubCUTAneous Daily    polyethylene glycol  17 g Oral Daily     Continuous Infusions:   sodium chloride         Lab Results:   CBC:   Recent Labs     07/26/25  1100 07/27/25  0739 07/28/25  0629   WBC 9.8 7.6 7.3   HGB 13.0 13.2 13.4    215 223     BMP:    Recent Labs     07/26/25  1100 07/27/25  0739 07/28/25  0629   * 133* 135   K 3.9 3.9 4.2   CL 99 98 98   CO2 24 21* 24   BUN 9 9 10   CREATININE 0.5 0.5 0.5   GLUCOSE 108 98 101     Hepatic:   Recent Labs     07/26/25  1100   AST 27   ALT 12   BILITOT 0.6   ALKPHOS 117       Magnesium:    Lab Results   Component Value Date/Time    MG 1.9 07/10/2024 08:33 PM     Uric Acid:  No components found for: \"URIC\"  HgBA1c:    Lab Results   Component Value Date/Time    LABA1C 5.7 07/12/2024 10:05 AM     TSH:     Lab Results   Component Value Date/Time    TSH 4.08 07/27/2025 07:39 AM       Assessment and Plan:   Acute fracture of the superior endplate of L1 with 25% height loss.   Multilevel degenerative arthritis lumbar spine.  Multilevel lumbar spinal stenosis.  Moderate to severe L4-5 moderate L3-4 mild at L1, mild - Mod L2-L3  History of CVA with generalized weakness-mild  Hypertension  Mild hyponatremia     Cont analgesics  Lumbar brace support.  PT OT  DVT prophylaxis  F/up MRI L spine    Cruz Fitzpatrick MD, MD

## 2025-07-30 PROCEDURE — 2500000003 HC RX 250 WO HCPCS: Performed by: INTERNAL MEDICINE

## 2025-07-30 PROCEDURE — 97110 THERAPEUTIC EXERCISES: CPT

## 2025-07-30 PROCEDURE — 97530 THERAPEUTIC ACTIVITIES: CPT

## 2025-07-30 PROCEDURE — 97116 GAIT TRAINING THERAPY: CPT

## 2025-07-30 PROCEDURE — 1200000000 HC SEMI PRIVATE

## 2025-07-30 PROCEDURE — 99232 SBSQ HOSP IP/OBS MODERATE 35: CPT | Performed by: NURSE PRACTITIONER

## 2025-07-30 PROCEDURE — 97535 SELF CARE MNGMENT TRAINING: CPT

## 2025-07-30 PROCEDURE — 6370000000 HC RX 637 (ALT 250 FOR IP): Performed by: INTERNAL MEDICINE

## 2025-07-30 PROCEDURE — 6360000002 HC RX W HCPCS: Performed by: INTERNAL MEDICINE

## 2025-07-30 RX ORDER — OXYCODONE HYDROCHLORIDE 5 MG/1
2.5 TABLET ORAL EVERY 4 HOURS PRN
Refills: 0 | Status: DISCONTINUED | OUTPATIENT
Start: 2025-07-30 | End: 2025-08-01 | Stop reason: HOSPADM

## 2025-07-30 RX ORDER — MICONAZOLE NITRATE 2 G/100G
CREAM TOPICAL 2 TIMES DAILY
Status: DISCONTINUED | OUTPATIENT
Start: 2025-07-30 | End: 2025-08-01 | Stop reason: HOSPADM

## 2025-07-30 RX ADMIN — Medication 2000 UNITS: at 09:17

## 2025-07-30 RX ADMIN — METOPROLOL TARTRATE 50 MG: 50 TABLET, FILM COATED ORAL at 22:56

## 2025-07-30 RX ADMIN — METOPROLOL TARTRATE 50 MG: 50 TABLET, FILM COATED ORAL at 09:17

## 2025-07-30 RX ADMIN — SODIUM CHLORIDE, PRESERVATIVE FREE 10 ML: 5 INJECTION INTRAVENOUS at 22:56

## 2025-07-30 RX ADMIN — AMLODIPINE BESYLATE 5 MG: 5 TABLET ORAL at 09:17

## 2025-07-30 RX ADMIN — SODIUM CHLORIDE, PRESERVATIVE FREE 10 ML: 5 INJECTION INTRAVENOUS at 09:17

## 2025-07-30 RX ADMIN — ANTACID TABLETS 500 MG: 500 TABLET, CHEWABLE ORAL at 09:17

## 2025-07-30 RX ADMIN — OXYCODONE 2.5 MG: 5 TABLET ORAL at 16:24

## 2025-07-30 RX ADMIN — BUPROPION HYDROCHLORIDE 150 MG: 150 TABLET, FILM COATED, EXTENDED RELEASE ORAL at 09:17

## 2025-07-30 RX ADMIN — ACETAMINOPHEN 650 MG: 325 TABLET ORAL at 22:54

## 2025-07-30 RX ADMIN — OXYCODONE 5 MG: 5 TABLET ORAL at 09:15

## 2025-07-30 RX ADMIN — POLYETHYLENE GLYCOL 3350 17 G: 17 POWDER, FOR SOLUTION ORAL at 09:17

## 2025-07-30 RX ADMIN — ACETAMINOPHEN 650 MG: 325 TABLET ORAL at 03:53

## 2025-07-30 RX ADMIN — ATORVASTATIN CALCIUM 10 MG: 10 TABLET, FILM COATED ORAL at 22:56

## 2025-07-30 RX ADMIN — MICONAZOLE NITRATE: 20 CREAM TOPICAL at 22:55

## 2025-07-30 RX ADMIN — PANTOPRAZOLE SODIUM 40 MG: 40 TABLET, DELAYED RELEASE ORAL at 05:50

## 2025-07-30 RX ADMIN — ENOXAPARIN SODIUM 40 MG: 100 INJECTION SUBCUTANEOUS at 22:54

## 2025-07-30 RX ADMIN — SENNOSIDES 8.6 MG: 8.6 TABLET, FILM COATED ORAL at 09:17

## 2025-07-30 ASSESSMENT — PATIENT HEALTH QUESTIONNAIRE - PHQ9
SUM OF ALL RESPONSES TO PHQ QUESTIONS 1-9: 0
2. FEELING DOWN, DEPRESSED OR HOPELESS: NOT AT ALL
SUM OF ALL RESPONSES TO PHQ QUESTIONS 1-9: 0
SUM OF ALL RESPONSES TO PHQ QUESTIONS 1-9: 0
1. LITTLE INTEREST OR PLEASURE IN DOING THINGS: NOT AT ALL
SUM OF ALL RESPONSES TO PHQ QUESTIONS 1-9: 0

## 2025-07-30 ASSESSMENT — PAIN DESCRIPTION - DESCRIPTORS
DESCRIPTORS: ACHING
DESCRIPTORS: ACHING

## 2025-07-30 ASSESSMENT — PAIN DESCRIPTION - ORIENTATION
ORIENTATION: MID;LOWER
ORIENTATION: MID

## 2025-07-30 ASSESSMENT — PAIN SCALES - GENERAL
PAINLEVEL_OUTOF10: 4
PAINLEVEL_OUTOF10: 4
PAINLEVEL_OUTOF10: 2
PAINLEVEL_OUTOF10: 4
PAINLEVEL_OUTOF10: 10
PAINLEVEL_OUTOF10: 5
PAINLEVEL_OUTOF10: 7

## 2025-07-30 ASSESSMENT — PAIN SCALES - WONG BAKER
WONGBAKER_NUMERICALRESPONSE: HURTS LITTLE MORE
WONGBAKER_NUMERICALRESPONSE: HURTS WORST
WONGBAKER_NUMERICALRESPONSE: NO HURT

## 2025-07-30 ASSESSMENT — PAIN DESCRIPTION - LOCATION
LOCATION: BACK

## 2025-07-30 NOTE — PLAN OF CARE
Problem: Chronic Conditions and Co-morbidities  Goal: Patient's chronic conditions and co-morbidity symptoms are monitored and maintained or improved  7/30/2025 1147 by Sonia Colin LPN  Outcome: Progressing  Flowsheets (Taken 7/30/2025 1147)  Care Plan - Patient's Chronic Conditions and Co-Morbidity Symptoms are Monitored and Maintained or Improved: Monitor and assess patient's chronic conditions and comorbid symptoms for stability, deterioration, or improvement     Problem: Discharge Planning  Goal: Discharge to home or other facility with appropriate resources  7/30/2025 1147 by Sonia Colin LPN  Outcome: Progressing  Flowsheets  Taken 7/30/2025 1147  Discharge to home or other facility with appropriate resources:   Identify barriers to discharge with patient and caregiver   Identify discharge learning needs (meds, wound care, etc)  Taken 7/30/2025 1114  Discharge to home or other facility with appropriate resources: Identify barriers to discharge with patient and caregiver     Problem: Safety - Adult  Goal: Free from fall injury  7/30/2025 1147 by Sonia Colin LPN  Outcome: Progressing  Flowsheets (Taken 7/30/2025 1147)  Free From Fall Injury:   Instruct family/caregiver on patient safety   Based on caregiver fall risk screen, instruct family/caregiver to ask for assistance with transferring infant if caregiver noted to have fall risk factors     Problem: ABCDS Injury Assessment  Goal: Absence of physical injury  7/30/2025 1147 by Sonia Colin LPN  Outcome: Progressing  Flowsheets (Taken 7/30/2025 1147)  Absence of Physical Injury: Implement safety measures based on patient assessment     Problem: Pain  Goal: Verbalizes/displays adequate comfort level or baseline comfort level  7/30/2025 1147 by Sonia Colin LPN  Outcome: Progressing  Flowsheets (Taken 7/30/2025 1147)  Verbalizes/displays adequate comfort level or baseline comfort level:   Encourage patient to monitor pain and request assistance   Administer

## 2025-07-30 NOTE — FLOWSHEET NOTE
07/30/25 1658   Treatment Team Notification   Reason for Communication Evaluate  (Patient is requesting Miconazole cream.)   Name of Team Member Notified Cruz Fitzpatrick   Treatment Team Role Attending Provider   Method of Communication Secure Message   Response See orders   Notification Time 1337

## 2025-07-30 NOTE — PROGRESS NOTES
Department of Orthopedic Surgery  Spine Service  Attending Progress Note        Subjective:  MRIs completed and reviewed with patient and daughter. L1 VCF and S2, S3, S4 edema vs fx.   TTP over the upper lumbar spine, no TTP over the lower lumbar/sacral area. She was able to ambulate with therapy this am with walker. Pain controlled currently sitting in chair. No radicular sx.     Vitals  VITALS:  BP (!) 141/70   Pulse 89   Temp 98.1 °F (36.7 °C) (Oral)   Resp 16   Wt 54.4 kg (120 lb)   SpO2 98%   BMI 21.95 kg/m²   24HR INTAKE/OUTPUT:    Intake/Output Summary (Last 24 hours) at 7/30/2025 1031  Last data filed at 7/30/2025 0716  Gross per 24 hour   Intake 570 ml   Output 800 ml   Net -230 ml     URINARY CATHETER OUTPUT (Ashraf):     DRAIN/TUBE OUTPUT:       PHYSICAL EXAM:    Orientation:  alert and oriented to person, place and time  Patient sitting up in chair  TTP upper lumbar spine  No TTP over lower lumbar spine    LABS:    HgB:    Lab Results   Component Value Date/Time    HGB 13.4 07/28/2025 06:29 AM         ASSESSMENT AND PLAN:    L1 VCF  S2, S3, S4 edema    1:  Activity as tolerated. Back brace when ambulating and prn comfort  Discussed results with patient and daughter  Kyphoplasty would only benefit L1 VCF which is not where her pain is while ambulating.   Recommend continuing with brace. PT/OT  No surgical plans.     LUKAS Ortega

## 2025-07-30 NOTE — PROGRESS NOTES
Physical Medicine & Rehabilitation   Progress Note      Chief Complaint:  fall. Rehab needs    History of Present Illness:  Rahel Lopez is a 89 y.o. female admitted to [facility] on 7/26/2025. Patient  has a past medical history of Acute CVA (cerebrovascular accident) (HCC), Arthritis, Cataract of both eyes, GERD (gastroesophageal reflux disease), Hyperlipidemia, Hypertension, Keratosis, and Psychiatric problem.  Patient presented to [facility] ER after suffering a fall at assisted living.  Patient was reportedly getting up into a small space without a walker in order to lower the blinds.  Patient suffered a fall onto her bottom.  An x-ray was completed at AL, questionable of a L4-5 fracture.  CT of the lumbar spine abdomen and pelvis revealed a L1 25% compression fracture.  Patient was admitted to the hospital.    7/28/2025: Patient was unable to work with therapy this morning due to new back brace.  Back brace is now available.  She reports getting up from the bed to the chair with assistance.  She states \"it hurt \".  Upon evaluation, patient does not have any pain with palpation through the thoracic or lumbar spine.  Patient with some pain with palpation in the sacral area.  CT abdomen pelvis report reviewed without any fractures noted per dictation.  Discussed with patient and daughter about IPR qualifications, even without patient's therapy evaluations completed yet, she does not meet the medical complexity or the diagnostic criteria that would qualify her for an IPR stay.  Consideration for home health at assisted living versus SNF depending on therapy needs.  Daughter understanding.  They deny any questions or concerns at this time    7/29/25: Patient seen today up in hallway with therapy.  Patient with complaints of lumbar brace not supporting where her pain is.  Reposition brace, she reports it still not in a good spot.  Again palpated the lumbar spine, she has no pain with palpation through  the lumbar spine.  Noted pain with ambulation and tenderness with palpation in the sacral area.  Ortho consulted, they have ordered MRI of the L-spine.  Will add a sacral MRI as the CT abdomen pelvis was negative, but may have nondisplaced sacral fractures that would be better seen on an MRI.     7/30/25: Patient seen today in follow up. MRI sacrum with possible insuffiencey fractures.  Explained with patient about timeline for healing, using ice, and continuing ambulation.  Patient verbalizes understanding.  She is hopeful to return to [facility].  Awaiting for response from the assisted living if they can take her back.  Strongly educated patient on using the call light at the assisted living.  Patient verbalizes understanding    Current Rehabilitation Assessments:  PT:   Bed Mobility:  Rolling to Right: Stand By Assistance, with rail, with verbal cues    Supine to Sit: Contact Guard Assistance, with rail, with verbal cues , for assistance to lift upper body off bed  *cues were required with all of bed mobility to maintain spinal precautions, as patient was attempting to twist with rolling and supine to sit  Transfers:  Sit to Stand: Contact Guard Assistance, cues for hand placement  Stand to Sit:Contact Guard Assistance  Stand Pivot:Contact Guard Assistance, with RW for support  Ambulation:  Contact Guard Assistance  Distance: 26 feet with one change in direction  Surface: Level Tile  Device: Rolling Walker  Gait Deviations: Forward Flexed Posture, Mild Path Deviations, Increased reliance on assistive device, and Cues for proximity to assistive device   Balance:  Dynamic Sitting Balance: Stand By Assistance. Unsupported sitting at edge of bed x 4 minutes. Head turns and visual scanning completed with patient having no reports of dizziness or headache  Static Standing Balance: Contact Guard Assistance, with support of walker. No reports of dizziness with standing    OT:   ADL:   Grooming: Stand By

## 2025-07-30 NOTE — PROGRESS NOTES
INTERNAL MEDICINE Progress Note  7/30/2025 2:03 PM  Subjective:   Admit Date: 7/26/2025  PCP: Herminia Lopez MD  Interval History:     + lower back pain, uses lumbar brace   On analgesics    Objective:   Vitals: /71   Pulse 72   Temp 98.7 °F (37.1 °C) (Oral)   Resp 14   Wt 54.4 kg (120 lb)   SpO2 97%   BMI 21.95 kg/m²   General appearance: alert and cooperative with exam  HEENT:  atraumatic  Neck: no adenopathy, no carotid bruit, and no JVD  Lungs: clear to auscultation bilaterally  Heart: S1, S2 normal  Abdomen: soft, non-tender; bowel sounds normal; no masses,  no organomegaly  Extremities: no edema, redness or tenderness in the calves or thighs, diffuse tenderness over lower L spine  Neurologic: Alert, oriented, thought content appropriate      Medications:   Scheduled Meds:   amLODIPine  5 mg Oral Daily    atorvastatin  10 mg Oral Nightly    buPROPion  150 mg Oral Daily    calcium carbonate  1 tablet Oral Daily    metoprolol tartrate  50 mg Oral BID    pantoprazole  40 mg Oral QAM AC    senna  1 tablet Oral Daily    Vitamin D  2,000 Units Oral Daily    sodium chloride flush  5-40 mL IntraVENous 2 times per day    enoxaparin  40 mg SubCUTAneous Daily    polyethylene glycol  17 g Oral Daily     Continuous Infusions:   sodium chloride         Lab Results:   CBC:   Recent Labs     07/28/25  0629   WBC 7.3   HGB 13.4        BMP:    Recent Labs     07/28/25  0629      K 4.2   CL 98   CO2 24   BUN 10   CREATININE 0.5   GLUCOSE 101     Hepatic:   No results for input(s): \"AST\", \"ALT\", \"BILITOT\", \"ALKPHOS\" in the last 72 hours.    Invalid input(s): \"ALB\"      Magnesium:    Lab Results   Component Value Date/Time    MG 1.9 07/10/2024 08:33 PM     Uric Acid:  No components found for: \"URIC\"  HgBA1c:    Lab Results   Component Value Date/Time    LABA1C 5.7 07/12/2024 10:05 AM     TSH:    Lab Results   Component Value Date/Time    TSH 4.08 07/27/2025 07:39 AM     MRI SACRUM COCCYX WO CONTRAST

## 2025-07-30 NOTE — PLAN OF CARE
Problem: Chronic Conditions and Co-morbidities  Goal: Patient's chronic conditions and co-morbidity symptoms are monitored and maintained or improved  7/30/2025 0005 by Tor Diaz RN  Outcome: Progressing  7/29/2025 1052 by Natalee Green LPN  Outcome: Progressing  Flowsheets (Taken 7/27/2025 0842 by Marquita Polanco LPN)  Care Plan - Patient's Chronic Conditions and Co-Morbidity Symptoms are Monitored and Maintained or Improved:   Monitor and assess patient's chronic conditions and comorbid symptoms for stability, deterioration, or improvement   Collaborate with multidisciplinary team to address chronic and comorbid conditions and prevent exacerbation or deterioration     Problem: Discharge Planning  Goal: Discharge to home or other facility with appropriate resources  7/30/2025 0005 by Tor Diaz RN  Outcome: Progressing  7/29/2025 1052 by Natalee Green LPN  Outcome: Progressing  Flowsheets (Taken 7/27/2025 0842 by Marquita Polanco LPN)  Discharge to home or other facility with appropriate resources:   Identify barriers to discharge with patient and caregiver   Arrange for needed discharge resources and transportation as appropriate   Identify discharge learning needs (meds, wound care, etc)     Problem: Safety - Adult  Goal: Free from fall injury  7/30/2025 0005 by Tor Diaz RN  Outcome: Progressing  Flowsheets (Taken 7/29/2025 1646 by Nova Navarro, RN)  Free From Fall Injury: Instruct family/caregiver on patient safety  7/29/2025 1052 by Natalee Green LPN  Outcome: Progressing  Flowsheets (Taken 7/27/2025 0842 by Marquita Polanco LPN)  Free From Fall Injury:   Instruct family/caregiver on patient safety   Based on caregiver fall risk screen, instruct family/caregiver to ask for assistance with transferring infant if caregiver noted to have fall risk factors     Problem: ABCDS Injury Assessment  Goal: Absence of physical injury  7/30/2025 0005 by Tor Diaz RN  Outcome:  RN)  Return Mobility to Safest Level of Function:   Assess patient stability and activity tolerance for standing, transferring and ambulating with or without assistive devices   Assist with transfers and ambulation using safe patient handling equipment as needed   Obtain physical therapy/occupational therapy consults as needed   Apply continuous passive motion per provider or physical therapy orders to increase flexion toward goal   Instruct patient/family in ordered activity level  Goal: Maintain proper alignment of affected body part  7/30/2025 0005 by Tor Diaz RN  Outcome: Progressing  7/29/2025 1052 by Natalee Green LPN  Outcome: Progressing  Flowsheets (Taken 7/29/2025 0051 by Makeda Saleh, RN)  Maintain proper alignment of affected body part:   Support and protect limb and body alignment per provider's orders   Instruct and reinforce with patient and family use of appropriate assistive device and precautions (e.g. spinal or hip dislocation precautions)  Goal: Return ADL status to a safe level of function  7/30/2025 0005 by Tor Diaz RN  Outcome: Progressing  7/29/2025 1052 by Natalee Green LPN  Outcome: Progressing  Flowsheets (Taken 7/29/2025 0051 by Makeda Saleh, RN)  Return ADL Status to a Safe Level of Function:   Administer medication as ordered   Obtain physical therapy/occupational therapy consults as needed   Assess activities of daily living deficits and provide assistive devices as needed   Assist and instruct patient to increase activity and self care as tolerated

## 2025-07-30 NOTE — PROGRESS NOTES
[facility]  STR ORTHOPEDICS 7K  Occupational Therapy  Daily Note    Discharge Recommendations: Subacute/skilled nursing facility, unless [facility] will provide increased assistance with mobility (CGA) and with ADL's.  Equipment Recommendations: No        Time In: 838  Time Out: 916  Timed Code Treatment Minutes: 38 Minutes  Minutes: 38          Date: 2025  Patient Name: Rahel Lopez,   Gender: female      Room: [facility]009-A  MRN: [id number]  : 3/4/1931  (89 y.o.)  Referring Practitioner: Cruz Fitzpatrick MD  Diagnosis: Lumbar compression fracture, closed, initial encounter ([facility])  Additional Pertinent Hx: Per EMR, \"The patient is a 89 y.o. female who presents with back pain following a fall at the nursing home.  The falll happened 3 days ago.  Patient subsequently developed severe pain in the lower back.  CT scan of the lumbar spine showed a new fracture in the lumbar #1 vertebrae.  Patient was seen in the emergency room evaluated and admitted for pain control and further care /orthospine consult.\"    Restrictions/Precautions:  Restrictions/Precautions: Fall Risk, General Precautions  Required Braces or Orthoses  Spinal: Lumbar Corset  Position Activity Restriction  Spinal Precautions: No Bending/Lifting/Twisting (BLT)     Social/Functional History:  Lives With: Other (Comment) (assisted living)  Type of Home: Assisted living  Home Layout: One level  Home Access: Level entry  Home Equipment: Walker - Rolling   Bathroom Shower/Tub: Walk-in shower, Shower chair with back  Bathroom Toilet: Handicap height  Bathroom Equipment: Grab bars in shower  Bathroom Accessibility: Accessible       Prior Level of Assist for ADLs: Needs assistance  Prior Level of Assist for Homemaking: Needs assistance  Prior Level of Assist for Transfers: Needs assistance  Prior Level of Assist for Ambulation: Independent household ambulator, with or without device  Has the patient had two or more falls in the past year or any

## 2025-07-30 NOTE — PROGRESS NOTES
SBIRT screening completed per trauma protocol. SBIRT Findings are Negative.  Patient denies alcohol and/or drug use. Also denies depressive symptoms.    Brief Intervention and Referral to Treatment Summary N/A    Patient was provided PHQ-9, AUDIT-C and DAST Screening:      PHQ-9 Score:  Negative  AUDIT-C Score:  Negative  DAST Score:   Negative    Patient’s substance use is considered     Negative      Patient’s depression is considered:     Minimal    Brief Education Was Provided    Not applicable as results are negative.      Brief Intervention(s) Provided  at time of screening(Only for AUDIT-C or DAST)     Not applicable as results are negative.    Injured Trauma Survivor Screening  1.  When you were injured or right afterward   Did you think you were going to die? RESPONSES; YES+1/NO: NO  Do you think this was done to you intentionally? NO    Since your injury  Have you felt more restless, tense or jumpy than usual? RESPONSES; YES+1/NO: NO  Have you found yourself unable to stop worrying? RESPONSES; YES+1/NO: NO  Do you find yourself thinking that the world is unsafe and that people are not to be trusted? RESPONSES; YES+1/NO: NO    TOTAL SCORE from ITSS Questions 1 and 2:   0  NOTE: A score of greater than or equal to 2 is considered positive for PTSD risk and is to receive a community resource packet to link with appropriate providers.    Recommendations/Referrals for Brief and/or Specialized Treatment Provided to Patient  N/A

## 2025-07-30 NOTE — CARE COORDINATION
7/30/25, 9:37 AM EDT    DISCHARGE PLANNING EVALUATION    Spoke with HANH at [facility] at [facility], chart information sent for review for possible return

## 2025-07-30 NOTE — PROGRESS NOTES
[facility]  INPATIENT PHYSICAL THERAPY  DAILY NOTE  [facility] ORTHOPEDICS 7K - 7K-09/009-A      Discharge Recommendations: continue to assess pending progress; if Assisted Living Facility is able to provide one assist for transfers and ambulation with patient, she would be able to return to [facility] with recommended PT; patient would benefit from continued PT at discharge  Equipment Recommendations: No               Time In: 0944  Time Out: 1022  Timed Code Treatment Minutes: 38 Minutes  Minutes: 38          Date: 2025  Patient Name: Rahel Lopez,  Gender:  female        MRN: [id number]  : 3/4/1931  (89 y.o.)     Referring Practitioner: Dr. ERIC Fitzpatrick  Diagnosis: Lumbar compression fracture, closed, initial encounter ([facility])  Additional Pertinent Hx: History of Present Illness:  Rahel Lopez is a 89 y.o. female admitted to [facility] on 2025. Patient  has a past medical history of Acute CVA (cerebrovascular accident) ([facility]), Arthritis, Cataract of both eyes, GERD (gastroesophageal reflux disease), Hyperlipidemia, Hypertension, Keratosis, and Psychiatric problem.  Patient presented to [facility] ER after suffering a fall at assisted living.  Patient was reportedly getting up into a small space without a walker in order to lower the blinds.  Patient suffered a fall onto her bottom.  An x-ray was completed at AL, questionable of a L4-5 fracture.  CT of the lumbar spine abdomen and pelvis revealed a L1 25% compression fracture.  Patient was admitted to the hospital.     Prior Level of Function:  Lives With: Other (Comment) (assisted living)  Type of Home: Assisted living  Home Layout: One level  Home Access: Level entry  Home Equipment: Walker - Rolling   Bathroom Shower/Tub: Walk-in shower, Shower chair with back  Bathroom Toilet: Handicap height  Bathroom Equipment: Grab bars in shower  Bathroom Accessibility: Accessible    Prior Level of Assist for ADLs: Needs assistance  Prior Level of

## 2025-07-31 PROCEDURE — 1200000000 HC SEMI PRIVATE

## 2025-07-31 PROCEDURE — 6360000002 HC RX W HCPCS: Performed by: INTERNAL MEDICINE

## 2025-07-31 PROCEDURE — 6370000000 HC RX 637 (ALT 250 FOR IP): Performed by: INTERNAL MEDICINE

## 2025-07-31 PROCEDURE — 97116 GAIT TRAINING THERAPY: CPT

## 2025-07-31 PROCEDURE — 2500000003 HC RX 250 WO HCPCS: Performed by: INTERNAL MEDICINE

## 2025-07-31 PROCEDURE — 97110 THERAPEUTIC EXERCISES: CPT

## 2025-07-31 RX ADMIN — ENOXAPARIN SODIUM 40 MG: 100 INJECTION SUBCUTANEOUS at 21:00

## 2025-07-31 RX ADMIN — METOPROLOL TARTRATE 50 MG: 50 TABLET, FILM COATED ORAL at 08:43

## 2025-07-31 RX ADMIN — SENNOSIDES 8.6 MG: 8.6 TABLET, FILM COATED ORAL at 08:43

## 2025-07-31 RX ADMIN — BUPROPION HYDROCHLORIDE 150 MG: 150 TABLET, FILM COATED, EXTENDED RELEASE ORAL at 08:43

## 2025-07-31 RX ADMIN — AMLODIPINE BESYLATE 5 MG: 5 TABLET ORAL at 08:43

## 2025-07-31 RX ADMIN — SODIUM CHLORIDE, PRESERVATIVE FREE 10 ML: 5 INJECTION INTRAVENOUS at 08:43

## 2025-07-31 RX ADMIN — Medication 2000 UNITS: at 08:43

## 2025-07-31 RX ADMIN — MICONAZOLE NITRATE: 20 CREAM TOPICAL at 20:59

## 2025-07-31 RX ADMIN — ATORVASTATIN CALCIUM 10 MG: 10 TABLET, FILM COATED ORAL at 20:59

## 2025-07-31 RX ADMIN — MICONAZOLE NITRATE: 20 CREAM TOPICAL at 08:44

## 2025-07-31 RX ADMIN — SODIUM CHLORIDE, PRESERVATIVE FREE 10 ML: 5 INJECTION INTRAVENOUS at 21:01

## 2025-07-31 RX ADMIN — PANTOPRAZOLE SODIUM 40 MG: 40 TABLET, DELAYED RELEASE ORAL at 06:00

## 2025-07-31 RX ADMIN — POLYETHYLENE GLYCOL 3350 17 G: 17 POWDER, FOR SOLUTION ORAL at 08:44

## 2025-07-31 RX ADMIN — OXYCODONE 2.5 MG: 5 TABLET ORAL at 13:27

## 2025-07-31 RX ADMIN — METOPROLOL TARTRATE 50 MG: 50 TABLET, FILM COATED ORAL at 20:59

## 2025-07-31 RX ADMIN — ANTACID TABLETS 500 MG: 500 TABLET, CHEWABLE ORAL at 08:42

## 2025-07-31 RX ADMIN — ACETAMINOPHEN 650 MG: 325 TABLET ORAL at 21:00

## 2025-07-31 ASSESSMENT — PAIN SCALES - GENERAL
PAINLEVEL_OUTOF10: 0
PAINLEVEL_OUTOF10: 10
PAINLEVEL_OUTOF10: 2
PAINLEVEL_OUTOF10: 5
PAINLEVEL_OUTOF10: 5
PAINLEVEL_OUTOF10: 0
PAINLEVEL_OUTOF10: 4
PAINLEVEL_OUTOF10: 0
PAINLEVEL_OUTOF10: 5

## 2025-07-31 ASSESSMENT — PAIN DESCRIPTION - LOCATION
LOCATION: BACK

## 2025-07-31 ASSESSMENT — PAIN DESCRIPTION - ORIENTATION
ORIENTATION: MID;LOWER
ORIENTATION: LOWER;MID
ORIENTATION: MID;LOWER
ORIENTATION: MID;LOWER

## 2025-07-31 ASSESSMENT — PAIN SCALES - WONG BAKER: WONGBAKER_NUMERICALRESPONSE: HURTS LITTLE MORE

## 2025-07-31 ASSESSMENT — PAIN DESCRIPTION - DESCRIPTORS: DESCRIPTORS: SHARP

## 2025-07-31 ASSESSMENT — PAIN - FUNCTIONAL ASSESSMENT: PAIN_FUNCTIONAL_ASSESSMENT: PREVENTS OR INTERFERES SOME ACTIVE ACTIVITIES AND ADLS

## 2025-07-31 NOTE — PLAN OF CARE
Problem: Chronic Conditions and Co-morbidities  Goal: Patient's chronic conditions and co-morbidity symptoms are monitored and maintained or improved  Outcome: Progressing  Flowsheets  Taken 7/31/2025 1607  Care Plan - Patient's Chronic Conditions and Co-Morbidity Symptoms are Monitored and Maintained or Improved: Update acute care plan with appropriate goals if chronic or comorbid symptoms are exacerbated and prevent overall improvement and discharge  Taken 7/31/2025 0830  Care Plan - Patient's Chronic Conditions and Co-Morbidity Symptoms are Monitored and Maintained or Improved: Monitor and assess patient's chronic conditions and comorbid symptoms for stability, deterioration, or improvement     Problem: Discharge Planning  Goal: Discharge to home or other facility with appropriate resources  7/31/2025 1607 by Sonia Colin LPN  Outcome: Progressing  Flowsheets  Taken 7/31/2025 1607  Discharge to home or other facility with appropriate resources:   Identify barriers to discharge with patient and caregiver   Identify discharge learning needs (meds, wound care, etc)  Taken 7/31/2025 0830  Discharge to home or other facility with appropriate resources:   Identify barriers to discharge with patient and caregiver   Arrange for needed discharge resources and transportation as appropriate   Identify discharge learning needs (meds, wound care, etc)     Problem: Safety - Adult  Goal: Free from fall injury  7/31/2025 1607 by Sonia Colin LPN  Outcome: Progressing  Flowsheets (Taken 7/31/2025 1607)  Free From Fall Injury: Instruct family/caregiver on patient safety     Problem: Safety - Adult  Goal: Free from fall injury  7/31/2025 1607 by Sonia Colin LPN  Outcome: Progressing  Flowsheets (Taken 7/31/2025 1607)  Free From Fall Injury: Instruct family/caregiver on patient safety     Problem: ABCDS Injury Assessment  Goal: Absence of physical injury  Outcome: Progressing  Flowsheets (Taken 7/31/2025 1607)  Absence of

## 2025-07-31 NOTE — CARE COORDINATION
7/31/25, 2:08 PM EDT    DISCHARGE PLANNING EVALUATION    Spoke with [facility] at [facility], assisted living can accept back at discharge.   Call made to daughter, Mary.  She is in agreement with return to [facility], requests ambulette transport at discharge.

## 2025-07-31 NOTE — PROGRESS NOTES
[facility]  INPATIENT PHYSICAL THERAPY  DAILY NOTE  [facility] ORTHOPEDICS 7K - 7K-09/009-A      Discharge Recommendations: 24 hour assistance or supervision, Patient would benefit from continued PT at discharge, and return to assistive living facility if they can provide one assist for transfers/ambulation  Equipment Recommendations: No               Time In: 0749  Time Out: 0816  Timed Code Treatment Minutes: 27 Minutes  Minutes: 27          Date: 2025  Patient Name: Rahel Lopez,  Gender:  female        MRN: [id number]  : 3/4/1931  (89 y.o.)     Referring Practitioner: Dr. ERIC Fitzpatrick  Diagnosis: Lumbar compression fracture, closed, initial encounter ([facility])  Additional Pertinent Hx: History of Present Illness:  Rahel Lopez is a 89 y.o. female admitted to [facility] on 2025. Patient  has a past medical history of Acute CVA (cerebrovascular accident) ([facility]), Arthritis, Cataract of both eyes, GERD (gastroesophageal reflux disease), Hyperlipidemia, Hypertension, Keratosis, and Psychiatric problem.  Patient presented to [facility] ER after suffering a fall at assisted living.  Patient was reportedly getting up into a small space without a walker in order to lower the blinds.  Patient suffered a fall onto her bottom.  An x-ray was completed at AL, questionable of a L4-5 fracture.  CT of the lumbar spine abdomen and pelvis revealed a L1 25% compression fracture.  Patient was admitted to the hospital.     Prior Level of Function:  Lives With: Other (Comment) (assisted living)  Type of Home: Assisted living  Home Layout: One level  Home Access: Level entry  Home Equipment: Walker - Rolling   Bathroom Shower/Tub: Walk-in shower, Shower chair with back  Bathroom Toilet: Handicap height  Bathroom Equipment: Grab bars in shower  Bathroom Accessibility: Accessible    Prior Level of Assist for ADLs: Needs assistance  Prior Level of Assist for Homemaking: Needs assistance  Prior Level of Assist for

## 2025-07-31 NOTE — CARE COORDINATION
7/31/25, 9:00 AM EDT    DISCHARGE PLANNING EVALUATION   Call made to [facility] director of nursing, unable to reach this morning, no voice mail available.  Discharge planning is ongoing depending on [facility]'s decision on return.

## 2025-07-31 NOTE — PLAN OF CARE
Problem: Discharge Planning  Goal: Discharge to home or other facility with appropriate resources  Outcome: Progressing     Problem: Safety - Adult  Goal: Free from fall injury  Outcome: Progressing   ID band on. [place] socks provided. Bed in low position and hourly rounding in place.   Problem: Pain  Goal: Verbalizes/displays adequate comfort level or baseline comfort level  Outcome: Progressing   PRN medication given with good effect.

## 2025-07-31 NOTE — PROGRESS NOTES
INTERNAL MEDICINE Progress Note  7/31/2025 2:22 PM  Subjective:   Admit Date: 7/26/2025  PCP: Herminia Lopez MD  Interval History:     7/30/25  + lower back pain, uses lumbar brace   On analgesics    7/31/25  LBP  Weakness    Objective:   Vitals: BP (!) 154/75   Pulse 82   Temp 98.2 °F (36.8 °C) (Oral)   Resp 16   Wt 54.4 kg (120 lb)   SpO2 98%   BMI 21.95 kg/m²   General appearance: alert and cooperative with exam  HEENT:  atraumatic  Neck: no adenopathy, no carotid bruit, and no JVD  Lungs: clear to auscultation bilaterally  Heart: S1, S2 normal  Abdomen: soft, non-tender; bowel sounds normal; no masses,  no organomegaly  Extremities: no edema, redness or tenderness in the calves or thighs, diffuse tenderness over lower L spine  Neurologic: Alert, oriented, thought content appropriate      Medications:   Scheduled Meds:   miconazole   Topical BID    amLODIPine  5 mg Oral Daily    atorvastatin  10 mg Oral Nightly    buPROPion  150 mg Oral Daily    calcium carbonate  1 tablet Oral Daily    metoprolol tartrate  50 mg Oral BID    pantoprazole  40 mg Oral QAM AC    senna  1 tablet Oral Daily    Vitamin D  2,000 Units Oral Daily    sodium chloride flush  5-40 mL IntraVENous 2 times per day    enoxaparin  40 mg SubCUTAneous Daily    polyethylene glycol  17 g Oral Daily     Continuous Infusions:   sodium chloride         Lab Results:   CBC:   No results for input(s): \"WBC\", \"HGB\", \"PLT\" in the last 72 hours.    BMP:    No results for input(s): \"NA\", \"K\", \"CL\", \"CO2\", \"BUN\", \"CREATININE\", \"GLUCOSE\" in the last 72 hours.    Hepatic:   No results for input(s): \"AST\", \"ALT\", \"BILITOT\", \"ALKPHOS\" in the last 72 hours.    Invalid input(s): \"ALB\"      Magnesium:    Lab Results   Component Value Date/Time    MG 1.9 07/10/2024 08:33 PM     Uric Acid:  No components found for: \"URIC\"  HgBA1c:    Lab Results   Component Value Date/Time    LABA1C 5.7 07/12/2024 10:05 AM     TSH:    Lab Results   Component Value Date/Time     TSH 4.08 07/27/2025 07:39 AM     MRI SACRUM COCCYX WO CONTRAST [[id number]]    Collected: 07/29/25 1606    Updated: 07/29/25 1620    Order Status: Completed    Narrative:     PROCEDURE: MRI SACRUM COCCYX WO CONTRAST    CLINICAL INFORMATION: sacral pain post fall    COMPARISON: No prior study.    TECHNIQUE: Multiplanar and multisequence MRI of the sacrum and coccyx without  contrast.      FINDINGS:    ALIGNMENT: Anatomic.    BONES: Yellow marrow signal dominates.   Bone marrow edema is seen in S2, S3,  and S4.    SACROILIAC JOINTS:  1. Mild degenerative changes of the SI joints.  2. There is no sclerosis.  3. There is no erosion.  4. There is no ankylosis.      HIP JOINTS:  1. Surgical hardware is seen in the right femur.  2. Mild degenerative changes of the left hip.    TENDONS:  1. Hamstring tendons: Unremarkable    SCIATIC NOTCH/SCIATIC NERVE: Intact.    NERVE ROOTS: Unremarkable    SOFT TISSUES AND PELVIC STRUCTURES: Unremarkable.    OTHER FINDINGS: None   Impression:     1. Increased bone marrow edema is seen in the S2, S3, S4 that can suggest the presence of stress reaction or a insufficiency fracture.     MRI LUMBAR SPINE WO CONTRAST [[id number]]    Collected: 07/29/25 1358    Updated: 07/29/25 1411    Order Status: Completed    Narrative:     PROCEDURE: MRI LUMBAR SPINE WO CONTRAST    CLINICAL INFORMATION: L1 VCF.    COMPARISON: CT scan of the lumbar spine dated 7/26/2025..    TECHNIQUE: Sagittal and axial T1 and T2-weighted images were obtained through the lumbar spine.    FINDINGS:  There is anterolisthesis of L4 relative to L5..  There is an acute compression fracture involving the L1 vertebral body with 25% compression.. There is mild retropulsion into the spinal canal. There is no bone marrow edema.  There are no pars defects  noted.  .    The distal spinal cord, conus medullaris and cauda equina are normal.    There are no gross abnormalities in the visualized aspects of the distal thoracic spine.    On

## 2025-08-01 VITALS
RESPIRATION RATE: 18 BRPM | DIASTOLIC BLOOD PRESSURE: 71 MMHG | OXYGEN SATURATION: 99 % | TEMPERATURE: 97.9 F | SYSTOLIC BLOOD PRESSURE: 138 MMHG | HEART RATE: 83 BPM | BODY MASS INDEX: 21.95 KG/M2 | WEIGHT: 120 LBS

## 2025-08-01 PROCEDURE — 97110 THERAPEUTIC EXERCISES: CPT

## 2025-08-01 PROCEDURE — 6370000000 HC RX 637 (ALT 250 FOR IP): Performed by: INTERNAL MEDICINE

## 2025-08-01 PROCEDURE — 97116 GAIT TRAINING THERAPY: CPT

## 2025-08-01 PROCEDURE — 2500000003 HC RX 250 WO HCPCS: Performed by: INTERNAL MEDICINE

## 2025-08-01 PROCEDURE — 97535 SELF CARE MNGMENT TRAINING: CPT

## 2025-08-01 RX ORDER — MICONAZOLE NITRATE 2 G/100G
CREAM TOPICAL
DISCHARGE
Start: 2025-08-01

## 2025-08-01 RX ADMIN — MICONAZOLE NITRATE: 20 CREAM TOPICAL at 08:43

## 2025-08-01 RX ADMIN — Medication 2000 UNITS: at 08:45

## 2025-08-01 RX ADMIN — SODIUM CHLORIDE, PRESERVATIVE FREE 10 ML: 5 INJECTION INTRAVENOUS at 08:48

## 2025-08-01 RX ADMIN — METOPROLOL TARTRATE 50 MG: 50 TABLET, FILM COATED ORAL at 08:45

## 2025-08-01 RX ADMIN — ANTACID TABLETS 500 MG: 500 TABLET, CHEWABLE ORAL at 08:48

## 2025-08-01 RX ADMIN — BUPROPION HYDROCHLORIDE 150 MG: 150 TABLET, FILM COATED, EXTENDED RELEASE ORAL at 08:45

## 2025-08-01 RX ADMIN — POLYETHYLENE GLYCOL 3350 17 G: 17 POWDER, FOR SOLUTION ORAL at 08:48

## 2025-08-01 RX ADMIN — AMLODIPINE BESYLATE 5 MG: 5 TABLET ORAL at 08:45

## 2025-08-01 RX ADMIN — ACETAMINOPHEN 650 MG: 325 TABLET ORAL at 05:18

## 2025-08-01 RX ADMIN — PANTOPRAZOLE SODIUM 40 MG: 40 TABLET, DELAYED RELEASE ORAL at 05:18

## 2025-08-01 RX ADMIN — SENNOSIDES 8.6 MG: 8.6 TABLET, FILM COATED ORAL at 08:48

## 2025-08-01 ASSESSMENT — PAIN SCALES - GENERAL
PAINLEVEL_OUTOF10: 2
PAINLEVEL_OUTOF10: 5
PAINLEVEL_OUTOF10: 0
PAINLEVEL_OUTOF10: 4

## 2025-08-01 ASSESSMENT — PAIN DESCRIPTION - LOCATION: LOCATION: BACK

## 2025-08-01 NOTE — PROGRESS NOTES
[facility] ORTHOPEDICS 7K  Occupational Therapy  Daily Note    Discharge Recommendations: Pt. Is returning to [facility] will require one assist for transfers and basic ADLs.  Equipment Recommendations: No        Time In: 1138  Time Out: 1202  Timed Code Treatment Minutes: 24 Minutes  Minutes: 24          Date: 2025  Patient Name: Rahel Lopez,   Gender: female      Room: [address] [place] [address]  MRN: [id number]  : 3/4/1931  (89 y.o.)  Referring Practitioner: Cruz Fitzpatrick MD  Diagnosis: Lumbar compression fracture, closed, initial encounter ([facility])  Additional Pertinent Hx: Per EMR, \"The patient is a 89 y.o. female who presents with back pain following a fall at the nursing home.  The falll happened 3 days ago.  Patient subsequently developed severe pain in the lower back.  CT scan of the lumbar spine showed a new fracture in the lumbar #1 vertebrae.  Patient was seen in the emergency room evaluated and admitted for pain control and further care /orthospine consult.\"    Restrictions/Precautions:  Restrictions/Precautions: Fall Risk, General Precautions  Required Braces or Orthoses  Spinal: Lumbar Corset  Position Activity Restriction  Spinal Precautions: No Bending/Lifting/Twisting (BLT)     Social/Functional History:  Lives With: Other (Comment) (assisted living)  Type of Home: Assisted living  Home Layout: One level  Home Access: Level entry  Home Equipment: Walker - Rolling   Bathroom Shower/Tub: Walk-in shower, Shower chair with back  Bathroom Toilet: Handicap height  Bathroom Equipment: Grab bars in shower  Bathroom Accessibility: Accessible       Prior Level of Assist for ADLs: Needs assistance  Prior Level of Assist for Homemaking: Needs assistance  Prior Level of Assist for Transfers: Needs assistance  Prior Level of Assist for Ambulation: Independent household ambulator, with or without device  Has the patient had two or more falls in the past year or any fall with injury in the past year?:

## 2025-08-01 NOTE — PROGRESS NOTES
INTERNAL MEDICINE Progress Note  8/1/2025 12:49 PM  Subjective:   Admit Date: 7/26/2025  PCP: Herminia oLpez MD  Interval History:     7/30/25  + lower back pain, uses lumbar brace   On analgesics    7/31/25  LBP  Weakness    8/1/25  No new c/o  On analgesics  Back brace with PT    Objective:   Vitals: /69   Pulse 69   Temp 98.4 °F (36.9 °C) (Oral)   Resp 18   Wt 54.4 kg (120 lb)   SpO2 99%   BMI 21.95 kg/m²   General appearance: alert and cooperative with exam  HEENT:  atraumatic  Neck: no adenopathy, no carotid bruit, and no JVD  Lungs: clear to auscultation bilaterally  Heart: S1, S2 normal  Abdomen: soft, non-tender; bowel sounds normal; no masses,  no organomegaly  Extremities: no edema, redness or tenderness in the calves or thighs, diffuse tenderness over lower L spine  Neurologic: Alert, oriented, thought content appropriate      Medications:   Scheduled Meds:   miconazole   Topical BID    amLODIPine  5 mg Oral Daily    atorvastatin  10 mg Oral Nightly    buPROPion  150 mg Oral Daily    calcium carbonate  1 tablet Oral Daily    metoprolol tartrate  50 mg Oral BID    pantoprazole  40 mg Oral QAM AC    senna  1 tablet Oral Daily    Vitamin D  2,000 Units Oral Daily    sodium chloride flush  5-40 mL IntraVENous 2 times per day    enoxaparin  40 mg SubCUTAneous Daily    polyethylene glycol  17 g Oral Daily     Continuous Infusions:   sodium chloride         Lab Results:   CBC:   No results for input(s): \"WBC\", \"HGB\", \"PLT\" in the last 72 hours.    BMP:    No results for input(s): \"NA\", \"K\", \"CL\", \"CO2\", \"BUN\", \"CREATININE\", \"GLUCOSE\" in the last 72 hours.    Hepatic:   No results for input(s): \"AST\", \"ALT\", \"BILITOT\", \"ALKPHOS\" in the last 72 hours.    Invalid input(s): \"ALB\"      Magnesium:    Lab Results   Component Value Date/Time    MG 1.9 07/10/2024 08:33 PM     Uric Acid:  No components found for: \"URIC\"  HgBA1c:    Lab Results   Component Value Date/Time    LABA1C 5.7 07/12/2024 10:05 AM

## 2025-08-01 NOTE — PLAN OF CARE
Problem: Chronic Conditions and Co-morbidities  Goal: Patient's chronic conditions and co-morbidity symptoms are monitored and maintained or improved  Outcome: Completed     Problem: Discharge Planning  Goal: Discharge to home or other facility with appropriate resources  8/1/2025 1819 by Nova Lawrence LPN  Outcome: Completed     Problem: Safety - Adult  Goal: Free from fall injury  8/1/2025 1819 by Nova Lawrence LPN  Outcome: Completed     Problem: ABCDS Injury Assessment  Goal: Absence of physical injury  Outcome: Completed     Problem: Pain  Goal: Verbalizes/displays adequate comfort level or baseline comfort level  8/1/2025 1819 by Nova Lawrence LPN  Outcome: Completed     Problem: Skin/Tissue Integrity  Goal: Skin integrity remains intact  Description: 1.  Monitor for areas of redness and/or skin breakdown  2.  Assess vascular access sites hourly  3.  Every 4-6 hours minimum:  Change oxygen saturation probe site  4.  Every 4-6 hours:  If on nasal continuous positive airway pressure, respiratory therapy assess nares and determine need for appliance change or resting period  Outcome: Completed     Problem: Musculoskeletal - Adult  Goal: Return mobility to safest level of function  Outcome: Completed     Problem: Musculoskeletal - Adult  Goal: Maintain proper alignment of affected body part  Outcome: Completed     Problem: Musculoskeletal - Adult  Goal: Return ADL status to a safe level of function  Outcome: Completed   Care plan reviewed with patient.  Patient verbalized understanding of the plan of care and contribute to goal setting.

## 2025-08-01 NOTE — PROGRESS NOTES
[facility]  INPATIENT PHYSICAL THERAPY  DAILY NOTE  [facility] ORTHOPEDICS 7K - 7K-09/009-A      Discharge Recommendations: return to AL  Equipment Recommendations: No               Time In: 1023  Time Out: 1058  Timed Code Treatment Minutes: 35 Minutes  Minutes: 35          Date: 2025  Patient Name: Rahel Lopez,  Gender:  female        MRN: [id number]  : 3/4/1931  (89 y.o.)     Referring Practitioner: Dr. ERIC Fitzpatrick  Diagnosis: Lumbar compression fracture, closed, initial encounter ([facility])  Additional Pertinent Hx: History of Present Illness:  Rahel Lopez is a 89 y.o. female admitted to [facility] on 2025. Patient  has a past medical history of Acute CVA (cerebrovascular accident) ([facility]), Arthritis, Cataract of both eyes, GERD (gastroesophageal reflux disease), Hyperlipidemia, Hypertension, Keratosis, and Psychiatric problem.  Patient presented to [facility] ER after suffering a fall at assisted living.  Patient was reportedly getting up into a small space without a walker in order to lower the blinds.  Patient suffered a fall onto her bottom.  An x-ray was completed at AL, questionable of a L4-5 fracture.  CT of the lumbar spine abdomen and pelvis revealed a L1 25% compression fracture.  Patient was admitted to the hospital.     Prior Level of Function:  Lives With: Other (Comment) (assisted living)  Type of Home: Assisted living  Home Layout: One level  Home Access: Level entry  Home Equipment: Walker - Rolling   Bathroom Shower/Tub: Walk-in shower, Shower chair with back  Bathroom Toilet: Handicap height  Bathroom Equipment: Grab bars in shower  Bathroom Accessibility: Accessible    Prior Level of Assist for ADLs: Needs assistance  Prior Level of Assist for Homemaking: Needs assistance  Prior Level of Assist for Transfers: Needs assistance  Active : No  Additional Comments: per pt amb short distances with RW and 1 assist at A.L. facility, pt stated rarely amb alone with hx of

## 2025-08-01 NOTE — CARE COORDINATION
8/1/25, 12:52 PM EDT    DISCHARGE PLANNING EVALUATION    Confirmed plan for return to [facility] with facility and daughter, daughter confirms request for ambulette transport, scheduled for 5:00 pm., updated RN.     8/1/25, 12:52 PM EDT    Patient goals/plan/ treatment preferences discussed by  and .  Patient goals/plan/ treatment preferences reviewed with patient/ family.  Patient/ family verbalize understanding of discharge plan and are in agreement with goal/plan/treatment preferences.  Understanding was demonstrated using the teach back method.  AVS provided by RN at time of discharge, which includes all necessary medical information pertaining to the patients current course of illness, treatment, post-discharge goals of care, and treatment preferences.     Services At/After Discharge: Assisted Living and In ambulette

## 2025-08-01 NOTE — PROGRESS NOTES
[facility] Transport given packet for transport to [facility] Patient transported via wheelchair with all of her belongings.

## 2025-08-01 NOTE — DISCHARGE INSTR - COC
Continuity of Care Form    Patient Name: Rahel Lopez   :  3/4/1931  MRN:  [id number]    Admit date:  2025  Discharge date:  ***    Code Status Order: Full Code   Advance Directives:     Admitting Physician:  Cruz Fitzpatrick MD  PCP: Herminia Lopez MD    Discharging Nurse: ***  Discharging Hospital Unit/Room#: 7K-09/009-A  Discharging Unit Phone Number: ***    Emergency Contact:   Extended Emergency Contact Information  Primary Emergency Contact: Bessy Castro   [facility]  Home Phone: [phone]  Mobile Phone: [phone]  Relation: Child  Secondary Emergency Contact: Helder Lopez  Home Phone: [phone]  Mobile Phone: [phone]  Relation: Child    Past Surgical History:  Past Surgical History:   Procedure Laterality Date    ABDOMEN SURGERY      APPENDECTOMY      CHOLECYSTECTOMY      COLON SURGERY      resection for diverticulitis    COLONOSCOPY      DILATATION, ESOPHAGUS      ENDOSCOPY, COLON, DIAGNOSTIC      HIP SURGERY Right 2024    RIGHT HIP INTERTAN performed by Jared Burns MD at [facility] OR    HYSTERECTOMY (CERVIX STATUS UNKNOWN)  1979    JOINT REPLACEMENT Right     right knee    KNEE ARTHROPLASTY Right 2021    RIGHT TOTAL KNEE REVISION WITH OSS performed by Jared Burns MD at [facility] OR    KNEE SURGERY      ROTATOR CUFF REPAIR Left        Immunization History:   Immunization History   Administered Date(s) Administered    COVID-19, J&J, (age 18y+), IM, 0.5 mL 2021       Active Problems:  Patient Active Problem List   Diagnosis Code    Hypertension I10    Closed displaced fracture of medial condyle of right femur (HCC) S72.431A    Closed comminuted supracondylar fracture of right femur (HCC) S72.451A    Displaced fracture of medial condyle of right femur, sequela S72.431S    Reactive depression F32.9    Closed fracture of base of fifth metacarpal bone of left hand S62.317A    History of cataract Z86.69    Dysarthria R47.1    Acute CVA

## 2025-08-01 NOTE — DISCHARGE SUMMARY
Discharge Summary    Rahel Lopez  :  3/4/1931  MRN:  [id number]    Admit date:  2025  Discharge date:  2025     Admitting Physician:  Cruz Fitzpatrick MD    Discharge Diagnoses:           Acute fracture of the superior endplate of L1 with 25% height loss.   Multilevel degenerative arthritis lumbar spine.  Multilevel lumbar spinal stenosis.  Moderate to severe L4-5 moderate L3-4 mild at L1, mild - Mod L2-L3  Contusion to sacral bone vs insufficiency fracture  History of CVA with generalized weakness-mild  Hypertension  Mild hyponatremia      Admission Condition:  serious  Discharged Condition:  good    Hospital Course:   ***    Discharge Medications:         Medication List        START taking these medications      miconazole 2 % cream  Commonly known as: MICOTIN  Apply topically 2 times daily.            CONTINUE taking these medications      amLODIPine 5 MG tablet  Commonly known as: NORVASC  Take 1 tablet by mouth daily     aspirin 81 MG chewable tablet  Take 1 tablet by mouth daily     atorvastatin 10 MG tablet  Commonly known as: LIPITOR     * buPROPion 150 MG extended release tablet  Commonly known as: WELLBUTRIN SR     * buPROPion 75 MG tablet  Commonly known as: WELLBUTRIN     calcium carbonate 500 MG chewable tablet  Commonly known as: TUMS     fluticasone 50 MCG/ACT nasal spray  Commonly known as: FLONASE  2 sprays by Each Nostril route 2 times daily     meloxicam 7.5 MG tablet  Commonly known as: MOBIC     metoprolol tartrate 50 MG tablet  Commonly known as: LOPRESSOR  Take 1 tablet by mouth 2 times daily Take 50mg BID     omeprazole 20 MG delayed release capsule  Commonly known as: PRILOSEC     ondansetron 4 MG disintegrating tablet  Commonly known as: ZOFRAN-ODT  Take 1 tablet by mouth every 8 hours as needed for Nausea or Vomiting     polyethylene glycol 17 GM/SCOOP powder  Commonly known as: GLYCOLAX     senna 8.6 MG tablet  Commonly known as: SENOKOT     sodium chloride 1 g tablet  Take

## 2025-08-01 NOTE — PLAN OF CARE
Problem: Discharge Planning  Goal: Discharge to home or other facility with appropriate resources  Outcome: Progressing     Problem: Safety - Adult  Goal: Free from fall injury  Outcome: Progressing   ID band on. Nonslip socks provided. Bed in low position and hourly rounding in place.   Problem: Pain  Goal: Verbalizes/displays adequate comfort level or baseline comfort level  Outcome: Progressing  Flowsheets (Taken 8/1/2025 0500)  Verbalizes/displays adequate comfort level or baseline comfort level: Encourage patient to monitor pain and request assistance

## 2025-08-04 ENCOUNTER — CARE COORDINATION (OUTPATIENT)
Dept: CARE COORDINATION | Age: 89
End: 2025-08-04

## 2025-08-04 ENCOUNTER — TELEPHONE (OUTPATIENT)
Age: 89
End: 2025-08-04

## 2025-08-05 ENCOUNTER — CARE COORDINATION (OUTPATIENT)
Dept: CARE COORDINATION | Age: 89
End: 2025-08-05

## 2025-08-05 ENCOUNTER — TELEPHONE (OUTPATIENT)
Age: 89
End: 2025-08-05

## 2025-08-05 DIAGNOSIS — S32.000A LUMBAR COMPRESSION FRACTURE, CLOSED, INITIAL ENCOUNTER (HCC): Primary | ICD-10-CM

## 2025-08-05 PROCEDURE — 1111F DSCHRG MED/CURRENT MED MERGE: CPT | Performed by: FAMILY MEDICINE

## 2025-08-08 NOTE — PROGRESS NOTES
Physician Progress Note      PATIENT:               KAREN OLIVAS  CSN #:                  [id number]  :                       3/4/1931  ADMIT DATE:       2025 9:32 AM  DISCH DATE:        2025 6:23 PM  RESPONDING  PROVIDER #:        Cruz Fitzpatrick MD          QUERY TEXT:    Please clarify whether the Acute fracture of the superior endplate of L1   documented H&P by Cruz Fitzpatrick MD at 2025 is related to a traumatic or   non-traumatic cause or following a minor injury that would not routinely   break a healthy bone:    The clinical indicators include:  The patient is a 89 y.o. female who presents with back pain.    -AGE89, Degenerative arthritis, H/O CVA (from H&P on )    -CT scan with L1 SEP fracture with 25% height likely 2/2 to underlying   osteoporosis and significant spinal canal stenosis at the L3-4 and L4-5 levels   due to a combination of degenerative changes. (from ED Provider Notes by   Arlin Baires MD at 2025)    -Acute fracture of the superior endplate of L1 with 25% height loss. (From H&P   by Cruz Fitzpatrick MD at 2025)    -Acute compression fracture of L1 with 25% height loss. This is likely   secondary to underlying osteoporosis. (from Consults by Kaleb Mazariegos PA at   2025)    - Acute fracture of the superior endplate of L1 with 25% height loss.   Contusion to sacral bone vs insufficiency fracture. (from Discharge Summary by   Cruz Fitzpatrick MD at 2025)    -Orthopedic Surgery consulted, morphine (PF) injection 2 mg (from [facility] on   ), Vitamin D (CHOLECALCIFEROL) tablet 2,000 Units (from [facility] on -)   0.9 % sodium chloride infusion (from [facility] on -)    Thank you,  Emil. EDEN [facility] CDS  Options provided:  -- Traumatic L1 fracture  -- Fragility/insufficiency L1 fracture related to osteoporosis  -- Fragility/insufficiency L1 fracture related to osteopenia  -- Other - I will add my own diagnosis  -- Disagree - Not applicable / Not

## 2025-08-12 ENCOUNTER — CARE COORDINATION (OUTPATIENT)
Dept: CARE COORDINATION | Age: 89
End: 2025-08-12

## 2025-08-26 ENCOUNTER — APPOINTMENT (OUTPATIENT)
Dept: CT IMAGING | Age: 89
End: 2025-08-26
Payer: MEDICARE

## 2025-08-26 ENCOUNTER — HOSPITAL ENCOUNTER (EMERGENCY)
Age: 89
Discharge: HOME | End: 2025-08-26
Attending: FAMILY MEDICINE
Payer: MEDICARE

## 2025-08-26 VITALS
SYSTOLIC BLOOD PRESSURE: 155 MMHG | HEART RATE: 83 BPM | OXYGEN SATURATION: 100 % | TEMPERATURE: 98.2 F | WEIGHT: 117 LBS | DIASTOLIC BLOOD PRESSURE: 67 MMHG | RESPIRATION RATE: 16 BRPM | BODY MASS INDEX: 21.4 KG/M2
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
## 2025-08-26 LAB
ANION GAP SERPL CALC-SCNC: 13 MEQ/L (ref 8–16)
BASOPHILS ABSOLUTE: 0.1 THOU/MM3 (ref 0–0.1)
BASOPHILS NFR BLD AUTO: 1.5 %
BUN SERPL-MCNC: 14 MG/DL (ref 8–23)
CALCIUM SERPL-MCNC: 9.6 MG/DL (ref 8.5–10.5)
CHLORIDE SERPL-SCNC: 100 MEQ/L (ref 98–111)
CO2 SERPL-SCNC: 23 MEQ/L (ref 22–29)
CREAT SERPL-MCNC: 0.5 MG/DL (ref 0.5–0.9)
DEPRECATED RDW RBC AUTO: 45.9 FL (ref 35–45)
EKG ATRIAL RATE: 85 BPM
EKG P AXIS: 43 DEGREES
EKG P-R INTERVAL: 204 MS
EKG Q-T INTERVAL: 396 MS
EKG QRS DURATION: 82 MS
EKG QTC CALCULATION (BAZETT): 471 MS
EKG R AXIS: -27 DEGREES
EKG T AXIS: 40 DEGREES
EKG VENTRICULAR RATE: 85 BPM
EOSINOPHIL NFR BLD AUTO: 4.1 %
EOSINOPHILS ABSOLUTE: 0.3 THOU/MM3 (ref 0–0.4)
ERYTHROCYTE [DISTWIDTH] IN BLOOD BY AUTOMATED COUNT: 13.4 % (ref 11.5–14.5)
GFR SERPL CREATININE-BSD FRML MDRD: 87 ML/MIN/1.73M2
GLUCOSE SERPL-MCNC: 96 MG/DL (ref 74–109)
HCT VFR BLD AUTO: 42.8 % (ref 37–47)
HGB BLD-MCNC: 13.9 GM/DL (ref 12–16)
IMM GRANULOCYTES # BLD AUTO: 0.02 THOU/MM3 (ref 0–0.07)
IMM GRANULOCYTES NFR BLD AUTO: 0.3 %
LYMPHOCYTES ABSOLUTE: 1.4 THOU/MM3 (ref 1–4.8)
LYMPHOCYTES NFR BLD AUTO: 18 %
MCH RBC QN AUTO: 30.3 PG (ref 26–33)
MCHC RBC AUTO-ENTMCNC: 32.5 GM/DL (ref 32.2–35.5)
MCV RBC AUTO: 93.4 FL (ref 81–99)
MONOCYTES ABSOLUTE: 1 THOU/MM3 (ref 0.4–1.3)
MONOCYTES NFR BLD AUTO: 12.9 %
NEUTROPHILS ABSOLUTE: 4.7 THOU/MM3 (ref 1.8–7.7)
NEUTROPHILS NFR BLD AUTO: 63.2 %
NRBC BLD AUTO-RTO: 0 /100 WBC
OSMOLALITY SERPL CALC.SUM OF ELEC: 272.3 MOSMOL/KG (ref 275–300)
PLATELET # BLD AUTO: 258 THOU/MM3 (ref 130–400)
PMV BLD AUTO: 9.4 FL (ref 9.4–12.4)
POTASSIUM SERPL-SCNC: 4 MEQ/L (ref 3.5–5.2)
RBC # BLD AUTO: 4.58 MILL/MM3 (ref 4.2–5.4)
SODIUM SERPL-SCNC: 136 MEQ/L (ref 135–145)
WBC # BLD AUTO: 7.5 THOU/MM3 (ref 4.8–10.8)

## 2025-08-26 PROCEDURE — 80048 BASIC METABOLIC PNL TOTAL CA: CPT

## 2025-08-26 PROCEDURE — 85025 COMPLETE CBC W/AUTO DIFF WBC: CPT

## 2025-08-26 PROCEDURE — 93010 ELECTROCARDIOGRAM REPORT: CPT | Performed by: INTERNAL MEDICINE

## 2025-08-26 PROCEDURE — 36415 COLL VENOUS BLD VENIPUNCTURE: CPT

## 2025-08-26 PROCEDURE — 93005 ELECTROCARDIOGRAM TRACING: CPT | Performed by: EMERGENCY MEDICINE

## 2025-08-26 PROCEDURE — 72125 CT NECK SPINE W/O DYE: CPT

## 2025-08-26 PROCEDURE — 99284 EMERGENCY DEPT VISIT MOD MDM: CPT

## 2025-08-26 PROCEDURE — 70450 CT HEAD/BRAIN W/O DYE: CPT

## 2025-08-26 RX ORDER — IOPAMIDOL 755 MG/ML
80 INJECTION, SOLUTION INTRAVASCULAR
Status: DISCONTINUED | OUTPATIENT
Start: 2025-08-26 | End: 2025-08-26 | Stop reason: HOSPADM

## 2025-08-26 ASSESSMENT — PAIN - FUNCTIONAL ASSESSMENT: PAIN_FUNCTIONAL_ASSESSMENT: 0-10

## 2025-08-26 ASSESSMENT — PAIN SCALES - GENERAL: PAINLEVEL_OUTOF10: 0

## (undated) DEVICE — COVER ARMBRD W13XL28.5IN IMPERV BLU FOR OP RM

## (undated) DEVICE — HIP PINNING: Brand: MEDLINE INDUSTRIES, INC.

## (undated) DEVICE — SUTURE VIC + LEN CP1 0 70CM VCP267H

## (undated) DEVICE — BASIC SINGLE BASIN BTC-LF: Brand: MEDLINE INDUSTRIES, INC.

## (undated) DEVICE — SPONGE GZ W4XL4IN COT 12 PLY TYP VII WVN C FLD DSGN

## (undated) DEVICE — C-ARM: Brand: UNBRANDED

## (undated) DEVICE — DRAPE C ARM W36XL30IN RECTANG BND BG AND TAPE

## (undated) DEVICE — BANDAGE COMPR E SGL LAYERED CLSR BGE W/ CLP W4INXL15FT

## (undated) DEVICE — GLOVE ORANGE PI 8 1/2   MSG9085

## (undated) DEVICE — TETRA-FLEX CF WOVEN LATEX FREE ELASTIC BANDAGE 6" X 5.5 YD: Brand: TETRA-FLEX™CF

## (undated) DEVICE — PREP IM ENCHANCED TOTAL HIP BONE                                    PREPARATION KIT: Brand: PREP-IM

## (undated) DEVICE — SUTURE VCRL SZ 1 L36IN ABSRB UD CTX L48MM 1/2 CIR J977H

## (undated) DEVICE — 450 ML BOTTLE OF 0.05% CHLORHEXIDINE GLUCONATE IN 99.95% STERILE WATER FOR IRRIGATION, USP AND APPLICATOR.: Brand: IRRISEPT ANTIMICROBIAL WOUND LAVAGE

## (undated) DEVICE — SUTURE VICRYL + SZ 2-0 L27IN ABSRB VLT L26MM CT-2 1/2 CIR VCP333H

## (undated) DEVICE — FAN SPRAY KIT: Brand: PULSAVAC®

## (undated) DEVICE — Device

## (undated) DEVICE — SUTURE ETHLN SZ 3-0 L30IN NONABSORBABLE BLK L36MM FSLX 3/8 1673BH

## (undated) DEVICE — LINER SUCT CANSTR 1500CC SEMI RIG W/ POR HYDROPHOBIC SHUT

## (undated) DEVICE — DRESSING,GAUZE,XEROFORM,CURAD,5"X9",ST: Brand: CURAD

## (undated) DEVICE — GAUZE,SPONGE,8"X4",12PLY,XRAY,STRL,LF: Brand: MEDLINE

## (undated) DEVICE — DECANTER FLD 9IN ST BG FOR ASEP TRNSF OF FLD

## (undated) DEVICE — ADHESIVE SKIN CLOSURE WND 8.661X1.5 IN 22 CM LIQUIBAND SECUR

## (undated) DEVICE — DRAPE,EXTREMITY,89X128,STERILE: Brand: MEDLINE

## (undated) DEVICE — SYRINGE MED 10ML LUERLOCK TIP W/O SFTY DISP

## (undated) DEVICE — Z INACTIVE USE 2660664 SOLUTION IRRIG 3000ML 0.9% SOD CHL USP UROMATIC PLAS CONT

## (undated) DEVICE — DRAIN SURG 10FR PVC TB W/ TRCR MID PERF NO RESVR HUBLESS

## (undated) DEVICE — VORTEX VACUUM MIXING SYSTEM: Brand: VORTEX

## (undated) DEVICE — GUIDE PIN 3.2MM X 343MM: Brand: TRIGEN

## (undated) DEVICE — BANDAGE COMPR M W6INXL10YD WHT BGE VELC E MTRX HK AND LOOP

## (undated) DEVICE — NEEDLE SPNL L3.5IN PNK HUB S STL REG WALL FIT STYL W/ QNCKE

## (undated) DEVICE — 2.7MM DRILL BIT W/QUICK CONNECT: Brand: PERI-LOC

## (undated) DEVICE — STRYKER PERFORMANCE SERIES SAGITTAL BLADE: Brand: STRYKER PERFORMANCE SERIES

## (undated) DEVICE — PAD,ABDOMINAL,5"X9",ST,LF,25/BX: Brand: MEDLINE INDUSTRIES, INC.

## (undated) DEVICE — SUTURE VCRL + SZ 2-0 L27IN ABSRB UD CP-1 1/2 CIR REV CUT VCP266H

## (undated) DEVICE — 4.0MM LONG AO PILOT DRILL: Brand: TRIGEN

## (undated) DEVICE — GLOVE PROTCT PF XL ANTIMICROBIAL A4 CUT RESIST SCEPTER LTX

## (undated) DEVICE — COVER,MAYO STAND,STERILE: Brand: MEDLINE

## (undated) DEVICE — KIT EVAC 400CC PVC RADPQ Y CONN

## (undated) DEVICE — GAMMEX® NON-LATEX SIZE 7.5, STERILE NEOPRENE POWDER-FREE SURGICAL GLOVE: Brand: GAMMEX

## (undated) DEVICE — SYRINGE MED 50ML LUERLOCK TIP

## (undated) DEVICE — PADDING CAST W6INXL4YD COT LO LINTING WYTEX

## (undated) DEVICE — PACK-MAJOR

## (undated) DEVICE — ROYAL SILK SURGICAL GOWN, XXL: Brand: CONVERTORS

## (undated) DEVICE — GLOVE ORANGE PI 7   MSG9070